# Patient Record
Sex: MALE | Race: WHITE | Employment: OTHER | URBAN - METROPOLITAN AREA
[De-identification: names, ages, dates, MRNs, and addresses within clinical notes are randomized per-mention and may not be internally consistent; named-entity substitution may affect disease eponyms.]

---

## 2017-10-23 ENCOUNTER — APPOINTMENT (OUTPATIENT)
Dept: CT IMAGING | Age: 77
DRG: 200 | End: 2017-10-23
Attending: EMERGENCY MEDICINE
Payer: MEDICARE

## 2017-10-23 ENCOUNTER — APPOINTMENT (OUTPATIENT)
Dept: GENERAL RADIOLOGY | Age: 77
DRG: 200 | End: 2017-10-23
Attending: EMERGENCY MEDICINE
Payer: MEDICARE

## 2017-10-23 ENCOUNTER — HOSPITAL ENCOUNTER (INPATIENT)
Age: 77
LOS: 4 days | Discharge: HOME | DRG: 200 | End: 2017-10-27
Attending: EMERGENCY MEDICINE | Admitting: HOSPITALIST
Payer: MEDICARE

## 2017-10-23 DIAGNOSIS — I10 ESSENTIAL HYPERTENSION: ICD-10-CM

## 2017-10-23 DIAGNOSIS — J90 PLEURAL EFFUSION: ICD-10-CM
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
## 2017-10-23 DIAGNOSIS — S22.41XA CLOSED FRACTURE OF MULTIPLE RIBS OF RIGHT SIDE, INITIAL ENCOUNTER: ICD-10-CM

## 2017-10-23 DIAGNOSIS — S27.321A CONTUSION OF RIGHT LUNG, INITIAL ENCOUNTER: ICD-10-CM

## 2017-10-23 DIAGNOSIS — R06.02 SHORTNESS OF BREATH: ICD-10-CM

## 2017-10-23 DIAGNOSIS — S27.0XXA TRAUMATIC PNEUMOTHORAX WITHOUT OPEN WOUND INTO THORAX, INITIAL ENCOUNTER: ICD-10-CM

## 2017-10-23 DIAGNOSIS — J93.9 PNEUMOTHORAX ON RIGHT: ICD-10-CM

## 2017-10-23 DIAGNOSIS — I48.91 ATRIAL FIBRILLATION, UNSPECIFIED TYPE (HCC): ICD-10-CM

## 2017-10-23 DIAGNOSIS — S22.41XD CLOSED FRACTURE OF MULTIPLE RIBS OF RIGHT SIDE WITH ROUTINE HEALING, SUBSEQUENT ENCOUNTER: ICD-10-CM

## 2017-10-23 PROBLEM — E87.5 HYPERKALEMIA: Status: ACTIVE | Noted: 2017-10-23

## 2017-10-23 PROBLEM — S22.49XA MULTIPLE RIB FRACTURES: Status: ACTIVE | Noted: 2017-10-23

## 2017-10-23 LAB
ANION GAP SERPL CALC-SCNC: 10 MMOL/L (ref 7–16)
APPEARANCE UR: CLEAR
BACTERIA URNS QL MICRO: 0 /HPF
BASOPHILS # BLD: 0 K/UL (ref 0–0.2)
BASOPHILS NFR BLD: 0 % (ref 0–2)
BILIRUB UR QL: NEGATIVE
BNP SERPL-MCNC: 430 PG/ML
BUN SERPL-MCNC: 17 MG/DL (ref 8–23)
CALCIUM SERPL-MCNC: 9.4 MG/DL (ref 8.3–10.4)
CASTS URNS QL MICRO: ABNORMAL /LPF
CHLORIDE SERPL-SCNC: 98 MMOL/L (ref 98–107)
CO2 SERPL-SCNC: 26 MMOL/L (ref 21–32)
COLOR UR: YELLOW
CREAT SERPL-MCNC: 1.34 MG/DL (ref 0.8–1.5)
CRP SERPL-MCNC: 0.9 MG/DL (ref 0–0.9)
DIFFERENTIAL METHOD BLD: ABNORMAL
EOSINOPHIL # BLD: 0 K/UL (ref 0–0.8)
EOSINOPHIL NFR BLD: 0 % (ref 0.5–7.8)
EPI CELLS #/AREA URNS HPF: 0 /HPF
ERYTHROCYTE [DISTWIDTH] IN BLOOD BY AUTOMATED COUNT: 13.1 % (ref 11.9–14.6)
GLUCOSE SERPL-MCNC: 129 MG/DL (ref 65–100)
GLUCOSE UR STRIP.AUTO-MCNC: NEGATIVE MG/DL
HCT VFR BLD AUTO: 41.4 % (ref 41.1–50.3)
HGB BLD-MCNC: 14.3 G/DL (ref 13.6–17.2)
HGB UR QL STRIP: NEGATIVE
IMM GRANULOCYTES # BLD: 0 K/UL (ref 0–0.5)
IMM GRANULOCYTES NFR BLD: 0 % (ref 0–5)
KETONES UR QL STRIP.AUTO: NEGATIVE MG/DL
LACTATE BLD-SCNC: 2 MMOL/L (ref 0.5–1.9)
LEUKOCYTE ESTERASE UR QL STRIP.AUTO: NEGATIVE
LYMPHOCYTES # BLD: 0.4 K/UL (ref 0.5–4.6)
LYMPHOCYTES NFR BLD: 4 % (ref 13–44)
MAGNESIUM SERPL-MCNC: 1.8 MG/DL (ref 1.8–2.4)
MCH RBC QN AUTO: 31.9 PG (ref 26.1–32.9)
MCHC RBC AUTO-ENTMCNC: 34.5 G/DL (ref 31.4–35)
MCV RBC AUTO: 92.4 FL (ref 79.6–97.8)
MONOCYTES # BLD: 0.6 K/UL (ref 0.1–1.3)
MONOCYTES NFR BLD: 6 % (ref 4–12)
NEUTS SEG # BLD: 9.4 K/UL (ref 1.7–8.2)
NEUTS SEG NFR BLD: 90 % (ref 43–78)
NITRITE UR QL STRIP.AUTO: NEGATIVE
PH UR STRIP: 6.5 [PH] (ref 5–9)
PHOSPHATE SERPL-MCNC: 3.6 MG/DL (ref 2.3–3.7)
PLATELET # BLD AUTO: 230 K/UL (ref 150–450)
PMV BLD AUTO: 9.8 FL (ref 10.8–14.1)
POTASSIUM SERPL-SCNC: 5.2 MMOL/L (ref 3.5–5.1)
PROT UR STRIP-MCNC: 30 MG/DL
RBC # BLD AUTO: 4.48 M/UL (ref 4.23–5.67)
RBC #/AREA URNS HPF: ABNORMAL /HPF
SODIUM SERPL-SCNC: 134 MMOL/L (ref 136–145)
SP GR UR REFRACTOMETRY: 1.02 (ref 1–1.02)
TROPONIN I BLD-MCNC: 0.02 NG/ML (ref 0.02–0.05)
TSH SERPL DL<=0.005 MIU/L-ACNC: 2.31 UIU/ML (ref 0.36–3.74)
UROBILINOGEN UR QL STRIP.AUTO: 0.2 EU/DL (ref 0.2–1)
WBC # BLD AUTO: 10.5 K/UL (ref 4.3–11.1)
WBC URNS QL MICRO: 0 /HPF

## 2017-10-23 PROCEDURE — 65610000001 HC ROOM ICU GENERAL

## 2017-10-23 PROCEDURE — 96375 TX/PRO/DX INJ NEW DRUG ADDON: CPT | Performed by: EMERGENCY MEDICINE

## 2017-10-23 PROCEDURE — 96361 HYDRATE IV INFUSION ADD-ON: CPT | Performed by: EMERGENCY MEDICINE

## 2017-10-23 PROCEDURE — 83880 ASSAY OF NATRIURETIC PEPTIDE: CPT | Performed by: HOSPITALIST

## 2017-10-23 PROCEDURE — 81001 URINALYSIS AUTO W/SCOPE: CPT | Performed by: HOSPITALIST

## 2017-10-23 PROCEDURE — 86140 C-REACTIVE PROTEIN: CPT | Performed by: HOSPITALIST

## 2017-10-23 PROCEDURE — 85025 COMPLETE CBC W/AUTO DIFF WBC: CPT | Performed by: EMERGENCY MEDICINE

## 2017-10-23 PROCEDURE — 74011000258 HC RX REV CODE- 258: Performed by: HOSPITALIST

## 2017-10-23 PROCEDURE — 84443 ASSAY THYROID STIM HORMONE: CPT | Performed by: HOSPITALIST

## 2017-10-23 PROCEDURE — 74011250637 HC RX REV CODE- 250/637: Performed by: HOSPITALIST

## 2017-10-23 PROCEDURE — 71010 XR CHEST PORT: CPT

## 2017-10-23 PROCEDURE — 83735 ASSAY OF MAGNESIUM: CPT | Performed by: HOSPITALIST

## 2017-10-23 PROCEDURE — 74011636320 HC RX REV CODE- 636/320: Performed by: EMERGENCY MEDICINE

## 2017-10-23 PROCEDURE — 83605 ASSAY OF LACTIC ACID: CPT

## 2017-10-23 PROCEDURE — 84484 ASSAY OF TROPONIN QUANT: CPT

## 2017-10-23 PROCEDURE — 74011250636 HC RX REV CODE- 250/636: Performed by: HOSPITALIST

## 2017-10-23 PROCEDURE — 74011250636 HC RX REV CODE- 250/636: Performed by: EMERGENCY MEDICINE

## 2017-10-23 PROCEDURE — 96374 THER/PROPH/DIAG INJ IV PUSH: CPT | Performed by: EMERGENCY MEDICINE

## 2017-10-23 PROCEDURE — 74011000258 HC RX REV CODE- 258: Performed by: EMERGENCY MEDICINE

## 2017-10-23 PROCEDURE — 84145 PROCALCITONIN (PCT): CPT | Performed by: HOSPITALIST

## 2017-10-23 PROCEDURE — 80048 BASIC METABOLIC PNL TOTAL CA: CPT | Performed by: EMERGENCY MEDICINE

## 2017-10-23 PROCEDURE — 99285 EMERGENCY DEPT VISIT HI MDM: CPT | Performed by: EMERGENCY MEDICINE

## 2017-10-23 PROCEDURE — 84100 ASSAY OF PHOSPHORUS: CPT | Performed by: HOSPITALIST

## 2017-10-23 PROCEDURE — 96376 TX/PRO/DX INJ SAME DRUG ADON: CPT | Performed by: EMERGENCY MEDICINE

## 2017-10-23 PROCEDURE — 87040 BLOOD CULTURE FOR BACTERIA: CPT | Performed by: HOSPITALIST

## 2017-10-23 PROCEDURE — 93005 ELECTROCARDIOGRAM TRACING: CPT | Performed by: EMERGENCY MEDICINE

## 2017-10-23 PROCEDURE — 71260 CT THORAX DX C+: CPT

## 2017-10-23 RX ORDER — MORPHINE SULFATE 10 MG/ML
5 INJECTION, SOLUTION INTRAMUSCULAR; INTRAVENOUS
Status: COMPLETED | OUTPATIENT
Start: 2017-10-23 | End: 2017-10-23

## 2017-10-23 RX ORDER — NALOXONE HYDROCHLORIDE 0.4 MG/ML
0.4 INJECTION, SOLUTION INTRAMUSCULAR; INTRAVENOUS; SUBCUTANEOUS AS NEEDED
Status: DISCONTINUED | OUTPATIENT
Start: 2017-10-23 | End: 2017-10-27 | Stop reason: HOSPADM

## 2017-10-23 RX ORDER — BISACODYL 5 MG
5 TABLET, DELAYED RELEASE (ENTERIC COATED) ORAL DAILY PRN
Status: DISCONTINUED | OUTPATIENT
Start: 2017-10-23 | End: 2017-10-27 | Stop reason: HOSPADM

## 2017-10-23 RX ORDER — ACETAMINOPHEN 325 MG/1
650 TABLET ORAL
Status: DISCONTINUED | OUTPATIENT
Start: 2017-10-23 | End: 2017-10-27 | Stop reason: HOSPADM

## 2017-10-23 RX ORDER — CARVEDILOL 3.12 MG/1
3.12 TABLET ORAL 2 TIMES DAILY WITH MEALS
Status: DISCONTINUED | OUTPATIENT
Start: 2017-10-23 | End: 2017-10-27 | Stop reason: HOSPADM

## 2017-10-23 RX ORDER — HYDROMORPHONE HYDROCHLORIDE 1 MG/ML
0.5 INJECTION, SOLUTION INTRAMUSCULAR; INTRAVENOUS; SUBCUTANEOUS
Status: COMPLETED | OUTPATIENT
Start: 2017-10-23 | End: 2017-10-23

## 2017-10-23 RX ORDER — SODIUM CHLORIDE 0.9 % (FLUSH) 0.9 %
5-10 SYRINGE (ML) INJECTION AS NEEDED
Status: DISCONTINUED | OUTPATIENT
Start: 2017-10-23 | End: 2017-10-27 | Stop reason: HOSPADM

## 2017-10-23 RX ORDER — AMLODIPINE BESYLATE 10 MG/1
10 TABLET ORAL DAILY
Status: DISCONTINUED | OUTPATIENT
Start: 2017-10-24 | End: 2017-10-27 | Stop reason: HOSPADM

## 2017-10-23 RX ORDER — HYDROMORPHONE HYDROCHLORIDE 1 MG/ML
0.5 INJECTION, SOLUTION INTRAMUSCULAR; INTRAVENOUS; SUBCUTANEOUS
Status: DISCONTINUED | OUTPATIENT
Start: 2017-10-23 | End: 2017-10-27 | Stop reason: HOSPADM

## 2017-10-23 RX ORDER — HYDRALAZINE HYDROCHLORIDE 20 MG/ML
10 INJECTION INTRAMUSCULAR; INTRAVENOUS
Status: DISCONTINUED | OUTPATIENT
Start: 2017-10-23 | End: 2017-10-27 | Stop reason: HOSPADM

## 2017-10-23 RX ORDER — SODIUM CHLORIDE 0.9 % (FLUSH) 0.9 %
5-10 SYRINGE (ML) INJECTION EVERY 8 HOURS
Status: DISCONTINUED | OUTPATIENT
Start: 2017-10-23 | End: 2017-10-27 | Stop reason: HOSPADM

## 2017-10-23 RX ORDER — ONDANSETRON 2 MG/ML
4 INJECTION INTRAMUSCULAR; INTRAVENOUS
Status: COMPLETED | OUTPATIENT
Start: 2017-10-23 | End: 2017-10-23

## 2017-10-23 RX ORDER — HYDROCODONE BITARTRATE AND ACETAMINOPHEN 5; 325 MG/1; MG/1
1 TABLET ORAL
Status: DISCONTINUED | OUTPATIENT
Start: 2017-10-23 | End: 2017-10-27 | Stop reason: HOSPADM

## 2017-10-23 RX ORDER — ALBUTEROL SULFATE 0.83 MG/ML
2.5 SOLUTION RESPIRATORY (INHALATION)
Status: DISCONTINUED | OUTPATIENT
Start: 2017-10-23 | End: 2017-10-27 | Stop reason: HOSPADM

## 2017-10-23 RX ORDER — SODIUM CHLORIDE 0.9 % (FLUSH) 0.9 %
10 SYRINGE (ML) INJECTION
Status: COMPLETED | OUTPATIENT
Start: 2017-10-23 | End: 2017-10-23

## 2017-10-23 RX ORDER — HEPARIN SODIUM 5000 [USP'U]/ML
5000 INJECTION, SOLUTION INTRAVENOUS; SUBCUTANEOUS EVERY 8 HOURS
Status: DISCONTINUED | OUTPATIENT
Start: 2017-10-23 | End: 2017-10-27 | Stop reason: HOSPADM

## 2017-10-23 RX ADMIN — AZITHROMYCIN MONOHYDRATE 500 MG: 500 INJECTION, POWDER, LYOPHILIZED, FOR SOLUTION INTRAVENOUS at 22:35

## 2017-10-23 RX ADMIN — CARVEDILOL 3.12 MG: 3.12 TABLET, FILM COATED ORAL at 22:34

## 2017-10-23 RX ADMIN — CEFTRIAXONE 1 G: 1 INJECTION, POWDER, FOR SOLUTION INTRAMUSCULAR; INTRAVENOUS at 22:35

## 2017-10-23 RX ADMIN — IOPAMIDOL 80 ML: 755 INJECTION, SOLUTION INTRAVENOUS at 17:34

## 2017-10-23 RX ADMIN — SODIUM CHLORIDE 1000 ML: 900 INJECTION, SOLUTION INTRAVENOUS at 16:09

## 2017-10-23 RX ADMIN — ONDANSETRON 4 MG: 2 INJECTION INTRAMUSCULAR; INTRAVENOUS at 16:09

## 2017-10-23 RX ADMIN — Medication 10 ML: at 22:36

## 2017-10-23 RX ADMIN — ONDANSETRON 4 MG: 2 INJECTION INTRAMUSCULAR; INTRAVENOUS at 18:43

## 2017-10-23 RX ADMIN — Medication 10 ML: at 17:35

## 2017-10-23 RX ADMIN — MORPHINE SULFATE 5 MG: 10 INJECTION INTRAMUSCULAR; INTRAVENOUS; SUBCUTANEOUS at 16:10

## 2017-10-23 RX ADMIN — HYDROMORPHONE HYDROCHLORIDE 0.5 MG: 1 INJECTION, SOLUTION INTRAMUSCULAR; INTRAVENOUS; SUBCUTANEOUS at 18:44

## 2017-10-23 RX ADMIN — HYDRALAZINE HYDROCHLORIDE 10 MG: 20 INJECTION INTRAMUSCULAR; INTRAVENOUS at 22:45

## 2017-10-23 RX ADMIN — SODIUM CHLORIDE 100 ML: 900 INJECTION, SOLUTION INTRAVENOUS at 17:35

## 2017-10-23 RX ADMIN — HEPARIN SODIUM 5000 UNITS: 5000 INJECTION, SOLUTION INTRAVENOUS; SUBCUTANEOUS at 22:34

## 2017-10-23 NOTE — IP AVS SNAPSHOT
[address] [place]  
[phone] Patient: Alexandro Hoff MRN: [id number] OYM:[id number] About your hospitalization You were admitted on:  October 23, 2017 You last received care in the:  [facility] You were discharged on:  October 27, 2017 Why you were hospitalized Your primary diagnosis was:  Pneumothorax, Closed, Traumatic Your diagnoses also included:  Shortness Of Breath, Multiple Rib Fractures, Htn (Hypertension), Anemia, Hyperkalemia, Atrial Fibrillation (Hcc), Hld (Hyperlipidemia), Pulmonary Contusion, Bilateral Pulmonary Infiltrates On Chest X-Ray Things You Need To Do (next 8 weeks) Follow up with Kushal Reaves MD in 1 week(s) OFFICE CLOSED CALL ON MONDAY TO SCHEDULE APPT. Phone:  [phone] Where:  [address] [address] Discharge Orders None A check chip indicates which time of day the medication should be taken. My Medications TAKE these medications as instructed Instructions Each Dose to Equal  
 Morning Noon Evening Bedtime  
 amLODIPine 10 mg tablet Commonly known as:  Jas Tomlin Your last dose was: Your next dose is: Take 1 Tab by mouth daily. 10 mg  
    
   
   
   
  
 carvedilol 3.125 mg tablet Commonly known as:  Omer Gonsalez Your last dose was: Your next dose is: Take 1 Tab by mouth two (2) times daily (with meals) for 30 days. 3.125 mg  
    
   
   
   
  
 doxycycline 100 mg tablet Commonly known as:  ADOXA Your last dose was: Your next dose is: Take 1 Tab by mouth two (2) times a day for 10 days. 100 mg HYDROcodone-acetaminophen 5-325 mg per tablet Commonly known as:  Karen Lozano Your last dose was: Your next dose is: Take 1 Tab by mouth every four (4) hours as needed for up to 5 days. Max Daily Amount: 6 Tabs. 1 Tab  
    
   
   
   
  
 lisinopril 10 mg tablet Commonly known as:  Lisa Oneil Start taking on:  10/28/2017 Your last dose was: Your next dose is: Take 1 Tab by mouth daily for 30 days. 10 mg Where to Get Your Medications These medications were sent to [address]  [address] [address] Phone:  [phone]  
  carvedilol 3.125 mg tablet  
 doxycycline 100 mg tablet  
 lisinopril 10 mg tablet Information on where to get these meds will be given to you by the nurse or doctor. ! Ask your nurse or doctor about these medications HYDROcodone-acetaminophen 5-325 mg per tablet Discharge Instructions DISCHARGE SUMMARY from Nurse PATIENT INSTRUCTIONS: 
 
After general anesthesia or intravenous sedation, for 24 hours or while taking prescription Narcotics: · Limit your activities · Do not drive and operate hazardous machinery · Do not make important personal or business decisions · Do  not drink alcoholic beverages · If you have not urinated within 8 hours after discharge, please contact your surgeon on call. Report the following to your surgeon: 
· Excessive pain, swelling, redness or odor of or around the surgical area · Temperature over 100.5 · Nausea and vomiting lasting longer than 4 hours or if unable to take medications · Any signs of decreased circulation or nerve impairment to extremity: change in color, persistent  numbness, tingling, coldness or increase pain · Any questions What to do at Home: 
Recommended activity: Activity as tolerated and no driving while on pain meds If you experience any of the following symptoms please see discharge instructions, please follow up with primary care MD. 
 
*  Please give a list of your current medications to your Primary Care Provider. *  Please update this list whenever your medications are discontinued, doses are 
    changed, or new medications (including over-the-counter products) are added. *  Please carry medication information at all times in case of emergency situations. These are general instructions for a healthy lifestyle: No smoking/ No tobacco products/ Avoid exposure to second hand smoke Surgeon General's Warning:  Quitting smoking now greatly reduces serious risk to your health. Obesity, smoking, and sedentary lifestyle greatly increases your risk for illness A healthy diet, regular physical exercise & weight monitoring are important for maintaining a healthy lifestyle You may be retaining fluid if you have a history of heart failure or if you experience any of the following symptoms:  Weight gain of 3 pounds or more overnight or 5 pounds in a week, increased swelling in our hands or feet or shortness of breath while lying flat in bed. Please call your doctor as soon as you notice any of these symptoms; do not wait until your next office visit. Recognize signs and symptoms of STROKE: 
 
F-face looks uneven A-arms unable to move or move unevenly S-speech slurred or non-existent T-time-call 911 as soon as signs and symptoms begin-DO NOT go Back to bed or wait to see if you get better-TIME IS BRAIN. Warning Signs of HEART ATTACK Call 911 if you have these symptoms: 
? Chest discomfort. Most heart attacks involve discomfort in the center of the chest that lasts more than a few minutes, or that goes away and comes back. It can feel like uncomfortable pressure, squeezing, fullness, or pain. ? Discomfort in other areas of the upper body. Symptoms can include pain or discomfort in one or both arms, the back, neck, jaw, or stomach. ? Shortness of breath with or without chest discomfort. ? Other signs may include breaking out in a cold sweat, nausea, or lightheadedness. Don't wait more than five minutes to call [address]! Fast action can save your life. Calling 911 is almost always the fastest way to get lifesaving treatment. Emergency Medical Services staff can begin treatment when they arrive  up to an hour sooner than if someone gets to the hospital by car. The discharge information has been reviewed with the patient. The patient verbalized understanding. Discharge medications reviewed with the patient and appropriate educational materials and side effects teaching were provided. ___________________________________________________________________________________________________________________________________ [employer] Announcement We are excited to announce that we are making your provider's discharge notes available to you in Ziploop. You will see these notes when they are completed and signed by the physician that discharged you from your recent hospital stay. If you have any questions or concerns about any information you see in [employer], please call the Health Information Department where you were seen or reach out to your Primary Care Provider for more information about your plan of care. Introducing [facility]! Edilberto Thomas introduces Ziploop patient portal. Now you can access parts of your medical record, email your doctor's office, and request medication refills online. 1. In your internet browser, go to [email or web address] 2. Click on the First Time User? Click Here link in the Sign In box. You will see the New Member Sign Up page. 3. Enter your Ziploop Access Code exactly as it appears below. You will not need to use this code after youve completed the sign-up process. If you do not sign up before the expiration date, you must request a new code. · [employer] Access Code: [email or web address] Expires: 1/21/2018  3:40 PM 
 
4. Enter the last four digits of your Social Security Number (xxxx) and Date of Birth (mm/dd/yyyy) as indicated and click Submit. You will be taken to the next sign-up page. 5. Create a [employer] ID. This will be your [employer] login ID and cannot be changed, so think of one that is secure and easy to remember. 6. Create a [employer] password. You can change your password at any time. 7. Enter your Password Reset Question and Answer. This can be used at a later time if you forget your password. 8. Enter your e-mail address. You will receive e-mail notification when new information is available in [address] 9. Click Sign Up. You can now view and download portions of your medical record. 10. Click the Download Summary menu link to download a portable copy of your medical information. If you have questions, please visit the Frequently Asked Questions section of the [employer] website. Remember, [employer] is NOT to be used for urgent needs. For medical emergencies, dial 911. Now available from your iPhone and Android! Unresulted Labs-Please follow up with your PCP about these lab tests Order Current Status CULTURE, BLOOD Preliminary result CULTURE, BLOOD Preliminary result Providers Seen During Your Hospitalization Provider Specialty Primary office phone [address] [facility], [place] Emergency Medicine [phone] Estrella Guillen MD Emergency Medicine [phone] Elder Crawford MD Internal Medicine [phone] Yaneli Calhoun DO Internal Medicine [phone] Your Primary Care Physician (PCP) Primary Care Physician Office Phone Office Fax Татьяна Watts [phone] You are allergic to the following No active allergies Recent Documentation Height Weight BMI Smoking Status 1.727 m 76.8 kg 25.74 kg/m2 Former Smoker Emergency Contacts Name Discharge Info Relation Home Work Mobile Alt Akilah Galaviz  Child [2] Patient Belongings The following personal items are in your possession at time of discharge: 
  Dental Appliances: None  Visual Aid: Glasses, With patient      Home Medications: None   Jewelry: None  Clothing: Undergarments, Footwear, Pajamas, With patient, At bedside, Hat    Other Valuables: John Siegel  Personal Items Sent to Safe: NONE Discharge Instructions Attachments/References RIB FRACTURE (ENGLISH) PNEUMONIA (ENGLISH) Patient Handouts Broken Rib: Care Instructions Your Care Instructions A broken rib is a crack or break in one of the bones of the rib cage. Breathing can be very painful because the muscles used for breathing pull on the rib. In most cases, a broken rib will heal on its own. You can take pain medicine while the rib mends. Pain relief allows you to take deep breaths. In the past, doctors recommended taping or wrapping broken ribs. This is no longer done because taping makes it hard for you to take deep breaths. Taking deep breaths may help prevent pneumonia or a partial collapse of a lung. Your rib will heal in about 6 weeks. You heal best when you take good care of yourself. Eat a variety of healthy foods, and don't smoke. Follow-up care is a key part of your treatment and safety. Be sure to make and go to all appointments, and call your doctor if you are having problems. It's also a good idea to know your test results and keep a list of the medicines you take. How can you care for yourself at home? · Be safe with medicines. Read and follow all instructions on the label. ¨ If the doctor gave you a prescription medicine for pain, take it as prescribed. ¨ If you are not taking a prescription pain medicine, ask your doctor if you can take an over-the-counter medicine.  
· Even if it hurts, try to cough or take the deepest breath you can at least once every hour. This will get air deeply into your lungs. This may reduce your chance of getting pneumonia or a partial collapse of a lung. Hold a pillow against your chest to make this less painful. · Put ice or a cold pack on the area for 10 to 20 minutes at a time. Put a thin cloth between the ice and your skin. When should you call for help? Call 911 anytime you think you may need emergency care. For example, call if: 
? · You have severe trouble breathing. ?Call your doctor now or seek immediate medical care if: 
? · You have some trouble breathing. ? · You have a fever. ? · You have a new or worse cough. ? Watch closely for changes in your health, and be sure to contact your doctor if: 
? · You have pain even after taking your medicine. ? · You do not get better as expected. Where can you learn more? Go to http://shivani-saad.info/. Enter M135 in the search box to learn more about \"Broken Rib: Care Instructions. \" Current as of: March 21, 2017 Content Version: 11.4 © 2891-0481 [employer]. Care instructions adapted under license by [employer] (which disclaims liability or warranty for this information). If you have questions about a medical condition or this instruction, always ask your healthcare professional. [address] any warranty or liability for your use of this information. Pneumonia: Care Instructions Your Care Instructions Pneumonia is an infection of the lungs. Most cases are caused by infections from bacteria or viruses. Pneumonia may be mild or very severe. If it is caused by bacteria, you will be treated with antibiotics. It may take a few weeks to a few months to recover fully from pneumonia, depending on how sick you were and whether your overall health is good. Follow-up care is a key part of your treatment and safety.  Be sure to make and go to all appointments, and call your doctor if you are having problems. It's also a good idea to know your test results and keep a list of the medicines you take. How can you care for yourself at home? · Take your antibiotics exactly as directed. Do not stop taking the medicine just because you are feeling better. You need to take the full course of antibiotics. · Take your medicines exactly as prescribed. Call your doctor if you think you are having a problem with your medicine. · Get plenty of rest and sleep. You may feel weak and tired for a while, but your energy level will improve with time. · To prevent dehydration, drink plenty of fluids, enough so that your urine is light yellow or clear like water. Choose water and other caffeine-free clear liquids until you feel better. If you have kidney, heart, or liver disease and have to limit fluids, talk with your doctor before you increase the amount of fluids you drink. · Take care of your cough so you can rest. A cough that brings up mucus from your lungs is common with pneumonia. It is one way your body gets rid of the infection. But if coughing keeps you from resting or causes severe fatigue and chest-wall pain, talk to your doctor. He or she may suggest that you take a medicine to reduce the cough. · Use a vaporizer or humidifier to add moisture to your bedroom. Follow the directions for cleaning the machine. · Do not smoke or allow others to smoke around you. Smoke will make your cough last longer. If you need help quitting, talk to your doctor about stop-smoking programs and medicines. These can increase your chances of quitting for good. · Take an over-the-counter pain medicine, such as acetaminophen (Tylenol), ibuprofen (Advil, Motrin), or naproxen (Aleve). Read and follow all instructions on the label. · Do not take two or more pain medicines at the same time unless the doctor told you to.  Many pain medicines have acetaminophen, which is Tylenol. Too much acetaminophen (Tylenol) can be harmful. · If you were given a spirometer to measure how well your lungs are working, use it as instructed. This can help your doctor tell how your recovery is going. · To prevent pneumonia in the future, talk to your doctor about getting a flu vaccine (once a year) and a pneumococcal vaccine (one time only for most people). When should you call for help? Call 911 anytime you think you may need emergency care. For example, call if: 
? · You have severe trouble breathing. ?Call your doctor now or seek immediate medical care if: 
? · You cough up dark brown or bloody mucus (sputum). ? · You have new or worse trouble breathing. ? · You are dizzy or lightheaded, or you feel like you may faint. ? Watch closely for changes in your health, and be sure to contact your doctor if: 
? · You have a new or higher fever. ? · You are coughing more deeply or more often. ? · You are not getting better after 2 days (48 hours). ? · You do not get better as expected. Where can you learn more? Go to http://shivani-saad.info/. Enter [id number] in the search box to learn more about \"Pneumonia: Care Instructions. \" Current as of: May 12, 2017 Content Version: 11.4 © 7982-9046 [employer]wise[employer] Incorporated. Care instructions adapted under license by [employer] (which disclaims liability or warranty for this information). If you have questions about a medical condition or this instruction, always ask your healthcare professional. [address] [address] any warranty or liability for your use of this information. Please provide this summary of care documentation to your next provider. Signatures-by signing, you are acknowledging that this After Visit Summary has been reviewed with you and you have received a copy. Patient Signature:  ____________________________________________________________ Date:  ____________________________________________________________  
  
Kinyarwanda Pih Provider Signature:  ____________________________________________________________ Date:  ____________________________________________________________

## 2017-10-23 NOTE — IP AVS SNAPSHOT
[address] 
[phone] Patient: Dahlia Alvarado MRN: [id number] UND:[birth year] My Medications TAKE these medications as instructed Instructions Each Dose to Equal  
 Morning Noon Evening Bedtime  
 amLODIPine 10 mg tablet Commonly known as:  Tahir Driscoll Your last dose was: Your next dose is: Take 1 Tab by mouth daily. 10 mg  
    
   
   
   
  
 carvedilol 3.125 mg tablet Commonly known as:  Will Alexander Your last dose was: Your next dose is: Take 1 Tab by mouth two (2) times daily (with meals) for 30 days. 3.125 mg  
    
   
   
   
  
 doxycycline 100 mg tablet Commonly known as:  ADOXA Your last dose was: Your next dose is: Take 1 Tab by mouth two (2) times a day for 10 days. 100 mg HYDROcodone-acetaminophen 5-325 mg per tablet Commonly known as:  Martha Fothergill Your last dose was: Your next dose is: Take 1 Tab by mouth every four (4) hours as needed for up to 5 days. Max Daily Amount: 6 Tabs. 1 Tab  
    
   
   
   
  
 lisinopril 10 mg tablet Commonly known as:  Chris Alberto Start taking on:  10/28/2017 Your last dose was: Your next dose is: Take 1 Tab by mouth daily for 30 days. 10 mg Where to Get Your Medications These medications were sent to [address]  [address] [place] [address] Way [address] Phone:  [phone]  
  carvedilol 3.125 mg tablet  
 doxycycline 100 mg tablet  
 lisinopril 10 mg tablet Information on where to get these meds will be given to you by the nurse or doctor. ! Ask your nurse or doctor about these medications HYDROcodone-acetaminophen 5-325 mg per tablet

## 2017-10-23 NOTE — ED PROVIDER NOTES
HPI:  76 M, here with Rt chest wall pain. [place] while going to the bathroom yesterday. Pain in the left lateral ribs. Worsen when taking deep breaths. Slept upright last night. This morning woke up pain became worse. Caused him to pass out. Woke up call EMS and was brought here. Complain of pain in the right lateral ribs. No hemoptysis. No LOC. No other injury. Not on blood thinner. Has had multiple prior rib fractures in the past    ROS  Constitutional: No fever, no chills  Skin: no rash  Eye: No vision changes  ENMT: No sore throat, no congestion  Respiratory: +shortness of breath, no cough  Cardiovascular: + chest pain, no palpitations  Gastrointestinal: No vomiting, no nausea, no diarrhea, no constipation, no rectal bleeding, no abdominal pain  : No dysuria, no hematuria  MSK: No back pain, no muscle pain, no joint pain  Neuro: No headache, no change in mental status, no numbness, no tingling, no weakness  Psych: No anxiety, no depression  Endocrine: No hyperglycemia  All other review of systems positive per history of present illness and the above otherwise negative or noncontributory. Visit Vitals    BP (!) 210/96     Past Medical History:   Diagnosis Date    Allergic rhinitis     Fractured pelvis ([address] 2006    [place] off of roof.  History of chicken pox Childhood    Scarlet fever Childhood    Zenker's diverticulum     In the throat. Past Surgical History:   Procedure Laterality Date    HX MALIGNANT SKIN LESION EXCISION      Multiple Basal Cells.  HX VASECTOMY       Prior to Admission Medications   Prescriptions Last Dose Informant Patient Reported? Taking? amLODIPine (NORVASC) 10 mg tablet   No No   Sig: Take 1 Tab by mouth daily.       Facility-Administered Medications: None         Adult Exam   General: alert, no acute distress  Head: normocephalic, atraumatic  ENT: moist mucous membranes  Neck: supple, non-tender; full range of motion  Cardiovascular: regular rate and rhythm, normal peripheral perfusion, no edema. Equal radial pulses  Respiratory: splinting on exam.  Appear to have pain on the right lateral ribs when taking deep breath. No paradoxical chest wall movement. No obvious diminished breath sounds noted on exam  Right lateral chest wall tenderness to palpation. Gastrointestinal: soft, non-tender; no rebound or guarding, no peritoneal signs, no distension  Back: non-tender, full range of motion  Musculoskeletal: normal range of motion, normal strength, no gross deformities  Neurological: alert and oriented x 4, no gross focal deficits; normal speech  Psychiatric: cooperative; appropriate mood and affect    MDM: patient here after a fall. Pain in the right chest wall. Concern for rib fracture, pneumothorax. Vital signs are stable but he is hypertensive. Likely 2/2 pain. KG here with A. Fib rate of 84. No ST elevation is suspicious for acute STEMI. Nonspecific T-wave flattening noted. Prior EKG showing atrial flutter atrial fibrillation. Review of EMR patient used to see Dr. Anthony Gauthier. Heart but does not appear to have any cardiologist.  He has not seen Dr. Alejandrina Isaac for 2-1/2 years due to change in insurance was told that the office no longer takes his insurance. No chest x-ray was obtained. No signs of fracture, pneumothorax or bilateral Infiltrate noted. This also cardiomegaly with pulmonary vascular congestion noted. There is also trace subcutaneous gas noted without signs of pneumothorax. I will follow up with CT chest with IV contrast suffered assessment of fracture, pneumothorax, infiltrate.       Xr Chest Port    Result Date: 10/23/2017  Chest portable CLINICAL INDICATION: Acute right side chest pain, reported rib fracture one month ago, fall injury today worsening pain COMPARISON: None TECHNIQUE: single AP portable view chest at 3:30 PM upright FINDINGS: The lung volumes are shallow leading to crowding and there are multiple external monitoring wires causing artifact. There is trace subcutaneous gas within lateral right chest wall of unclear significance. The bone density is low throughout limiting the sensitivity for subtle osseous lesions. Cardiac silhouette is enlarged. Moderate bibasilar patchy and groundglass infiltrates. Bilateral perihilar vascular indistinctness. No evidence of a large pleural effusion. No evidence of a displaced fracture. Spine not well visualized. IMPRESSION: 1. Cardiomegaly and pulmonary vascular congestion. 2. Bibasilar infiltrates. 3. No pneumothorax. Trace subcutaneous gas noted. Dragon voice recognition software was used to create this note. Although the note has been reviewed and corrected where necessary, additional errors may have been overlooked and remain in the text. 7:25 PM  CT scan of chest shows multiple right-sided rib fractures, right pleural effusion, small trace pneumothorax. Patient still requiring some supplemental oxygen. Otherwise he is hemodynamically stable.  Case has been discussed with hospitalist for admission

## 2017-10-24 ENCOUNTER — APPOINTMENT (OUTPATIENT)
Dept: GENERAL RADIOLOGY | Age: 77
DRG: 200 | End: 2017-10-24
Attending: HOSPITALIST
Payer: MEDICARE

## 2017-10-24 PROBLEM — S27.329A PULMONARY CONTUSION: Status: ACTIVE | Noted: 2017-10-24

## 2017-10-24 LAB
ALBUMIN SERPL-MCNC: 3 G/DL (ref 3.2–4.6)
ALBUMIN/GLOB SERPL: 0.6 {RATIO} (ref 1.2–3.5)
ALP SERPL-CCNC: 160 U/L (ref 50–136)
ALT SERPL-CCNC: 39 U/L (ref 12–65)
ANION GAP SERPL CALC-SCNC: 10 MMOL/L (ref 7–16)
AST SERPL-CCNC: 27 U/L (ref 15–37)
ATRIAL RATE: 468 BPM
BILIRUB SERPL-MCNC: 1 MG/DL (ref 0.2–1.1)
BUN SERPL-MCNC: 19 MG/DL (ref 8–23)
CALCIUM SERPL-MCNC: 9 MG/DL (ref 8.3–10.4)
CALCULATED R AXIS, ECG10: 26 DEGREES
CALCULATED T AXIS, ECG11: 77 DEGREES
CHLORIDE SERPL-SCNC: 97 MMOL/L (ref 98–107)
CHOLEST SERPL-MCNC: 179 MG/DL
CO2 SERPL-SCNC: 27 MMOL/L (ref 21–32)
CREAT SERPL-MCNC: 1.47 MG/DL (ref 0.8–1.5)
DIAGNOSIS, 93000: NORMAL
ERYTHROCYTE [DISTWIDTH] IN BLOOD BY AUTOMATED COUNT: 13.6 % (ref 11.9–14.6)
GLOBULIN SER CALC-MCNC: 5 G/DL (ref 2.3–3.5)
GLUCOSE SERPL-MCNC: 106 MG/DL (ref 65–100)
HCT VFR BLD AUTO: 40.3 % (ref 41.1–50.3)
HDLC SERPL-MCNC: 70 MG/DL (ref 40–60)
HDLC SERPL: 2.6 {RATIO}
HGB BLD-MCNC: 13.6 G/DL (ref 13.6–17.2)
LDLC SERPL CALC-MCNC: 101.8 MG/DL
LIPID PROFILE,FLP: ABNORMAL
MCH RBC QN AUTO: 32 PG (ref 26.1–32.9)
MCHC RBC AUTO-ENTMCNC: 33.7 G/DL (ref 31.4–35)
MCV RBC AUTO: 94.8 FL (ref 79.6–97.8)
PLATELET # BLD AUTO: 247 K/UL (ref 150–450)
PMV BLD AUTO: 10.2 FL (ref 10.8–14.1)
POTASSIUM SERPL-SCNC: 4.7 MMOL/L (ref 3.5–5.1)
PROCALCITONIN SERPL-MCNC: 0.1 NG/ML
PROT SERPL-MCNC: 8 G/DL (ref 6.3–8.2)
Q-T INTERVAL, ECG07: 364 MS
QRS DURATION, ECG06: 104 MS
QTC CALCULATION (BEZET), ECG08: 430 MS
RBC # BLD AUTO: 4.25 M/UL (ref 4.23–5.67)
SODIUM SERPL-SCNC: 134 MMOL/L (ref 136–145)
TRIGL SERPL-MCNC: 36 MG/DL (ref 35–150)
TROPONIN I SERPL-MCNC: 0.04 NG/ML (ref 0.02–0.05)
VENTRICULAR RATE, ECG03: 84 BPM
VLDLC SERPL CALC-MCNC: 7.2 MG/DL (ref 6–23)
WBC # BLD AUTO: 12.4 K/UL (ref 4.3–11.1)

## 2017-10-24 PROCEDURE — 80061 LIPID PANEL: CPT | Performed by: HOSPITALIST

## 2017-10-24 PROCEDURE — 94640 AIRWAY INHALATION TREATMENT: CPT

## 2017-10-24 PROCEDURE — 77030027138 HC INCENT SPIROMETER -A

## 2017-10-24 PROCEDURE — 71010 XR CHEST SNGL V: CPT

## 2017-10-24 PROCEDURE — 94760 N-INVAS EAR/PLS OXIMETRY 1: CPT

## 2017-10-24 PROCEDURE — 94664 DEMO&/EVAL PT USE INHALER: CPT

## 2017-10-24 PROCEDURE — C8929 TTE W OR WO FOL WCON,DOPPLER: HCPCS

## 2017-10-24 PROCEDURE — 99223 1ST HOSP IP/OBS HIGH 75: CPT | Performed by: INTERNAL MEDICINE

## 2017-10-24 PROCEDURE — 84145 PROCALCITONIN (PCT): CPT | Performed by: INTERNAL MEDICINE

## 2017-10-24 PROCEDURE — 77010033678 HC OXYGEN DAILY

## 2017-10-24 PROCEDURE — 74011000250 HC RX REV CODE- 250: Performed by: INTERNAL MEDICINE

## 2017-10-24 PROCEDURE — 85027 COMPLETE CBC AUTOMATED: CPT | Performed by: HOSPITALIST

## 2017-10-24 PROCEDURE — 65610000001 HC ROOM ICU GENERAL

## 2017-10-24 PROCEDURE — 74011250637 HC RX REV CODE- 250/637: Performed by: INTERNAL MEDICINE

## 2017-10-24 PROCEDURE — 80053 COMPREHEN METABOLIC PANEL: CPT | Performed by: HOSPITALIST

## 2017-10-24 PROCEDURE — 36415 COLL VENOUS BLD VENIPUNCTURE: CPT | Performed by: HOSPITALIST

## 2017-10-24 PROCEDURE — 74011000250 HC RX REV CODE- 250: Performed by: HOSPITALIST

## 2017-10-24 PROCEDURE — 74011000258 HC RX REV CODE- 258: Performed by: HOSPITALIST

## 2017-10-24 PROCEDURE — 74011250637 HC RX REV CODE- 250/637: Performed by: HOSPITALIST

## 2017-10-24 PROCEDURE — 84484 ASSAY OF TROPONIN QUANT: CPT | Performed by: HOSPITALIST

## 2017-10-24 PROCEDURE — 74011250636 HC RX REV CODE- 250/636: Performed by: HOSPITALIST

## 2017-10-24 PROCEDURE — 77030032490 HC SLV COMPR SCD KNE COVD -B

## 2017-10-24 RX ORDER — ONDANSETRON 2 MG/ML
4 INJECTION INTRAMUSCULAR; INTRAVENOUS
Status: DISCONTINUED | OUTPATIENT
Start: 2017-10-24 | End: 2017-10-27 | Stop reason: HOSPADM

## 2017-10-24 RX ORDER — LISINOPRIL 5 MG/1
10 TABLET ORAL DAILY
Status: DISCONTINUED | OUTPATIENT
Start: 2017-10-24 | End: 2017-10-27 | Stop reason: HOSPADM

## 2017-10-24 RX ORDER — ALBUTEROL SULFATE 0.83 MG/ML
1.25 SOLUTION RESPIRATORY (INHALATION)
Status: DISCONTINUED | OUTPATIENT
Start: 2017-10-24 | End: 2017-10-25

## 2017-10-24 RX ADMIN — AMLODIPINE BESYLATE 10 MG: 10 TABLET ORAL at 08:18

## 2017-10-24 RX ADMIN — Medication 10 ML: at 05:19

## 2017-10-24 RX ADMIN — ALBUTEROL SULFATE 1.25 MG: 2.5 SOLUTION RESPIRATORY (INHALATION) at 16:25

## 2017-10-24 RX ADMIN — PERFLUTREN 1 ML: 6.52 INJECTION, SUSPENSION INTRAVENOUS at 10:00

## 2017-10-24 RX ADMIN — HYDROMORPHONE HYDROCHLORIDE 0.5 MG: 1 INJECTION, SOLUTION INTRAMUSCULAR; INTRAVENOUS; SUBCUTANEOUS at 13:49

## 2017-10-24 RX ADMIN — LISINOPRIL 10 MG: 5 TABLET ORAL at 17:16

## 2017-10-24 RX ADMIN — CEFTRIAXONE 1 G: 1 INJECTION, POWDER, FOR SOLUTION INTRAMUSCULAR; INTRAVENOUS at 23:13

## 2017-10-24 RX ADMIN — HEPARIN SODIUM 5000 UNITS: 5000 INJECTION, SOLUTION INTRAVENOUS; SUBCUTANEOUS at 23:12

## 2017-10-24 RX ADMIN — HEPARIN SODIUM 5000 UNITS: 5000 INJECTION, SOLUTION INTRAVENOUS; SUBCUTANEOUS at 05:19

## 2017-10-24 RX ADMIN — HEPARIN SODIUM 5000 UNITS: 5000 INJECTION, SOLUTION INTRAVENOUS; SUBCUTANEOUS at 13:49

## 2017-10-24 RX ADMIN — CARVEDILOL 3.12 MG: 3.12 TABLET, FILM COATED ORAL at 08:18

## 2017-10-24 RX ADMIN — CARVEDILOL 3.12 MG: 3.12 TABLET, FILM COATED ORAL at 17:16

## 2017-10-24 RX ADMIN — ALBUTEROL SULFATE 1.25 MG: 2.5 SOLUTION RESPIRATORY (INHALATION) at 08:30

## 2017-10-24 RX ADMIN — HYDROCODONE BITARTRATE AND ACETAMINOPHEN 1 TABLET: 5; 325 TABLET ORAL at 17:16

## 2017-10-24 RX ADMIN — HYDROMORPHONE HYDROCHLORIDE 0.5 MG: 1 INJECTION, SOLUTION INTRAMUSCULAR; INTRAVENOUS; SUBCUTANEOUS at 03:50

## 2017-10-24 RX ADMIN — HYDROCODONE BITARTRATE AND ACETAMINOPHEN 1 TABLET: 5; 325 TABLET ORAL at 08:18

## 2017-10-24 RX ADMIN — Medication 10 ML: at 13:50

## 2017-10-24 RX ADMIN — ALBUTEROL SULFATE 1.25 MG: 2.5 SOLUTION RESPIRATORY (INHALATION) at 12:20

## 2017-10-24 NOTE — PROGRESS NOTES
No change in assessment. Instructed patient on use of Incentive Spirometry. Patient decline to use. Patient states\" hurting to bad right now\". Will continue to monitor.

## 2017-10-24 NOTE — PROGRESS NOTES
TRANSFER - IN REPORT:    Verbal report received from [facility] on Meaghan Ortiz  being received from ED for routine progression of care      Report consisted of patients Situation, Background, Assessment and   Recommendations(SBAR). Information from the following report(s) SBAR, ED Summary, Intake/Output, MAR and Recent Results was reviewed with the receiving nurse. Opportunity for questions and clarification was provided. Assessment completed upon patients arrival to unit and care assumed.

## 2017-10-24 NOTE — PROGRESS NOTES
Care Management Interventions  PCP Verified by CM: Yes  Transition of Care Consult (CM Consult): Other  Current Support Network: Lives Alone  Discharge Location  Discharge Placement: Home      SW received consult due to pt having a cat at home & has no one to care for it. SW spoke with pt who is A&O, lives alone and indep with ADL'S. Pt states has friends but none have a key to his apt. SW suggested pt call apt office ( as they can enter apt) and hopefully they will help out with cat care. Pt appreciative of suggestion. Pt fell at home and has fx ribs & pneumothorax. Unsure at this time if pt will have any d/c needs.

## 2017-10-24 NOTE — ED NOTES
TRANSFER - OUT REPORT:    Verbal report given to Sujit Rockwell  being transferred to ICU room 374 for routine progression of care       Report consisted of patients Situation, Background, Assessment and   Recommendations(SBAR). Information from the following report(s) ED Summary was reviewed with the receiving nurse. Lines:   Peripheral IV 10/23/17 Left Antecubital (Active)   Site Assessment Clean, dry, & intact 10/23/2017  4:32 PM   Phlebitis Assessment 0 10/23/2017  4:32 PM   Infiltration Assessment 0 10/23/2017  4:32 PM   Dressing Status Clean, dry, & intact 10/23/2017  4:32 PM       Peripheral IV 10/23/17 Left Hand (Active)        Opportunity for questions and clarification was provided.       Patient transported with:   Registered Nurse

## 2017-10-24 NOTE — PROGRESS NOTES
Hospitalist Progress Note    10/24/2017  Admit Date: 10/23/2017  2:34 PM   NAME: Beatriz Oneill   :  1940   MRN:  [id number]   Attending: Aditya Sanches MD  PCP:  José Luis Guido MD    SUBJECTIVE:   Patient presented s/p fall in bathroom. Chest xray showed cardiomegaly and pulmonary vascular congestion and bibasilar infiltrates. Denies fever, chills, hemoptysis. CT chest shows multiple right-sided rib fractures with trace right-sided pneumothorax and right pleural effusion. Dense bibasilar airspace consolidation. Started on rocephin and azithromycin. No pneumo on repeat xray. Has been in afib/flutter. Pain currently well controlled.         Review of Systems negative with exception of pertinent positives noted above  PHYSICAL EXAM     Visit Vitals    /87    Pulse 84    Temp 99 °F (37.2 °C)    Resp 26    Ht 5' 8\" (1.727 m)    Wt 72.9 kg (160 lb 12.8 oz)    SpO2 99%    BMI 24.45 kg/m2      Temp (24hrs), Av.3 °F (36.8 °C), Min:98.1 °F (36.7 °C), Max:99 °F (37.2 °C)    Oxygen Therapy  O2 Sat (%): 99 % (10/24/17 0830)  Pulse via Oximetry: 80 beats per minute (10/24/17 0830)  O2 Device: Nasal cannula (10/24/17 08)  O2 Flow Rate (L/min): 3 l/min (weaned to 2 ) (10/24/17 08)    Intake/Output Summary (Last 24 hours) at 10/24/17 1006  Last data filed at 10/24/17 0705   Gross per 24 hour   Intake             1300 ml   Output              375 ml   Net              925 ml      General: No acute distress    Lungs:  Decreased at bases, coarse  Heart:  irregular,  No murmur, rub, or gallop  Abdomen: Soft, Non distended, Non tender, Positive bowel sounds  Extremities: No cyanosis, clubbing or edema  Neurologic:  No focal deficits    ASSESSMENT      Active Hospital Problems    Diagnosis Date Noted    Pulmonary contusion 10/24/2017    Shortness of breath 10/23/2017    Pneumothorax, closed, traumatic 10/23/2017    Multiple rib fractures 10/23/2017    Hyperkalemia 10/23/2017    Atrial fibrillation ([address] 10/23/2017    Anemia 07/24/2013    HLD (hyperlipidemia) 07/24/2013    HTN (hypertension) 07/15/2013     Plan:  · Continue abx. Afebrile  · Monitor on tele. Cardio consulted for afib/flutter  · K improved.   Repeat bmp in am  · PT/OT  · Appreciate specialist inptu    DVT Prophylaxis: heparin    Signed By: Melinda Foster MD     October 24, 2017

## 2017-10-24 NOTE — CONSULTS
CONSULT NOTE    Suzy Goode    10/24/2017    Date of Admission:  10/23/2017    The patient's chart is reviewed and the patient is discussed with the staff. Subjective: The patient is a 76 y.o.  male seen and evaluated at the request of Dr. Bridger Chance for evaluation of a PTX and rib fractures. The pt was in his usual state of health when he fell in the bathroom yesterday morning. He hit his right chest against the bathtub. He managed to crawl out from the BR and apparently lost consciousness for a time. EMS was summoned and he was evaluated in the ER. His chest CT was notable for multiple rib fractures and a small anterior PTX with a densely consolidated RLL and small effusion. His CXR this AM reveals no PTX. Atrial fibrillation was noted in the ER as well. The pt denies any prior issues with dyspnea, cough, sputum, hemoptysis, or fever. Review of Systems    Denies: fevers, chills, sweats, fatigue, malaise, anorexia, weight loss   Denies: blurry vision, loss of vision, eye pain, photophobia  Denies: hearing loss, ringing in the ears, earache, epistaxis  Denies: chest pain, palpitations, syncope, orthopnea, paroxysmal nocturnal dyspnea, claudication  Denies: dysphagia, odynophagia, nausea, vomiting, diarrhea, constipation, abdominal pain, jaundice, melena   Denies: frequency, dysuria, nocturia, urinary incontinence, stones, hematuria  Denies: polydipsia/polyuria, skin changes, temperature intolerance, unexpected weight gain  Denies: back pain, joint pain, joint swelling, muscle pain, muscle weakness  Denies: bleeding problems, blood transfusions, bruising, pallor, swollen lymph nodes  Denies: headache, dysarthria, blurred vision, diplopia,seizure, focal deficits.     Admits to: R chest pain, dyspnea        Patient Active Problem List   Diagnosis Code    HTN (hypertension) I10    HLD (hyperlipidemia) E78.5    Anemia D64.9    Allergic rhinitis J30.9    Shortness of breath R06.02    Pneumothorax, closed, traumatic S27. 0XXA    Multiple rib fractures S22.49XA    Hyperkalemia E87.5    Atrial fibrillation (HCC) I48.91    Pulmonary contusion S27.329A         Prior to Admission Medications   Prescriptions Last Dose Informant Patient Reported? Taking? amLODIPine (NORVASC) 10 mg tablet Not Taking at Unknown time  No No   Sig: Take 1 Tab by mouth daily. Facility-Administered Medications: None       Past Medical History:   Diagnosis Date    Allergic rhinitis     Fractured pelvis ([address] 2006    Kathreen [place] off of roof.  History of chicken pox Childhood    Scarlet fever Childhood    Zenker's diverticulum     In the throat. Past Surgical History:   Procedure Laterality Date    HX MALIGNANT SKIN LESION EXCISION      Multiple Basal Cells.  HX VASECTOMY       Social History     Social History    Marital status:      Spouse name: N/A    Number of children: N/A    Years of education: N/A     Occupational History    Retired salesman.       Social History Main Topics    Smoking status: Former Smoker     Quit date: 1/1/1976    Smokeless tobacco: Not on file    Alcohol use 1.0 oz/week     2 drink(s) per week    Drug use: No    Sexual activity: Not on file     Other Topics Concern    Not on file     Social History Narrative     Family History   Problem Relation Age of Onset    Hypertension Mother     Heart Disease Mother     Heart Disease Father     Diabetes Father     Stroke Father     Cancer Neg Hx      No Known Allergies    Current Facility-Administered Medications   Medication Dose Route Frequency    ondansetron (ZOFRAN) injection 4 mg  4 mg IntraVENous Q6H PRN    amLODIPine (NORVASC) tablet 10 mg  10 mg Oral DAILY    sodium chloride (NS) flush 5-10 mL  5-10 mL IntraVENous Q8H    sodium chloride (NS) flush 5-10 mL  5-10 mL IntraVENous PRN    cefTRIAXone (ROCEPHIN) 1 g in 0.9% sodium chloride (MBP/ADV) 50 mL  1 g IntraVENous Q24H    azithromycin (ZITHROMAX) 500 mg in 0.9% sodium chloride (MBP/ADV) 250 mL  500 mg IntraVENous Q24H    acetaminophen (TYLENOL) tablet 650 mg  650 mg Oral Q4H PRN    HYDROcodone-acetaminophen (NORCO) 5-325 mg per tablet 1 Tab  1 Tab Oral Q4H PRN    HYDROmorphone (PF) (DILAUDID) injection 0.5 mg  0.5 mg IntraVENous Q4H PRN    albuterol (PROVENTIL VENTOLIN) nebulizer solution 2.5 mg  2.5 mg Nebulization Q4H PRN    naloxone (NARCAN) injection 0.4 mg  0.4 mg IntraVENous PRN    heparin (porcine) injection 5,000 Units  5,000 Units SubCUTAneous Q8H    bisacodyl (DULCOLAX) tablet 5 mg  5 mg Oral DAILY PRN    hydrALAZINE (APRESOLINE) 20 mg/mL injection 10 mg  10 mg IntraVENous Q6H PRN    carvedilol (COREG) tablet 3.125 mg  3.125 mg Oral BID WITH MEALS         Objective:     Vitals:    10/24/17 0419 10/24/17 0649 10/24/17 0705 10/24/17 0719   BP: 155/85 181/87 176/85 179/75   Pulse: 72 84 82 70   Resp: 16 (!) 31 29 17   Temp:       SpO2: 98% 98% 98% 98%   Weight:       Height:           PHYSICAL EXAM     Constitutional:  the patient is well developed and in no acute distress  EENMT:  Sclera clear, pupils equal, oral mucosa moist  Respiratory: decreased BS R with marked tenderness and some subQ air  Cardiovascular:  iRR without M,G,R (aflutter with variable block on monitor)  Gastrointestinal: soft and non-tender; with positive bowel sounds. Musculoskeletal: warm without cyanosis. There is no lower leg edema. Skin:  no jaundice or rashes, contusion R chest  Neurologic: no gross neuro deficits     Psychiatric:  alert and oriented x 3    CXR:          Chest CT:        FINDINGS: There is a trace right-sided pneumothorax present. There are fractures  of the right seventh, eighth, ninth, and 10th ribs. There is a small right  pleural effusion. There is significant bibasilar atelectasis present. Calcified  mediastinal lymph nodes are present. There is no pericardial effusion.  There is  no axillary, mediastinal, or hilar lymphadenopathy demonstrated.     Evaluation of the upper abdomen demonstrates multiple calcified granulomas  within the spleen.     There are no aggressive osseous lesions.       IMPRESSION:    1. Multiple right-sided rib fractures with trace right-sided pneumothorax. There  is also a right pleural effusion. 2. Dense bibasilar airspace consolidation, atelectasis. 3. Evidence of remote granulomatous disease.       Recent Labs      10/24/17   0415  10/23/17   1549   WBC  12.4*  10.5   HGB  13.6  14.3   HCT  40.3*  41.4   PLT  247  230     Recent Labs      10/24/17   0415  10/23/17   1958  10/23/17   1549   NA  134*   --   134*   K  4.7   --   5.2*   CL  97*   --   98   GLU  106*   --   129*   CO2  27   --   26   BUN  19   --   17   CREA  1.47   --   1.34   MG   --   1.8   --    PHOS   --   3.6   --    CA  9.0   --   9.4   TROIQ  0.04   --    --    ALB  3.0*   --    --    TBILI  1.0   --    --    ALT  39   --    --    SGOT  27   --    --      No results for input(s): PH, PCO2, PO2, HCO3 in the last 72 hours. No results for input(s): LCAD, LAC in the last 72 hours. Assessment:  (Medical Decision Making)     Hospital Problems  Date Reviewed: 4/22/2015          Codes Class Noted POA    Pulmonary contusion ICD-10-CM: S27.329A  ICD-9-CM: 861.21  10/24/2017 Unknown    No evidence for any pneumonia prior to fall but can check PCT to screen for underlying infectious process    Shortness of breath ICD-10-CM: R06.02  ICD-9-CM: 786.05  10/23/2017 Unknown    Due to rib fx, pain, pulm contusion, PTX    * (Principal)Pneumothorax, closed, traumatic ICD-10-CM: S27. 0XXA  ICD-9-CM: 860.0  10/23/2017 Unknown    Very small on CT and not evident on CXR this AM    Multiple rib fractures ICD-10-CM: S22.49XA  ICD-9-CM: 807.09  10/23/2017 Unknown    Needs pain control    Hyperkalemia ICD-10-CM: E87.5  ICD-9-CM: 276.7  10/23/2017 Unknown    Resolved    Atrial fibrillation ([facility] [address] ICD-10-CM: I48.91  ICD-9-CM: 427.31  10/23/2017 Yes    Now with aflutter. HLD (hyperlipidemia) ICD-10-CM: E78.5  ICD-9-CM: 272.4  7/24/2013 Yes        Anemia ICD-10-CM: D64.9  ICD-9-CM: 285.9  7/24/2013 Yes    Follow    HTN (hypertension) ICD-10-CM: I10  ICD-9-CM: 401.9  7/15/2013 Yes    Poor control due to pain. Plan:  (Medical Decision Making)     Pain control. Defer to primary team.  O2 for nitrogen washout. Keep sat in high 90's today  EZ PAP with albuterol to prevent atx. Check PCT. Follow CXR. ? Cardiology eval. Poor candidate to anticoagulate with likely small hemothorax at present. --    More than 50% of the time documented was spent in face-to-face contact with the patient and in the care of the patient on the floor/unit where the patient is located. Thank you very much for this referral.  We appreciate the opportunity to participate in this patient's care. Will follow along with above stated plan.     Abdulaziz Florez MD

## 2017-10-24 NOTE — PROGRESS NOTES
Late entry due to hands on patient care. Patient received from ED and place on cardiac monitor. Atrial fib with occasional PVC'S. Heart rate 97. Patient complains of mild pain to rib cage. 2/10. Declines pain medicine for now. Head of bed elevated. Respiration even and unlabored. Oxygen saturation 97 % on 3 liters nasal cannula. Dual skin assessment completed with [facility]. Ecchymosis noted to right lateral rib cage. Lower extremities shin area to ankles are discolored and darken. Positive palpable pulses. Sacral/coccyx pink and blanchable. Allevyn place. Instructed patient on use of call light. Demonstrate an understanding. Bed in low/lock position. Bed alarm on.

## 2017-10-24 NOTE — H&P
Hospitalist H&P/Consult Note     Admit Date:  10/23/2017  2:34 PM   Name:  Arian Williamson   Age:  76 y.o.  :  1940   MRN:  [id number]   PCP:  Anca Ott MD  Treatment Team: Attending Provider: Sheila Quigley MD; Primary Nurse: Jak Cuello RN    HPI:   Pleasant 77 y/o male with hx HTN fell in the bathroom this morning around 8 am and right ribcage hit side of bathtub. He had no LOC/head injury. Has been short of breath since the fall and difficulty breathing deeply because of the pain. He has actually recuperated from some anterior right rib fractures sustained from a fall in September. Has had some mild cough prior to fall today. Denies any chest pain or hx CAD/afib. EKG does show atrial fibrillation but he was unaware of this and denies palpitations or syncope/near-syncope. Chest xray today shows cardiomegaly and pulmonary vascular congestion and bibasilar infiltrates. Denies fever, chills, hemoptysis. CT chest shows multiple right-sided rib fractures with trace right-sided pneumothorax and right pleural effusion. Dense bibasilar airspace consolidation. Will admit for further workup. 10 systems reviewed and negative except as noted in HPI. Past Medical History:   Diagnosis Date    Allergic rhinitis     Fractured pelvis ([address]     Severa Murrain off of roof.  History of chicken pox Childhood    Scarlet fever Childhood    Zenker's diverticulum     In the throat. Past Surgical History:   Procedure Laterality Date    HX MALIGNANT SKIN LESION EXCISION      Multiple Basal Cells.  HX VASECTOMY        Prior to Admission Medications   Prescriptions Last Dose Informant Patient Reported? Taking? amLODIPine (NORVASC) 10 mg tablet   No No   Sig: Take 1 Tab by mouth daily.       Facility-Administered Medications: None     No Known Allergies   Social History   Substance Use Topics    Smoking status: Former Smoker     Quit date: 1976    Smokeless tobacco: Not on file    Alcohol use 1.0 oz/week     2 drink(s) per week      Family History   Problem Relation Age of Onset    Hypertension Mother     Heart Disease Mother     Heart Disease Father     Diabetes Father     Stroke Father     Cancer Neg Hx       Immunization History   Administered Date(s) Administered    Influenza Vaccine 01/15/2014    Pneumococcal Polysaccharide (PPSV-23) 07/15/2013       Objective:   Patient Vitals for the past 24 hrs:   Temp Pulse Resp BP SpO2   10/23/17 1901 - 79 18 190/83 96 %   10/23/17 1848 - (!) 101 18 (!) 214/107 97 %   10/23/17 1847 - 94 23 (!) 214/107 96 %   10/23/17 1832 - 93 27 - 94 %   10/23/17 1831 - 94 27 (!) 212/101 -   10/23/17 1816 - (!) 103 (!) 32 (!) 227/109 96 %   10/23/17 1700 - 90 (!) 31 (!) 202/90 96 %   10/23/17 1635 - 92 23 - 98 %   10/23/17 1634 - - - - 98 %   10/23/17 1633 - - - - 97 %   10/23/17 1632 - 83 (!) 31 (!) 214/172 (!) 89 %   10/23/17 1630 - 97 16 (!) 221/107 98 %   10/23/17 1622 - 96 24 - 97 %   10/23/17 1621 - 92 26 - 97 %   10/23/17 1620 - 96 (!) 32 - 93 %   10/23/17 1619 - 91 25 - (!) 86 %   10/23/17 1618 - 88 27 - (!) 88 %   10/23/17 1617 - 91 22 (!) 214/100 -   10/23/17 1614 - 79 18 (!) 202/122 -   10/23/17 1438 98.2 °F (36.8 °C) 94 16 (!) 210/96 98 %     Oxygen Therapy  O2 Sat (%): 96 % (10/23/17 1901)  Pulse via Oximetry: 85 beats per minute (10/23/17 1901)  O2 Device: Nasal cannula (10/23/17 1620)  O2 Flow Rate (L/min): 3 l/min (10/23/17 1620)  No intake or output data in the 24 hours ending 10/23/17 2107    Physical Exam:  General:    Well nourished. Alert. O2 NC     Eyes:   Normal sclera. Extraocular movements intact. JVD present  ENT:  Normocephalic, atraumatic. Moist mucous membranes  CV:                  irreg rhythm, nl rate, Afib on monitor with PVCs  Lungs:  Diminished bases, tender right posterior ribs. splinting  Abdomen: Soft, nontender, nondistended. Bowel sounds normal.   Extremities: Warm and dry.   No cyanosis trace LE edema  Neurologic: CN II-XII grossly intact. Sensation intact. Skin:     No rashes or jaundice. No wounds. Psych:  Normal mood and affect. I reviewed the labs, imaging, EKGs, telemetry, and other studies done this admission. Data Review:   Recent Results (from the past 24 hour(s))   CBC WITH AUTOMATED DIFF    Collection Time: 10/23/17  3:49 PM   Result Value Ref Range    WBC 10.5 4.3 - 11.1 K/uL    RBC 4.48 4.23 - 5.67 M/uL    HGB 14.3 13.6 - 17.2 g/dL    HCT 41.4 41.1 - 50.3 %    MCV 92.4 79.6 - 97.8 FL    MCH 31.9 26.1 - 32.9 PG    MCHC 34.5 31.4 - 35.0 g/dL    RDW 13.1 11.9 - 14.6 %    PLATELET 288 072 - 252 K/uL    MPV 9.8 (L) 10.8 - 14.1 FL    DF AUTOMATED      NEUTROPHILS 90 (H) 43 - 78 %    LYMPHOCYTES 4 (L) 13 - 44 %    MONOCYTES 6 4.0 - 12.0 %    EOSINOPHILS 0 (L) 0.5 - 7.8 %    BASOPHILS 0 0.0 - 2.0 %    IMMATURE GRANULOCYTES 0 0.0 - 5.0 %    ABS. NEUTROPHILS 9.4 (H) 1.7 - 8.2 K/UL    ABS. LYMPHOCYTES 0.4 (L) 0.5 - 4.6 K/UL    ABS. MONOCYTES 0.6 0.1 - 1.3 K/UL    ABS. EOSINOPHILS 0.0 0.0 - 0.8 K/UL    ABS. BASOPHILS 0.0 0.0 - 0.2 K/UL    ABS. IMM.  GRANS. 0.0 0.0 - 0.5 K/UL   METABOLIC PANEL, BASIC    Collection Time: 10/23/17  3:49 PM   Result Value Ref Range    Sodium 134 (L) 136 - 145 mmol/L    Potassium 5.2 (H) 3.5 - 5.1 mmol/L    Chloride 98 98 - 107 mmol/L    CO2 26 21 - 32 mmol/L    Anion gap 10 7 - 16 mmol/L    Glucose 129 (H) 65 - 100 mg/dL    BUN 17 8 - 23 MG/DL    Creatinine 1.34 0.8 - 1.5 MG/DL    GFR est AA >60 >60 ml/min/1.73m2    GFR est non-AA 55 (L) >60 ml/min/1.73m2    Calcium 9.4 8.3 - 10.4 MG/DL   POC TROPONIN-I    Collection Time: 10/23/17  3:51 PM   Result Value Ref Range    Troponin-I (POC) 0.02 0.02 - 0.05 ng/ml   URINALYSIS W/ RFLX MICROSCOPIC    Collection Time: 10/23/17  7:58 PM   Result Value Ref Range    Color YELLOW      Appearance CLEAR      Specific gravity 1.024 (H) 1.001 - 1.023      pH (UA) 6.5 5.0 - 9.0      Protein 30 (A) NEG mg/dL    Glucose NEGATIVE  mg/dL    Ketone NEGATIVE  NEG mg/dL Bilirubin NEGATIVE  NEG      Blood NEGATIVE  NEG      Urobilinogen 0.2 0.2 - 1.0 EU/dL    Nitrites NEGATIVE  NEG      Leukocyte Esterase NEGATIVE  NEG      WBC 0 0 /hpf    RBC 0-3 0 /hpf    Epithelial cells 0 0 /hpf    Bacteria 0 0 /hpf    Casts 0-3 0 /lpf   MAGNESIUM    Collection Time: 10/23/17  7:58 PM   Result Value Ref Range    Magnesium 1.8 1.8 - 2.4 mg/dL   PHOSPHORUS    Collection Time: 10/23/17  7:58 PM   Result Value Ref Range    Phosphorus 3.6 2.3 - 3.7 MG/DL   TSH 3RD GENERATION    Collection Time: 10/23/17  7:58 PM   Result Value Ref Range    TSH 2.314 0.358 - 3.740 uIU/mL   BNP    Collection Time: 10/23/17  7:58 PM   Result Value Ref Range     pg/mL   POC LACTIC ACID    Collection Time: 10/23/17  8:03 PM   Result Value Ref Range    Lactic Acid (POC) 2.0 (H) 0.5 - 1.9 mmol/L       Imaging Studies:  CXR Results  (Last 48 hours)               10/23/17 1538  XR CHEST PORT Final result    Impression:  IMPRESSION:    1. Cardiomegaly and pulmonary vascular congestion. 2. Bibasilar infiltrates. 3. No pneumothorax. Trace subcutaneous gas noted. Narrative:  Chest portable       CLINICAL INDICATION: Acute right side chest pain, reported rib fracture one   month ago, fall injury today worsening pain       COMPARISON: None       TECHNIQUE: single AP portable view chest at 3:30 PM upright        FINDINGS: The lung volumes are shallow leading to crowding and there are   multiple external monitoring wires causing artifact. There is trace subcutaneous   gas within lateral right chest wall of unclear significance. The bone density is   low throughout limiting the sensitivity for subtle osseous lesions. Cardiac   silhouette is enlarged. Moderate bibasilar patchy and groundglass infiltrates. Bilateral perihilar vascular indistinctness. No evidence of a large pleural   effusion. No evidence of a displaced fracture. Spine not well visualized.                     CT Results  (Last 48 hours) 10/23/17 1734  CT CHEST W CONT Final result    Impression:  IMPRESSION:   1. Multiple right-sided rib fractures with trace right-sided pneumothorax. There   is also a right pleural effusion. 2. Dense bibasilar airspace consolidation, atelectasis. 3. Evidence of remote granulomatous disease. [facility]   The critical results contained in this report were communicated to Dr. Mingo Pichardo by   myself on 10/23/2017 at 5:50 PM. Critical results were communicated as outlined   in Section II.C.2.a.i of the ACR Practice Guideline for Communication of   Diagnostic Imaging Findings. Narrative:  History: Fall with right lateral and posterior chest wall pain. Worsening with   inspiration. Fall while going to the bathroom yesterday. Pain also within the   left lateral ribs. EXAM: CT chest with IV contrast       TECHNIQUE: Thin section axial CT images are obtained from the thoracic inlet   through the upper abdomen. 80 cc Isovue-370 is administered intravenously   without incident. Radiation dose reduction techniques were used for this study. Our CT scanners use one or all of the following: Automated exposure control,   adjustment of the mA and/or kV according to patient size, use of iterative   reconstruction. COMPARISON: Portable chest x-ray dated 10/23/2017       FINDINGS: There is a trace right-sided pneumothorax present. There are fractures   of the right seventh, eighth, ninth, and 10th ribs. There is a small right   pleural effusion. There is significant bibasilar atelectasis present. Calcified   mediastinal lymph nodes are present. There is no pericardial effusion. There is   no axillary, mediastinal, or hilar lymphadenopathy demonstrated. Evaluation of the upper abdomen demonstrates multiple calcified granulomas   within the spleen. There are no aggressive osseous lesions.                  Assessment and Plan:     Hospital Problems as of 10/23/2017  Date Reviewed: 4/22/2015 Codes Class Noted - Resolved POA    * (Principal)Pneumothorax, closed, traumatic ICD-10-CM: S27. 0XXA  ICD-9-CM: 860.0  10/23/2017 - Present Unknown        Multiple rib fractures ICD-10-CM: S22.49XA  ICD-9-CM: 807.09  10/23/2017 - Present Unknown        Shortness of breath ICD-10-CM: R06.02  ICD-9-CM: 786.05  10/23/2017 - Present Unknown        Atrial fibrillation (HCC) ICD-10-CM: I48.91  ICD-9-CM: 427.31  10/23/2017 - Present Yes        Hyperkalemia ICD-10-CM: E87.5  ICD-9-CM: 276.7  10/23/2017 - Present Unknown        HTN (hypertension) ICD-10-CM: I10  ICD-9-CM: 401.9  7/15/2013 - Present Yes        HLD (hyperlipidemia) ICD-10-CM: E78.5  ICD-9-CM: 272.4  7/24/2013 - Present Yes        Anemia ICD-10-CM: D64.9  ICD-9-CM: 285.9  7/24/2013 - Present Yes              PLAN:  · Admit inpatient to telemetry  · Continue oxygen as needed. Control pain  · Empirically start antibiotics for bilateral infiltrates. Cannot r/o PNA. May be atelectasis. Add incentive spirometry   · Check lactic acid panel and blood cultures. Check procalcitonin  · Consult pulmonary in am RE: pneumothorax. Hopefully conservative management. Will repeat chest xray in am. If PTX increased may require chest tube  · Atrial fibrillation is new and needs further workup. Rate is controlled. Repeat cardiac enzymes, check BNP, Mg, TSH. Echocardogram in am and cardiology consult. Hold AC tonight in case requires chest tube for PTX. · Check lipid status  · Has evidence of JVD elevation and may have a degree of heart failure. BP is markedly elevated. May partly be due to pain of rib fractures. He is on amlodipine. Start coreg 3.125 mg po BID. Check BNP. Prn IV hydralazine  · Daily BMP with renal insufficiency and hyperkalemia.    · SCDs      Estimated LOS:  Greater 2 midnights    Signed:  Kaycee Mitchell MD

## 2017-10-25 ENCOUNTER — APPOINTMENT (OUTPATIENT)
Dept: GENERAL RADIOLOGY | Age: 77
DRG: 200 | End: 2017-10-25
Attending: INTERNAL MEDICINE
Payer: MEDICARE

## 2017-10-25 PROBLEM — I48.91 ATRIAL FIBRILLATION (HCC): Chronic | Status: RESOLVED | Noted: 2017-10-23 | Resolved: 2017-10-25

## 2017-10-25 PROBLEM — I48.91 ATRIAL FIBRILLATION (HCC): Chronic | Status: ACTIVE | Noted: 2017-10-23

## 2017-10-25 LAB
ANION GAP SERPL CALC-SCNC: 7 MMOL/L (ref 7–16)
BASOPHILS # BLD: 0 K/UL (ref 0–0.2)
BASOPHILS NFR BLD: 0 % (ref 0–2)
BUN SERPL-MCNC: 16 MG/DL (ref 8–23)
CALCIUM SERPL-MCNC: 8.7 MG/DL (ref 8.3–10.4)
CHLORIDE SERPL-SCNC: 92 MMOL/L (ref 98–107)
CO2 SERPL-SCNC: 28 MMOL/L (ref 21–32)
CREAT SERPL-MCNC: 1.15 MG/DL (ref 0.8–1.5)
DIFFERENTIAL METHOD BLD: ABNORMAL
EOSINOPHIL # BLD: 0.1 K/UL (ref 0–0.8)
EOSINOPHIL NFR BLD: 1 % (ref 0.5–7.8)
ERYTHROCYTE [DISTWIDTH] IN BLOOD BY AUTOMATED COUNT: 13 % (ref 11.9–14.6)
GLUCOSE SERPL-MCNC: 100 MG/DL (ref 65–100)
HCT VFR BLD AUTO: 39 % (ref 41.1–50.3)
HGB BLD-MCNC: 13.3 G/DL (ref 13.6–17.2)
IMM GRANULOCYTES # BLD: 0 K/UL (ref 0–0.5)
IMM GRANULOCYTES NFR BLD: 0 % (ref 0–5)
LYMPHOCYTES # BLD: 0.8 K/UL (ref 0.5–4.6)
LYMPHOCYTES NFR BLD: 7 % (ref 13–44)
MCH RBC QN AUTO: 31.9 PG (ref 26.1–32.9)
MCHC RBC AUTO-ENTMCNC: 34.1 G/DL (ref 31.4–35)
MCV RBC AUTO: 93.5 FL (ref 79.6–97.8)
MONOCYTES # BLD: 1 K/UL (ref 0.1–1.3)
MONOCYTES NFR BLD: 9 % (ref 4–12)
NEUTS SEG # BLD: 8.4 K/UL (ref 1.7–8.2)
NEUTS SEG NFR BLD: 83 % (ref 43–78)
PLATELET # BLD AUTO: 231 K/UL (ref 150–450)
PMV BLD AUTO: 9.8 FL (ref 10.8–14.1)
POTASSIUM SERPL-SCNC: 4.6 MMOL/L (ref 3.5–5.1)
PROCALCITONIN SERPL-MCNC: 0.1 NG/ML
PROCALCITONIN SERPL-MCNC: 0.1 NG/ML
RBC # BLD AUTO: 4.17 M/UL (ref 4.23–5.67)
SODIUM SERPL-SCNC: 127 MMOL/L (ref 136–145)
WBC # BLD AUTO: 10.2 K/UL (ref 4.3–11.1)

## 2017-10-25 PROCEDURE — 94760 N-INVAS EAR/PLS OXIMETRY 1: CPT

## 2017-10-25 PROCEDURE — 77010033678 HC OXYGEN DAILY

## 2017-10-25 PROCEDURE — 99232 SBSQ HOSP IP/OBS MODERATE 35: CPT | Performed by: INTERNAL MEDICINE

## 2017-10-25 PROCEDURE — 65270000029 HC RM PRIVATE

## 2017-10-25 PROCEDURE — 74011000250 HC RX REV CODE- 250: Performed by: INTERNAL MEDICINE

## 2017-10-25 PROCEDURE — 85025 COMPLETE CBC W/AUTO DIFF WBC: CPT | Performed by: INTERNAL MEDICINE

## 2017-10-25 PROCEDURE — 71010 XR CHEST SNGL V: CPT

## 2017-10-25 PROCEDURE — 74011250636 HC RX REV CODE- 250/636: Performed by: HOSPITALIST

## 2017-10-25 PROCEDURE — 74011250637 HC RX REV CODE- 250/637: Performed by: HOSPITALIST

## 2017-10-25 PROCEDURE — 84145 PROCALCITONIN (PCT): CPT | Performed by: INTERNAL MEDICINE

## 2017-10-25 PROCEDURE — 74011000250 HC RX REV CODE- 250: Performed by: HOSPITALIST

## 2017-10-25 PROCEDURE — 74011000258 HC RX REV CODE- 258: Performed by: HOSPITALIST

## 2017-10-25 PROCEDURE — 94640 AIRWAY INHALATION TREATMENT: CPT

## 2017-10-25 PROCEDURE — 80048 BASIC METABOLIC PNL TOTAL CA: CPT | Performed by: INTERNAL MEDICINE

## 2017-10-25 PROCEDURE — 74011250637 HC RX REV CODE- 250/637: Performed by: INTERNAL MEDICINE

## 2017-10-25 RX ORDER — ALBUTEROL SULFATE 0.83 MG/ML
2.5 SOLUTION RESPIRATORY (INHALATION)
Status: DISCONTINUED | OUTPATIENT
Start: 2017-10-26 | End: 2017-10-27 | Stop reason: HOSPADM

## 2017-10-25 RX ADMIN — ALBUTEROL SULFATE 2.5 MG: 2.5 SOLUTION RESPIRATORY (INHALATION) at 19:53

## 2017-10-25 RX ADMIN — HYDROCODONE BITARTRATE AND ACETAMINOPHEN 1 TABLET: 5; 325 TABLET ORAL at 20:47

## 2017-10-25 RX ADMIN — ALBUTEROL SULFATE 1.25 MG: 2.5 SOLUTION RESPIRATORY (INHALATION) at 07:45

## 2017-10-25 RX ADMIN — HEPARIN SODIUM 5000 UNITS: 5000 INJECTION, SOLUTION INTRAVENOUS; SUBCUTANEOUS at 14:20

## 2017-10-25 RX ADMIN — CARVEDILOL 3.12 MG: 3.12 TABLET, FILM COATED ORAL at 08:49

## 2017-10-25 RX ADMIN — ALBUTEROL SULFATE 1.25 MG: 2.5 SOLUTION RESPIRATORY (INHALATION) at 13:01

## 2017-10-25 RX ADMIN — CARVEDILOL 3.12 MG: 3.12 TABLET, FILM COATED ORAL at 16:49

## 2017-10-25 RX ADMIN — Medication 10 ML: at 14:21

## 2017-10-25 RX ADMIN — Medication 10 ML: at 00:08

## 2017-10-25 RX ADMIN — Medication 10 ML: at 05:58

## 2017-10-25 RX ADMIN — HYDROCODONE BITARTRATE AND ACETAMINOPHEN 1 TABLET: 5; 325 TABLET ORAL at 12:39

## 2017-10-25 RX ADMIN — AMLODIPINE BESYLATE 10 MG: 10 TABLET ORAL at 08:49

## 2017-10-25 RX ADMIN — AZITHROMYCIN MONOHYDRATE 500 MG: 500 INJECTION, POWDER, LYOPHILIZED, FOR SOLUTION INTRAVENOUS at 23:37

## 2017-10-25 RX ADMIN — ALBUTEROL SULFATE 1.25 MG: 2.5 SOLUTION RESPIRATORY (INHALATION) at 16:00

## 2017-10-25 RX ADMIN — AZITHROMYCIN MONOHYDRATE 500 MG: 500 INJECTION, POWDER, LYOPHILIZED, FOR SOLUTION INTRAVENOUS at 00:08

## 2017-10-25 RX ADMIN — CEFTRIAXONE 1 G: 1 INJECTION, POWDER, FOR SOLUTION INTRAMUSCULAR; INTRAVENOUS at 22:18

## 2017-10-25 RX ADMIN — LISINOPRIL 10 MG: 5 TABLET ORAL at 08:49

## 2017-10-25 RX ADMIN — HEPARIN SODIUM 5000 UNITS: 5000 INJECTION, SOLUTION INTRAVENOUS; SUBCUTANEOUS at 22:25

## 2017-10-25 RX ADMIN — HYDROMORPHONE HYDROCHLORIDE 0.5 MG: 1 INJECTION, SOLUTION INTRAMUSCULAR; INTRAVENOUS; SUBCUTANEOUS at 09:16

## 2017-10-25 RX ADMIN — HEPARIN SODIUM 5000 UNITS: 5000 INJECTION, SOLUTION INTRAVENOUS; SUBCUTANEOUS at 05:57

## 2017-10-25 RX ADMIN — Medication 10 ML: at 22:25

## 2017-10-25 NOTE — PROGRESS NOTES
TRANSFER - IN REPORT:    Verbal report received from Bradley Gold RN on Reyes Formosa  being received from [facility] (unit) for routine progression of care      Report consisted of patients Situation, Background, Assessment and   Recommendations(SBAR). Information from the following report(s) SBAR, Procedure Summary, Intake/Output, MAR and Recent Results was reviewed with the receiving nurse. Opportunity for questions and clarification was provided. Assessment completed upon patients arrival to unit and care assumed.

## 2017-10-25 NOTE — PROGRESS NOTES
Bedside report received from [facility]. Pt sitting up in bed, trying to eat dinner. Repositioned to sit up better, tech in to help pt eat. Pt states pain 2/10 right rib cage but does not want pain meds at this time. Call light within reach.

## 2017-10-25 NOTE — PROGRESS NOTES
Progress Note    Patient: Greyson Sebastian MRN: [id number]  SSN: [id number]    YOB: 1940  Age: 76 y.o. Sex: male      Admit Date: 10/23/2017    LOS: 2 days     Subjective: This 77 yo male that was admitted on 10/23/17 with a trace right posterior pneumothorax and multiple posterior rib fractures from a fall. No LOC or cardiac event reported, simple slip and fall. Had a history of multiple fall and previous anterior rib fractures. Clinically stable with indications of an airspace consolidation, started on antibiotic coverage. No events over night per nursing staff.       Current Facility-Administered Medications   Medication Dose Route Frequency    ondansetron (ZOFRAN) injection 4 mg  4 mg IntraVENous Q6H PRN    albuterol (PROVENTIL VENTOLIN) nebulizer solution 1.25 mg  1.25 mg Inhalation QID RT    lisinopril (PRINIVIL, ZESTRIL) tablet 10 mg  10 mg Oral DAILY    amLODIPine (NORVASC) tablet 10 mg  10 mg Oral DAILY    sodium chloride (NS) flush 5-10 mL  5-10 mL IntraVENous Q8H    sodium chloride (NS) flush 5-10 mL  5-10 mL IntraVENous PRN    cefTRIAXone (ROCEPHIN) 1 g in 0.9% sodium chloride (MBP/ADV) 50 mL  1 g IntraVENous Q24H    azithromycin (ZITHROMAX) 500 mg in 0.9% sodium chloride (MBP/ADV) 250 mL  500 mg IntraVENous Q24H    acetaminophen (TYLENOL) tablet 650 mg  650 mg Oral Q4H PRN    HYDROcodone-acetaminophen (NORCO) 5-325 mg per tablet 1 Tab  1 Tab Oral Q4H PRN    HYDROmorphone (PF) (DILAUDID) injection 0.5 mg  0.5 mg IntraVENous Q4H PRN    albuterol (PROVENTIL VENTOLIN) nebulizer solution 2.5 mg  2.5 mg Nebulization Q4H PRN    naloxone (NARCAN) injection 0.4 mg  0.4 mg IntraVENous PRN    heparin (porcine) injection 5,000 Units  5,000 Units SubCUTAneous Q8H    bisacodyl (DULCOLAX) tablet 5 mg  5 mg Oral DAILY PRN    hydrALAZINE (APRESOLINE) 20 mg/mL injection 10 mg  10 mg IntraVENous Q6H PRN    carvedilol (COREG) tablet 3.125 mg  3.125 mg Oral BID WITH MEALS Objective:     Vitals:    10/25/17 1210 10/25/17 1319 10/25/17 1526 10/25/17 1600   BP: 150/79 161/80 (!) 163/91    Pulse: 74 77 78    Resp: 26 22 21    Temp:  97.9 °F (36.6 °C) 97.5 °F (36.4 °C)    SpO2: 96% 93% 96% 95%   Weight:       Height:            Intake and Output:  Current Shift:    Last three shifts: 10/24 0701 - 10/25 1900  In: 250 [P.O.:250]  Out: 825 [Urine:825]    Physical Exam:   GEN:  This 75 yo white male , in no apparent distress, no family present at time of assessment. ENT: Pupils equal, round , reactive to light. Conjunctiva pink/moist, sclera non-icteric, non-injected. PUL:  Generally course breath sounds throughout the lung fields. No overt rhonchi or wheezes. CARD: Regular rhythm, regular rate, no appreciated NANI. No carotid bruits. ABD: Non-tender, non-distended, positive bowel sounds in all four quadrants. EXT: No upper extremity or lower extremity edema. Pulses equal at all levels bilaterally. SKIN: No skin breakdown, open wounds, ulcers or concerning findings. NEURO: Grossly intact CN II-XII, no overt focal defects on exam    PSYC: Mood appropriate, no anxiety appreciated    Lab/Data Review:    Recent Results (from the past 24 hour(s))   PROCALCITONIN    Collection Time: 10/25/17  8:37 AM   Result Value Ref Range    Procalcitonin 0.1 ng/mL   CBC WITH AUTOMATED DIFF    Collection Time: 10/25/17  8:37 AM   Result Value Ref Range    WBC 10.2 4.3 - 11.1 K/uL    RBC 4.17 (L) 4.23 - 5.67 M/uL    HGB 13.3 (L) 13.6 - 17.2 g/dL    HCT 39.0 (L) 41.1 - 50.3 %    MCV 93.5 79.6 - 97.8 FL    MCH 31.9 26.1 - 32.9 PG    MCHC 34.1 31.4 - 35.0 g/dL    RDW 13.0 11.9 - 14.6 %    PLATELET 960 402 - 243 K/uL    MPV 9.8 (L) 10.8 - 14.1 FL    DF AUTOMATED      NEUTROPHILS 83 (H) 43 - 78 %    LYMPHOCYTES 7 (L) 13 - 44 %    MONOCYTES 9 4.0 - 12.0 %    EOSINOPHILS 1 0.5 - 7.8 %    BASOPHILS 0 0.0 - 2.0 %    IMMATURE GRANULOCYTES 0 0.0 - 5.0 %    ABS.  NEUTROPHILS 8.4 (H) 1.7 - 8.2 K/UL ABS. LYMPHOCYTES 0.8 0.5 - 4.6 K/UL    ABS. MONOCYTES 1.0 0.1 - 1.3 K/UL    ABS. EOSINOPHILS 0.1 0.0 - 0.8 K/UL    ABS. BASOPHILS 0.0 0.0 - 0.2 K/UL    ABS. IMM. GRANS. 0.0 0.0 - 0.5 K/UL   METABOLIC PANEL, BASIC    Collection Time: 10/25/17  8:37 AM   Result Value Ref Range    Sodium 127 (L) 136 - 145 mmol/L    Potassium 4.6 3.5 - 5.1 mmol/L    Chloride 92 (L) 98 - 107 mmol/L    CO2 28 21 - 32 mmol/L    Anion gap 7 7 - 16 mmol/L    Glucose 100 65 - 100 mg/dL    BUN 16 8 - 23 MG/DL    Creatinine 1.15 0.8 - 1.5 MG/DL    GFR est AA >60 >60 ml/min/1.73m2    GFR est non-AA >60 >60 ml/min/1.73m2    Calcium 8.7 8.3 - 10.4 MG/DL       Assessment/ Plan:     Principal Problem:    Pneumothorax, closed, traumatic (10/23/2017)    Active Problems:    Multiple rib fractures (10/23/2017)      Pulmonary contusion (10/24/2017)      HTN (hypertension) (7/15/2013)      HLD (hyperlipidemia) (7/24/2013)      Anemia (7/24/2013)      Shortness of breath (10/23/2017)      Hyperkalemia (10/23/2017)      Patient is clinically stable, still in pain but refusing therapeutics. On 2L NC. Pulmonology following, no intervention required for the trace pneumothorax that appears that resolved. Will move out to the floor for further care and discharge planning. The plan of care will be continued and adjusted as patient condition changes, laboratory and radiologic data is obtained.       Signed By: Lawrence Mayorga DO     October 25, 2017

## 2017-10-25 NOTE — PROGRESS NOTES
TRANSFER - OUT REPORT:    Verbal report given to Cachorro Vega RN (name) on Chris Ni  being transferred to [facility] (unit) for routine progression of care       Report consisted of patients Situation, Background, Assessment and   Recommendations(SBAR). Information from the following report(s) SBAR, Kardex, ED Summary, Procedure Summary, Intake/Output, MAR, Recent Results, Med Rec Status and Cardiac Rhythm A fib/Flutter was reviewed with the receiving nurse. Lines:   Peripheral IV 10/24/17 Right Antecubital (Active)   Site Assessment Clean, dry, & intact 10/25/2017  7:14 AM   Phlebitis Assessment 0 10/25/2017  7:14 AM   Infiltration Assessment 0 10/25/2017  7:14 AM   Dressing Status Clean, dry, & intact 10/25/2017  7:14 AM   Dressing Type Tape;Transparent 10/25/2017  7:14 AM   Hub Color/Line Status Pink;Patent; Flushed 10/25/2017  7:14 AM   Alcohol Cap Used No 10/25/2017  7:14 AM        Opportunity for questions and clarification was provided.       Patient transported with:   O2 @ 2 liters

## 2017-10-25 NOTE — PROGRESS NOTES
Critical Care Daily Progress Note: 10/25/2017    Chris Last   Admission Date: 10/23/2017         The patient's chart is reviewed and the patient is discussed with the staff. 75 yo WM admitted after fall in [place] with rib fx, small PTX, and pulmonary contusion. Subjective:     Comfortable on NC except for chest wall pain. Has not been OOB.  No F/C/S    Current Facility-Administered Medications   Medication Dose Route Frequency    ondansetron (ZOFRAN) injection 4 mg  4 mg IntraVENous Q6H PRN    albuterol (PROVENTIL VENTOLIN) nebulizer solution 1.25 mg  1.25 mg Inhalation QID RT    lisinopril (PRINIVIL, ZESTRIL) tablet 10 mg  10 mg Oral DAILY    amLODIPine (NORVASC) tablet 10 mg  10 mg Oral DAILY    sodium chloride (NS) flush 5-10 mL  5-10 mL IntraVENous Q8H    sodium chloride (NS) flush 5-10 mL  5-10 mL IntraVENous PRN    cefTRIAXone (ROCEPHIN) 1 g in 0.9% sodium chloride (MBP/ADV) 50 mL  1 g IntraVENous Q24H    azithromycin (ZITHROMAX) 500 mg in 0.9% sodium chloride (MBP/ADV) 250 mL  500 mg IntraVENous Q24H    acetaminophen (TYLENOL) tablet 650 mg  650 mg Oral Q4H PRN    HYDROcodone-acetaminophen (NORCO) 5-325 mg per tablet 1 Tab  1 Tab Oral Q4H PRN    HYDROmorphone (PF) (DILAUDID) injection 0.5 mg  0.5 mg IntraVENous Q4H PRN    albuterol (PROVENTIL VENTOLIN) nebulizer solution 2.5 mg  2.5 mg Nebulization Q4H PRN    naloxone (NARCAN) injection 0.4 mg  0.4 mg IntraVENous PRN    heparin (porcine) injection 5,000 Units  5,000 Units SubCUTAneous Q8H    bisacodyl (DULCOLAX) tablet 5 mg  5 mg Oral DAILY PRN    hydrALAZINE (APRESOLINE) 20 mg/mL injection 10 mg  10 mg IntraVENous Q6H PRN    carvedilol (COREG) tablet 3.125 mg  3.125 mg Oral BID WITH MEALS       Review of Systems    Constitutional:  negative for fever, chills, sweats  Cardiovascular:  negative for chest pain, palpitations, syncope, edema  Gastrointestinal:  negative for dysphagia, reflux, vomiting, diarrhea, abdominal pain, or melena  Neurologic:  negative for focal weakness, numbness, headache      Objective:     Vitals:    10/25/17 0510 10/25/17 0610 10/25/17 0714 10/25/17 0745   BP: 149/68 169/82 169/82    Pulse: 72 70 81    Resp: 18 20 29    Temp:   99.1 °F (37.3 °C)    SpO2: 97% 96% (!) 89% 96%   Weight:       Height:           Intake and Output:   10/23 1901 - 10/25 0700  In: 1300 [P.O.:1000; I.V.:300]  Out: 700 [Urine:700]       Physical Exam:          Constitutional:  the patient is well developed and in no acute distress  EENMT:  Sclera clear, pupils equal, oral mucosa moist  Respiratory: decreased BS  Cardiovascular:  RRR without M,G,R  Gastrointestinal: soft and non-tender; with positive bowel sounds. Musculoskeletal: warm without cyanosis. There is no lower leg edema. Skin:  no jaundice or rashes  Neurologic: no gross neuro deficits     Psychiatric:  alert and oriented x 3    LINES:  PIV    DRIPS:   IVF    CXR:           LAB  No results for input(s): GLUCPOC in the last 72 hours. No lab exists for component: [address]      10/24/17   0415  10/23/17   1549   WBC  12.4*  10.5   HGB  13.6  14.3   HCT  40.3*  41.4   PLT  247  230     Recent Labs      10/24/17   0415  10/23/17   1958  10/23/17   1549   NA  134*   --   134*   K  4.7   --   5.2*   CL  97*   --   98   CO2  27   --   26   GLU  106*   --   129*   BUN  19   --   17   CREA  1.47   --   1.34   MG   --   1.8   --    PHOS   --   3.6   --    CA  9.0   --   9.4   TROIQ  0.04   --    --    ALB  3.0*   --    --    TBILI  1.0   --    --    ALT  39   --    --    SGOT  27   --    --      No results for input(s): PH, PCO2, PO2, HCO3 in the last 72 hours. No results for input(s): LCAD, LAC in the last 72 hours.     Assessment:  (Medical Decision Making)     Hospital Problems  Date Reviewed: 4/22/2015          Codes Class Noted POA    Pulmonary contusion ICD-10-CM: S27.329A  ICD-9-CM: 861.21  10/24/2017 Unknown    Needs to be OOB to improve lung inflation and oxygenation. Shortness of breath ICD-10-CM: R06.02  ICD-9-CM: 786.05  10/23/2017 Unknown        * (Principal)Pneumothorax, closed, traumatic ICD-10-CM: S27. 0XXA  ICD-9-CM: 860.0  10/23/2017 Unknown    None on today's CXR    Multiple rib fractures ICD-10-CM: S22.49XA  ICD-9-CM: 807.09  10/23/2017 Unknown    Pain persists. Hyperkalemia ICD-10-CM: E87.5  ICD-9-CM: 276.7  10/23/2017 Unknown        Atrial fibrillation (HCC) ICD-10-CM: I48.91  ICD-9-CM: 427.31  10/23/2017 Yes        HLD (hyperlipidemia) ICD-10-CM: E78.5  ICD-9-CM: 272.4  7/24/2013 Yes        Anemia ICD-10-CM: D64.9  ICD-9-CM: 285.9  7/24/2013 Yes        HTN (hypertension) ICD-10-CM: I10  ICD-9-CM: 401.9  7/15/2013 Yes              Plan:  (Medical Decision Making)       OOB. Ok for floor transfer. Wean oxygen as tolerated. Check PCT. --    More than 50% of the time documented was spent in face-to-face contact with the patient and in the care of the patient on the floor/unit where the patient is located.     Kyle Medina MD

## 2017-10-25 NOTE — PROGRESS NOTES
Bedside and verbal shift report received from [facility]. Patient resting quietly in bed. Reports that he has pain with coughing/movement but does not want pain medicine at this time. VSS, will continue to monitor.

## 2017-10-25 NOTE — PROGRESS NOTES
Pts daughter Yury Red, called to be informed of patient's status. States \"her dad his hardheaded and wouldn't not tell her what happened or why he was in ICU. \" I got permission from pt to give daughter update. [facility], that is pts primary contact in our computer system is . Darshana added to emergency contact [phone]. Daughter updated on pt status.

## 2017-10-25 NOTE — PROGRESS NOTES
10/25/2017 11:42 AM    Admit Date: 10/23/2017        Subjective:     Greyson Sebastian denies palpitations, Has alot of rt rib pain H/o a fib in past he was told by Dr Booker Herrera. Objective:      Visit Vitals    /76    Pulse 76    Temp 99.1 °F (37.3 °C)    Resp 22    Ht 5' 8\" (1.727 m)    Wt 76.8 kg (169 lb 4.8 oz)    SpO2 96%    BMI 25.74 kg/m2       Physical Exam:  Heart: irregularly irregular rhythm  Lungs: diminished breath sounds R base  Extremities: no edema    Data Review:   Labs:    Recent Results (from the past 24 hour(s))   CBC WITH AUTOMATED DIFF    Collection Time: 10/25/17  8:37 AM   Result Value Ref Range    WBC 10.2 4.3 - 11.1 K/uL    RBC 4.17 (L) 4.23 - 5.67 M/uL    HGB 13.3 (L) 13.6 - 17.2 g/dL    HCT 39.0 (L) 41.1 - 50.3 %    MCV 93.5 79.6 - 97.8 FL    MCH 31.9 26.1 - 32.9 PG    MCHC 34.1 31.4 - 35.0 g/dL    RDW 13.0 11.9 - 14.6 %    PLATELET 257 552 - 086 K/uL    MPV 9.8 (L) 10.8 - 14.1 FL    DF AUTOMATED      NEUTROPHILS 83 (H) 43 - 78 %    LYMPHOCYTES 7 (L) 13 - 44 %    MONOCYTES 9 4.0 - 12.0 %    EOSINOPHILS 1 0.5 - 7.8 %    BASOPHILS 0 0.0 - 2.0 %    IMMATURE GRANULOCYTES 0 0.0 - 5.0 %    ABS. NEUTROPHILS 8.4 (H) 1.7 - 8.2 K/UL    ABS. LYMPHOCYTES 0.8 0.5 - 4.6 K/UL    ABS. MONOCYTES 1.0 0.1 - 1.3 K/UL    ABS. EOSINOPHILS 0.1 0.0 - 0.8 K/UL    ABS. BASOPHILS 0.0 0.0 - 0.2 K/UL    ABS. IMM.  GRANS. 0.0 0.0 - 0.5 K/UL   METABOLIC PANEL, BASIC    Collection Time: 10/25/17  8:37 AM   Result Value Ref Range    Sodium 127 (L) 136 - 145 mmol/L    Potassium 4.6 3.5 - 5.1 mmol/L    Chloride 92 (L) 98 - 107 mmol/L    CO2 28 21 - 32 mmol/L    Anion gap 7 7 - 16 mmol/L    Glucose 100 65 - 100 mg/dL    BUN 16 8 - 23 MG/DL    Creatinine 1.15 0.8 - 1.5 MG/DL    GFR est AA >60 >60 ml/min/1.73m2    GFR est non-AA >60 >60 ml/min/1.73m2    Calcium 8.7 8.3 - 10.4 MG/DL       Telemetry: AFIB rate controlled    ECHO: Nl EF > 50%      Assessment:     Patient Active Problem List    Diagnosis Date Noted    Pulmonary contusion 10/24/2017    Shortness of breath 10/23/2017    Pneumothorax, closed, traumatic 10/23/2017    Multiple rib fractures from fall No syncope 10/23/2017    Hyperkalemia 10/23/2017    Atrial fibrillation (HCC) chronic rate control Later he may need blood thinner once his ribs heal 10/23/2017    HLD (hyperlipidemia) 07/24/2013    Anemia 07/24/2013    Allergic rhinitis 07/24/2013    HTN (hypertension) 07/15/2013       Plan:   Call us if needed thanks

## 2017-10-26 LAB
ANION GAP SERPL CALC-SCNC: 5 MMOL/L (ref 7–16)
BUN SERPL-MCNC: 21 MG/DL (ref 8–23)
CALCIUM SERPL-MCNC: 8.9 MG/DL (ref 8.3–10.4)
CHLORIDE SERPL-SCNC: 95 MMOL/L (ref 98–107)
CO2 SERPL-SCNC: 29 MMOL/L (ref 21–32)
CREAT SERPL-MCNC: 1.11 MG/DL (ref 0.8–1.5)
ERYTHROCYTE [DISTWIDTH] IN BLOOD BY AUTOMATED COUNT: 13.1 % (ref 11.9–14.6)
GLUCOSE SERPL-MCNC: 97 MG/DL (ref 65–100)
HCT VFR BLD AUTO: 37.1 % (ref 41.1–50.3)
HGB BLD-MCNC: 12.9 G/DL (ref 13.6–17.2)
MCH RBC QN AUTO: 32.4 PG (ref 26.1–32.9)
MCHC RBC AUTO-ENTMCNC: 34.8 G/DL (ref 31.4–35)
MCV RBC AUTO: 93.2 FL (ref 79.6–97.8)
PLATELET # BLD AUTO: 236 K/UL (ref 150–450)
PMV BLD AUTO: 10.3 FL (ref 10.8–14.1)
POTASSIUM SERPL-SCNC: 5.3 MMOL/L (ref 3.5–5.1)
RBC # BLD AUTO: 3.98 M/UL (ref 4.23–5.67)
SODIUM SERPL-SCNC: 129 MMOL/L (ref 136–145)
WBC # BLD AUTO: 10.7 K/UL (ref 4.3–11.1)

## 2017-10-26 PROCEDURE — 74011250637 HC RX REV CODE- 250/637: Performed by: INTERNAL MEDICINE

## 2017-10-26 PROCEDURE — 74011000250 HC RX REV CODE- 250: Performed by: HOSPITALIST

## 2017-10-26 PROCEDURE — 77010033678 HC OXYGEN DAILY

## 2017-10-26 PROCEDURE — 74011250637 HC RX REV CODE- 250/637: Performed by: HOSPITALIST

## 2017-10-26 PROCEDURE — 65270000029 HC RM PRIVATE

## 2017-10-26 PROCEDURE — 94760 N-INVAS EAR/PLS OXIMETRY 1: CPT

## 2017-10-26 PROCEDURE — 97161 PT EVAL LOW COMPLEX 20 MIN: CPT

## 2017-10-26 PROCEDURE — 74011250636 HC RX REV CODE- 250/636: Performed by: HOSPITALIST

## 2017-10-26 PROCEDURE — 85027 COMPLETE CBC AUTOMATED: CPT | Performed by: INTERNAL MEDICINE

## 2017-10-26 PROCEDURE — 99232 SBSQ HOSP IP/OBS MODERATE 35: CPT | Performed by: INTERNAL MEDICINE

## 2017-10-26 PROCEDURE — 36415 COLL VENOUS BLD VENIPUNCTURE: CPT | Performed by: INTERNAL MEDICINE

## 2017-10-26 PROCEDURE — 80048 BASIC METABOLIC PNL TOTAL CA: CPT | Performed by: INTERNAL MEDICINE

## 2017-10-26 PROCEDURE — 97165 OT EVAL LOW COMPLEX 30 MIN: CPT

## 2017-10-26 PROCEDURE — 94640 AIRWAY INHALATION TREATMENT: CPT

## 2017-10-26 PROCEDURE — 74011000258 HC RX REV CODE- 258: Performed by: HOSPITALIST

## 2017-10-26 RX ADMIN — ALBUTEROL SULFATE 2.5 MG: 2.5 SOLUTION RESPIRATORY (INHALATION) at 11:27

## 2017-10-26 RX ADMIN — HYDROCODONE BITARTRATE AND ACETAMINOPHEN 1 TABLET: 5; 325 TABLET ORAL at 08:23

## 2017-10-26 RX ADMIN — ALBUTEROL SULFATE 2.5 MG: 2.5 SOLUTION RESPIRATORY (INHALATION) at 08:00

## 2017-10-26 RX ADMIN — Medication 10 ML: at 21:26

## 2017-10-26 RX ADMIN — HEPARIN SODIUM 5000 UNITS: 5000 INJECTION, SOLUTION INTRAVENOUS; SUBCUTANEOUS at 15:16

## 2017-10-26 RX ADMIN — AMLODIPINE BESYLATE 10 MG: 10 TABLET ORAL at 08:23

## 2017-10-26 RX ADMIN — Medication 10 ML: at 05:50

## 2017-10-26 RX ADMIN — HYDROCODONE BITARTRATE AND ACETAMINOPHEN 1 TABLET: 5; 325 TABLET ORAL at 12:36

## 2017-10-26 RX ADMIN — LISINOPRIL 10 MG: 5 TABLET ORAL at 08:23

## 2017-10-26 RX ADMIN — ALBUTEROL SULFATE 2.5 MG: 2.5 SOLUTION RESPIRATORY (INHALATION) at 20:11

## 2017-10-26 RX ADMIN — CEFTRIAXONE 1 G: 1 INJECTION, POWDER, FOR SOLUTION INTRAMUSCULAR; INTRAVENOUS at 21:26

## 2017-10-26 RX ADMIN — CARVEDILOL 3.12 MG: 3.12 TABLET, FILM COATED ORAL at 17:15

## 2017-10-26 RX ADMIN — HEPARIN SODIUM 5000 UNITS: 5000 INJECTION, SOLUTION INTRAVENOUS; SUBCUTANEOUS at 05:48

## 2017-10-26 RX ADMIN — CARVEDILOL 3.12 MG: 3.12 TABLET, FILM COATED ORAL at 08:23

## 2017-10-26 RX ADMIN — HYDROCODONE BITARTRATE AND ACETAMINOPHEN 1 TABLET: 5; 325 TABLET ORAL at 17:15

## 2017-10-26 RX ADMIN — HEPARIN SODIUM 5000 UNITS: 5000 INJECTION, SOLUTION INTRAVENOUS; SUBCUTANEOUS at 21:13

## 2017-10-26 RX ADMIN — AZITHROMYCIN MONOHYDRATE 500 MG: 500 INJECTION, POWDER, LYOPHILIZED, FOR SOLUTION INTRAVENOUS at 22:51

## 2017-10-26 RX ADMIN — HYDROCODONE BITARTRATE AND ACETAMINOPHEN 1 TABLET: 5; 325 TABLET ORAL at 21:38

## 2017-10-26 NOTE — PROGRESS NOTES
Problem: Mobility Impaired (Adult and Pediatric)  Goal: *Acute Goals and Plan of Care (Insert Text)  DISCHARGE GOALS:    (1.)Mr. Riggs will transfer from bed to chair and chair to bed with MODIFIED INDEPENDENCE using the least restrictive device within 3 day(s). (2.)Mr. Riggs will ambulate with MODIFIED INDEPENDENCE for 300 feet with the least restrictive device within 3 day(s). (3.)Mr. Riggs will ambulate up and down a flight of stairs with one handrail and SUPERVISION within 3 days. (4.)Mr. Riggs will demonstrate good dynamic standing balance with UE support at walker within 3 days. ________________________________________________________________________________________________      PHYSICAL THERAPY: Initial Assessment 10/26/2017  INPATIENT: Hospital Day: 4  Payor: [address] [place] [address] / Plan: [address] / Product Type: [email or web address] Medicare /      NAME/AGE/GENDER: Brandie Araiza is a 76 y.o. male   PRIMARY DIAGNOSIS: Pneumothorax, closed, traumatic  Multiple rib fractures  Shortness of breath Pneumothorax, closed, traumatic Pneumothorax, closed, traumatic        ICD-10: Treatment Diagnosis:   · Other lack of cordination (R27.8)  · Difficulty in walking, Not elsewhere classified (R26.2)  · History of falling (Z91.81)   Precaution/Allergies:  Review of patient's allergies indicates no known allergies. ASSESSMENT:     Mr. Nicho Lazaro presents with above diagnosis. Patient fell in his bathroom at home after toileting and undressing without the lights turned on. He lives alone and reports he is independent with his mobility and ADLs without an assistive device at baseline. Patient does, however, have a history of falls and rib fractures. Patient needed lots of encouragement to work with therapy. Patient very irritable and often difficult to reason with.   Patient insistent that he is going home today and explained he needed to work with therapy to ensure his safety if he does return home. O2 sats 89% prior to getting up. Patient asked to use a walker when getting out of bed. He ambulated in the hallways to the stairwell with an accelerated gait pattern and often deviating from a straight path. Needed cues to slow his pace. Able to ambulate up/down stairs with L handrail and alternating between a step to and reciprocal pattern but required CGA for safety. On the way back to room, made patient stop and focus on his balance with walking. O2 sats initially 86% upon returning to room but quickly reached 90-91%. Patient impulsive and stubborn which made him unsafe today. He is agreeable to using a walker at home and is agreeable to working with [address] he returns home. He feels his daughter can stay with him the first night if necessary but he doesn't think it will be. Talked with patient about needing a night light in the bathroom and about being more cautious. He doesn't feel he needs a seat in the shower and reports he will sponge bathe until he feels safe. Unsure if patient will follow through with recommendations. Patient would benefit from continued therapy while he is in the hospital to improve mobility and balance and safety. This section established at most recent assessment   PROBLEM LIST (Impairments causing functional limitations):  1. Decreased Strength  2. Decreased ADL/Functional Activities  3. Decreased Transfer Abilities  4. Decreased Ambulation Ability/Technique  5. Decreased Balance  6. Increased Pain  7. Decreased Activity Tolerance  8. Increased Shortness of Breath  9. Decreased Knowledge of Precautions   INTERVENTIONS PLANNED: (Benefits and precautions of physical therapy have been discussed with the patient.)  1. Gait Training  2. Home Exercise Program (HEP)  3. Therapeutic Activites  4.  Therapeutic Exercise/Strengthening     TREATMENT PLAN: Frequency/Duration: daily for duration of hospital stay  Rehabilitation Potential For Stated Goals: Good     RECOMMENDED REHABILITATION/EQUIPMENT: (at time of discharge pending progress): Due to the probability of continued deficits (see above) this patient will likely need continued skilled physical therapy after discharge. Equipment:    Walkers, Type: Rolling Walker - if unable to get from family member              HISTORY:   History of Present Injury/Illness (Reason for Referral):  Lydia 77 y/o male with hx HTN fell in the bathroom this morning around 8 am and right ribcage hit side of bathtub. He had no LOC/head injury. Has been short of breath since the fall and difficulty breathing deeply because of the pain. He has actually recuperated from some anterior right rib fractures sustained from a fall in September. Has had some mild cough prior to fall today. Denies any chest pain or hx CAD/afib. EKG does show atrial fibrillation but he was unaware of this and denies palpitations or syncope/near-syncope. Chest xray today shows cardiomegaly and pulmonary vascular congestion and bibasilar infiltrates. Denies fever, chills, hemoptysis. CT chest shows multiple right-sided rib fractures with trace right-sided pneumothorax and right pleural effusion. Dense bibasilar airspace consolidation. Will admit for further workup. Past Medical History/Comorbidities:   Mr. Shaq Roblero  has a past medical history of Allergic rhinitis; Fractured pelvis ([address] (2006); History of chicken pox (Childhood); Scarlet fever (Childhood); and Zenker's diverticulum. Mr. Shaq Roblero  has a past surgical history that includes vasectomy and malignant skin lesion excision.   Social History/Living Environment:   Home Environment: Apartment  # Steps to Enter: 15  Rails to Enter: Yes  [employer] : Bilateral  One/Two Story Residence: One story  Living Alone: Yes  Support Systems: Friends \ neighbors  Patient Expects to be Discharged to[de-identified] Private residence  Current DME Used/Available at Home: Walker, rolling  Tub or Shower Type: Tub/Shower combination  Prior Level of Function/Work/Activity:  Independent with mobility and ADLs but history of falls and rib fractures. Personal Factors:          Sex:  male        Age:  76 y.o. Number of Personal Factors/Comorbidities that affect the Plan of Care: 1-2: MODERATE COMPLEXITY   EXAMINATION:   Most Recent Physical Functioning:   Gross Assessment:  AROM: Generally decreased, functional  Strength: Generally decreased, functional  Coordination: Generally decreased, functional  Tone: Normal  Sensation: Intact               Posture:     Balance:  Sitting: Intact  Standing: Impaired  Standing - Static: Good (with walker)  Standing - Dynamic : Fair (with walker) Bed Mobility:  Supine to Sit: Supervision  Sit to Supine: Supervision  Wheelchair Mobility:     Transfers:  Sit to Stand: Stand-by asssistance  Stand to Sit: Stand-by asssistance  Gait:     Base of Support: Widened  Speed/Yeni: Accelerated  Gait Abnormalities: Path deviations  Distance (ft): 150 Feet (ft)  Assistive Device: Walker, rolling  Ambulation - Level of Assistance: Contact guard assistance  Number of Stairs Trained: 14  Stairs - Level of Assistance: Contact guard assistance  Rail Use: Left   Interventions: Safety awareness training;Verbal cues      Body Structures Involved:  1. Muscles Body Functions Affected:  1. Movement Related Activities and Participation Affected:  1. Mobility  2. Self Care  3. Domestic Life   Number of elements that affect the Plan of Care: 4+: HIGH COMPLEXITY   CLINICAL PRESENTATION:   Presentation: Stable and uncomplicated: LOW COMPLEXITY   CLINICAL DECISION MAKIN [facility] [address] [id number] AM-PAC 6 Clicks   Basic Mobility Inpatient Short Form  How much difficulty does the patient currently have. .. Unable A Lot A Little None   1. Turning over in bed (including adjusting bedclothes, sheets and blankets)? [] 1   [] 2   [x] 3   [] 4   2.   Sitting down on and standing up from a chair with arms ( e.g., wheelchair, bedside commode, etc.)   [] 1   [] 2   [x] 3   [] 4   3. Moving from lying on back to sitting on the side of the bed? [] 1   [] 2   [x] 3   [] 4   How much help from another person does the patient currently need. .. Total A Lot A Little None   4. Moving to and from a bed to a chair (including a wheelchair)? [] 1   [] 2   [x] 3   [] 4   5. Need to walk in hospital room? [] 1   [] 2   [x] 3   [] 4   6. Climbing 3-5 steps with a railing? [] 1   [] 2   [x] 3   [] 4   © 2007, Trustees of [address] [place] [address] [address] [address], under license to [employer]. All rights reserved      Score:  Initial: 18 Most Recent: X (Date: -- )    Interpretation of Tool:  Represents activities that are increasingly more difficult (i.e. Bed mobility, Transfers, Gait). Score 24 23 22-20 19-15 14-10 9-7 6     Modifier CH CI CJ CK CL CM CN      ? Mobility - Walking and Moving Around:     - CURRENT STATUS: CK - 40%-59% impaired, limited or restricted    - GOAL STATUS: CJ - 20%-39% impaired, limited or restricted    - D/C STATUS:  ---------------To be determined---------------  Payor: [employer]PLETE / Plan: [address] / Product Type: Managed Care Medicare /      Medical Necessity:     · Patient is expected to demonstrate progress in strength, balance and coordination to improve safety during home and community activities. Reason for Services/Other Comments:  · Patient continues to require skilled intervention due to decreased strength, balance, mobility, and safety when out of bed. Use of outcome tool(s) and clinical judgement create a POC that gives a: Clear prediction of patient's progress: LOW COMPLEXITY            TREATMENT:   (In addition to Assessment/Re-Assessment sessions the following treatments were rendered)   Pre-treatment Symptoms/Complaints:  Patient agreeable to therapy after some discussion.   Pain: Initial:   Pain Intensity 1: 2  Pain Location 1: Rib cage  Pain Orientation 1: Right  Post Session:  1/10     Assessment/Reassessment only, no treatment provided today    Braces/Orthotics/Lines/Etc:   · O2 Device: Room air  Treatment/Session Assessment:    · Response to Treatment:  Patient tolerated fairly well. Patient impulsive and unsafe today but also frustrated about lack of knowledge of d/c plans/time/day. Patient easily irritated. · Interdisciplinary Collaboration:   o Physical Therapist  o Occupational Therapist  o Registered Nurse  · After treatment position/precautions:   o Supine in bed  o Bed alarm/tab alert on  o Bed/Chair-wheels locked  o Bed in low position  o Call light within reach  o RN notified  o Side rails x 3   · Compliance with Program/Exercises: compliant most of the time. · Recommendations/Intent for next treatment session: \"Next visit will focus on advancements to more challenging activities and reduction in assistance provided\".   Total Treatment Duration:  PT Patient Time In/Time Out  Time In: 1320  Time Out: [facility]

## 2017-10-26 NOTE — PROGRESS NOTES
Notified MD that patient was not comfortable going home to be by himself at this time. Daughter also called concerned about patients safety if he were to be discharge today. Said she would rather him leave tomorrow MD stated it would be ok for patient to stay until tomorrow and daughter is able to be here for the discharge. Pt. Refused walking O2 sat because he does not want to leave today. Stated he will do it tomorrow.

## 2017-10-26 NOTE — PROGRESS NOTES
AM Assessment. Respirations even and unlabored. Lungs clear, slightly diminished in the lower lobes. Abd. Soft and non tender. BS active X4 quads. Pt. Has no complaints at this time. Call light within reach.

## 2017-10-26 NOTE — PROGRESS NOTES
Critical Care Daily Progress Note: 10/26/2017    Christopher Rodgers   Admission Date: 10/23/2017         The patient's chart is reviewed and the patient is discussed with the staff. 75 yo WM admitted after fall in [place] with rib fx, small PTX, and pulmonary contusion. Subjective:      On RA  Complains of some pain when taking deep breath     Current Facility-Administered Medications   Medication Dose Route Frequency    albuterol (PROVENTIL VENTOLIN) nebulizer solution 2.5 mg  2.5 mg Inhalation QID RT    ondansetron (ZOFRAN) injection 4 mg  4 mg IntraVENous Q6H PRN    lisinopril (PRINIVIL, ZESTRIL) tablet 10 mg  10 mg Oral DAILY    amLODIPine (NORVASC) tablet 10 mg  10 mg Oral DAILY    sodium chloride (NS) flush 5-10 mL  5-10 mL IntraVENous Q8H    sodium chloride (NS) flush 5-10 mL  5-10 mL IntraVENous PRN    cefTRIAXone (ROCEPHIN) 1 g in 0.9% sodium chloride (MBP/ADV) 50 mL  1 g IntraVENous Q24H    azithromycin (ZITHROMAX) 500 mg in 0.9% sodium chloride (MBP/ADV) 250 mL  500 mg IntraVENous Q24H    acetaminophen (TYLENOL) tablet 650 mg  650 mg Oral Q4H PRN    HYDROcodone-acetaminophen (NORCO) 5-325 mg per tablet 1 Tab  1 Tab Oral Q4H PRN    HYDROmorphone (PF) (DILAUDID) injection 0.5 mg  0.5 mg IntraVENous Q4H PRN    albuterol (PROVENTIL VENTOLIN) nebulizer solution 2.5 mg  2.5 mg Nebulization Q4H PRN    naloxone (NARCAN) injection 0.4 mg  0.4 mg IntraVENous PRN    heparin (porcine) injection 5,000 Units  5,000 Units SubCUTAneous Q8H    bisacodyl (DULCOLAX) tablet 5 mg  5 mg Oral DAILY PRN    hydrALAZINE (APRESOLINE) 20 mg/mL injection 10 mg  10 mg IntraVENous Q6H PRN    carvedilol (COREG) tablet 3.125 mg  3.125 mg Oral BID WITH MEALS       Review of Systems    Constitutional:  negative for fever, chills, sweats  Cardiovascular:  negative for chest pain, palpitations, syncope, edema  Gastrointestinal:  negative for dysphagia, reflux, vomiting, diarrhea, abdominal pain, or melena  Neurologic:  negative for focal weakness, numbness, headache      Objective:     Vitals:    10/26/17 0719 10/26/17 0800 10/26/17 0850 10/26/17 1022   BP: 154/83   119/77   Pulse: 75   76   Resp: 20   20   Temp: 98.1 °F (36.7 °C)   97.7 °F (36.5 °C)   SpO2: 95% 96% 92% 91%   Weight:       Height:           Intake and Output:   10/24 1901 - 10/26 0700  In: 250 [P.O.:250]  Out: 1695 [Urine:1695]       Physical Exam:          Constitutional:  the patient is well developed and in no acute distress  EENMT:  Sclera clear, pupils equal, oral mucosa moist  Respiratory: decreased BS  Cardiovascular:  RRR without M,G,R  Gastrointestinal: soft and non-tender; with positive bowel sounds. Musculoskeletal: warm without cyanosis. There is no lower leg edema. Skin:  no jaundice or rashes  Neurologic: no gross neuro deficits     Psychiatric:  alert and oriented x 3    LINES:  PIV    DRIPS:   IVF    CXR:           LAB  No results for input(s): GLUCPOC in the last 72 hours.     No lab exists for component: [place]   Recent Labs      10/26/17   0525  10/25/17   0837  10/24/17   0415   WBC  10.7  10.2  12.4*   HGB  12.9*  13.3*  13.6   HCT  37.1*  39.0*  40.3*   PLT  236  231  247     Recent Labs      10/26/17   0525  10/25/17   0837  10/24/17   0415  10/23/17   1958   NA  129*  127*  134*   --    K  5.3*  4.6  4.7   --    CL  95*  92*  97*   --    CO2  29  28  27   --    GLU  97  100  106*   --    BUN  21  16  19   --    CREA  1.11  1.15  1.47   --    MG   --    --    --   1.8   PHOS   --    --    --   3.6   CA  8.9  8.7  9.0   --    TROIQ   --    --   0.04   --    ALB   --    --   3.0*   --    TBILI   --    --   1.0   --    ALT   --    --   39   --    SGOT   --    --   27   --          Assessment:  (Medical Decision Making)     Hospital Problems  Date Reviewed: 4/22/2015          Codes Class Noted POA    Pulmonary contusion ICD-10-CM: S27.329A  ICD-9-CM: 861.21  10/24/2017 Unknown    Needs to be OOB to improve lung inflation and oxygenation. Shortness of breath ICD-10-CM: R06.02  ICD-9-CM: 786.05  10/23/2017 Unknown        * (Principal)Pneumothorax, closed, traumatic ICD-10-CM: S27. 0XXA  ICD-9-CM: 860.0  10/23/2017 Unknown    None on today's CXR    Multiple rib fractures ICD-10-CM: S22.49XA  ICD-9-CM: 807.09  10/23/2017 Unknown    Pain persists. Hyperkalemia ICD-10-CM: E87.5  ICD-9-CM: 276.7  10/23/2017 Unknown        Atrial fibrillation (HCC) ICD-10-CM: I48.91  ICD-9-CM: 427.31  10/23/2017 Yes        HLD (hyperlipidemia) ICD-10-CM: E78.5  ICD-9-CM: 272.4  7/24/2013 Yes        Anemia ICD-10-CM: D64.9  ICD-9-CM: 285.9  7/24/2013 Yes        HTN (hypertension) ICD-10-CM: I10  ICD-9-CM: 401.9  7/15/2013 Yes              Plan:  (Medical Decision Making)   -doing well, Ok to be discharged, discussed with   --    More than 50% of the time documented was spent in face-to-face contact with the patient and in the care of the patient on the floor/unit where the patient is located.     Vee Saini MD

## 2017-10-26 NOTE — PROGRESS NOTES
Pt. Stated pain 4/10 while sitting down but states it increases significantly when moving. Requested pain medication before getting out of bed to go to the restroom. Norco given. See MAR.
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
## 2017-10-26 NOTE — PROGRESS NOTES
Problem: Self Care Deficits Care Plan (Adult)  Goal: *Acute Goals and Plan of Care (Insert Text)  1. Patient will perform grooming with mod indep. 2. Patient will perform Upper body dressing with mod indep  3. Patient will perform lower body dressing with mod indep  4. Patient will perform upper and lower body bathing with mod indep  5. Patient will perform toilet transfers with CGA. 6. Patient will perform shower transfer with CGA. 7. Patient will participate in 30 + minutes of ADL/ therapeutic exercise/therapeutic activity with min rest breaks to increase activity tolerance for self care. 8. Patient will perform ADL functional mobility in room with mod indep    Goals to be achieved in 7 days.;    OCCUPATIONAL THERAPY: Initial Assessment and PM 10/26/2017  INPATIENT: Hospital Day: 4  Payor: [address] [place] [address] / Plan: [address] / Product Type: Managed Care Medicare /      NAME/AGE/GENDER: Daxa Wadsworth is a 76 y.o. male   PRIMARY DIAGNOSIS:  Pneumothorax, closed, traumatic  Multiple rib fractures  Shortness of breath Pneumothorax, closed, traumatic Pneumothorax, closed, traumatic        ICD-10: Treatment Diagnosis:    · Generalized Muscle Weakness (M62.81)  · Other lack of cordination (R27.8)   Precautions/Allergies:     Review of patient's allergies indicates no known allergies. ASSESSMENT:     Mr. Tao Harrell presents with above diagnosis. Pt has a h/o multiple falls with PTX and rib fractures. Pt lives alone in an apartment on the second floor. Pt presents with decreased indep with self care and functional mobility. Will benefit from skilled OT to address deficits. This section established at most recent assessment   PROBLEM LIST (Impairments causing functional limitations):  1. Decreased ADL/Functional Activities  2. Decreased Transfer Abilities  3. Decreased Ambulation Ability/Technique  4. Decreased Balance  5. Increased Shortness of Breath  6.  Decreased [place] with Home Exercise Program   INTERVENTIONS PLANNED: (Benefits and precautions of occupational therapy have been discussed with the patient.)  1. Activities of daily living training  2. Adaptive equipment training  3. Balance training  4. Clothing management  5. Hygiene training  6. Therapeutic activity  7. Therapeutic exercise     TREATMENT PLAN: Frequency/Duration: Follow patient 12 times to address above goals. Rehabilitation Potential For Stated Goals: Good     RECOMMENDED REHABILITATION/EQUIPMENT: (at time of discharge pending progress): Due to the probability of continued deficits (see above) this patient will likely need continued skilled occupational therapy after discharge. Equipment:    None at this time              OCCUPATIONAL PROFILE AND HISTORY:   History of Present Injury/Illness (Reason for Referral):  Pt with falls resulting in rib fractures and PTX. Past Medical History/Comorbidities:   Mr. Amanda Orr  has a past medical history of Allergic rhinitis; Fractured pelvis ([address] (2006); History of chicken pox (Childhood); Scarlet fever (Childhood); and Zenker's diverticulum. Mr. Amanda Orr  has a past surgical history that includes vasectomy and malignant skin lesion excision.   Social History/Living Environment:   Home Environment: Apartment  # Steps to Enter: 15  Rails to Enter: Yes  [employer] : Bilateral  One/Two Story Residence: One story  Living Alone: Yes  Support Systems: Friends \ neighbors  Patient Expects to be Discharged to[de-identified] Private residence  Current DME Used/Available at Home: Walker, rolling  Tub or Shower Type: Tub/Shower combination  Prior Level of Function/Work/Activity:  indep with BADLs and IADLs     Number of Personal Factors/Comorbidities that affect the Plan of Care: Brief history (0):  LOW COMPLEXITY   ASSESSMENT OF OCCUPATIONAL PERFORMANCE[de-identified]   Activities of Daily Living:         Indep  Basic ADLs (From Assessment) Complex ADLs (From Assessment)   Basic ADL  Feeding: Supervision  Oral Facial Hygiene/Grooming: Supervision  Bathing: Minimum assistance  Upper Body Dressing: Supervision  Lower Body Dressing: Minimum assistance  Toileting: Minimum assistance     Grooming/Bathing/Dressing Activities of Daily Living     Cognitive Retraining  Safety/Judgement: Decreased awareness of need for assistance;Decreased awareness of need for safety;Decreased insight into deficits                 Functional Transfers  Toilet Transfer : Contact guard assistance  Shower Transfer: Contact guard assistance     Bed/Mat Mobility  Supine to Sit: Supervision  Sit to Supine: Supervision  Sit to Stand: Contact guard assistance       Most Recent Physical Functioning:   Gross Assessment:                  Posture:     Balance:  Sitting: Intact  Standing: Impaired  Standing - Static: Good (with walker)  Standing - Dynamic : Fair (with walker) Bed Mobility:  Supine to Sit: Supervision  Sit to Supine: Supervision  Wheelchair Mobility:     Transfers:  Sit to Stand: Contact guard assistance  Stand to Sit: Contact guard assistance     ROM:          BUE WFL           Patient Vitals for the past 6 hrs:   BP BP Patient Position SpO2 Pulse   10/26/17 1022 119/77 At rest;Sitting 91 % 76       Mental Status  Neurologic State: Alert  Orientation Level: Oriented X4  Cognition: Impaired decision making, Impulsive, Poor safety awareness  Perception: Appears intact  Perseveration: No perseveration noted  Safety/Judgement: Decreased awareness of need for assistance, Decreased awareness of need for safety, Decreased insight into deficits                          Physical Skills Involved:  1. Balance  2. Strength  3. Activity Tolerance Cognitive Skills Affected (resulting in the inability to perform in a timely and safe manner):  1.  None Psychosocial Skills Affected:  1. none   Number of elements that affect the Plan of Care: 1-3:  LOW COMPLEXITY   CLINICAL DECISION MAKING:   [employer] AM-PAC 6 Clicks   Daily Activity Inpatient Short Form  How much help from another person does the patient currently need. .. Total A Lot A Little None   1. Putting on and taking off regular lower body clothing? [] 1   [] 2   [x] 3   [] 4   2. Bathing (including washing, rinsing, drying)? [] 1   [] 2   [x] 3   [] 4   3. Toileting, which includes using toilet, bedpan or urinal?   [] 1   [] 2   [x] 3   [] 4   4. Putting on and taking off regular upper body clothing? [] 1   [] 2   [x] 3   [] 4   5. Taking care of personal grooming such as brushing teeth? [] 1   [] 2   [x] 3   [] 4   6. Eating meals? [] 1   [] 2   [x] 3   [] 4   © 2007, Trustees of [facility], under license to [employer]. All rights reserved      Score:  Initial: 18 Most Recent: X (Date: -- )    Interpretation of Tool:  Represents activities that are increasingly more difficult (i.e. Bed mobility, Transfers, Gait). Score 24 23 22-20 19-15 14-10 9-7 6     Modifier CH CI CJ CK CL CM CN      ? Self Care:     - CURRENT STATUS: CK - 40%-59% impaired, limited or restricted    - GOAL STATUS: CJ - 20%-39% impaired, limited or restricted    - D/C STATUS:  ---------------To be determined---------------  Payor: [address] [place] [address] / Plan: [facility] MEDICARE COMPLETE / Product Type: Managed Care Medicare /      Medical Necessity:     · Patient is expected to demonstrate progress in strength, balance, coordination and functional technique to decrease assistance required with self care and functional mobility. Reason for Services/Other Comments:  · Patient continues to require skilled intervention due to decreased self care and functional mobility.    Use of outcome tool(s) and clinical judgement create a POC that gives a: LOW COMPLEXITY         TREATMENT:   (In addition to Assessment/Re-Assessment sessions the following treatments were rendered)     Pre-treatment Symptoms/Complaints:  none  Pain: Initial:   Pain Intensity 1: 2  Pain Location 1: Rib cage  Pain Intervention(s) 1: Ambulation/Increased Activity  Post Session:  0     Assessment/Reassessment only, no treatment provided today    Braces/Orthotics/Lines/Etc:   · O2 Device: Room air  Treatment/Session Assessment:    · Response to Treatment:  Tolerated well  · Interdisciplinary Collaboration:   o Physical Therapist  o Occupational Therapist  o Registered Nurse  · After treatment position/precautions:   o Supine in bed  o Bed alarm/tab alert on  o Bed/Chair-wheels locked  o Bed in low position  o Call light within reach  o RN notified  o Side rails x 3   · Compliance with Program/Exercises: compliant most of the time. · Recommendations/Intent for next treatment session: \"Next visit will focus on advancements to more challenging activities\".   Total Treatment Duration:  OT Patient Time In/Time Out  Time In: 1400  Time Out: [address], OT

## 2017-10-26 NOTE — PROGRESS NOTES
Pt resting in bed and is alert and oriented x 4. he denies pain and is on 2 L NC. RR even and unlabored. Call light in reach and pt instructed to call for assistance if needed. Will monitor. Daughter at bedside.
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
## 2017-10-27 VITALS
OXYGEN SATURATION: 94 % | WEIGHT: 169.3 LBS | TEMPERATURE: 98.5 F | RESPIRATION RATE: 18 BRPM | DIASTOLIC BLOOD PRESSURE: 92 MMHG | HEART RATE: 78 BPM | SYSTOLIC BLOOD PRESSURE: 165 MMHG | HEIGHT: 68 IN | BODY MASS INDEX: 25.66 KG/M2

## 2017-10-27 PROBLEM — R91.8 BILATERAL PULMONARY INFILTRATES ON CHEST X-RAY: Status: ACTIVE | Noted: 2017-10-27

## 2017-10-27 LAB
ANION GAP SERPL CALC-SCNC: 9 MMOL/L (ref 7–16)
BUN SERPL-MCNC: 20 MG/DL (ref 8–23)
CALCIUM SERPL-MCNC: 8.9 MG/DL (ref 8.3–10.4)
CHLORIDE SERPL-SCNC: 94 MMOL/L (ref 98–107)
CO2 SERPL-SCNC: 26 MMOL/L (ref 21–32)
CREAT SERPL-MCNC: 1.07 MG/DL (ref 0.8–1.5)
ERYTHROCYTE [DISTWIDTH] IN BLOOD BY AUTOMATED COUNT: 13.1 % (ref 11.9–14.6)
GLUCOSE SERPL-MCNC: 98 MG/DL (ref 65–100)
HCT VFR BLD AUTO: 39.5 % (ref 41.1–50.3)
HGB BLD-MCNC: 13.5 G/DL (ref 13.6–17.2)
MAGNESIUM SERPL-MCNC: 1.9 MG/DL (ref 1.8–2.4)
MCH RBC QN AUTO: 31.6 PG (ref 26.1–32.9)
MCHC RBC AUTO-ENTMCNC: 34.2 G/DL (ref 31.4–35)
MCV RBC AUTO: 92.5 FL (ref 79.6–97.8)
PLATELET # BLD AUTO: 239 K/UL (ref 150–450)
PMV BLD AUTO: 10.1 FL (ref 10.8–14.1)
POTASSIUM SERPL-SCNC: 4.4 MMOL/L (ref 3.5–5.1)
RBC # BLD AUTO: 4.27 M/UL (ref 4.23–5.67)
SODIUM SERPL-SCNC: 129 MMOL/L (ref 136–145)
WBC # BLD AUTO: 9.6 K/UL (ref 4.3–11.1)

## 2017-10-27 PROCEDURE — 94760 N-INVAS EAR/PLS OXIMETRY 1: CPT

## 2017-10-27 PROCEDURE — 85027 COMPLETE CBC AUTOMATED: CPT | Performed by: INTERNAL MEDICINE

## 2017-10-27 PROCEDURE — 83735 ASSAY OF MAGNESIUM: CPT | Performed by: INTERNAL MEDICINE

## 2017-10-27 PROCEDURE — 94640 AIRWAY INHALATION TREATMENT: CPT

## 2017-10-27 PROCEDURE — 74011250637 HC RX REV CODE- 250/637: Performed by: HOSPITALIST

## 2017-10-27 PROCEDURE — 74011250636 HC RX REV CODE- 250/636: Performed by: HOSPITALIST

## 2017-10-27 PROCEDURE — 36415 COLL VENOUS BLD VENIPUNCTURE: CPT | Performed by: INTERNAL MEDICINE

## 2017-10-27 PROCEDURE — 80048 BASIC METABOLIC PNL TOTAL CA: CPT | Performed by: INTERNAL MEDICINE

## 2017-10-27 PROCEDURE — 77010033678 HC OXYGEN DAILY

## 2017-10-27 PROCEDURE — 74011250637 HC RX REV CODE- 250/637: Performed by: INTERNAL MEDICINE

## 2017-10-27 PROCEDURE — 74011000250 HC RX REV CODE- 250: Performed by: HOSPITALIST

## 2017-10-27 RX ORDER — CARVEDILOL 3.12 MG/1
3.12 TABLET ORAL 2 TIMES DAILY WITH MEALS
Qty: 60 TAB | Refills: 0 | Status: SHIPPED | OUTPATIENT
Start: 2017-10-27 | End: 2017-11-03 | Stop reason: SDUPTHER

## 2017-10-27 RX ORDER — DOXYCYCLINE 100 MG/1
100 TABLET ORAL 2 TIMES DAILY
Qty: 20 TAB | Refills: 0 | Status: SHIPPED | OUTPATIENT
Start: 2017-10-27 | End: 2017-11-06

## 2017-10-27 RX ORDER — HYDROCODONE BITARTRATE AND ACETAMINOPHEN 5; 325 MG/1; MG/1
1 TABLET ORAL
Qty: 30 TAB | Refills: 0 | Status: SHIPPED | OUTPATIENT
Start: 2017-10-27 | End: 2017-11-01

## 2017-10-27 RX ORDER — LISINOPRIL 10 MG/1
10 TABLET ORAL DAILY
Qty: 30 TAB | Refills: 0 | Status: SHIPPED | OUTPATIENT
Start: 2017-10-28 | End: 2017-11-03 | Stop reason: SDUPTHER

## 2017-10-27 RX ADMIN — HEPARIN SODIUM 5000 UNITS: 5000 INJECTION, SOLUTION INTRAVENOUS; SUBCUTANEOUS at 05:44

## 2017-10-27 RX ADMIN — LISINOPRIL 10 MG: 5 TABLET ORAL at 08:57

## 2017-10-27 RX ADMIN — AMLODIPINE BESYLATE 10 MG: 10 TABLET ORAL at 08:57

## 2017-10-27 RX ADMIN — HYDROCODONE BITARTRATE AND ACETAMINOPHEN 1 TABLET: 5; 325 TABLET ORAL at 10:19

## 2017-10-27 RX ADMIN — CARVEDILOL 3.12 MG: 3.12 TABLET, FILM COATED ORAL at 08:57

## 2017-10-27 RX ADMIN — HYDROCODONE BITARTRATE AND ACETAMINOPHEN 1 TABLET: 5; 325 TABLET ORAL at 05:41

## 2017-10-27 RX ADMIN — Medication 10 ML: at 05:46

## 2017-10-27 RX ADMIN — ALBUTEROL SULFATE 2.5 MG: 2.5 SOLUTION RESPIRATORY (INHALATION) at 08:00

## 2017-10-27 RX ADMIN — HYDROCODONE BITARTRATE AND ACETAMINOPHEN 1 TABLET: 5; 325 TABLET ORAL at 01:34

## 2017-10-27 NOTE — PROGRESS NOTES
Shift assessment complete. Pt alert and oriented x4, respirations diminished, even and unlabored, HOB elevated, pt denies any SOB at this time, S1&S2 auscultated, HR irregular, abd soft, non- tender, Active BS in all 4 quadrants, No pressure ulcers or edema noted, pt is up with assistance to the bathroom, voiding, SCDs in place and functioning, pt instructed to call for assistance, pt verbalizes understanding, bed low and locked, side rails x3, call light within reach.

## 2017-10-27 NOTE — PROGRESS NOTES
Discharge instructions given to and reviewed with pt and pts daughterCorbin. Pt given specific instructions to take antibiotics everyday as prescribed until completed. Pt also given IS to use at home, pt demonstrated understanding of teaching. Pt to go home today with daughter and they will call and make follow up appointment with primary care MD. Pt assisted to [facility] prior to discharge and to call when ready to get dressed.

## 2017-10-27 NOTE — PROGRESS NOTES
Hourly rounds completed every hour. End of shift report given to oncoming nurse, Rosalie Mcnally, [facility].

## 2017-10-27 NOTE — PROGRESS NOTES
[facility] - INPATIENT  Face to Face Encounter    Patients Name: Crystal Ruvalcaba    YOB: 1940    Ordering Physician: Linda Dick    Primary Diagnosis: Pneumothorax, closed, traumatic  Multiple rib fractures  Shortness of breath    Date of Face to Face:   10/27/2017                                  Face to Face Encounter findings are related to primary reason for home care:   yes. 1. I certify that the patient needs intermittent care as follows: physical therapy: stretching/ROM    2. I certify that this patient is homebound, that is: 1) patient requires the use of a walker device, special transportation, or assistance of another to leave the home; or 2) patient's condition makes leaving the home medically contraindicated; and 3) patient has a normal inability to leave the home and leaving the home requires considerable and taxing effort. Patient may leave the home for infrequent and short duration for medical reasons, and occasional absences for non-medical reasons. Homebound status is due to the following functional limitations: Patient with strength deficits limiting the performance of all ADL's without caregiver assistance or the use of an assistive device. 3. I certify that this patient is under my care and that I, or a nurse practitioner or  [id number], or clinical nurse specialist, or certified nurse midwife, working with me, had a Face-to-Face Encounter that meets the physician Face-to-Face Encounter requirements. The following are the clinical findings from the [address] [place] [address] encounter that support the need for skilled services and is a summary of the encounter: Progress  Notes    See attached progess note      NUVIA Louis  10/27/2017      THE FOLLOWING TO BE COMPLETED BY THE COMMUNITY PHYSICIAN:    I concur with the findings described above from the [facility] encounter that this patient is homebound and in need of a skilled service.     Certifying Physician: _____________________________________      Printed Certifying Physician Name: _____________________________________    Date: _________________
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
## 2017-10-27 NOTE — DISCHARGE INSTRUCTIONS
DISCHARGE SUMMARY from Nurse    PATIENT INSTRUCTIONS:    After general anesthesia or intravenous sedation, for 24 hours or while taking prescription Narcotics:  · Limit your activities  · Do not drive and operate hazardous machinery  · Do not make important personal or business decisions  · Do  not drink alcoholic beverages  · If you have not urinated within 8 hours after discharge, please contact your surgeon on call. Report the following to your surgeon:  · Excessive pain, swelling, redness or odor of or around the surgical area  · Temperature over 100.5  · Nausea and vomiting lasting longer than 4 hours or if unable to take medications  · Any signs of decreased circulation or nerve impairment to extremity: change in color, persistent  numbness, tingling, coldness or increase pain  · Any questions    What to do at Home:  Recommended activity: Activity as tolerated and no driving while on pain meds     If you experience any of the following symptoms please see discharge instructions, please follow up with primary care MD.    *  Please give a list of your current medications to your Primary Care Provider. *  Please update this list whenever your medications are discontinued, doses are      changed, or new medications (including over-the-counter products) are added. *  Please carry medication information at all times in case of emergency situations. These are general instructions for a healthy lifestyle:    No smoking/ No tobacco products/ Avoid exposure to second hand smoke  Surgeon General's Warning:  Quitting smoking now greatly reduces serious risk to your health.     Obesity, smoking, and sedentary lifestyle greatly increases your risk for illness    A healthy diet, regular physical exercise & weight monitoring are important for maintaining a healthy lifestyle    You may be retaining fluid if you have a history of heart failure or if you experience any of the following symptoms:  Weight gain of 3 pounds or more overnight or 5 pounds in a week, increased swelling in our hands or feet or shortness of breath while lying flat in bed. Please call your doctor as soon as you notice any of these symptoms; do not wait until your next office visit. Recognize signs and symptoms of STROKE:    F-face looks uneven    A-arms unable to move or move unevenly    S-speech slurred or non-existent    T-time-call 911 as soon as signs and symptoms begin-DO NOT go       Back to bed or wait to see if you get better-TIME IS BRAIN. Warning Signs of HEART ATTACK     Call 911 if you have these symptoms:   Chest discomfort. Most heart attacks involve discomfort in the center of the chest that lasts more than a few minutes, or that goes away and comes back. It can feel like uncomfortable pressure, squeezing, fullness, or pain.  Discomfort in other areas of the upper body. Symptoms can include pain or discomfort in one or both arms, the back, neck, jaw, or stomach.  Shortness of breath with or without chest discomfort.  Other signs may include breaking out in a cold sweat, nausea, or lightheadedness. Don't wait more than five minutes to call 911 - MINUTES MATTER! Fast action can save your life. Calling 911 is almost always the fastest way to get lifesaving treatment. Emergency Medical Services staff can begin treatment when they arrive -- up to an hour sooner than if someone gets to the hospital by car. The discharge information has been reviewed with the patient. The patient verbalized understanding. Discharge medications reviewed with the patient and appropriate educational materials and side effects teaching were provided.   ___________________________________________________________________________________________________________________________________

## 2017-10-29 LAB
BACTERIA SPEC CULT: NORMAL
BACTERIA SPEC CULT: NORMAL
SERVICE CMNT-IMP: NORMAL
SERVICE CMNT-IMP: NORMAL

## 2017-12-01 PROBLEM — S22.49XA MULTIPLE RIB FRACTURES: Status: RESOLVED | Noted: 2017-10-23 | Resolved: 2017-12-01

## 2017-12-01 PROBLEM — S27.0XXA PNEUMOTHORAX, CLOSED, TRAUMATIC: Status: RESOLVED | Noted: 2017-10-23 | Resolved: 2017-12-01

## 2017-12-01 PROBLEM — S27.329A PULMONARY CONTUSION: Status: RESOLVED | Noted: 2017-10-24 | Resolved: 2017-12-01

## 2017-12-01 PROBLEM — E87.1 CHRONIC HYPONATREMIA: Status: ACTIVE | Noted: 2017-12-01

## 2017-12-01 PROBLEM — I48.0 PAROXYSMAL ATRIAL FIBRILLATION (HCC): Status: ACTIVE | Noted: 2017-12-01

## 2017-12-01 PROBLEM — R06.02 SHORTNESS OF BREATH: Status: RESOLVED | Noted: 2017-10-23 | Resolved: 2017-12-01

## 2017-12-01 PROBLEM — R91.8 BILATERAL PULMONARY INFILTRATES ON CHEST X-RAY: Status: RESOLVED | Noted: 2017-10-27 | Resolved: 2017-12-01

## 2017-12-01 PROBLEM — E87.5 HYPERKALEMIA: Status: RESOLVED | Noted: 2017-10-23 | Resolved: 2017-12-01

## 2018-04-20 ENCOUNTER — APPOINTMENT (RX ONLY)
Dept: URBAN - METROPOLITAN AREA CLINIC 349 | Facility: CLINIC | Age: 78
Setting detail: DERMATOLOGY
End: 2018-04-20

## 2018-04-20 DIAGNOSIS — D485 NEOPLASM OF UNCERTAIN BEHAVIOR OF SKIN: ICD-10-CM

## 2018-04-20 DIAGNOSIS — Z85.828 PERSONAL HISTORY OF OTHER MALIGNANT NEOPLASM OF SKIN: ICD-10-CM

## 2018-04-20 DIAGNOSIS — D22 MELANOCYTIC NEVI: ICD-10-CM | Status: STABLE

## 2018-04-20 PROCEDURE — ? OBSERVATION

## 2018-04-20 PROCEDURE — ? COUNSELING

## 2018-04-20 ASSESSMENT — LOCATION DETAILED DESCRIPTION DERM
LOCATION DETAILED: LEFT DISTAL DORSAL FOREARM
LOCATION DETAILED: RIGHT MEDIAL UPPER BACK
LOCATION DETAILED: LEFT INFERIOR UPPER BACK
LOCATION DETAILED: RIGHT FOREHEAD
LOCATION DETAILED: RIGHT PROXIMAL DORSAL FOREARM
LOCATION DETAILED: RIGHT DISTAL POSTERIOR THIGH
LOCATION DETAILED: LEFT DISTAL POSTERIOR THIGH

## 2018-04-20 ASSESSMENT — LOCATION SIMPLE DESCRIPTION DERM
LOCATION SIMPLE: LEFT POSTERIOR THIGH
LOCATION SIMPLE: RIGHT FOREARM
LOCATION SIMPLE: LEFT UPPER BACK
LOCATION SIMPLE: RIGHT FOREHEAD
LOCATION SIMPLE: RIGHT UPPER BACK
LOCATION SIMPLE: RIGHT POSTERIOR THIGH
LOCATION SIMPLE: LEFT FOREARM

## 2018-04-20 ASSESSMENT — LOCATION ZONE DERM
LOCATION ZONE: TRUNK
LOCATION ZONE: LEG
LOCATION ZONE: ARM
LOCATION ZONE: FACE

## 2018-07-22 ENCOUNTER — HOSPITAL ENCOUNTER (EMERGENCY)
Age: 78
Discharge: HOME | End: 2018-07-22
Attending: EMERGENCY MEDICINE
Payer: MEDICARE

## 2018-07-22 ENCOUNTER — APPOINTMENT (OUTPATIENT)
Dept: GENERAL RADIOLOGY | Age: 78
End: 2018-07-22
Attending: NURSE PRACTITIONER
Payer: MEDICARE

## 2018-07-22 VITALS
HEIGHT: 67 IN | OXYGEN SATURATION: 98 % | SYSTOLIC BLOOD PRESSURE: 193 MMHG | RESPIRATION RATE: 18 BRPM | TEMPERATURE: 97.8 F | HEART RATE: 68 BPM | BODY MASS INDEX: 23.54 KG/M2 | WEIGHT: 150 LBS | DIASTOLIC BLOOD PRESSURE: 106 MMHG

## 2018-07-22 DIAGNOSIS — M79.601 PAIN OF RIGHT UPPER EXTREMITY: Primary | ICD-10-CM

## 2018-07-22 PROCEDURE — 73060 X-RAY EXAM OF HUMERUS: CPT

## 2018-07-22 PROCEDURE — 99282 EMERGENCY DEPT VISIT SF MDM: CPT | Performed by: NURSE PRACTITIONER

## 2018-07-22 NOTE — ED NOTES
I have reviewed discharge instructions with the patient. The patient verbalized understanding. Patient left ED via Discharge Method: ambulatory to Home with self. Opportunity for questions and clarification provided. Patient given 0 scripts. To continue your aftercare when you leave the hospital, you may receive an automated call from our care team to check in on how you are doing. This is a free service and part of our promise to provide the best care and service to meet your aftercare needs.  If you have questions, or wish to unsubscribe from this service please call [phone]. Thank you for Choosing our [facility].
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
## 2018-09-04 NOTE — CONSULTS
[facility] Cardiology Consult                Date of  Admission: 10/23/2017  2:34 PM     Primary Care Physician:  Dr. Yisel Schulte (no longer seeing Dr. Gómez Benavidez)  Primary Cardiologist:  None  Referring Physician:  Dr. Mayra Matute Physician:  Dr. Katheryn Benz    CC/Reason for consult: a fib/flutter      Ayush Marti is a 76 y.o. male with PMH of HTN, atrial fibrillation, HLP, and anemia, who presented to the [facility] ED after a fall at home. The patient reports getting up to go to the restroom to urinate. He had \"some leakage\" on the way to the restroom. After rinsing underpants he was going to hang them over the door and lost his balance falling backwards over the toilet and striking the bath tub. He managed to crawl out of the bathroom into a chair in his room when he thinks he passed out for a while. The patient developed pain the right lateral ribs with increased difficulty breathing and summoned EMS. In the ED, CXR showed cardiomegaly, pulmonary congestion and bibasilar infiltrates. A CT of chest was ordered and revealed multiple right sided rib fractures with trace right sided pneumothorax and pleural effusion. Pulmonary following for PTX. Other labs showed , K 5.2, , TSH 2.3 and negative troponin. EKG in the ED showed questionable a fib (tracing was poor). Tele shows rate controlled. atrial flutter. Patient states he was diagnosed with a fib on a routine exam by Dr. Gómez Benavidez. Review of Dr. Gómez Benavidez office visits states that the patient refused [address] despite being counseling on potential risks such as CVA. His BP has been notably elevated as well with review of past notes showing elevated BP on visits to PCP. He was prescribed Norvasc at one point but stated he took it and was only given one refill so he assumed he didn't need to take it any more after the meds ran out. His insurance changed and Dr. Gómez Benavidez was no longer in network and doesn't look like he has followed up in about 1 1/2 years.   He denies history of CAD or CHF. He takes no medications at home. No chest pain, SOB prior to accident, palpitations or syncope. Patient Active Problem List   Diagnosis Code    HTN (hypertension) I10    HLD (hyperlipidemia) E78.5    Anemia D64.9    Allergic rhinitis J30.9    Shortness of breath R06.02    Pneumothorax, closed, traumatic S27. 0XXA    Multiple rib fractures S22.49XA    Hyperkalemia E87.5    Atrial fibrillation (HCC) I48.91    Pulmonary contusion S27.329A       Past Medical History:   Diagnosis Date    Allergic rhinitis     Fractured pelvis ([address] 2006    Fell off of roof.  History of chicken pox Childhood    Scarlet fever Childhood    Zenker's diverticulum     In the throat. Past Surgical History:   Procedure Laterality Date    HX MALIGNANT SKIN LESION EXCISION      Multiple Basal Cells.     HX VASECTOMY       No Known Allergies   Family History   Problem Relation Age of Onset    Hypertension Mother     Heart Disease Mother     Heart Disease Father     Diabetes Father     Stroke Father     Cancer Neg Hx         Current Facility-Administered Medications   Medication Dose Route Frequency    ondansetron (ZOFRAN) injection 4 mg  4 mg IntraVENous Q6H PRN    albuterol (PROVENTIL VENTOLIN) nebulizer solution 1.25 mg  1.25 mg Inhalation QID RT    amLODIPine (NORVASC) tablet 10 mg  10 mg Oral DAILY    sodium chloride (NS) flush 5-10 mL  5-10 mL IntraVENous Q8H    sodium chloride (NS) flush 5-10 mL  5-10 mL IntraVENous PRN    cefTRIAXone (ROCEPHIN) 1 g in 0.9% sodium chloride (MBP/ADV) 50 mL  1 g IntraVENous Q24H    azithromycin (ZITHROMAX) 500 mg in 0.9% sodium chloride (MBP/ADV) 250 mL  500 mg IntraVENous Q24H    acetaminophen (TYLENOL) tablet 650 mg  650 mg Oral Q4H PRN    HYDROcodone-acetaminophen (NORCO) 5-325 mg per tablet 1 Tab  1 Tab Oral Q4H PRN    HYDROmorphone (PF) (DILAUDID) injection 0.5 mg  0.5 mg IntraVENous Q4H PRN    albuterol (PROVENTIL VENTOLIN) nebulizer solution 2.5 mg  2.5 mg Nebulization Q4H PRN    naloxone (NARCAN) injection 0.4 mg  0.4 mg IntraVENous PRN    heparin (porcine) injection 5,000 Units  5,000 Units SubCUTAneous Q8H    bisacodyl (DULCOLAX) tablet 5 mg  5 mg Oral DAILY PRN    hydrALAZINE (APRESOLINE) 20 mg/mL injection 10 mg  10 mg IntraVENous Q6H PRN    carvedilol (COREG) tablet 3.125 mg  3.125 mg Oral BID WITH MEALS       Review of Systems   Respiratory: Positive for shortness of breath. Musculoskeletal: Positive for back pain and falls. All other systems reviewed and are negative. Physical Exam  Vitals:    10/24/17 0818 10/24/17 0819 10/24/17 0830 10/24/17 1012   BP: 196/87 196/87  153/89   Pulse: 84 84  73   Resp:  26  29   Temp:       SpO2:   99% 96%   Weight:       Height:           Physical Exam:  Physical Exam   Constitutional: He is oriented to person, place, and time and well-developed, well-nourished, and in no distress. Eyes: Pupils are equal, round, and reactive to light. Neck: Normal range of motion. Cardiovascular: Normal rate. A regularly irregular rhythm present. Pulmonary/Chest: Tachypnea noted. He has decreased breath sounds. Abdominal: Soft. Bowel sounds are normal.   Musculoskeletal: Normal range of motion. Neurological: He is alert and oriented to person, place, and time. Skin: Skin is warm and dry.    Psychiatric: Mood, memory, affect and judgment normal.       Cardiographics    Telemetry: atrial flutter  ECG: a fib / a flutter (poor tracing)  Echocardiogram: pending  Labs:   Recent Labs      10/24/17   0415  10/23/17   1958  10/23/17   1549   NA  134*   --   134*   K  4.7   --   5.2*   MG   --   1.8   --    BUN  19   --   17   CREA  1.47   --   1.34   GLU  106*   --   129*   WBC  12.4*   --   10.5   HGB  13.6   --   14.3   HCT  40.3*   --   41.4   PLT  247   --   230   TRIGL  36   --    --    HDL  70*   --    --         Assessment/Plan:     Assessment:          Pneumothorax, closed, traumatic -- likely hemothorax. Continue oxygen via NC. Pulmonary following      Atrial fibrillation/flutter-- rate controlled. Continue BB. He will need anticoagulation for stroke prevention when bleeding risk from trauma subsides. Echo results pending. Will need follow up with cardiology after discharge. HTN (hypertension) -- continue norvasc, coreg, and has hydralazine PRN. Add Lisinopril. HLD (hyperlipidemia) -- controlled with diet, total cholesterol 179. Anemia-- hgb stable      Shortness of breath - see above      Multiple rib fractures -- pain management per primary      Hyperkalemia - -resolved. Pulmonary contusion -- see above, pulmonary following      Thank you very much for this referral. We appreciate the opportunity to participate in this patient's care. We will follow along with above stated plan. Ishmael Black NP  Consulting MD: Teresa Aguila    Patient seen and examined. Agree with above note. He has atrial  flutter of unknown duration with controlled rate on Coreg. He needs anticoagulation when there is a low risk of bleeding from rib fractures. Echo pending. Exam unremarkable. Creatinine 1.5. Recommend addition of Lisinopril 10 mg daily. Will follow.      Cristina Villeda MD

## 2019-12-09 PROBLEM — N18.30 STAGE 3 CHRONIC KIDNEY DISEASE (HCC): Status: ACTIVE | Noted: 2019-12-09

## 2021-04-08 PROBLEM — N18.31 STAGE 3A CHRONIC KIDNEY DISEASE (HCC): Status: ACTIVE | Noted: 2019-12-09

## 2021-05-28 ENCOUNTER — HOSPITAL ENCOUNTER (OUTPATIENT)
Dept: CT IMAGING | Age: 81
Discharge: HOME | End: 2021-05-28
Attending: INTERNAL MEDICINE
Payer: MEDICARE

## 2021-05-28 DIAGNOSIS — R93.89 ABNORMAL CXR: ICD-10-CM

## 2021-05-28 PROCEDURE — 71250 CT THORAX DX C-: CPT

## 2021-06-01 NOTE — PROGRESS NOTES
CT showed what looks like an old blood collection and is probably not a concern. They recommended rechecking a CT chest w/o contrast in 3 months to make sure it doesn't change.

## 2021-06-01 NOTE — PROGRESS NOTES
Spoke with patient advised per  CT showed what looks like an old blood collection and is probably not a concern. They recommended rechecking a CT chest w/o contrast in 3 months to make sure it doesn't change. Pt expressed understanding, informed him he would get an call to schedule an appt.

## 2021-08-25 ENCOUNTER — APPOINTMENT (OUTPATIENT)
Dept: GENERAL RADIOLOGY | Age: 81
DRG: 481 | End: 2021-08-25
Attending: ORTHOPAEDIC SURGERY
Payer: MEDICARE

## 2021-08-25 ENCOUNTER — ANESTHESIA (OUTPATIENT)
Dept: SURGERY | Age: 81
DRG: 481 | End: 2021-08-25
Payer: MEDICARE

## 2021-08-25 ENCOUNTER — APPOINTMENT (OUTPATIENT)
Dept: GENERAL RADIOLOGY | Age: 81
DRG: 481 | End: 2021-08-25
Attending: STUDENT IN AN ORGANIZED HEALTH CARE EDUCATION/TRAINING PROGRAM
Payer: MEDICARE

## 2021-08-25 ENCOUNTER — ANESTHESIA EVENT (OUTPATIENT)
Dept: SURGERY | Age: 81
DRG: 481 | End: 2021-08-25
Payer: MEDICARE

## 2021-08-25 ENCOUNTER — HOSPITAL ENCOUNTER (INPATIENT)
Age: 81
LOS: 2 days | Discharge: NURSING, REHAB OR LONG-TERM CARE | DRG: 481 | End: 2021-08-27
Attending: STUDENT IN AN ORGANIZED HEALTH CARE EDUCATION/TRAINING PROGRAM | Admitting: HOSPITALIST
Payer: MEDICARE
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
## 2021-08-25 DIAGNOSIS — S72.002A CLOSED FRACTURE OF LEFT HIP, INITIAL ENCOUNTER (HCC): Primary | ICD-10-CM

## 2021-08-25 DIAGNOSIS — S72.142A CLOSED 2-PART INTERTROCHANTERIC FRACTURE OF LEFT FEMUR, INITIAL ENCOUNTER (HCC): ICD-10-CM

## 2021-08-25 LAB
ABO + RH BLD: NORMAL
ALBUMIN SERPL-MCNC: 3.2 G/DL (ref 3.2–4.6)
ALBUMIN/GLOB SERPL: 0.6 {RATIO} (ref 1.2–3.5)
ALP SERPL-CCNC: 171 U/L (ref 50–136)
ALT SERPL-CCNC: 30 U/L (ref 12–65)
ANION GAP SERPL CALC-SCNC: 4 MMOL/L (ref 7–16)
AST SERPL-CCNC: 26 U/L (ref 15–37)
ATRIAL RATE: 178 BPM
BASOPHILS # BLD: 0 K/UL (ref 0–0.2)
BASOPHILS NFR BLD: 0 % (ref 0–2)
BILIRUB SERPL-MCNC: 1.5 MG/DL (ref 0.2–1.1)
BLOOD GROUP ANTIBODIES SERPL: NORMAL
BUN SERPL-MCNC: 25 MG/DL (ref 8–23)
CALCIUM SERPL-MCNC: 9.1 MG/DL (ref 8.3–10.4)
CALCULATED R AXIS, ECG10: 68 DEGREES
CALCULATED T AXIS, ECG11: -97 DEGREES
CHLORIDE SERPL-SCNC: 101 MMOL/L (ref 98–107)
CK SERPL-CCNC: 95 U/L (ref 21–215)
CO2 SERPL-SCNC: 28 MMOL/L (ref 21–32)
COVID-19 RAPID TEST, COVR: NOT DETECTED
CREAT SERPL-MCNC: 1.47 MG/DL (ref 0.8–1.5)
DIAGNOSIS, 93000: NORMAL
DIFFERENTIAL METHOD BLD: ABNORMAL
EOSINOPHIL # BLD: 0 K/UL (ref 0–0.8)
EOSINOPHIL NFR BLD: 0 % (ref 0.5–7.8)
ERYTHROCYTE [DISTWIDTH] IN BLOOD BY AUTOMATED COUNT: 13 % (ref 11.9–14.6)
GLOBULIN SER CALC-MCNC: 5 G/DL (ref 2.3–3.5)
GLUCOSE SERPL-MCNC: 121 MG/DL (ref 65–100)
HCT VFR BLD AUTO: 41.3 % (ref 41.1–50.3)
HGB BLD-MCNC: 13.8 G/DL (ref 13.6–17.2)
IMM GRANULOCYTES # BLD AUTO: 0 K/UL (ref 0–0.5)
IMM GRANULOCYTES NFR BLD AUTO: 0 % (ref 0–5)
INR PPP: 1
LACTATE SERPL-SCNC: 2.6 MMOL/L (ref 0.4–2)
LYMPHOCYTES # BLD: 0.4 K/UL (ref 0.5–4.6)
LYMPHOCYTES NFR BLD: 4 % (ref 13–44)
MCH RBC QN AUTO: 32.5 PG (ref 26.1–32.9)
MCHC RBC AUTO-ENTMCNC: 33.4 G/DL (ref 31.4–35)
MCV RBC AUTO: 97.4 FL (ref 79.6–97.8)
MONOCYTES # BLD: 0.7 K/UL (ref 0.1–1.3)
MONOCYTES NFR BLD: 6 % (ref 4–12)
NEUTS SEG # BLD: 10.5 K/UL (ref 1.7–8.2)
NEUTS SEG NFR BLD: 90 % (ref 43–78)
NRBC # BLD: 0 K/UL (ref 0–0.2)
PLATELET # BLD AUTO: 235 K/UL (ref 150–450)
PMV BLD AUTO: 9.8 FL (ref 9.4–12.3)
POTASSIUM SERPL-SCNC: 4.5 MMOL/L (ref 3.5–5.1)
PROT SERPL-MCNC: 8.2 G/DL (ref 6.3–8.2)
PROTHROMBIN TIME: 14 SEC (ref 12.6–14.5)
Q-T INTERVAL, ECG07: 320 MS
QRS DURATION, ECG06: 108 MS
QTC CALCULATION (BEZET), ECG08: 406 MS
RBC # BLD AUTO: 4.24 M/UL (ref 4.23–5.6)
SODIUM SERPL-SCNC: 133 MMOL/L (ref 138–145)
SOURCE, COVRS: NORMAL
SPECIMEN EXP DATE BLD: NORMAL
VENTRICULAR RATE, ECG03: 97 BPM
WBC # BLD AUTO: 11.7 K/UL (ref 4.3–11.1)

## 2021-08-25 PROCEDURE — 00HU33Z INSERTION OF INFUSION DEVICE INTO SPINAL CANAL, PERCUTANEOUS APPROACH: ICD-10-PCS | Performed by: ANESTHESIOLOGY

## 2021-08-25 PROCEDURE — 77030007880 HC KT SPN EPDRL BBMI -B: Performed by: ANESTHESIOLOGY

## 2021-08-25 PROCEDURE — 85025 COMPLETE CBC W/AUTO DIFF WBC: CPT

## 2021-08-25 PROCEDURE — 74011250637 HC RX REV CODE- 250/637: Performed by: ORTHOPAEDIC SURGERY

## 2021-08-25 PROCEDURE — 76010000160 HC OR TIME 0.5 TO 1 HR INTENSV-TIER 1: Performed by: ORTHOPAEDIC SURGERY

## 2021-08-25 PROCEDURE — 99284 EMERGENCY DEPT VISIT MOD MDM: CPT

## 2021-08-25 PROCEDURE — 73502 X-RAY EXAM HIP UNI 2-3 VIEWS: CPT

## 2021-08-25 PROCEDURE — 85610 PROTHROMBIN TIME: CPT

## 2021-08-25 PROCEDURE — 65270000029 HC RM PRIVATE

## 2021-08-25 PROCEDURE — 77030021107 HC SHFT RMR MOD DISP STRY -D: Performed by: ORTHOPAEDIC SURGERY

## 2021-08-25 PROCEDURE — 74011250636 HC RX REV CODE- 250/636: Performed by: ORTHOPAEDIC SURGERY

## 2021-08-25 PROCEDURE — 74011250636 HC RX REV CODE- 250/636: Performed by: STUDENT IN AN ORGANIZED HEALTH CARE EDUCATION/TRAINING PROGRAM

## 2021-08-25 PROCEDURE — 74011000250 HC RX REV CODE- 250: Performed by: ANESTHESIOLOGY

## 2021-08-25 PROCEDURE — C1713 ANCHOR/SCREW BN/BN,TIS/BN: HCPCS | Performed by: ORTHOPAEDIC SURGERY

## 2021-08-25 PROCEDURE — 86901 BLOOD TYPING SEROLOGIC RH(D): CPT

## 2021-08-25 PROCEDURE — 77030003665 HC NDL SPN BBMI -A: Performed by: ANESTHESIOLOGY

## 2021-08-25 PROCEDURE — 83605 ASSAY OF LACTIC ACID: CPT

## 2021-08-25 PROCEDURE — 77030008846 HC WRE K STRY -C: Performed by: ORTHOPAEDIC SURGERY

## 2021-08-25 PROCEDURE — 74011000258 HC RX REV CODE- 258: Performed by: ORTHOPAEDIC SURGERY

## 2021-08-25 PROCEDURE — 96374 THER/PROPH/DIAG INJ IV PUSH: CPT

## 2021-08-25 PROCEDURE — 0QH706Z INSERTION OF INTRAMEDULLARY INTERNAL FIXATION DEVICE INTO LEFT UPPER FEMUR, OPEN APPROACH: ICD-10-PCS | Performed by: ORTHOPAEDIC SURGERY

## 2021-08-25 PROCEDURE — 71045 X-RAY EXAM CHEST 1 VIEW: CPT

## 2021-08-25 PROCEDURE — 82550 ASSAY OF CK (CPK): CPT

## 2021-08-25 PROCEDURE — 99232 SBSQ HOSP IP/OBS MODERATE 35: CPT | Performed by: ORTHOPAEDIC SURGERY

## 2021-08-25 PROCEDURE — 93005 ELECTROCARDIOGRAM TRACING: CPT | Performed by: STUDENT IN AN ORGANIZED HEALTH CARE EDUCATION/TRAINING PROGRAM

## 2021-08-25 PROCEDURE — C1769 GUIDE WIRE: HCPCS | Performed by: ORTHOPAEDIC SURGERY

## 2021-08-25 PROCEDURE — 87635 SARS-COV-2 COVID-19 AMP PRB: CPT

## 2021-08-25 PROCEDURE — 74011250636 HC RX REV CODE- 250/636: Performed by: NURSE ANESTHETIST, CERTIFIED REGISTERED

## 2021-08-25 PROCEDURE — 76210000006 HC OR PH I REC 0.5 TO 1 HR: Performed by: ORTHOPAEDIC SURGERY

## 2021-08-25 PROCEDURE — 73552 X-RAY EXAM OF FEMUR 2/>: CPT

## 2021-08-25 PROCEDURE — 77030017016 HC DSG ANTIMIC BARR2 S&N -B: Performed by: ORTHOPAEDIC SURGERY

## 2021-08-25 PROCEDURE — 27245 TREAT THIGH FRACTURE: CPT | Performed by: ORTHOPAEDIC SURGERY

## 2021-08-25 PROCEDURE — 74011250637 HC RX REV CODE- 250/637: Performed by: ANESTHESIOLOGY

## 2021-08-25 PROCEDURE — 77030016449 HC BIT DRL AO1 STRY -C: Performed by: ORTHOPAEDIC SURGERY

## 2021-08-25 PROCEDURE — 74011250636 HC RX REV CODE- 250/636: Performed by: ANESTHESIOLOGY

## 2021-08-25 PROCEDURE — 77030002933 HC SUT MCRYL J&J -A: Performed by: ORTHOPAEDIC SURGERY

## 2021-08-25 PROCEDURE — 74011000250 HC RX REV CODE- 250: Performed by: NURSE ANESTHETIST, CERTIFIED REGISTERED

## 2021-08-25 PROCEDURE — 80053 COMPREHEN METABOLIC PANEL: CPT

## 2021-08-25 PROCEDURE — 96375 TX/PRO/DX INJ NEW DRUG ADDON: CPT

## 2021-08-25 PROCEDURE — 2709999900 HC NON-CHARGEABLE SUPPLY: Performed by: ORTHOPAEDIC SURGERY

## 2021-08-25 PROCEDURE — 2709999900 HC NON-CHARGEABLE SUPPLY

## 2021-08-25 PROCEDURE — 76060000033 HC ANESTHESIA 1 TO 1.5 HR: Performed by: ORTHOPAEDIC SURGERY

## 2021-08-25 PROCEDURE — 77030018673: Performed by: ORTHOPAEDIC SURGERY

## 2021-08-25 DEVICE — LOCKING SCREW, FULLY THREADED: Type: IMPLANTABLE DEVICE | Site: HIP | Status: FUNCTIONAL

## 2021-08-25 DEVICE — LAG SCREW, TI
Type: IMPLANTABLE DEVICE | Site: HIP | Status: FUNCTIONAL
Brand: GAMMA

## 2021-08-25 DEVICE — LONG NAIL KIT R1.5, TI, LEFT
Type: IMPLANTABLE DEVICE | Site: HIP | Status: FUNCTIONAL
Brand: GAMMA

## 2021-08-25 RX ORDER — LIDOCAINE HYDROCHLORIDE 20 MG/ML
INJECTION, SOLUTION EPIDURAL; INFILTRATION; INTRACAUDAL; PERINEURAL AS NEEDED
Status: DISCONTINUED | OUTPATIENT
Start: 2021-08-25 | End: 2021-08-25 | Stop reason: HOSPADM

## 2021-08-25 RX ORDER — ONDANSETRON 2 MG/ML
4 INJECTION INTRAMUSCULAR; INTRAVENOUS
Status: DISCONTINUED | OUTPATIENT
Start: 2021-08-25 | End: 2021-08-27 | Stop reason: HOSPADM

## 2021-08-25 RX ORDER — HYDRALAZINE HYDROCHLORIDE 20 MG/ML
10 INJECTION INTRAMUSCULAR; INTRAVENOUS
Status: DISCONTINUED | OUTPATIENT
Start: 2021-08-25 | End: 2021-08-27 | Stop reason: HOSPADM

## 2021-08-25 RX ORDER — POLYETHYLENE GLYCOL 3350 17 G/17G
17 POWDER, FOR SOLUTION ORAL DAILY PRN
Status: DISCONTINUED | OUTPATIENT
Start: 2021-08-25 | End: 2021-08-27 | Stop reason: HOSPADM

## 2021-08-25 RX ORDER — ACETAMINOPHEN 325 MG/1
650 TABLET ORAL ONCE
Status: COMPLETED | OUTPATIENT
Start: 2021-08-25 | End: 2021-08-25

## 2021-08-25 RX ORDER — FUROSEMIDE 40 MG/1
40 TABLET ORAL DAILY
Status: DISCONTINUED | OUTPATIENT
Start: 2021-08-26 | End: 2021-08-27 | Stop reason: HOSPADM

## 2021-08-25 RX ORDER — ENOXAPARIN SODIUM 100 MG/ML
40 INJECTION SUBCUTANEOUS EVERY 24 HOURS
Status: DISCONTINUED | OUTPATIENT
Start: 2021-08-26 | End: 2021-08-26

## 2021-08-25 RX ORDER — HYDROMORPHONE HYDROCHLORIDE 1 MG/ML
0.5 INJECTION, SOLUTION INTRAMUSCULAR; INTRAVENOUS; SUBCUTANEOUS
Status: DISCONTINUED | OUTPATIENT
Start: 2021-08-25 | End: 2021-08-25 | Stop reason: HOSPADM

## 2021-08-25 RX ORDER — ACETAMINOPHEN 650 MG/1
650 SUPPOSITORY RECTAL
Status: DISCONTINUED | OUTPATIENT
Start: 2021-08-25 | End: 2021-08-27 | Stop reason: HOSPADM

## 2021-08-25 RX ORDER — VALSARTAN 80 MG/1
80 TABLET ORAL DAILY
Status: DISCONTINUED | OUTPATIENT
Start: 2021-08-26 | End: 2021-08-27 | Stop reason: HOSPADM

## 2021-08-25 RX ORDER — FENTANYL CITRATE 50 UG/ML
50 INJECTION, SOLUTION INTRAMUSCULAR; INTRAVENOUS
Status: COMPLETED | OUTPATIENT
Start: 2021-08-25 | End: 2021-08-25

## 2021-08-25 RX ORDER — SODIUM CHLORIDE 0.9 % (FLUSH) 0.9 %
5-40 SYRINGE (ML) INJECTION AS NEEDED
Status: DISCONTINUED | OUTPATIENT
Start: 2021-08-25 | End: 2021-08-25 | Stop reason: HOSPADM

## 2021-08-25 RX ORDER — OXYCODONE AND ACETAMINOPHEN 10; 325 MG/1; MG/1
1 TABLET ORAL AS NEEDED
Status: DISCONTINUED | OUTPATIENT
Start: 2021-08-25 | End: 2021-08-25 | Stop reason: HOSPADM

## 2021-08-25 RX ORDER — SODIUM CHLORIDE 0.9 % (FLUSH) 0.9 %
5-40 SYRINGE (ML) INJECTION AS NEEDED
Status: DISCONTINUED | OUTPATIENT
Start: 2021-08-25 | End: 2021-08-27 | Stop reason: HOSPADM

## 2021-08-25 RX ORDER — FERROUS SULFATE, DRIED 160(50) MG
1 TABLET, EXTENDED RELEASE ORAL
Status: DISCONTINUED | OUTPATIENT
Start: 2021-08-25 | End: 2021-08-27 | Stop reason: HOSPADM

## 2021-08-25 RX ORDER — ONDANSETRON 4 MG/1
4 TABLET, ORALLY DISINTEGRATING ORAL
Status: DISCONTINUED | OUTPATIENT
Start: 2021-08-25 | End: 2021-08-27 | Stop reason: HOSPADM

## 2021-08-25 RX ORDER — BUPIVACAINE HYDROCHLORIDE 7.5 MG/ML
INJECTION, SOLUTION INTRASPINAL
Status: COMPLETED | OUTPATIENT
Start: 2021-08-25 | End: 2021-08-25

## 2021-08-25 RX ORDER — ONDANSETRON 2 MG/ML
4 INJECTION INTRAMUSCULAR; INTRAVENOUS
Status: COMPLETED | OUTPATIENT
Start: 2021-08-25 | End: 2021-08-25

## 2021-08-25 RX ORDER — SODIUM CHLORIDE, SODIUM LACTATE, POTASSIUM CHLORIDE, CALCIUM CHLORIDE 600; 310; 30; 20 MG/100ML; MG/100ML; MG/100ML; MG/100ML
75 INJECTION, SOLUTION INTRAVENOUS CONTINUOUS
Status: DISCONTINUED | OUTPATIENT
Start: 2021-08-25 | End: 2021-08-26

## 2021-08-25 RX ORDER — MAGNESIUM SULFATE HEPTAHYDRATE 40 MG/ML
2 INJECTION, SOLUTION INTRAVENOUS AS NEEDED
Status: DISCONTINUED | OUTPATIENT
Start: 2021-08-25 | End: 2021-08-27 | Stop reason: HOSPADM

## 2021-08-25 RX ORDER — CEFAZOLIN SODIUM/WATER 2 G/20 ML
2 SYRINGE (ML) INTRAVENOUS
Status: DISPENSED | OUTPATIENT
Start: 2021-08-25 | End: 2021-08-26

## 2021-08-25 RX ORDER — ASPIRIN 325 MG
325 TABLET, DELAYED RELEASE (ENTERIC COATED) ORAL DAILY
Status: DISCONTINUED | OUTPATIENT
Start: 2021-08-26 | End: 2021-08-27 | Stop reason: HOSPADM

## 2021-08-25 RX ORDER — SODIUM CHLORIDE 0.9 % (FLUSH) 0.9 %
5-40 SYRINGE (ML) INJECTION AS NEEDED
Status: DISCONTINUED | OUTPATIENT
Start: 2021-08-25 | End: 2021-08-27

## 2021-08-25 RX ORDER — OXYCODONE HYDROCHLORIDE 5 MG/1
10 TABLET ORAL
Status: DISCONTINUED | OUTPATIENT
Start: 2021-08-25 | End: 2021-08-27 | Stop reason: HOSPADM

## 2021-08-25 RX ORDER — SODIUM CHLORIDE, SODIUM LACTATE, POTASSIUM CHLORIDE, CALCIUM CHLORIDE 600; 310; 30; 20 MG/100ML; MG/100ML; MG/100ML; MG/100ML
INJECTION, SOLUTION INTRAVENOUS
Status: DISCONTINUED | OUTPATIENT
Start: 2021-08-25 | End: 2021-08-25 | Stop reason: HOSPADM

## 2021-08-25 RX ORDER — MAG HYDROX/ALUMINUM HYD/SIMETH 200-200-20
30 SUSPENSION, ORAL (FINAL DOSE FORM) ORAL
Status: DISCONTINUED | OUTPATIENT
Start: 2021-08-25 | End: 2021-08-27 | Stop reason: HOSPADM

## 2021-08-25 RX ORDER — MORPHINE SULFATE 4 MG/ML
4 INJECTION INTRAVENOUS
Status: DISCONTINUED | OUTPATIENT
Start: 2021-08-25 | End: 2021-08-27 | Stop reason: HOSPADM

## 2021-08-25 RX ORDER — CYCLOBENZAPRINE HCL 10 MG
10 TABLET ORAL ONCE
Status: COMPLETED | OUTPATIENT
Start: 2021-08-25 | End: 2021-08-25

## 2021-08-25 RX ORDER — OXYCODONE HYDROCHLORIDE 5 MG/1
5 TABLET ORAL
Status: DISCONTINUED | OUTPATIENT
Start: 2021-08-25 | End: 2021-08-25 | Stop reason: HOSPADM

## 2021-08-25 RX ORDER — ACETAMINOPHEN 325 MG/1
650 TABLET ORAL
Status: DISCONTINUED | OUTPATIENT
Start: 2021-08-25 | End: 2021-08-27 | Stop reason: HOSPADM

## 2021-08-25 RX ORDER — SODIUM CHLORIDE 0.9 % (FLUSH) 0.9 %
5-40 SYRINGE (ML) INJECTION EVERY 8 HOURS
Status: DISCONTINUED | OUTPATIENT
Start: 2021-08-25 | End: 2021-08-25

## 2021-08-25 RX ORDER — TAMSULOSIN HYDROCHLORIDE 0.4 MG/1
0.4 CAPSULE ORAL DAILY
Status: DISCONTINUED | OUTPATIENT
Start: 2021-08-26 | End: 2021-08-27 | Stop reason: HOSPADM

## 2021-08-25 RX ORDER — METOPROLOL TARTRATE 5 MG/5ML
2.5 INJECTION INTRAVENOUS
Status: DISCONTINUED | OUTPATIENT
Start: 2021-08-25 | End: 2021-08-27 | Stop reason: HOSPADM

## 2021-08-25 RX ORDER — EPHEDRINE SULFATE/0.9% NACL/PF 50 MG/5 ML
SYRINGE (ML) INTRAVENOUS AS NEEDED
Status: DISCONTINUED | OUTPATIENT
Start: 2021-08-25 | End: 2021-08-25 | Stop reason: HOSPADM

## 2021-08-25 RX ORDER — OXYCODONE HYDROCHLORIDE 5 MG/1
5 TABLET ORAL
Status: DISCONTINUED | OUTPATIENT
Start: 2021-08-25 | End: 2021-08-25

## 2021-08-25 RX ORDER — POTASSIUM CHLORIDE 7.45 MG/ML
10 INJECTION INTRAVENOUS AS NEEDED
Status: DISCONTINUED | OUTPATIENT
Start: 2021-08-25 | End: 2021-08-27 | Stop reason: HOSPADM

## 2021-08-25 RX ORDER — MORPHINE SULFATE 2 MG/ML
2 INJECTION, SOLUTION INTRAMUSCULAR; INTRAVENOUS
Status: DISCONTINUED | OUTPATIENT
Start: 2021-08-25 | End: 2021-08-25

## 2021-08-25 RX ORDER — SODIUM CHLORIDE 0.9 % (FLUSH) 0.9 %
5-40 SYRINGE (ML) INJECTION EVERY 8 HOURS
Status: DISCONTINUED | OUTPATIENT
Start: 2021-08-25 | End: 2021-08-27 | Stop reason: HOSPADM

## 2021-08-25 RX ORDER — SODIUM CHLORIDE 0.9 % (FLUSH) 0.9 %
5-40 SYRINGE (ML) INJECTION EVERY 8 HOURS
Status: DISCONTINUED | OUTPATIENT
Start: 2021-08-25 | End: 2021-08-25 | Stop reason: HOSPADM

## 2021-08-25 RX ORDER — PANTOPRAZOLE SODIUM 40 MG/1
40 TABLET, DELAYED RELEASE ORAL
Status: DISCONTINUED | OUTPATIENT
Start: 2021-08-26 | End: 2021-08-27 | Stop reason: HOSPADM

## 2021-08-25 RX ORDER — PROPOFOL 10 MG/ML
INJECTION, EMULSION INTRAVENOUS
Status: DISCONTINUED | OUTPATIENT
Start: 2021-08-25 | End: 2021-08-25 | Stop reason: HOSPADM

## 2021-08-25 RX ADMIN — PHENYLEPHRINE HYDROCHLORIDE 120 MCG: 10 INJECTION INTRAVENOUS at 14:35

## 2021-08-25 RX ADMIN — PHENYLEPHRINE HYDROCHLORIDE 120 MCG: 10 INJECTION INTRAVENOUS at 14:33

## 2021-08-25 RX ADMIN — FENTANYL CITRATE 50 MCG: 50 INJECTION, SOLUTION INTRAMUSCULAR; INTRAVENOUS at 10:51

## 2021-08-25 RX ADMIN — ACETAMINOPHEN 650 MG: 325 TABLET ORAL at 13:50

## 2021-08-25 RX ADMIN — PHENYLEPHRINE HYDROCHLORIDE 120 MCG: 10 INJECTION INTRAVENOUS at 14:36

## 2021-08-25 RX ADMIN — PHENYLEPHRINE HYDROCHLORIDE 120 MCG: 10 INJECTION INTRAVENOUS at 14:18

## 2021-08-25 RX ADMIN — SODIUM CHLORIDE, SODIUM LACTATE, POTASSIUM CHLORIDE, AND CALCIUM CHLORIDE 75 ML/HR: 600; 310; 30; 20 INJECTION, SOLUTION INTRAVENOUS at 13:51

## 2021-08-25 RX ADMIN — SODIUM CHLORIDE, SODIUM LACTATE, POTASSIUM CHLORIDE, AND CALCIUM CHLORIDE 75 ML/HR: 600; 310; 30; 20 INJECTION, SOLUTION INTRAVENOUS at 18:04

## 2021-08-25 RX ADMIN — PHENYLEPHRINE HYDROCHLORIDE 120 MCG: 10 INJECTION INTRAVENOUS at 14:21

## 2021-08-25 RX ADMIN — CYCLOBENZAPRINE 10 MG: 10 TABLET, FILM COATED ORAL at 21:57

## 2021-08-25 RX ADMIN — Medication 1 TABLET: at 18:05

## 2021-08-25 RX ADMIN — PHENYLEPHRINE HYDROCHLORIDE 120 MCG: 10 INJECTION INTRAVENOUS at 14:29

## 2021-08-25 RX ADMIN — OXYCODONE 10 MG: 5 TABLET ORAL at 20:05

## 2021-08-25 RX ADMIN — SODIUM CHLORIDE, SODIUM LACTATE, POTASSIUM CHLORIDE, AND CALCIUM CHLORIDE: 600; 310; 30; 20 INJECTION, SOLUTION INTRAVENOUS at 13:54

## 2021-08-25 RX ADMIN — PHENYLEPHRINE HYDROCHLORIDE 120 MCG: 10 INJECTION INTRAVENOUS at 14:43

## 2021-08-25 RX ADMIN — ONDANSETRON 4 MG: 2 INJECTION INTRAMUSCULAR; INTRAVENOUS at 10:54

## 2021-08-25 RX ADMIN — PHENYLEPHRINE HYDROCHLORIDE 120 MCG: 10 INJECTION INTRAVENOUS at 14:27

## 2021-08-25 RX ADMIN — BUPIVACAINE HYDROCHLORIDE IN DEXTROSE 9 MG: 7.5 INJECTION, SOLUTION SUBARACHNOID at 14:06

## 2021-08-25 RX ADMIN — PHENYLEPHRINE HYDROCHLORIDE 120 MCG: 10 INJECTION INTRAVENOUS at 14:31

## 2021-08-25 RX ADMIN — LIDOCAINE HYDROCHLORIDE 30 MG: 20 INJECTION, SOLUTION EPIDURAL; INFILTRATION; INTRACAUDAL; PERINEURAL at 14:02

## 2021-08-25 RX ADMIN — SODIUM CHLORIDE, SODIUM LACTATE, POTASSIUM CHLORIDE, AND CALCIUM CHLORIDE 75 ML/HR: 600; 310; 30; 20 INJECTION, SOLUTION INTRAVENOUS at 22:01

## 2021-08-25 RX ADMIN — PROPOFOL 40 MCG/KG/MIN: 10 INJECTION, EMULSION INTRAVENOUS at 14:02

## 2021-08-25 RX ADMIN — CEFAZOLIN SODIUM 1 G: 1 INJECTION, POWDER, FOR SOLUTION INTRAMUSCULAR; INTRAVENOUS at 18:10

## 2021-08-25 RX ADMIN — ACETAMINOPHEN 650 MG: 325 TABLET ORAL at 21:57

## 2021-08-25 RX ADMIN — Medication 5 MG: at 14:36

## 2021-08-25 RX ADMIN — Medication 10 ML: at 21:44

## 2021-08-25 RX ADMIN — Medication 5 MG: at 14:33

## 2021-08-25 RX ADMIN — HYDROMORPHONE HYDROCHLORIDE 0.5 MG: 1 INJECTION, SOLUTION INTRAMUSCULAR; INTRAVENOUS; SUBCUTANEOUS at 12:44

## 2021-08-25 NOTE — ANESTHESIA PROCEDURE NOTES
Spinal Block    Start time: 8/25/2021 2:03 PM  End time: 8/25/2021 2:06 PM  Performed by: Daniela Mendoza MD  Authorized by: Daniela Mendoza MD     Pre-procedure: Indications: primary anesthetic  Preanesthetic Checklist: patient identified, risks and benefits discussed, anesthesia consent, site marked, patient being monitored and timeout performed    Timeout Time: 14:03 EDT          Spinal Block:   Patient Position:  Right lateral decubitus  Prep Region:  Lumbar  Prep: chlorhexidine and patient draped      Location:  L3-4          Needle:   Needle Type:   Bereket  Needle Gauge:  25 G  Attempts:  1      Events: CSF confirmed, no blood with aspiration and no paresthesia        Assessment:  Insertion:  Uncomplicated  Patient tolerance:  Patient tolerated the procedure well with no immediate complications

## 2021-08-25 NOTE — OP NOTES
Operative Report    Patient: Daniele Mae MRN: [id number]  SSN: [id number]    YOB: 1940  Age: 78 y.o. Sex: male       Date of Surgery: 8/25/2021     History:  Daniele Mae is a 78 y.o. male who fell onto his left hip. He was seen in the emergency room and found to have a left peritrochanteric proximal femur fracture. I did talk to him about the need for operative fixation and try to explain exactly what this would involve. After talking him about the nature of the procedure he seemed to feel comfortable consenting. I talked to the patient and/or their representative and explained the exact nature the procedure. I also went through a detailed list of the material risks associated with  the procedure which included risk of bleeding, infection, injury to nearby structures, worsening the situation, as well as the risks associate with anesthesia and finally death. Also talked with him regarding the benefits and alternatives to the procedure. Preoperative Diagnosis: Left Hip Fracture     Postoperative Diagnosis: Left closed displaced osteoporotic intertrochanteric proximal femur fracture     Surgeon(s) and Role:     * Herb Stephens MD - Primary    Anesthesia: General     Procedure: Procedure(s):  Open treatment of left proximal femur fracture with intramedullary nail fixation    Procedure in Detail: After successful  induction of spinal anesthetic the left lower extremity was placed in gentle boot traction on a fracture table and we made sure we could adequately visualize the osteoporotic proximal femur and that an adequate closed reduction could be obtained.   I then prepped and draped the left hip and thigh area and made a small incision just proximal to the area the greater trochanter and placed the cannulated awl into the tip the greater trochanter on both the AP and lateral projection and once it was in appropriate position placed a guidewire down the shaft of the femur and then reamed sequentially up to 13 mm for the shaft of the femur and to 15.5 mm proximally. I then measured for a 420 mm nail and placed the actual nail. Once the nail was in appropriate position I placed a guidewire in the center of the femoral head on both the AP and lateral projection. Once this was in appropriate position I drilled for and placed a 100 mm lag screw proximally. Once this was in position I then placed a set screw in the proximal portion of the nail and tightened this all the way down and backed off a half of a turn to allow for dynamic compression. I then placed a single interlock bolt distally using freehand technique. I then removed the insertion handle and checked the final reduction as well as the placement of the hardware. I was very pleased with this I then closed incisions with 2.0 Monocryl for the subcutaneous tissue and staples for the skin. Dressings were applied. The patient was awakened and taken recovery in stable condition there were no apparent complications      Estimated Blood Loss: 120 cc    Tourniquet Time: * No tourniquets in log *      Implants:   Implant Name Type Inv. Item Serial No.  Lot No. LRB No. Used Action   NAIL IM L420MM [id number] 125DEG LNG L FEM HIP TI GAMMA3 R1.5 - [id number]  NAIL IM L420MM [id number] 125DEG LNG L FEM HIP TI GAMMA3 R1.5  [email or web address] [id number] Left 1 Implanted   SCR BNE LAG GAMMA 3 [id number] -- TI STRL - [id number]  SCR BNE LAG GAMMA 3 [id number] -- TI STRL  [email or web address] [id number] Left 1 Implanted   SCR BNE LCK T2 FT 5X60MM TI -- STRL - [id number]  SCR BNE LCK T2 FT 5X60MM TI -- STRL  [email or web address] [id number] Left 1 Implanted               Specimens: * No specimens in log *        Drains: None                Complications: None    Counts: Sponge and needle counts were correct times two.     Signed By:  Vanessa Prescott Becka Sharpe MD     August 25, 2021

## 2021-08-25 NOTE — PERIOP NOTES
TRANSFER - IN REPORT:    Verbal report received from Sherri RN(name) on Andrew Wei  being received from ER(unit) for routine progression of care      Report consisted of patients Situation, Background, Assessment and   Recommendations(SBAR). Information from the following report(s) Kardex, MAR and Recent Results was reviewed with the receiving nurse. Opportunity for questions and clarification was provided. Assessment completed upon patients arrival to unit and care assumed.

## 2021-08-25 NOTE — ED NOTES
TRANSFER - OUT REPORT:    Verbal report given to EVA Wilson on Unknown [place]  being transferred to 7th floor for routine progression of care       Report consisted of patients Situation, Background, Assessment and   Recommendations(SBAR). Information from the following report(s) ED Summary was reviewed with the receiving nurse. Lines:   Peripheral IV 08/25/21 Right Antecubital (Active)        Opportunity for questions and clarification was provided.       Patient transported with:   Ines Veliz

## 2021-08-25 NOTE — PERIOP NOTES
TRANSFER - OUT REPORT:    Verbal report given to EVA Wilson(name) on Ángel Russ  being transferred to 703(unit) for routine post - op       Report consisted of patients Situation, Background, Assessment and   Recommendations(SBAR). Information from the following report(s) SBAR, Kardex, Procedure Summary, Intake/Output and MAR was reviewed with the receiving nurse. Lines:   Peripheral IV 08/25/21 Right Antecubital (Active)   Site Assessment Clean, dry, & intact 08/25/21 1458   Phlebitis Assessment 0 08/25/21 1458   Infiltration Assessment 0 08/25/21 1458   Dressing Status Clean, dry, & intact 08/25/21 1458   Dressing Type Tape;Transparent 08/25/21 1458   Hub Color/Line Status Pink;Patent 08/25/21 1458        Opportunity for questions and clarification was provided.

## 2021-08-25 NOTE — ED TRIAGE NOTES
Patient arrived via EMS from home. EMS was called due to fall. Patient fell around 0300 this morning and remained on the floor until EMS arrived. EMS reports pt had a trip and fall on carpet. Denies hitting head, denies LOC, denies blood thinners. Noted shortening and rotation to left leg and c/o left hip pain. /96, HR 83, O2 sat 96% on RA.

## 2021-08-25 NOTE — PROGRESS NOTES
08/25/21 1650   Dual Skin Pressure Injury Assessment   Dual Skin Pressure Injury Assessment WDL   Second Care Provider (Based on [address] [place] [address]) Shannon Heard RN   Skin Integumentary   Skin Integumentary (WDL) X    Pressure  Injury Documentation No Pressure Injury Noted-Pressure Ulcer Prevention Initiated   Skin Integrity Incision (comment)

## 2021-08-25 NOTE — ED NOTES
TRANSFER - OUT REPORT:    Verbal report given to Josi Henderson RN on [place]  being transferred to Pre-OP for routine progression of care       Report consisted of patients Situation, Background, Assessment and   Recommendations(SBAR). Information from the following report(s) ED Summary was reviewed with the receiving nurse. Lines:   Peripheral IV 08/25/21 Right Antecubital (Active)        Opportunity for questions and clarification was provided.       Patient transported with:

## 2021-08-25 NOTE — PROGRESS NOTES
Consult Received:     Closed left hip intertrochanteric hip fracture. I have notified Dr. Kiran Stevenson. Patient is NPO since 2AM. Hospitalist to admit and clear for surgery. Hopefully we can proceed with surgery this afternoon depending on OR availability. ER to covid swab. Hold blood thinners.  Keep NPO

## 2021-08-25 NOTE — ED PROVIDER NOTES
39-year-old male presents emerge department after a fall at home. States he fell approximately 3 AM, after tripping over carpet. Had to wait until approximately 9 to 9:30 AM this morning with help was able to arrive. Denies hitting his head, denies loss of conscious. Reports pain to the left hip. Denies any other injuries. Normal sensation to the left foot. Is not on blood thinners. Past Medical History:   Diagnosis Date    Allergic rhinitis     Fractured pelvis ([address]     Eli Broaden off of roof.  History of chicken pox Childhood    Scarlet fever Childhood    Zenker's diverticulum     In the throat. 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  HX VASECTOMY           Family History:   Problem Relation Age of Onset    Hypertension Mother     Heart Disease Mother     Heart Disease Father     Diabetes Father     Stroke Father     Cancer Neg Hx        Social History     Socioeconomic History    Marital status:      Spouse name: Not on file    Number of children: Not on file    Years of education: Not on file    Highest education level: Not on file   Occupational History    Occupation: Retired salesman. Tobacco Use    Smoking status: Former Smoker     Quit date: 1976     Years since quittin.6    Smokeless tobacco: Never Used   Substance and Sexual Activity    Alcohol use:  Yes     Alcohol/week: 1.7 standard drinks     Types: 2 Standard drinks or equivalent per week     Comment: occ    Drug use: No    Sexual activity: Not on file   Other Topics Concern    Not on file   Social History Narrative    Not on file     Social Determinants of Health     Financial Resource Strain:     Difficulty of Paying Living Expenses:    Food Insecurity:     Worried About Running Out of Food in the Last Year:     [address] in the Last Year:    Transportation Needs:     Lack of Transportation (Medical):  Lack of Transportation (Non-Medical):    Physical Activity:     Days of Exercise per Week:     Minutes of Exercise per Session:    Stress:     Feeling of Stress :    Social Connections:     Frequency of Communication with Friends and Family:     Frequency of Social Gatherings with Friends and Family:     Attends [employer] Services:     Active Member of Clubs or Organizations:     Attends Club or Organization Meetings:     Marital Status:    Intimate Partner Violence:     Fear of Current or Ex-Partner:     Emotionally Abused:     Physically Abused:     Sexually Abused: ALLERGIES: Patient has no known allergies. Review of Systems   Constitutional: Negative for chills and fever. HENT: Negative for congestion and sore throat. Eyes: Negative for visual disturbance. Respiratory: Negative for cough and shortness of breath. Cardiovascular: Negative for chest pain. Gastrointestinal: Negative for abdominal pain, diarrhea, nausea and vomiting. Endocrine: Negative for polyuria. Genitourinary: Negative for difficulty urinating and dysuria. Musculoskeletal: Positive for arthralgias. Negative for neck pain and neck stiffness. Skin: Negative for rash. Neurological: Negative for weakness and headaches. All other systems reviewed and are negative. Vitals:    08/25/21 1015   BP: (!) 189/89   Pulse: 85   Resp: 18   Temp: 98.3 °F (36.8 °C)   SpO2: 97%   Weight: 69.9 kg (154 lb)   Height: 5' 4\" (1.626 m)            Physical Exam  Vitals and nursing note reviewed. Constitutional:       Appearance: Normal appearance. HENT:      Head: Normocephalic and atraumatic. Nose: Nose normal.      Mouth/Throat:      Mouth: Mucous membranes are moist.   Eyes:      Extraocular Movements: Extraocular movements intact. Cardiovascular:      Rate and Rhythm: Normal rate and regular rhythm. Heart sounds: Normal heart sounds.    Pulmonary:      Effort: Pulmonary effort is normal.      Breath sounds: Normal breath sounds. No wheezing, rhonchi or rales. Abdominal:      General: Abdomen is flat. Palpations: Abdomen is soft. Tenderness: There is no abdominal tenderness. There is no guarding. Musculoskeletal:         General: Tenderness and signs of injury present. Cervical back: Normal range of motion. Comments: Left lower extremity: Externally rotated and shortened, normal sensation, DP and PT pulses are present. Pain to the lateral aspect of the hip with slight deformity. Skin:     General: Skin is warm and dry. Neurological:      General: No focal deficit present. Mental Status: He is alert and oriented to person, place, and time. Psychiatric:         Mood and Affect: Mood normal.          MDM  Number of Diagnoses or Management Options  Closed fracture of left hip, initial encounter [facility])  Fall, initial encounter  Diagnosis management comments: 49-year-old male presents the ER with fall. Pain with the left hip. Will obtain imaging as well as lab work given the patient laid on the ground for approximately 6 hours. We will treat with fentanyl and Zofran for pain and nausea. X-ray of left hip shows intertrochanteric hip fracture. Lab work including CBC CMP showed no emergent findings. I spoke with orthopedics who requested Covid swab and hospitalist admission. I then spoke with hospice agreed to meet this patient for continued evaluation and treatment. Patient voiced understanding and agreement with this plan.        Amount and/or Complexity of Data Reviewed  Clinical lab tests: ordered and reviewed  Tests in the radiology section of CPT®: reviewed  Discussion of test results with the performing providers: yes  Discuss the patient with other providers: yes    Risk of Complications, Morbidity, and/or Mortality  Presenting problems: moderate  Diagnostic procedures: moderate  Management options: moderate Procedures

## 2021-08-25 NOTE — H&P
[facility] History and Physical       Name:  Darrel Johnson  Age:81 y.o. Sex:male   :  1940    MRN:  [id number]   PCP:  Chago Schofield MD      Admit Date:  2021 10:09 AM   Chief Complaint: Status post mechanical fall with left hip pain    Reason for Admission:   Closed 2-part intertrochanteric fracture of left femur ([address] [S72.142A]    Assessment & Plan:     #Left femoral intracardiac fracture:  Patient has been evaluated by Ortho. Patient does not have any acute contraindication for surgical intervention. Patient is at low risk for moderate risk surgery. Plan for ORIF today  Ordered for 1 dose of Flexeril 10 mg for muscle spasm  Order for morphine 2 mg IV every 3 hours along with Roxicodone 5 mg every 4 hours as needed. Patient is n.p.o. for now. #Hypertension:  BP is elevated due to underlying pain. We will restart Diovan 80 mg p.o. daily  Order for IV Lopressor 2.5 mg IV every 6 hours for heart rate of greater than 110  As needed hydralazine 10 mg every 6 hours for systolic of more than 035 or DBP greater than 100    #CKD stage III:  Creatinine 1.47, is at baseline currently. No intervention for now    #BPH:  Order for Flomax 0.4 mg daily  Indwelling Fabian in place    #Paroxysmal A. fib:  Patient is currently with controlled heart rate  Patient is not on any oral anticoagulation  Continue as needed IV Lopressor as needed for heart rate of greater than 110  We will get 2D echo tomorrow    PT and OT eval after surgery      Disposition/Expected LOS: 3 to 4 days pending postoperative clearance  Diet: DIET NPO  VTE ppx: Lovenox  GI ppx: Protonix  Code status: Full Code  Surrogate decision-maker: Patient's daughter      History of Presenting Illness:     Patient is an 24-year-old  male with history of hypertension, CKD stage IIIa, mixed dyslipidemia, questionable paroxysmal A. fib (not on [address]) presented to ER following a fall at home at 3 AM last night.   Patient experienced sudden acute pain in left hip following the fall and was not able to stand up or put any weight on left hip. ER work-up showed left intertrochanteric femur fracture. Patient is at low risk of moderate risk surgery. Patient denies having any chest pain, shortness of breath, cough, urinary symptoms, abdominal pain, headache, focal neurological weakness, lightheadedness or dizziness. Blood work was remarkable for creatinine 1.47 (at baseline), T bili 1.5, , lactic acid 2.6. Review of Systems:  A 14 point review of systems was taken and pertinent positive as per HPI. Past Medical History:   Diagnosis Date    Allergic rhinitis     Fractured pelvis ([address]     Jame Nai off of roof.  History of chicken pox Childhood    Scarlet fever Childhood    Zenker's diverticulum     In the throat. 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  HX VASECTOMY         Family History : reviewed  Family History   Problem Relation Age of Onset    Hypertension Mother     Heart Disease Mother     Heart Disease Father     Diabetes Father     Stroke Father     Cancer Neg Hx         Social History     Tobacco Use    Smoking status: Former Smoker     Quit date: 1976     Years since quittin.6    Smokeless tobacco: Never Used   Substance Use Topics    Alcohol use:  Yes     Alcohol/week: 1.7 standard drinks     Types: 2 Standard drinks or equivalent per week     Comment: occ       No Known Allergies    Immunization History   Administered Date(s) Administered    COVID-19, PFIZER, MRNA, LNP-S, PF, 30MCG/0.3ML DOSE 2021, 2021    Influenza High Dose Vaccine PF 2017, 2018    Influenza Vaccine 01/15/2014    Influenza Vaccine (Tri) Adjuvanted (>65 Yrs FLUAD TRI 82278) 2019    Influenza, High-dose, Quadrivalent (>65 Yrs Fluzone High Dose Quad 77636) 2020    Pneumococcal Conjugate (PCV-13) 12/01/2017    Pneumococcal Polysaccharide (PPSV-23) 07/15/2013    Tdap 01/01/2019         PTA Medications:  Current Outpatient Medications   Medication Instructions    furosemide (LASIX) 40 mg, Oral, DAILY    tamsulosin (FLOMAX) 0.4 mg capsule TAKE 1 CAPSULE BY MOUTH DAILY    valsartan (DIOVAN) 80 mg tablet TAKE 1 TABLET BY MOUTH DAILY       Objective:     Patient Vitals for the past 24 hrs:   Temp Pulse Resp BP SpO2   08/25/21 1047  87  (!) 189/89 98 %   08/25/21 1015 98.3 °F (36.8 °C) 85 18 (!) 189/89 97 %       Oxygen Therapy  O2 Sat (%): 98 % (08/25/21 1047)  Pulse via Oximetry: 89 beats per minute (08/25/21 1047)  O2 Device: None (Room air) (08/25/21 1015)    Body mass index is 26.43 kg/m². Physical Exam:    General:  Elderly, moderate distress due to pain, on room air, alert and awake  HEENT:  Head NCAT , pupils equal and reactive to light and accommodation, oropharynx is clear   Neck:   Supple, no lymphadenopathy, no JVD   Lungs:  Clear to auscultation bilaterally   CV:   Regular rate and rhythm with normal S1 and S2   Abdomen:  Soft, nontender, nondistended, normoactive bowel sounds   Extremities:  No cyanosis clubbing or edema   Neuro:  GCS 15, following commands, cranial nerves II 12 intact  Psych:  Normal mood and affect       Data Reviewed: I have reviewed all labs, meds, and studies. Recent Results (from the past 24 hour(s))   EKG, 12 LEAD, INITIAL    Collection Time: 08/25/21 10:18 AM   Result Value Ref Range    Ventricular Rate 97 BPM    Atrial Rate 178 BPM    QRS Duration 108 ms    Q-T Interval 320 ms    QTC Calculation (Bezet) 406 ms    Calculated R Axis 68 degrees    Calculated T Axis -97 degrees    Diagnosis       Appears to be atrial fibrillation but artifact. PVCs present.    Possible Inferior infarct , age undetermined  Abnormal ECG  No previous ECGs available  Confirmed by Joseph Christian ([id number]) on 8/25/2021 1:39:14 PM     CBC WITH AUTOMATED DIFF Collection Time: 08/25/21 10:42 AM   Result Value Ref Range    WBC 11.7 (H) 4.3 - 11.1 K/uL    RBC 4.24 4.23 - 5.6 M/uL    HGB 13.8 13.6 - 17.2 g/dL    HCT 41.3 41.1 - 50.3 %    MCV 97.4 79.6 - 97.8 FL    MCH 32.5 26.1 - 32.9 PG    MCHC 33.4 31.4 - 35.0 g/dL    RDW 13.0 11.9 - 14.6 %    PLATELET 358 299 - 425 K/uL    MPV 9.8 9.4 - 12.3 FL    ABSOLUTE NRBC 0.00 0.0 - 0.2 K/uL    DF AUTOMATED      NEUTROPHILS 90 (H) 43 - 78 %    LYMPHOCYTES 4 (L) 13 - 44 %    MONOCYTES 6 4.0 - 12.0 %    EOSINOPHILS 0 (L) 0.5 - 7.8 %    BASOPHILS 0 0.0 - 2.0 %    IMMATURE GRANULOCYTES 0 0.0 - 5.0 %    ABS. NEUTROPHILS 10.5 (H) 1.7 - 8.2 K/UL    ABS. LYMPHOCYTES 0.4 (L) 0.5 - 4.6 K/UL    ABS. MONOCYTES 0.7 0.1 - 1.3 K/UL    ABS. EOSINOPHILS 0.0 0.0 - 0.8 K/UL    ABS. BASOPHILS 0.0 0.0 - 0.2 K/UL    ABS. IMM. GRANS. 0.0 0.0 - 0.5 K/UL   METABOLIC PANEL, COMPREHENSIVE    Collection Time: 08/25/21 10:42 AM   Result Value Ref Range    Sodium 133 (L) 138 - 145 mmol/L    Potassium 4.5 3.5 - 5.1 mmol/L    Chloride 101 98 - 107 mmol/L    CO2 28 21 - 32 mmol/L    Anion gap 4 (L) 7 - 16 mmol/L    Glucose 121 (H) 65 - 100 mg/dL    BUN 25 (H) 8 - 23 MG/DL    Creatinine 1.47 0.8 - 1.5 MG/DL    GFR est AA 59 (L) >60 ml/min/1.73m2    GFR est non-AA 49 (L) >60 ml/min/1.73m2    Calcium 9.1 8.3 - 10.4 MG/DL    Bilirubin, total 1.5 (H) 0.2 - 1.1 MG/DL    ALT (SGPT) 30 12 - 65 U/L    AST (SGOT) 26 15 - 37 U/L    Alk.  phosphatase 171 (H) 50 - 136 U/L    Protein, total 8.2 6.3 - 8.2 g/dL    Albumin 3.2 3.2 - 4.6 g/dL    Globulin 5.0 (H) 2.3 - 3.5 g/dL    A-G Ratio 0.6 (L) 1.2 - 3.5     TYPE & SCREEN    Collection Time: 08/25/21 10:42 AM   Result Value Ref Range    Crossmatch Expiration 08/28/2021,2359     ABO/Rh(D) Kate Cruise POSITIVE     Antibody screen NEG    CK    Collection Time: 08/25/21 10:42 AM   Result Value Ref Range    CK 95 21 - 215 U/L   LACTIC ACID    Collection Time: 08/25/21 10:44 AM   Result Value Ref Range    Lactic acid 2.6 (H) 0.4 - 2.0 MMOL/L PROTHROMBIN TIME + INR    Collection Time: 08/25/21 10:44 AM   Result Value Ref Range    Prothrombin time 14.0 12.6 - 14.5 sec    INR 1.0     COVID-19 RAPID TEST    Collection Time: 08/25/21 11:40 AM   Result Value Ref Range    Specimen source NASAL      COVID-19 rapid test Not detected NOTD         EKG Results     Procedure 720 Value Units Date/Time    EKG 12 LEAD INITIAL [[id number]] Collected: 08/25/21 1018    Order Status: Completed Updated: 08/25/21 1339     Ventricular Rate 97 BPM      Atrial Rate 178 BPM      QRS Duration 108 ms      Q-T Interval 320 ms      QTC Calculation (Bezet) 406 ms      Calculated R Axis 68 degrees      Calculated T Axis -97 degrees      Diagnosis --     Appears to be atrial fibrillation but artifact. PVCs present. Possible Inferior infarct , age undetermined  Abnormal ECG  No previous ECGs available  Confirmed by Jeanine Ronquillo ([id number]) on 8/25/2021 1:39:14 PM            All Micro Results     Procedure Component Value Units Date/Time    COVID-19 RAPID TEST [[id number]] Collected: 08/25/21 1140    Order Status: Completed Specimen: Nasopharyngeal Updated: 08/25/21 1207     Specimen source NASAL        Comment: RAPID ONLY        COVID-19 rapid test Not detected        Comment:      The specimen is NEGATIVE for SARS-CoV-2, the novel coronavirus associated with COVID-19. A negative result does not rule out COVID-19. This test has been authorized by the FDA under an Emergency Use Authorization (EUA) for use by authorized laboratories.         Fact sheet for Healthcare Providers: ConventionUpdate.co.nz  Fact sheet for Patients: ConventionUpdate.co.nz       Methodology: Isothermal Nucleic Acid Amplification               Other Studies:  XR CHEST SNGL V    Result Date: 8/25/2021  EXAMINATION: XR CHEST SNGL V, XR FEMUR LT 2 V, XR HIP LT W OR WO PELV 2-3 VWS 8/25/2021 11:14 AM ACCESSION NUMBER: [id number], [id number], [id number] INDICATION: fall, Fall at 3am this morning-Hx many broken ribs, per pt COMPARISON: Chest CT 5/20/2021, chest x-ray 4/8/2021 TECHNIQUE AND FINDINGS: SINGLE AP VIEW OF THE CHEST: Unchanged enlargement of the cardiac silhouette. Multiple chronic right rib deformities are unchanged from prior exam. Adjacent pleural thickening and right-sided pulmonary parenchymal opacifications are unchanged from priors. There is unchanged blunting of the right costophrenic sulcus. The left lung is clear, without evidence of a pleural effusion. There is no pneumothorax bilaterally. Calcifications overlying the right hilum corresponding to calcified mediastinal and hilar lymph nodes. Unremarkable included upper abdomen. AP VIEW OF THE PELVIS AND AP AND FROG-LEG LATERAL VIEWS OF THE LEFT HIP: Comminuted intertrochanteric fracture of the proximal left femur with foreshortening of the distal fracture fragments. The femoral head and acetabulum remain normally aligned. Mild right hip joint degenerative change. No acute fracture in the pelvis. Lower lumbar spine spondylosis. 2 VIEWS OF THE LEFT FEMUR: Intertrochanteric fracture as above. No evidence of a fracture of the more distal portions of the left femur. 1. Right-sided pleural thickening and parenchymal opacification adjacent to chronic right rib fractures are unchanged from recent priors, including the 5/28/2021 chest CT. While this may be a chronic organized hematoma, favored to the rib fractures, as discussed on the prior chest CT, a follow-up chest CT would be beneficial to demonstrate stability. 2. Intertrochanteric fracture of the proximal left femur, as above. 3. No evidence of acute fracture in the more distal portions of the left femur.  VOICE DICTATED BY: Dr. John Mo 2-3 VWS    Result Date: 8/25/2021  EXAMINATION: XR CHEST SNGL V, XR FEMUR LT 2 V, XR HIP LT W OR WO PELV 2-3 VWS 8/25/2021 11:14 AM ACCESSION NUMBER: [id number], [id number], [id number] INDICATION: fall, Fall at 3am this morning-Hx many broken ribs, per pt COMPARISON: Chest CT 5/20/2021, chest x-ray 4/8/2021 TECHNIQUE AND FINDINGS: SINGLE AP VIEW OF THE CHEST: Unchanged enlargement of the cardiac silhouette. Multiple chronic right rib deformities are unchanged from prior exam. Adjacent pleural thickening and right-sided pulmonary parenchymal opacifications are unchanged from priors. There is unchanged blunting of the right costophrenic sulcus. The left lung is clear, without evidence of a pleural effusion. There is no pneumothorax bilaterally. Calcifications overlying the right hilum corresponding to calcified mediastinal and hilar lymph nodes. Unremarkable included upper abdomen. AP VIEW OF THE PELVIS AND AP AND FROG-LEG LATERAL VIEWS OF THE LEFT HIP: Comminuted intertrochanteric fracture of the proximal left femur with foreshortening of the distal fracture fragments. The femoral head and acetabulum remain normally aligned. Mild right hip joint degenerative change. No acute fracture in the pelvis. Lower lumbar spine spondylosis. 2 VIEWS OF THE LEFT FEMUR: Intertrochanteric fracture as above. No evidence of a fracture of the more distal portions of the left femur. 1. Right-sided pleural thickening and parenchymal opacification adjacent to chronic right rib fractures are unchanged from recent priors, including the 5/28/2021 chest CT. While this may be a chronic organized hematoma, favored to the rib fractures, as discussed on the prior chest CT, a follow-up chest CT would be beneficial to demonstrate stability. 2. Intertrochanteric fracture of the proximal left femur, as above. 3. No evidence of acute fracture in the more distal portions of the left femur.  VOICE DICTATED BY: Dr. Yuly Goyal      XR FEMUR LT 2 V    Result Date: 8/25/2021  EXAMINATION: XR CHEST SNGL V, XR FEMUR LT 2 V, XR HIP LT W OR WO PELV 2-3 VWS 8/25/2021 11:14 AM ACCESSION NUMBER: [id number], [id number], [id number] INDICATION: fall, Fall at 3am this morning-Hx many broken ribs, per pt COMPARISON: Chest CT 5/20/2021, chest x-ray 4/8/2021 TECHNIQUE AND FINDINGS: SINGLE AP VIEW OF THE CHEST: Unchanged enlargement of the cardiac silhouette. Multiple chronic right rib deformities are unchanged from prior exam. Adjacent pleural thickening and right-sided pulmonary parenchymal opacifications are unchanged from priors. There is unchanged blunting of the right costophrenic sulcus. The left lung is clear, without evidence of a pleural effusion. There is no pneumothorax bilaterally. Calcifications overlying the right hilum corresponding to calcified mediastinal and hilar lymph nodes. Unremarkable included upper abdomen. AP VIEW OF THE PELVIS AND AP AND FROG-LEG LATERAL VIEWS OF THE LEFT HIP: Comminuted intertrochanteric fracture of the proximal left femur with foreshortening of the distal fracture fragments. The femoral head and acetabulum remain normally aligned. Mild right hip joint degenerative change. No acute fracture in the pelvis. Lower lumbar spine spondylosis. 2 VIEWS OF THE LEFT FEMUR: Intertrochanteric fracture as above. No evidence of a fracture of the more distal portions of the left femur. 1. Right-sided pleural thickening and parenchymal opacification adjacent to chronic right rib fractures are unchanged from recent priors, including the 5/28/2021 chest CT. While this may be a chronic organized hematoma, favored to the rib fractures, as discussed on the prior chest CT, a follow-up chest CT would be beneficial to demonstrate stability. 2. Intertrochanteric fracture of the proximal left femur, as above. 3. No evidence of acute fracture in the more distal portions of the left femur.  VOICE DICTATED BY: Dr. Luis Love          Medications:  Medications Administered       acetaminophen (TYLENOL) tablet 650 mg       Admin Date  03/13/2021 Action  Given Dose  650 mg Route  Oral Administered By  Isma Street RN              cefTRIAXone (ROCEPHIN) 1 g in 0.9% sodium chloride (MBP/ADV) 50 mL MBP       Admin Date  03/13/2021 Action  New Bag Dose  1 g Rate  100 mL/hr Route  IntraVENous Administered By  Arti Medley RN              cefTRIAXone (ROCEPHIN) 2 g in 0.9% sodium chloride (MBP/ADV) 50 mL MBP       Admin Date  03/13/2021 Action  New Bag Dose  2 g Rate  100 mL/hr Route  IntraVENous Administered By  Wesley Ahuja RN               Admin Date  03/14/2021 Action  New Bag Dose  2 g Rate  100 mL/hr Route  IntraVENous Administered By  Yocasta Morley RN               Admin Date  03/15/2021 Action  New Bag Dose  2 g Rate  100 mL/hr Route  IntraVENous Administered By  Yocasta Morley RN               Admin Date  03/16/2021 Action  New Bag Dose  2 g Rate  100 mL/hr Route  IntraVENous Administered By  Toshia Lama              diphenhydrAMINE (BENADRYL) injection 25 mg       Admin Date  03/14/2021 Action  Given Dose  25 mg Route  IntraVENous Administered By  Luisa Reese RN              doxycycline (VIBRAMYCIN) 100 mg in 0.9% sodium chloride (MBP/ADV) 100 mL MBP       Admin Date  03/13/2021 Action  New Bag Dose  100 mg Rate  100 mL/hr Route  IntraVENous Administered By  Wesley Ahuja RN               Admin Date  03/13/2021 Action  New Bag Dose  100 mg Rate  100 mL/hr Route  IntraVENous Administered By  Luisa Reese RN               Admin Date  03/14/2021 Action  New Bag Dose  100 mg Rate  100 mL/hr Route  IntraVENous Administered By  Yocasta Morley RN               Admin Date  03/14/2021 Action  New Bag Dose  100 mg Rate  100 mL/hr Route  IntraVENous Administered By  Luisa Reese RN               Admin Date  03/15/2021 Action  New Bag Dose  100 mg Rate  100 mL/hr Route  IntraVENous Administered By  Yocasta Morley RN              enoxaparin (LOVENOX) injection 40 mg       Admin Date  03/13/2021 Action  Given Dose  40 mg Route  SubCUTAneous Administered By  Wesley Ahuja RN               Admin Date  03/13/2021 Action  Given Dose  40 mg Route  SubCUTAneous Administered By  William Oakley, RN               Admin Date  03/14/2021 Action  Given Dose  40 mg Route  SubCUTAneous Administered By  Katy Martínez, RN               Admin Date  03/14/2021 Action  Given Dose  40 mg Route  SubCUTAneous Administered By  William Oakley, RN               Admin Date  03/15/2021 Action  Given Dose  40 mg Route  SubCUTAneous Administered By  Katy Martínez, RN               Admin Date  03/15/2021 Action  Given Dose  40 mg Route  SubCUTAneous Administered By  Carmencita Banerjee, RN               Admin Date  03/16/2021 Action  Given Dose  40 mg Route  SubCUTAneous Administered By  Triny Khan              fentaNYL citrate (PF) injection 50 mcg       Admin Date  03/13/2021 Action  Given Dose  50 mcg Route  IntraVENous Administered By  Brenda Chaudhry, RN               Admin Date  03/14/2021 Action  Given Dose  50 mcg Route  IntraVENous Administered By  William Oakley RN              ferrous sulfate tablet 325 mg       Admin Date  03/14/2021 Action  Given Dose  325 mg Route  Oral Administered By  Katy Martínez, EVA               Admin Date  03/14/2021 Action  Given Dose  325 mg Route  Oral Administered By  Katy Martínez, RN               Admin Date  03/15/2021 Action  Given Dose  325 mg Route  Oral Administered By  Katy Martínez RN              folic acid (FOLVITE) tablet 1 mg       Admin Date  03/14/2021 Action  Given Dose  1 mg Route  Oral Administered By  Katy Martínez RN               Admin Date  03/15/2021 Action  Given Dose  1 mg Route  Oral Administered By  Katy Martínez RN               Admin Date  03/16/2021 Action  Given Dose  1 mg Route  Oral Administered By  Triny Khan              furosemide (LASIX) injection 20 mg       Admin Date  03/15/2021 Action  Given Dose  20 mg Route  IntraVENous Administered By  Katy Martínez RN              furosemide (LASIX) injection 40 mg       Admin Date  03/13/2021 Action  Given Dose  40 mg Route  IntraVENous Administered By  Onel Brito RN               Admin Date  03/13/2021 Action  Given Dose  40 mg Route  IntraVENous Administered By  Danae Wilburn RN               Admin Date  03/14/2021 Action  Given Dose  40 mg Route  IntraVENous Administered By  Nahed Butcher RN               Admin Date  03/14/2021 Action  Given Dose  40 mg Route  IntraVENous Administered By  Danae Wilburn RN               Admin Date  03/15/2021 Action  Given Dose  40 mg Route  IntraVENous Administered By  Gladys Conteh RN               Admin Date  03/16/2021 Action  Given Dose  40 mg Route  IntraVENous Administered By  Alejandra Whalen              HYDROcodone-acetaminophen [facility]) 5-325 mg per tablet 1 Tab       Admin Date  03/14/2021 Action  Given Dose  1 Tab Route  Oral Administered By  Nahed Butcher RN               Admin Date  03/15/2021 Action  Given Dose  1 Tab Route  Oral Administered By  Danae Wilburn RN              insulin lispro (HUMALOG) injection       Admin Date  03/13/2021 Action  Given Dose  2 Units Route  SubCUTAneous Administered By  Danae Wilburn RN               Admin Date  03/14/2021 Action  Given Dose  2 Units Route  SubCUTAneous Administered By  Nahed Butcher RN               Admin Date  03/14/2021 Action  Given Dose  2 Units Route  SubCUTAneous Administered By  Nahed Butcher RN               Admin Date  03/14/2021 Action  Given Dose  4 Units Route  SubCUTAneous Administered By  Danae Wilburn RN               Admin Date  03/15/2021 Action  Given Dose  2 Units Route  SubCUTAneous Administered By  Nahed Butcher RN               Admin Date  03/15/2021 Action  Given Dose  4 Units Route  SubCUTAneous Administered By  Nahed Butcher RN               Admin Date  03/15/2021 Action  Given Dose  2 Units Route  SubCUTAneous Administered By  Gladys Conteh RN               Admin Date  03/16/2021 Action  Given Dose  4 Units Route  SubCUTAneous Administered By  Alejandra Whalen              levothyroxine (SYNTHROID) tablet 137 mcg       Admin Date  03/14/2021 Action  Given Dose  137 mcg Route  Oral Administered By  Quan Hawley RN               Admin Date  03/15/2021 Action  Given Dose  137 mcg Route  Oral Administered By  Quan Hawley RN               Admin Date  03/16/2021 Action  Given Dose  137 mcg Route  Oral Administered By  Horacio Grissom RN              midodrine (PROAMATINE) tablet 10 mg       Admin Date  03/14/2021 Action  Given Dose  10 mg Route  Oral Administered By  Quan Hawley RN               Admin Date  03/14/2021 Action  Given Dose  10 mg Route  Oral Administered By  Quan Hawley RN               Admin Date  03/15/2021 Action  Given Dose  10 mg Route  Oral Administered By  Quan Hawley RN               Admin Date  03/15/2021 Action  Given Dose  10 mg Route  Oral Administered By  Quan Hawley RN               Admin Date  03/15/2021 Action  Given Dose  10 mg Route  Oral Administered By  Quan Hawley RN               Admin Date  03/16/2021 Action  Given Dose  10 mg Route  Oral Administered By  Ben Mims Date  03/16/2021 Action  Given Dose  10 mg Route  Oral Administered By  Ben Mims Date  03/16/2021 Action  Given Dose  10 mg Route  Oral Administered By  Aaron Gomes              [facility]) injection 2 mg       Admin Date  03/13/2021 Action  Given Dose  2 mg Route  IntraVENous Administered By  Chidi Siddiqui RN              NOREPINephrine (LEVOPHED) 4 mg in 5% dextrose 250 mL infusion       Admin Date  03/14/2021 Action  New Bag Dose  4 mcg/min Rate  15 mL/hr Route  IntraVENous Administered By  Quan Hawley RN               Admin Date  03/14/2021 Action  Rate Change Dose  6 mcg/min Rate  22.5 mL/hr Route  IntraVENous Administered By  Quan Hawley RN               Admin Date  03/14/2021 Action  Rate Change Dose  4 mcg/min Rate  15 mL/hr Route  IntraVENous Administered By  Quan Hawley RN Admin Date  03/14/2021 Action  Rate Change Dose  2 mcg/min Rate  7.5 mL/hr Route  IntraVENous Administered By  Vilma Contreras RN               Admin Date  03/14/2021 Action  Rate Change Dose  1 mcg/min Rate  3.8 mL/hr Route  IntraVENous Administered By  Vilma Contreras RN               Admin Date  03/14/2021 Action  Restarted Dose  2 mcg/min Rate  7.5 mL/hr Route  IntraVENous Administered By  Vilma Contreras RN               Admin Date  03/14/2021 Action  Rate Change Dose  4 mcg/min Rate  15 mL/hr Route  IntraVENous Administered By  Vilma Contreras RN               Admin Date  03/15/2021 Action  Rate Change Dose  2 mcg/min Rate  7.5 mL/hr Route  IntraVENous Administered By  Alanna Fierro RN              ondansetron [facility]) injection 4 mg       Admin Date  03/13/2021 Action  Given Dose  4 mg Route  IntraVENous Administered By  Grecia Charles RN              pantoprazole (PROTONIX) tablet 40 mg       Admin Date  03/14/2021 Action  Given Dose  40 mg Route  Oral Administered By  Vilma Contreras RN               Admin Date  03/15/2021 Action  Given Dose  40 mg Route  Oral Administered By  Vilma Contreras RN               Admin Date  03/16/2021 Action  Given Dose  40 mg Route  Oral Administered By  Saumya Francis              perflutren lipid microspheres (DEFINITY) in NS bolus IV       Admin Date  03/13/2021 Action  Given Dose  1 mL Route  IntraVENous Administered By  YOU Kiran              potassium chloride (K-DUR, KLOR-CON) SR tablet 40 mEq       Admin Date  03/15/2021 Action  Given Dose  40 mEq Route  Oral Administered By  Vilma Contreras RN               Admin Date  03/16/2021 Action  Given Dose  40 mEq Route  Oral Administered By  Saumya Francis              potassium chloride 40 mEq IVPB       Admin Date  03/15/2021 Action  New Bag Dose  40 mEq Rate  25 mL/hr Route  IntraVENous Administered By  Alanna Fierro RN              pregabalin (LYRICA) capsule 300 mg       Admin Date  03/14/2021 Action  Given Dose  300 mg Route  Oral Administered By  Gabbie Stratton RN               Admin Date  03/14/2021 Action  Given Dose  300 mg Route  Oral Administered By  Gabbie Stratton RN               Admin Date  03/15/2021 Action  Given Dose  300 mg Route  Oral Administered By  Gabbie Stratton RN               Admin Date  03/15/2021 Action  Given Dose  300 mg Route  Oral Administered By  Maxine Savage RN               Admin Date  03/16/2021 Action  Given Dose  300 mg Route  Oral Administered By  Jenni Perez              sertraline (ZOLOFT) tablet 300 mg       Admin Date  03/14/2021 Action  Given Dose  300 mg Route  Oral Administered By  Gabbie Stratton RN               Admin Date  03/15/2021 Action  Given Dose  300 mg Route  Oral Administered By  Gabbie Stratton RN               Admin Date  03/16/2021 Action  Given Dose  300 mg Route  Oral Administered By  Jenni Perez              sodium chloride (NS) flush 5-40 mL       Admin Date  03/13/2021 Action  Given Dose  10 mL Route  IntraVENous Administered By  Shawn Barcenas RN               Admin Date  03/13/2021 Action  Given Dose  30 mL Route  IntraVENous Administered By  Shawn Barcenas RN               Admin Date  03/13/2021 Action  Given Dose  40 mL Route  IntraVENous Administered By  Elizabeth Martinez RN               Admin Date  03/14/2021 Action  Given Dose  40 mL Route  IntraVENous Administered By  Elizabeth Martinez RN               Admin Date  03/14/2021 Action  Given Dose  10 mL Route  IntraVENous Administered By  Gabbie Stratton RN               Admin Date  03/14/2021 Action  Given Dose  40 mL Route  IntraVENous Administered By  Elizabeth Martinez RN               Admin Date  03/15/2021 Action  Given Dose  40 mL Route  IntraVENous Administered By  Elizabeth Martinez RN               Admin Date  03/15/2021 Action  Given Dose  10 mL Route  IntraVENous Administered By  Gabbie Stratton RN               Admin Date  03/15/2021 Action  Given Dose  10 mL Route  IntraVENous Administered By  Nadja Ruff RN               Admin Date  03/16/2021 Action  Given Dose  10 mL Route  IntraVENous Administered By  Toshia Lama              sodium chloride 0.9 % bolus infusion 250 mL       Admin Date  03/14/2021 Action  New Bag Dose  250 mL Rate  250 mL/hr Route  IntraVENous Administered By  Yocasta Morley RN              tiZANidine (ZANAFLEX) tablet 4 mg       Admin Date  03/14/2021 Action  Given Dose  4 mg Route  Oral Administered By  Luisa Reese RN               Admin Date  03/14/2021 Action  Given Dose  4 mg Route  Oral Administered By  Yocasta Morley RN               Admin Date  03/14/2021 Action  Given Dose  4 mg Route  Oral Administered By  Yocasta Morley RN               Admin Date  03/14/2021 Action  Given Dose  4 mg Route  Oral Administered By  Luisa Reese RN               Admin Date  03/15/2021 Action  Given Dose  4 mg Route  Oral Administered By  Yocasta Morley RN               Admin Date  03/15/2021 Action  Given Dose  4 mg Route  Oral Administered By  Yocasta Morley RN               Admin Date  03/15/2021 Action  Given Dose  4 mg Route  Oral Administered By  Nadja Ruff RN               Admin Date  03/16/2021 Action  Given Dose  4 mg Route  Oral Administered By  Aries Davidson Date  03/16/2021 Action  Given Dose  4 mg Route  Oral Administered By  Toshia Lama              traZODone (DESYREL) tablet 150 mg       Admin Date  03/14/2021 Action  Given Dose  150 mg Route  Oral Administered By  Luisa Reese RN               Admin Date  03/15/2021 Action  Given Dose  150 mg Route  Oral Administered By  Nadja Ruff RN              tuberculin injection 5 Units       Admin Date  03/15/2021 Action  Give PPD Dose  5 Units Route  IntraDERMal Administered By  Yocasta Morley RN              vancomycin (VANCOCIN) 2500 mg in  mL infusion       Admin Date  03/13/2021 Action  Given Dose  2,500 mg Rate  200 mL/hr Route  IntraVENous Administered By  Se Garrett RN              zonisamide [facility]) capsule 300 mg       Admin Date  03/14/2021 Action  Given Dose  300 mg Route  Oral Administered By  Raghu Tim RN               Admin Date  03/15/2021 Action  Given Dose  300 mg Route  Oral Administered By  Lashawn Llamas RN                        Problem List:     Hospital Problems as of 8/25/2021 Date Reviewed: 8/25/2021        Codes Class Noted - Resolved POA    * (Principal) Closed 2-part intertrochanteric fracture of left femur ([facility] ICD-10-CM: C50.133S  ICD-9-CM: 820.21  8/25/2021 - Present Unknown        Stage 3a chronic kidney disease ([facility] ICD-10-CM: N18.31  ICD-9-CM: 585.3  12/9/2019 - Present Yes        Paroxysmal atrial fibrillation ([facility] ICD-10-CM: I48.0  ICD-9-CM: 427.31  12/1/2017 - Present Yes        Mixed hyperlipidemia ICD-10-CM: E78.2  ICD-9-CM: 272.2  7/24/2013 - Present Yes        Essential hypertension ICD-10-CM: I10  ICD-9-CM: 401.9  7/15/2013 - Present Yes                 Signed By: Dominique Dey MD   [employer] [facility]    August 25, 2021  4:22 PM

## 2021-08-25 NOTE — PROGRESS NOTES
Patient is an 75-year-old  male with history of hypertension, CKD stage IIIa, mixed dyslipidemia, questionable paroxysmal A. fib (not on [address]) presented to ER following a fall at home at 3 AM last night. Patient experienced sudden acute pain in left hip following the fall and was not able to stand up or put any weight on left hip. ER work-up showed left intertrochanteric femur fracture. Patient is at low risk of moderate risk surgery. Does not have any acute contraindication for surgical intervention. EKG, chest x-ray and blood work reviewed. O/E:  Alert/awake, mild distress due to pain, on RA  Left LE with LROM due to pain, pulses intact  CTAB/L  Normal S1S2 IRIR, no M/R/G  Patient can proceed with surgery based on OR availability today itself.

## 2021-08-25 NOTE — CONSULTS
Consult    Patient: Leann Garcia MRN: [id number]  SSN: [id number]    YOB: 1940  Age: 78 y.o. Sex: male      Subjective:      Leann Garcia is a 78 y.o. male who fell and injured his left hip. He was seen emergency room and found to have a left peritrochanteric proximal femur fracture. He denies any other problems besides his left hip. He normally is in pretty good health and gets around very well. He has no problem with his right lower extremity or bilateral upper extremities no other complaints besides his left hip. He had no history of any problems with his left hip prior to his fall    Past Medical History:   Diagnosis Date    Allergic rhinitis     Fractured pelvis ([address]     [facility] off of roof.  History of chicken pox Childhood    Scarlet fever Childhood    Zenker's diverticulum     In the throat. 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  HX VASECTOMY        FAMHX -No history of inflammatory arthritis   Social History     Tobacco Use    Smoking status: Former Smoker     Quit date: 1976     Years since quittin.6    Smokeless tobacco: Never Used   Substance Use Topics    Alcohol use:  Yes     Alcohol/week: 1.7 standard drinks     Types: 2 Standard drinks or equivalent per week     Comment: occ      Current Facility-Administered Medications   Medication Dose Route Frequency Provider Last Rate Last Admin    [START ON 2021] furosemide (LASIX) tablet 40 mg  40 mg Oral DAILY Bina Felton MD        [START ON 2021] tamsulosin (FLOMAX) capsule 0.4 mg  0.4 mg Oral DAILY Bina Felton MD       [facility] ON 2021] valsartan (DIOVAN) tablet 80 mg  80 mg Oral DAILY Bina Felton MD        sodium chloride (NS) flush 5-40 mL  5-40 mL IntraVENous Q8H Bina Felton MD        sodium chloride (NS) flush 5-40 mL  5-40 mL IntraVENous PRN Bhavya Menjivar MD        acetaminophen (TYLENOL) tablet 650 mg  650 mg Oral Q6H PRN Bhavya Menjivar MD        Or    acetaminophen (TYLENOL) suppository 650 mg  650 mg Rectal Q6H PRN Bhavya Menjivar MD        polyethylene glycol (MIRALAX) packet 17 g  17 g Oral DAILY PRN Bhavya Menjivar MD        ondansetron (ZOFRAN ODT) tablet 4 mg  4 mg Oral Q8H PRN Bhavya Menjivar MD        Or    ondansetron [facility]) injection 4 mg  4 mg IntraVENous Q4H PRN Bhavya Menjivar MD        [START ON 8/26/2021] enoxaparin (LOVENOX) injection 40 mg  40 mg SubCUTAneous Q24H Bhavya Menjivar MD        potassium chloride 10 mEq in 100 ml IVPB  10 mEq IntraVENous PRN Bhavya Menjivar MD        magnesium sulfate 2 g/50 ml IVPB (premix or compounded)  2 g IntraVENous PRN Bhavya Menjivar MD        lactated Ringers infusion  75 mL/hr IntraVENous CONTINUOUS Leena Mike MD        HYDROmorphone (DILAUDID) injection 0.5 mg  0.5 mg IntraVENous Multiple Leena Mike MD   0.5 mg at 08/25/21 1244    acetaminophen (TYLENOL) tablet 650 mg  650 mg Oral ONCE Leena Mike MD        cyclobenzaprine (FLEXERIL) tablet 10 mg  10 mg Oral Clara Blancas MD        morphine injection 2 mg  2 mg IntraVENous Q3H PRN Bhavya Menjivar MD        oxyCODONE IR (ROXICODONE) tablet 5 mg  5 mg Oral Q4H PRN Bhavya Menjivar MD        metoprolol (LOPRESSOR) injection 2.5 mg  2.5 mg IntraVENous Q6H PRN Bhavya Menjivar MD        hydrALAZINE (APRESOLINE) 20 mg/mL injection 10 mg  10 mg IntraVENous Q6H PRN Bhavya Menjivar MD            No Known Allergies    Review of Systems:  A comprehensive review of systems was negative except for that written in the History of Present Illness.     Objective:     Vitals:    08/25/21 1015 08/25/21 1047   BP: (!) 189/89 (!) 189/89   Pulse: 85 87   Resp: 18    Temp: 98.3 °F (36.8 °C)    SpO2: 97% 98%   Weight: 69.9 kg (154 lb)    Height: 5' 4\" (1.626 m)         Physical Exam:  Physical Exam:  General:  Alert, cooperative, no distress, appears stated age. Orientation alert and oriented person place time and situation   Eyes:  Conjunctivae/corneas clear. PERRL, EOMs intact. Fundi benign   Ears:  Normal TMs and external ear canals both ears. Nose: Nares normal. Septum midline. Mucosa normal. No drainage or sinus tenderness. Mouth/Throat: Lips, mucosa, and tongue normal. Teeth and gums normal.   Neck: Supple, symmetrical, trachea midline, no adenopathy, thyroid: no enlargment/tenderness/nodules, no carotid bruit and no JVD. Back:   Symmetric, no curvature. ROM normal. No CVA tenderness. Lungs:   Clear to auscultation bilaterally. Heart:  Regular rate and rhythm, S1, S2 normal, no murmur, click, rub or gallop. Abdomen:   Soft, non-tender. Bowel sounds normal. No masses,  No organomegaly. No lymphadenopathy in all 4 extremities  Alignment- pt has some obvious deformity of the left hip  Range of motion- with any range of motion leftt hip  Vascular-distal pulses palpable in left lower extremity  Sensory/motor-deep tendon reflexes normal left lower extremity. Motor and sensory function intact.   Stability- is difficult to assess stability because of the presence of the fracture  Tenderness to palpation over the left greater trochanter area  Skin- no rashes ulcerations or open wounds left lower extremity  Gait- cannot put any weight on the left lower extremity      Assessment:     Hospital Problems  Date Reviewed: 8/25/2021        Codes Class Noted POA    * (Principal) Closed 2-part intertrochanteric fracture of left femur ([facility] ICD-10-CM: T27.270S  ICD-9-CM: 820.21  8/25/2021 Unknown        Stage 3a chronic kidney disease ([facility] ICD-10-CM: N18.31  ICD-9-CM: 585.3  12/9/2019 Yes        Paroxysmal atrial fibrillation (HCC) ICD-10-CM: I48.0  ICD-9-CM: 427.31  12/1/2017 Yes        Mixed hyperlipidemia ICD-10-CM: U67.6  ICD-9-CM: 272.2  7/24/2013 Yes        Essential hypertension ICD-10-CM: I10  ICD-9-CM: 401.9  7/15/2013 Yes                Xrays and or studies:    AP and lateral views of the left hip shows a peritrochanteric/intertrochanteric left proximal femur fracture  Plan:     I have spoke with the patient regarding the need for operative fixation of this. I explained to him that I do not think nonoperative treatment really plays much of a role since he is a healthy individual that should undergo surgical intervention rather easily. I explained exactly what this would involve the use of illustrations to help him understand exactly what the nail looks like in try to make sure he understands where his incisions would be and what the procedure would involve. So after talking him about the nature of the procedure up also talked to him about the material risk associate with the procedure. After hearing this he seems to feel comfortable consenting.   The plan will be to proceed with open treatment of left proximal femur fracture with intramedullary nail fixation today in the operating room    Signed By: Pedro Gomez MD

## 2021-08-25 NOTE — ANESTHESIA PREPROCEDURE EVALUATION
Relevant Problems   CARDIOVASCULAR   (+) Essential hypertension   (+) Paroxysmal atrial fibrillation (HCC)      RENAL FAILURE   (+) Stage 3a chronic kidney disease (HCC)       Anesthetic History   No history of anesthetic complications            Review of Systems / Medical History  Patient summary reviewed and pertinent labs reviewed    Pulmonary  Within defined limits                 Neuro/Psych   Within defined limits           Cardiovascular    Hypertension        Dysrhythmias : atrial fibrillation      Exercise tolerance: >4 METS  Comments: Does not take any anticoagulants   GI/Hepatic/Renal  Within defined limits              Endo/Other  Within defined limits           Other Findings            Physical Exam    Airway  Mallampati: II  TM Distance: > 6 cm  Neck ROM: decreased range of motion   Mouth opening: Normal     Cardiovascular    Rhythm: regular           Dental  No notable dental hx       Pulmonary                 Abdominal  GI exam deferred       Other Findings            Anesthetic Plan    ASA: 2  Anesthesia type: spinal          Induction: Intravenous  Anesthetic plan and risks discussed with: Patient

## 2021-08-26 PROBLEM — D62 ACUTE BLOOD LOSS AS CAUSE OF POSTOPERATIVE ANEMIA: Status: ACTIVE | Noted: 2021-08-26

## 2021-08-26 PROBLEM — I48.0 PAROXYSMAL ATRIAL FIBRILLATION (HCC): Chronic | Status: ACTIVE | Noted: 2017-12-01

## 2021-08-26 PROBLEM — E87.1 CHRONIC HYPONATREMIA: Chronic | Status: ACTIVE | Noted: 2017-12-01

## 2021-08-26 PROBLEM — N18.31 STAGE 3A CHRONIC KIDNEY DISEASE (HCC): Chronic | Status: ACTIVE | Noted: 2019-12-09

## 2021-08-26 LAB
ALBUMIN SERPL-MCNC: 2.3 G/DL (ref 3.2–4.6)
ALBUMIN/GLOB SERPL: 0.6 {RATIO} (ref 1.2–3.5)
ALP SERPL-CCNC: 111 U/L (ref 50–136)
ALT SERPL-CCNC: 22 U/L (ref 12–65)
ANION GAP SERPL CALC-SCNC: 5 MMOL/L (ref 7–16)
AST SERPL-CCNC: 20 U/L (ref 15–37)
BASOPHILS # BLD: 0 K/UL (ref 0–0.2)
BASOPHILS NFR BLD: 0 % (ref 0–2)
BILIRUB SERPL-MCNC: 0.9 MG/DL (ref 0.2–1.1)
BUN SERPL-MCNC: 29 MG/DL (ref 8–23)
CALCIUM SERPL-MCNC: 8.1 MG/DL (ref 8.3–10.4)
CHLORIDE SERPL-SCNC: 101 MMOL/L (ref 98–107)
CO2 SERPL-SCNC: 28 MMOL/L (ref 21–32)
CREAT SERPL-MCNC: 1.48 MG/DL (ref 0.8–1.5)
DIFFERENTIAL METHOD BLD: ABNORMAL
EOSINOPHIL # BLD: 0 K/UL (ref 0–0.8)
EOSINOPHIL NFR BLD: 0 % (ref 0.5–7.8)
ERYTHROCYTE [DISTWIDTH] IN BLOOD BY AUTOMATED COUNT: 13 % (ref 11.9–14.6)
GLOBULIN SER CALC-MCNC: 3.8 G/DL (ref 2.3–3.5)
GLUCOSE SERPL-MCNC: 116 MG/DL (ref 65–100)
HCT VFR BLD AUTO: 31.3 % (ref 41.1–50.3)
HGB BLD-MCNC: 10.6 G/DL (ref 13.6–17.2)
IMM GRANULOCYTES # BLD AUTO: 0 K/UL (ref 0–0.5)
IMM GRANULOCYTES NFR BLD AUTO: 0 % (ref 0–5)
LYMPHOCYTES # BLD: 0.6 K/UL (ref 0.5–4.6)
LYMPHOCYTES NFR BLD: 6 % (ref 13–44)
MAGNESIUM SERPL-MCNC: 2 MG/DL (ref 1.8–2.4)
MCH RBC QN AUTO: 32.2 PG (ref 26.1–32.9)
MCHC RBC AUTO-ENTMCNC: 33.9 G/DL (ref 31.4–35)
MCV RBC AUTO: 95.1 FL (ref 79.6–97.8)
MONOCYTES # BLD: 0.9 K/UL (ref 0.1–1.3)
MONOCYTES NFR BLD: 10 % (ref 4–12)
NEUTS SEG # BLD: 7.7 K/UL (ref 1.7–8.2)
NEUTS SEG NFR BLD: 83 % (ref 43–78)
NRBC # BLD: 0 K/UL (ref 0–0.2)
PLATELET # BLD AUTO: 175 K/UL (ref 150–450)
PMV BLD AUTO: 10.1 FL (ref 9.4–12.3)
POTASSIUM SERPL-SCNC: 4.6 MMOL/L (ref 3.5–5.1)
PROT SERPL-MCNC: 6.1 G/DL (ref 6.3–8.2)
RBC # BLD AUTO: 3.29 M/UL (ref 4.23–5.6)
SARS-COV-2, COV2: NORMAL
SODIUM SERPL-SCNC: 134 MMOL/L (ref 136–145)
WBC # BLD AUTO: 9.3 K/UL (ref 4.3–11.1)

## 2021-08-26 PROCEDURE — 80053 COMPREHEN METABOLIC PANEL: CPT

## 2021-08-26 PROCEDURE — 77030040361 HC SLV COMPR DVT MDII -B

## 2021-08-26 PROCEDURE — 97161 PT EVAL LOW COMPLEX 20 MIN: CPT

## 2021-08-26 PROCEDURE — 74011000250 HC RX REV CODE- 250: Performed by: INTERNAL MEDICINE

## 2021-08-26 PROCEDURE — 74011250637 HC RX REV CODE- 250/637: Performed by: ORTHOPAEDIC SURGERY

## 2021-08-26 PROCEDURE — 74011250636 HC RX REV CODE- 250/636: Performed by: ORTHOPAEDIC SURGERY

## 2021-08-26 PROCEDURE — 83735 ASSAY OF MAGNESIUM: CPT

## 2021-08-26 PROCEDURE — 74011250636 HC RX REV CODE- 250/636: Performed by: HOSPITALIST

## 2021-08-26 PROCEDURE — 86580 TB INTRADERMAL TEST: CPT | Performed by: INTERNAL MEDICINE

## 2021-08-26 PROCEDURE — 85025 COMPLETE CBC W/AUTO DIFF WBC: CPT

## 2021-08-26 PROCEDURE — 74011250637 HC RX REV CODE- 250/637: Performed by: HOSPITALIST

## 2021-08-26 PROCEDURE — 97530 THERAPEUTIC ACTIVITIES: CPT

## 2021-08-26 PROCEDURE — 65270000029 HC RM PRIVATE

## 2021-08-26 PROCEDURE — 97165 OT EVAL LOW COMPLEX 30 MIN: CPT

## 2021-08-26 PROCEDURE — 74011000258 HC RX REV CODE- 258: Performed by: ORTHOPAEDIC SURGERY

## 2021-08-26 PROCEDURE — U0005 INFEC AGEN DETEC AMPLI PROBE: HCPCS

## 2021-08-26 PROCEDURE — 36415 COLL VENOUS BLD VENIPUNCTURE: CPT

## 2021-08-26 PROCEDURE — 77030027138 HC INCENT SPIROMETER -A

## 2021-08-26 PROCEDURE — 97535 SELF CARE MNGMENT TRAINING: CPT

## 2021-08-26 PROCEDURE — 2709999900 HC NON-CHARGEABLE SUPPLY

## 2021-08-26 RX ADMIN — Medication 10 ML: at 14:08

## 2021-08-26 RX ADMIN — PANTOPRAZOLE SODIUM 40 MG: 40 TABLET, DELAYED RELEASE ORAL at 06:25

## 2021-08-26 RX ADMIN — OXYCODONE 10 MG: 5 TABLET ORAL at 06:25

## 2021-08-26 RX ADMIN — TAMSULOSIN HYDROCHLORIDE 0.4 MG: 0.4 CAPSULE ORAL at 08:29

## 2021-08-26 RX ADMIN — TUBERCULIN PURIFIED PROTEIN DERIVATIVE 5 UNITS: 5 INJECTION, SOLUTION INTRADERMAL at 08:28

## 2021-08-26 RX ADMIN — Medication 1 TABLET: at 08:29

## 2021-08-26 RX ADMIN — Medication 1 TABLET: at 17:38

## 2021-08-26 RX ADMIN — MORPHINE SULFATE 4 MG: 4 INJECTION INTRAVENOUS at 01:20

## 2021-08-26 RX ADMIN — ASPIRIN 325 MG: 325 TABLET, COATED ORAL at 08:29

## 2021-08-26 RX ADMIN — FUROSEMIDE 40 MG: 40 TABLET ORAL at 08:29

## 2021-08-26 RX ADMIN — Medication 5 ML: at 01:21

## 2021-08-26 RX ADMIN — Medication 1 TABLET: at 12:18

## 2021-08-26 RX ADMIN — Medication 10 ML: at 21:34

## 2021-08-26 RX ADMIN — VALSARTAN 80 MG: 80 TABLET ORAL at 08:29

## 2021-08-26 RX ADMIN — CEFAZOLIN SODIUM 1 G: 1 INJECTION, POWDER, FOR SOLUTION INTRAMUSCULAR; INTRAVENOUS at 01:24

## 2021-08-26 RX ADMIN — ENOXAPARIN SODIUM 40 MG: 40 INJECTION SUBCUTANEOUS at 08:29

## 2021-08-26 NOTE — PROGRESS NOTES
ACUTE OT GOALS:  (Developed with and agreed upon by patient and/or caregiver.)  1. Patient will complete grooming at sink with modified independence. 2. Patient will complete functional transfers with CGA while maintaining WBAT status in LLE and with adaptive equipment as needed. 3. Patient will complete lower body bathing and dressing with minimal assistance and adaptive equipment as needed. 4. Patient will complete toileting and toilet transfer with CGA. 5. Patient will tolerate 20 minutes of OT treatment with as needed rest breaks to increase activity tolerance for ADLs. 6. Patient will participate in at least 20 minutes of BUE therapeutic exercises to strengthen BUE for functional transfers.      Timeframe: 7 visits       OCCUPATIONAL THERAPY ASSESSMENT: Initial Assessment and Daily Note OT Treatment Day # 1    Arielle White is a 78 y.o. male   PRIMARY DIAGNOSIS: Closed 2-part intertrochanteric fracture of left femur (HCC)  Closed 2-part intertrochanteric fracture of left femur (HCC) [S72.142A]  Procedure(s) (LRB):  Left Gamma Nail (Left)  1 Day Post-Op  Reason for Referral:  Fall; s/p above procedure   ICD-10: Treatment Diagnosis: Pain in left hip (M25.552)  Stiffness of Left Hip, Not elsewhere classified (M25.652)  History of falling (Z91.81)  INPATIENT: Payor: [employer]ARP MEDICARE COMPLETE / Plan: Henery Plush / Product Type: Managed Care Medicare /   ASSESSMENT:     REHAB RECOMMENDATIONS:   Recommendation to date pending progress:  Setting:   Short-term Rehab  Equipment:    To Be Determined     PRIOR LEVEL OF FUNCTION:  (Prior to Hospitalization)  INITIAL/CURRENT LEVEL OF FUNCTION:  (Based on today's evaluation)   Bathing:   Independent  Dressing:   Independent  Feeding/Grooming:   Independent  Toileting:   Independent  Functional Mobility:   Independent Bathing:   Maximal Assistance  Dressing:   Maximal Assistance  Feeding/Grooming:   Set Up  Toileting:   Maximal Assistance  Functional Mobility:   Minimal Assistance x 2     ASSESSMENT:  Mr. Mathew Echeverria is an 78year old male who was admitted after fall, now s/p above procedure and WBAT in LLE. At baseline patient lives alone and is typically independent with ADLs. Today, reports no pain. Able to stand with minimal assistance x2 and RW. Then sat at sink to complete grooming ADLs with SBA. He is functioning below his baseline and would benefit from continued OT to increase independence and safety. Will follow. SUBJECTIVE:   Mr. Mathew Echeverria states, \"I need to shave. \"    SOCIAL HISTORY/LIVING ENVIRONMENT: Patient lives alone, is independent with ADLs, & driving.    Home Environment: Apartment  # Steps to Enter: 12  One/Two Story Residence: One story  Living Alone: Yes    OBJECTIVE:     PAIN: VITAL SIGNS: LINES/DRAINS:   Pre Treatment: Pain Screen  Pain Scale 1: Numeric (0 - 10)  Pain Intensity 1: 0  Post Treatment: none    IV  O2 Device: None (Room air)     GROSS EVALUATION:  BUE Within Functional Limits Abnormal/ Functional Abnormal/ Non-Functional (see comments) Not Tested Comments:   AROM [x] [] [] []    PROM [] [] [] [x]    Strength [x] [] [] []    Balance [] [x] [] []    Posture [] [x] [] []    Sensation [] [] [] [x]    Coordination [] [] [] [x]    Tone [] [] [] [x]    Edema [] [] [] [x]    Activity Tolerance [] [x] [] []     [] [] [] []      COGNITION/  PERCEPTION: Intact Impaired   (see comments) Comments:   Orientation [x] []    Vision [] [x]    Hearing [] []    Judgment/ Insight [x] []    Attention [x] []    Memory [x] []    Command Following [x] []    Emotional Regulation [x] []     [] []      ACTIVITIES OF DAILY LIVING: I Mod I S SBA CGA Min Mod Max Total NT Comments   BASIC ADLs:              Bathing/ Showering [] [] [] [] [] [] [] [] [] []    Toileting [] [] [] [] [] [] [] [] [] []    Dressing [] [] [] [] [] [] [] [] [] []    Feeding [] [] [x] [] [] [] [] [] [] []    Grooming [] [] [x] [] [] [] [] [] [] [] Washing face, brushing teeth, shaving, combing hair from seated position @ sink    Personal Device Care [] [] [] [] [] [] [] [] [] []    Functional Mobility [] [] [] [] [] [x] [] [] [] []    I=Independent, Mod I=Modified Independent, S=Supervision, SBA=Standby Assistance, CGA=Contact Guard Assistance,   Min=Minimal Assistance, Mod=Moderate Assistance, Max=Maximal Assistance, Total=Total Assistance, NT=Not Tested    MOBILITY: I Mod I S SBA CGA Min Mod Max Total  NT x2 Comments:   Supine to sit [] [] [] [] [] [] [] [] [] [x] []    Sit to supine [] [] [] [] [] [] [] [] [] [x] []    Sit to stand [] [] [] [] [] [x] [] [] [] [] [x]    Bed to chair [] [] [] [] [] [] [] [] [] [x] []    I=Independent, Mod I=Modified Independent, S=Supervision, SBA=Standby Assistance, CGA=Contact Guard Assistance,   Min=Minimal Assistance, Mod=Moderate Assistance, Max=Maximal Assistance, Total=Total Assistance, NT=Not [facility] 6 Clicks   Daily Activity Inpatient Short Form        How much help from another person does the patient currently need. .. Total A Lot A Little None   1. Putting on and taking off regular lower body clothing? [] 1   [x] 2   [] 3   [] 4   2. Bathing (including washing, rinsing, drying)? [] 1   [x] 2   [] 3   [] 4   3. Toileting, which includes using toilet, bedpan or urinal?   [] 1   [x] 2   [] 3   [] 4   4. Putting on and taking off regular upper body clothing? [] 1   [] 2   [x] 3   [] 4   5. Taking care of personal grooming such as brushing teeth? [] 1   [] 2   [x] 3   [] 4   6. Eating meals? [] 1   [] 2   [x] 3   [] 4   © 2007, Trustees of [address] [place] [address] [address] [id number], under license to [employer]. All rights reserved     Score:  Initial: 15 Most Recent: X (Date: -- )   Interpretation of Tool:  Represents activities that are increasingly more difficult (i.e. Bed mobility, Transfers, Gait).     PLAN:   FREQUENCY/DURATION: OT Plan of Care: 4 times/week for duration of hospital stay or until stated goals are met, whichever comes first.    PROBLEM LIST:   (Skilled intervention is medically necessary to address:)  1. Decreased ADL/Functional Activities  2. Decreased Activity Tolerance  3. Decreased AROM/PROM  4. Decreased Balance  5. Decreased Gait Ability  6. Decreased Transfer Abilities  7. Increased Pain   INTERVENTIONS PLANNED:   (Benefits and precautions of occupational therapy have been discussed with the patient.)  1. Self Care Training  2. Therapeutic Activity  3. Therapeutic Exercise/HEP  4. Neuromuscular Re-education  5. Education     TREATMENT:     EVALUATION: Low Complexity : (Untimed Charge)    TREATMENT:   ($$ Self Care/Home Management: 23-37 mins    )  Co-Treatment PT/OT necessary due to patient's decreased overall endurance/tolerance levels, as well as need for high level skilled assistance to complete functional transfers/mobility and functional tasks  Self Care (23 Minutes): Self care including Self Feeding, Personal Device Care and Grooming to increase independence and decrease level of assistance required.     TREATMENT GRID:  N/A    AFTER TREATMENT POSITION/PRECAUTIONS:  Alarm Activated, Chair, Needs within reach and RN notified    INTERDISCIPLINARY COLLABORATION:  RN/PCT, PT/PTA and OT/AVILA    TOTAL TREATMENT DURATION:  OT Patient Time In/Time Out  Time In: 1311  Time Out: 0721 [facility], OTR/L

## 2021-08-26 NOTE — PROGRESS NOTES
Hospitalist Progress Note   Admit Date:  2021 10:09 AM   Name:  Meghann Garcia   Age:  78 y.o. Sex:  male  :  1940   MRN:  [id number]     Presenting Complaint: Fall and Hip Pain    Reason(s) for Admission: Closed 2-part intertrochanteric fracture of left femur [facility]) [place] [facility] Course & Interval History:   Patient is an 78-year-old  male with history of hypertension, CKD stage IIIa, mixed dyslipidemia, questionable paroxysmal A. fib (not on [address]) presented to ER following a fall at home at 3 AM last night. Patient experienced sudden acute pain in left hip following the fall and was not able to stand up or put any weight on left hip. ER work-up showed left intertrochanteric femur fracture. Patient is at low risk of moderate risk surgery. Patient denies having any chest pain, shortness of breath, cough, urinary symptoms, abdominal pain, headache, focal neurological weakness, lightheadedness or dizziness. Blood work was remarkable for creatinine 1.47 (at baseline), T bili 1.5, , lactic acid 2.6. Admitted for hip fracture. Went to OR . Blood loss postop but no other complications. Subjective (21):  Pt reports pain 2/10 dull, constant, worse with movement. No CP, SOB. Assessment & Plan:     #Left femoral intracardiac fracture:  - s/p ORIF . -IV morphine and PO oxy PRN    Anemia   - new issue, likely operative blood loss. recheck Hb in AM.    #Hypertension:  Better controlled today.   Cont current meds     #CKD stage III:  Creatinine is at baseline     #BPH:  Cont Flomax 0.4 mg daily     #Paroxysmal A. fib:  Patient is not on any oral anticoagulation at home    We will get 2D echo tomorrow     Diet:  ADULT DIET Regular  DVT PPx: per ortho  Code status: Full Code    Active Hospital Problems    Diagnosis Date Noted    Acute blood loss as cause of postoperative anemia 2021    Closed 2-part intertrochanteric fracture of left femur ([facility] 08/25/2021    Stage 3a chronic kidney disease ([facility] 12/09/2019    Paroxysmal atrial fibrillation ([facility] 12/01/2017    Chronic hyponatremia 12/01/2017    Mixed hyperlipidemia 07/24/2013    Essential hypertension 07/15/2013       Objective:     Patient Vitals for the past 24 hrs:   Temp Pulse Resp BP SpO2   08/26/21 0816 98.2 °F (36.8 °C) 86 18 134/69 94 %   08/26/21 0332 97.6 °F (36.4 °C) 86 18 (!) 155/88 96 %   08/25/21 2315 98.2 °F (36.8 °C) 80 18 (!) 141/72 95 %   08/25/21 1910 97.8 °F (36.6 °C) 80 18 (!) 133/58 91 %   08/25/21 1649 97.4 °F (36.3 °C) 84 16 (!) 154/97 96 %   08/25/21 1545  80 16 (!) 100/57 (!) 79 %   08/25/21 1540  81  (!) 93/53 99 %   08/25/21 1535 98 °F (36.7 °C) 85 14 (!) 118/55 98 %   08/25/21 1531  91 12 129/63 96 %   08/25/21 1526  77 14 (!) 107/58 99 %   08/25/21 1521  82 16 110/64 100 %   08/25/21 1516  77 14 102/64 97 %   08/25/21 1510  73 13 (!) 125/58 100 %   08/25/21 1505  76 14 (!) 113/54 100 %   08/25/21 1500  75 16 (!) 114/56 100 %   08/25/21 1458 97.3 °F (36.3 °C) 75 14 (!) 116/56 100 %   08/25/21 1456  74  (!) 95/51 99 %     Oxygen Therapy  O2 Sat (%): 94 % (08/26/21 0816)  Pulse via Oximetry: 87 beats per minute (08/25/21 1545)  O2 Device: None (Room air) (08/26/21 0900)  O2 Flow Rate (L/min): 3 l/min (08/25/21 1458)    Estimated body mass index is 26.43 kg/m² as calculated from the following:    Height as of this encounter: 5' 4\" (1.626 m). Weight as of this encounter: 69.9 kg (154 lb). Intake/Output Summary (Last 24 hours) at 8/26/2021 1149  Last data filed at 8/26/2021 0816  Gross per 24 hour   Intake 708 ml   Output 25 ml   Net 683 ml         Physical Exam:   General:    Well nourished. No overt distress  Head:  Normocephalic, atraumatic  Eyes:  Sclerae appear normal.  Pupils equally round. ENT:  Nares appear normal, no drainage. Moist oral mucosa  Neck:  No restricted ROM. Trachea midline   CV:   RRR. No jugular venous distension.   Lungs: Respirations even, unlabored  Abdomen:   Soft, nontender, nondistended. Extremities: No cyanosis or clubbing. No edema  Skin:     No rashes and normal coloration. Warm and dry. Neuro:  Cranial nerves II-XII grossly intact. Sensation intact  Psych:  Normal mood and affect. Alert and oriented x3    I have reviewed ordered lab tests and independently visualized imaging below:    Last 24hr Labs:  Recent Results (from the past 24 hour(s))   CBC WITH AUTOMATED DIFF    Collection Time: 08/26/21  4:15 AM   Result Value Ref Range    WBC 9.3 4.3 - 11.1 K/uL    RBC 3.29 (L) 4.23 - 5.6 M/uL    HGB 10.6 (L) 13.6 - 17.2 g/dL    HCT 31.3 (L) 41.1 - 50.3 %    MCV 95.1 79.6 - 97.8 FL    MCH 32.2 26.1 - 32.9 PG    MCHC 33.9 31.4 - 35.0 g/dL    RDW 13.0 11.9 - 14.6 %    PLATELET 940 703 - 257 K/uL    MPV 10.1 9.4 - 12.3 FL    ABSOLUTE NRBC 0.00 0.0 - 0.2 K/uL    DF AUTOMATED      NEUTROPHILS 83 (H) 43 - 78 %    LYMPHOCYTES 6 (L) 13 - 44 %    MONOCYTES 10 4.0 - 12.0 %    EOSINOPHILS 0 (L) 0.5 - 7.8 %    BASOPHILS 0 0.0 - 2.0 %    IMMATURE GRANULOCYTES 0 0.0 - 5.0 %    ABS. NEUTROPHILS 7.7 1.7 - 8.2 K/UL    ABS. LYMPHOCYTES 0.6 0.5 - 4.6 K/UL    ABS. MONOCYTES 0.9 0.1 - 1.3 K/UL    ABS. EOSINOPHILS 0.0 0.0 - 0.8 K/UL    ABS. BASOPHILS 0.0 0.0 - 0.2 K/UL    ABS. IMM.  GRANS. 0.0 0.0 - 0.5 K/UL   MAGNESIUM    Collection Time: 08/26/21  4:15 AM   Result Value Ref Range    Magnesium 2.0 1.8 - 2.4 mg/dL   METABOLIC PANEL, COMPREHENSIVE    Collection Time: 08/26/21  4:15 AM   Result Value Ref Range    Sodium 134 (L) 136 - 145 mmol/L    Potassium 4.6 3.5 - 5.1 mmol/L    Chloride 101 98 - 107 mmol/L    CO2 28 21 - 32 mmol/L    Anion gap 5 (L) 7 - 16 mmol/L    Glucose 116 (H) 65 - 100 mg/dL    BUN 29 (H) 8 - 23 MG/DL    Creatinine 1.48 0.8 - 1.5 MG/DL    GFR est AA 58 (L) >60 ml/min/1.73m2    GFR est non-AA 48 (L) >60 ml/min/1.73m2    Calcium 8.1 (L) 8.3 - 10.4 MG/DL    Bilirubin, total 0.9 0.2 - 1.1 MG/DL    ALT (SGPT) 22 12 - 65 U/L AST (SGOT) 20 15 - 37 U/L    Alk. phosphatase 111 50 - 136 U/L    Protein, total 6.1 (L) 6.3 - 8.2 g/dL    Albumin 2.3 (L) 3.2 - 4.6 g/dL    Globulin 3.8 (H) 2.3 - 3.5 g/dL    A-G Ratio 0.6 (L) 1.2 - 3.5         All Micro Results     Procedure Component Value Units Date/Time    COVID-19 RAPID TEST [[id number]] Collected: 08/25/21 1140    Order Status: Completed Specimen: Nasopharyngeal Updated: 08/25/21 1207     Specimen source NASAL        Comment: RAPID ONLY        COVID-19 rapid test Not detected        Comment:      The specimen is NEGATIVE for SARS-CoV-2, the novel coronavirus associated with COVID-19. A negative result does not rule out COVID-19. This test has been authorized by the FDA under an Emergency Use Authorization (EUA) for use by authorized laboratories. Fact sheet for Healthcare Providers: [employer]co.nz  Fact sheet for Patients: [employer]       Methodology: Isothermal Nucleic Acid Amplification               Other Studies:  XR HIP LT W OR WO PELV 2-3 VWS    Result Date: 8/25/2021  EXAMINATION: XR HIP LT W OR WO PELV 2-3 VWS 8/25/2021 4:27 PM COMPARISON: Prior left hip and femur x-rays  INDICATION: Postop TECHNIQUE: A frontal view of the pelvis with  2 views of the left hip were obtained FINDINGS: Internal fixation of a prior intertrochanteric fracture. Fracture fragments are in improved alignment. Unremarkable immediate postoperative appearance of the hardware. Expected postoperative subcutaneous emphysema, soft tissue swelling, and surgical skin staples. There is mild irregularity the superior cortex of the left superior pubic ramus. 1. Near-anatomic alignment of the intertrochanteric fracture after internal fixation. Expected postoperative findings as above. 2. There is subtle irregularity of the superior cortex of the left superior pubic ramus.  This appear to be smooth on the prior pelvis x-rays and is favored to be chronic. Directed attention on follow-up exams is recommended to completely exclude nondisplaced fracture. XR FEMUR LT 2 V    Result Date: 8/25/2021  EXAMINATION: XR FEMUR LT 2 V 8/25/2021 2:30 PM COMPARISON: Same-day left hip x-rays  INDICATION: Left gamma nail insertion TECHNIQUE: 4 intraoperative fluoroscopic views of the left femur were obtained FINDINGS: Fluoroscopy demonstrating internal fixation of the prior femoral fracture. Intraoperative fluoroscopy for proximal femoral fracture fixation.  Fluoroscopic time: 57 seconds number fluoroscopic images: 4      Current Meds:  Current Facility-Administered Medications   Medication Dose Route Frequency    tuberculin injection 5 Units  5 Units IntraDERMal ONCE    furosemide (LASIX) tablet 40 mg  40 mg Oral DAILY    tamsulosin (FLOMAX) capsule 0.4 mg  0.4 mg Oral DAILY    valsartan (DIOVAN) tablet 80 mg  80 mg Oral DAILY    sodium chloride (NS) flush 5-40 mL  5-40 mL IntraVENous Q8H    sodium chloride (NS) flush 5-40 mL  5-40 mL IntraVENous PRN    acetaminophen (TYLENOL) tablet 650 mg  650 mg Oral Q6H PRN    Or    acetaminophen (TYLENOL) suppository 650 mg  650 mg Rectal Q6H PRN    polyethylene glycol (MIRALAX) packet 17 g  17 g Oral DAILY PRN    ondansetron (ZOFRAN ODT) tablet 4 mg  4 mg Oral Q8H PRN    Or    ondansetron (ZOFRAN) injection 4 mg  4 mg IntraVENous Q4H PRN    potassium chloride 10 mEq in 100 ml IVPB  10 mEq IntraVENous PRN    magnesium sulfate 2 g/50 ml IVPB (premix or compounded)  2 g IntraVENous PRN    lactated Ringers infusion  75 mL/hr IntraVENous CONTINUOUS    metoprolol (LOPRESSOR) injection 2.5 mg  2.5 mg IntraVENous Q6H PRN    hydrALAZINE (APRESOLINE) 20 mg/mL injection 10 mg  10 mg IntraVENous Q6H PRN    ceFAZolin (ANCEF) 2 g/20 mL in sterile water IV syringe  2 g IntraVENous ON CALL TO OR    oxyCODONE IR (ROXICODONE) tablet 10 mg  10 mg Oral Q4H PRN    morphine injection 4 mg  4 mg IntraVENous Q1H PRN    sodium chloride (NS) flush 5-40 mL  5-40 mL IntraVENous PRN    alum-mag hydroxide-simeth (MYLANTA) oral suspension 30 mL  30 mL Oral Q4H PRN    calcium-vitamin D (OS-BRITTNI +D3) 500 mg-200 unit per tablet 1 Tablet  1 Tablet Oral TID WITH MEALS    aspirin delayed-release tablet 325 mg  325 mg Oral DAILY    pantoprazole (PROTONIX) tablet 40 mg  40 mg Oral ACB       Signed:  Andrea Pierre MD    Part of this note may have been written by using a voice dictation software. The note has been proof read but may still contain some grammatical/other typographical errors.

## 2021-08-26 NOTE — PROGRESS NOTES
2021         Post Op day: 1 Day Post-Op Procedure(s) (LRB):  Left Gamma Nail (Left)      Admit Date: 2021  Admit Diagnosis: Closed 2-part intertrochanteric fracture of left femur ([address] [S72.142A]       Principle Problem: Closed 2-part intertrochanteric fracture of left femur ([address] Subjective: Doing well, No complaints, No SOB, No Chest Pain, No Nausea or Vomiting     Objective:   Vital Signs are Stable, No Acute Distress, Alert,  Dressing is Dry,  Neurovascular exam is normal.     Assessment / Plan :  Patient Active Problem List   Diagnosis Code    Essential hypertension I10    Mixed hyperlipidemia E78.2    Paroxysmal atrial fibrillation (HCC) I48.0    Chronic hyponatremia E87.1    Stage 3a chronic kidney disease ([facility] N18.31    Closed 2-part intertrochanteric fracture of left femur ([facility] S72.142A      Patient Vitals for the past 8 hrs:   BP Temp Pulse Resp SpO2   21 0816 134/69 98.2 °F (36.8 °C) 86 18 94 %   21 0332 (!) 155/88 97.6 °F (36.4 °C) 86 18 96 %    Temp (24hrs), Av.9 °F (36.6 °C), Min:97.3 °F (36.3 °C), Max:98.3 °F (36.8 °C)    Body mass index is 26.43 kg/m².     Lab Results   Component Value Date/Time    HGB 10.6 (L) 2021 04:15 AM          S/P Procedure(s) (LRB):  Left Gamma Nail (Left)       Medical Mgmt per hospitalist  Anticoagulation plan: ASA 325mg daily  Continue PT  Fall Precautions  DC disp: per SW  F/U: 2 weeks postop for wound check and staple removal        Signed By: JERRY Ashley  2021,  9:28 AM

## 2021-08-26 NOTE — PROGRESS NOTES
ACUTE PHYSICAL THERAPY GOALS:  (Developed with and agreed upon by patient and/or caregiver. )  LTG:  (1.)Mr. Riggs will move from supine to sit and sit to supine , scoot up and down and roll side to side in bed with MODIFIED INDEPENDENCE within 7 treatment day(s). (2.)Mr. Riggs will transfer from bed to chair and chair to bed with CONTACT GUARD ASSIST using the least restrictive device within 7 treatment day(s). (3.)Mr. Riggs will ambulate with MINIMAL ASSIST for 40+ feet with the least restrictive device within 7 treatment day(s). (4.)Mr. Riggs will perform exercises per HEP independently to improve strength and mobility within 7 days. PHYSICAL THERAPY: Daily Note and PM Treatment Day # 1    Dani Martin is a 78 y.o. male   PRIMARY DIAGNOSIS: Closed 2-part intertrochanteric fracture of left femur (HCC)  Closed 2-part intertrochanteric fracture of left femur (HCC) [S72.142A]  Procedure(s) (LRB):  Left Gamma Nail (Left)  1 Day Post-Op    ASSESSMENT:     REHAB RECOMMENDATIONS: CURRENT LEVEL OF FUNCTION:  (Most Recently Demonstrated)   Recommendation to date pending progress:  Setting:   Short-term Rehab  Equipment:    3 in 1 Bedside Commode   Rolling Walker Bed Mobility:   Not tested  Sit to Stand:   Minimal Assistance  Transfers:   Minimal Assistance x 2  Gait/Mobility:   Minimal Assistance x 2     ASSESSMENT:  Mr. Cecille Bruno is admitted from home with left femur fracture after fall; s/p IM nailing and is WBAT per ortho orders. Presents today expected post op pain/soreness. He is agreeable to therapy assessment, mobility. Needs additional time and cueing for all mobility. Mod assist for supine to sit transfer with fair balance and initial posterior lean. Sit-stand transfer with mod assist using RW, performed x 2 trials. Worked on standing balance, posture, and walker management during pre-gait activities.  On second stand, pt able to take a few small shuffled steps with very slow pace and antalgic pattern to chair. Cues for use of UEs, step clearance, and gait safety. Positioned comfortably in chair, reviewed IS use. Chair alarm on, needs in reach. Will need rehab at dc. PM-practiced sit-stand transfers, standing activities, and ambulation x 5 ft with RW, very slow pace, and cues for posture, walker management, and sequencing. Performed static and dynamic activities in sitting and/or standing to address balance, tolerance, and safety. Pt  Left sitting at sink with OT.      SUBJECTIVE:   Mr. Cedirck Edwards states, \"I think I need to sit. \"    SOCIAL HISTORY/ LIVING ENVIRONMENT: Lives alone. Independent without DME use. Drives.   Home Environment: Apartment  # Steps to Enter: 12  One/Two Story Residence: One story  Living Alone: Yes  OBJECTIVE:     PAIN: VITAL SIGNS: LINES/DRAINS:   Pre Treatment: Pain Screen  Pain Scale 1: Numeric (0 - 10)  Pain Intensity 1: 0  Pain Onset 1: post op  Pain Location 1: Hip  Pain Orientation 1: Left  Pain Intervention(s) 1: Repositioned  Post Treatment: 0/10 Vital Signs  O2 Device: None (Room air) Purewick  O2 Device: None (Room air)     MOBILITY: I Mod I S SBA CGA Min Mod Max Total  NT x2 Comments:   Bed Mobility    Rolling [] [] [] [] [] [] [] [] [] [] []    Supine to Sit [] [] [] [] [] [] [] [] [] [] []    Scooting [] [] [] [] [] [] [] [] [] [] []    Sit to Supine [] [] [] [] [] [] [] [] [] [] []    Transfers    Sit to Stand [] [] [] [] [] [x] [] [] [] [] [x]    Bed to Chair [] [] [] [] [] [x] [] [] [] [] [x]    Stand to Sit [] [] [] [] [] [x] [] [] [] [] [x]    I=Independent, Mod I=Modified Independent, S=Supervision, SBA=Standby Assistance, CGA=Contact Guard Assistance,   Min=Minimal Assistance, Mod=Moderate Assistance, Max=Maximal Assistance, Total=Total Assistance, NT=Not Tested    BALANCE: Good Fair+ Fair Fair- Poor NT Comments   Sitting Static [] [] [x] [] [] []    Sitting Dynamic [] [] [x] [] [] []              Standing Static [] [] [] [x] [] [] Standing Dynamic [] [] [] [x] [] []      GAIT: I Mod I S SBA CGA Min Mod Max Total  NT x2 Comments:   Level of Assistance [] [] [] [] [] [x] [] [] [] [] [x]    Distance 5 ft    DME Rolling Walker    Gait Quality Slow, antalgic, decreased step clearance    Weightbearing  Status WBAT L LE    I=Independent, Mod I=Modified Independent, S=Supervision, SBA=Standby Assistance, CGA=Contact Guard Assistance,   Min=Minimal Assistance, Mod=Moderate Assistance, Max=Maximal Assistance, Total=Total Assistance, NT=Not Tested    PLAN:   FREQUENCY/DURATION: PT Plan of Care: BID for duration of hospital stay or until stated goals are met, whichever comes first.  TREATMENT:     TREATMENT:   ($$ Therapeutic Activity: 23-37 mins    )  Co-Treatment PT/OT necessary due to patient's decreased overall endurance/tolerance levels, as well as need for high level skilled assistance to complete functional transfers/mobility and functional tasks  Therapeutic Activity (30 Minutes): Therapeutic activity included Scooting, Transfer Training, Ambulation on level ground, Sitting balance  and Standing balance to improve functional Mobility, Strength, Activity tolerance and balance, transfer technique, walker management/use.     TREATMENT GRID:  N/A    AFTER TREATMENT POSITION/PRECAUTIONS:  Chair, Needs within reach, RN notified and OT in room    INTERDISCIPLINARY COLLABORATION:  RN/PCT, PT/PTA and OT/AVILA    TOTAL TREATMENT DURATION:  PT Patient Time In/Time Out  Time In: 1311  Time Out: [place], DPT

## 2021-08-26 NOTE — PROGRESS NOTES
Problem: Falls - Risk of  Goal: *Absence of Falls  Description: Document Drea Louisccasin Fall Risk and appropriate interventions in the flowsheet. Outcome: Progressing Towards Goal  Note: Fall Risk Interventions:  Mobility Interventions: Bed/chair exit alarm, Patient to call before getting OOB         Medication Interventions: Bed/chair exit alarm, Patient to call before getting OOB    Elimination Interventions: Call light in reach, Bed/chair exit alarm    History of Falls Interventions: Bed/chair exit alarm, Door open when patient unattended         Problem: Patient Education: Go to Patient Education Activity  Goal: Patient/Family Education  Outcome: Progressing Towards Goal     Problem: Pressure Injury - Risk of  Goal: *Prevention of pressure injury  Description: Document Duke Scale and appropriate interventions in the flowsheet.   Outcome: Progressing Towards Goal  Note: Pressure Injury Interventions:       Moisture Interventions: Absorbent underpads, Apply protective barrier, creams and emollients    Activity Interventions: Increase time out of bed    Mobility Interventions: HOB 30 degrees or less, Float heels    Nutrition Interventions: Document food/fluid/supplement intake                     Problem: Patient Education: Go to Patient Education Activity  Goal: Patient/Family Education  Outcome: Progressing Towards Goal     Problem: Patient Education: Go to Patient Education Activity  Goal: Patient/Family Education  Outcome: Progressing Towards Goal     Problem: Hip Fracture: Post-Op Day 1  Goal: Off Pathway (Use only if patient is Off Pathway)  Outcome: Progressing Towards Goal  Goal: Activity/Safety  Outcome: Progressing Towards Goal  Goal: Diagnostic Test/Procedures  Outcome: Progressing Towards Goal  Goal: Nutrition/Diet  Outcome: Progressing Towards Goal  Goal: Medications  Outcome: Progressing Towards Goal  Goal: Respiratory  Outcome: Progressing Towards Goal  Goal: Treatments/Interventions/Procedures  Outcome: Progressing Towards Goal  Goal: Psychosocial  Outcome: Progressing Towards Goal  Goal: Discharge Planning  Outcome: Progressing Towards Goal  Goal: *Demonstrates progressive activity  Outcome: Progressing Towards Goal  Goal: *Optimal pain control at patient's stated goal  Outcome: Progressing Towards Goal  Goal: *Hemodynamically stable  Outcome: Progressing Towards Goal  Goal: *Discharge plan identified  Outcome: Progressing Towards Goal  Goal: *Absence of skin breakdown  Outcome: Progressing Towards Goal     Problem: Hip Fracture: Post-Op Day 2  Goal: Off Pathway (Use only if patient is Off Pathway)  Outcome: Progressing Towards Goal  Goal: Activity/Safety  Outcome: Progressing Towards Goal  Goal: Diagnostic Test/Procedures  Outcome: Progressing Towards Goal  Goal: Nutrition/Diet  Outcome: Progressing Towards Goal  Goal: Medications  Outcome: Progressing Towards Goal  Goal: Respiratory  Outcome: Progressing Towards Goal  Goal: Treatments/Interventions/Procedures  Outcome: Progressing Towards Goal  Goal: Psychosocial  Outcome: Progressing Towards Goal  Goal: Discharge Planning  Outcome: Progressing Towards Goal  Goal: *Optimal pain control at patient's stated goal  Outcome: Progressing Towards Goal  Goal: *Hemodynamically stable  Outcome: Progressing Towards Goal  Goal: *Adequate oxygenation  Outcome: Progressing Towards Goal  Goal: *Tolerates increased activity  Outcome: Progressing Towards Goal  Goal: *Tolerates nutrition therapy  Outcome: Progressing Towards Goal  Goal: *Absence of skin breakdown  Outcome: Progressing Towards Goal     Problem: Hip Fracture: Post-Op Day 3  Goal: Off Pathway (Use only if patient is Off Pathway)  Outcome: Progressing Towards Goal  Goal: Activity/Safety  Outcome: Progressing Towards Goal  Goal: Diagnostic Test/Procedures  Outcome: Progressing Towards Goal  Goal: Nutrition/Diet  Outcome: Progressing Towards Goal  Goal: Medications  Outcome: Progressing Towards Goal  Goal: Respiratory  Outcome: Progressing Towards Goal  Goal: Treatments/Interventions/Procedures  Outcome: Progressing Towards Goal  Goal: Psychosocial  Outcome: Progressing Towards Goal  Goal: Discharge Planning  Outcome: Progressing Towards Goal  Goal: *Met physical therapy criteria for discharge to next level of care  Outcome: Progressing Towards Goal  Goal: *Optimal pain control at patient's stated goal  Outcome: Progressing Towards Goal  Goal: *Hemodynamically stable  Outcome: Progressing Towards Goal  Goal: *Tolerating diet  Outcome: Progressing Towards Goal  Goal: *Active bowel function  Outcome: Progressing Towards Goal  Goal: *Adequate urinary output  Outcome: Progressing Towards Goal  Goal: *Absence of skin breakdown  Outcome: Progressing Towards Goal  Goal: *Patient verbalizes understanding of discharge instructions  Outcome: Progressing Towards Goal     Problem: Hip Fracture: Post-Op Day 4  Goal: Off Pathway (Use only if patient is Off Pathway)  Outcome: Progressing Towards Goal  Goal: Activity/Safety  Outcome: Progressing Towards Goal  Goal: Diagnostic Test/Procedures  Outcome: Progressing Towards Goal  Goal: Nutrition/Diet  Outcome: Progressing Towards Goal  Goal: Medications  Outcome: Progressing Towards Goal  Goal: Respiratory  Outcome: Progressing Towards Goal  Goal: Treatments/Interventions/Procedures  Outcome: Progressing Towards Goal  Goal: Psychosocial  Outcome: Progressing Towards Goal  Goal: Discharge Planning  Outcome: Progressing Towards Goal  Goal: *Met physical therapy criteria for discharge to next level of care  Outcome: Progressing Towards Goal  Goal: *Optimal pain control at patient's stated goal  Outcome: Progressing Towards Goal  Goal: *Hemodynamically stable  Outcome: Progressing Towards Goal  Goal: *Tolerating diet  Outcome: Progressing Towards Goal  Goal: *Active bowel function  Outcome: Progressing Towards Goal  Goal: *Adequate urinary output  Outcome: Progressing Towards Goal  Goal: *Absence of skin breakdown  Outcome: Progressing Towards Goal  Goal: *Patient verbalizes understanding of discharge instructions  Outcome: Progressing Towards Goal

## 2021-08-26 NOTE — PROGRESS NOTES
ACUTE PHYSICAL THERAPY GOALS:  (Developed with and agreed upon by patient and/or caregiver. )  LTG:  (1.)Mr. Riggs will move from supine to sit and sit to supine , scoot up and down and roll side to side in bed with MODIFIED INDEPENDENCE within 7 treatment day(s). (2.)Mr. Riggs will transfer from bed to chair and chair to bed with CONTACT GUARD ASSIST using the least restrictive device within 7 treatment day(s). (3.)Mr. Riggs will ambulate with MINIMAL ASSIST for 40+ feet with the least restrictive device within 7 treatment day(s). (4.)Mr. Riggs will perform exercises per HEP independently to improve strength and mobility within 7 days.   ________________________________________________________________________________________________      PHYSICAL THERAPY ASSESSMENT: Initial Assessment and AM PT Treatment Day # 1      Leann Garcia is a 78 y.o. male   PRIMARY DIAGNOSIS: Closed 2-part intertrochanteric fracture of left femur (HCC)  Closed 2-part intertrochanteric fracture of left femur (HCC) [S72.142A]  Procedure(s) (LRB):  Left Gamma Nail (Left)  1 Day Post-Op  Reason for Referral:  Femur fracture, IM nailing  ICD-10: Treatment Diagnosis: Generalized Muscle Weakness (M62.81)  Difficulty in walking, Not elsewhere classified (R26.2)  Other abnormalities of gait and mobility (R26.89)  INPATIENT: Payor: [facility] MEDICARE COMPLETE / Plan: BSI [facility] MEDICARE COMPLETE / Product Type: Managed Care Medicare /     ASSESSMENT:     REHAB RECOMMENDATIONS:   Recommendation to date pending progress:  Setting:   Short-term Rehab  Equipment:    3 in 1 Bedside Commode   Rolling Walker     PRIOR LEVEL OF FUNCTION:  (Prior to Hospitalization) INITIAL/CURRENT LEVEL OF FUNCTION:  (Most Recently Demonstrated)   Bed Mobility:   Independent  Sit to Stand:   Independent  Transfers:   Independent  Gait/Mobility:   Independent Bed Mobility:   Moderate Assistance  Sit to Stand:   Moderate Assistance  Transfers:   Moderate Assistance  Gait/Mobility:   Moderate Assistance     ASSESSMENT:  Mr. Amadeo Carlos is admitted from home with left femur fracture after fall; s/p IM nailing and is WBAT per ortho orders. Presents today expected post op pain/soreness. He is agreeable to therapy assessment, mobility. Needs additional time and cueing for all mobility. Mod assist for supine to sit transfer with fair balance and initial posterior lean. Sit-stand transfer with mod assist using RW, performed x 2 trials. Worked on standing balance, posture, and walker management during pre-gait activities. On second stand, pt able to take a few small shuffled steps with very slow pace and antalgic pattern to chair. Cues for use of UEs, step clearance, and gait safety. Positioned comfortably in chair, reviewed IS use. Chair alarm on, needs in reach. Will need rehab at [place]. SUBJECTIVE:   Mr. Amadeo Carlos states, \"Don't touch me. \"    SOCIAL HISTORY/LIVING ENVIRONMENT: Lives alone. Independent without DME use. Drives.    Home Environment: Apartment  # Steps to Enter: 12  One/Two Story Residence: One story  Living Alone: Yes  OBJECTIVE:     PAIN: VITAL SIGNS: LINES/DRAINS:   Pre Treatment: Pain Screen  Pain Scale 1: Numeric (0 - 10)  Pain Intensity 1: 3  Pain Onset 1: post op  Pain Location 1: Hip  Pain Orientation 1: Left  Pain Intervention(s) 1: Repositioned  Post Treatment: 3/10 Vital Signs  O2 Device: None (Room air) IV and Purewick  O2 Device: None (Room air)     GROSS EVALUATION:   Within Functional Limits Abnormal/ Functional Abnormal/ Non-Functional (see comments) Not Tested Comments:   AROM [] [x] [] []    PROM [] [] [] []    Strength [] [x] [] []    Balance [] [x] [] []    Posture [] [] [] []    Sensation [] [] [] []    Coordination [] [] [] []    Tone [] [] [] []    Edema [] [] [] []    Activity Tolerance [] [x] [] []     [] [] [] []      COGNITION/  PERCEPTION: Intact Impaired   (see comments) Comments:   Orientation [x] []    Vision [x] []    Hearing [x] []    Command Following [] [x]    Safety Awareness [] [x]     [] []      MOBILITY: I Mod I S SBA CGA Min Mod Max Total  NT x2 Comments:   Bed Mobility    Rolling [] [] [] [] [] [] [x] [] [] [] []    Supine to Sit [] [] [] [] [] [] [x] [] [] [] []    Scooting [] [] [] [] [] [] [x] [] [] [] []    Sit to Supine [] [] [] [] [] [] [] [] [] [] []    Transfers    Sit to Stand [] [] [] [] [] [] [x] [] [] [] []    Bed to Chair [] [] [] [] [] [] [x] [] [] [] []    Stand to Sit [] [] [] [] [] [] [x] [] [] [] []    I=Independent, Mod I=Modified Independent, S=Supervision, SBA=Standby Assistance, CGA=Contact Guard Assistance,   Min=Minimal Assistance, Mod=Moderate Assistance, Max=Maximal Assistance, Total=Total Assistance, NT=Not Tested  GAIT: I Mod I S SBA CGA Min Mod Max Total  NT x2 Comments:   Level of Assistance [] [] [] [] [] [] [x] [] [] [] []    Distance 3 ft    DME Rolling Walker    Gait Quality Antalgic, slow, decreased step clearance    Weightbearing Status WBAT     I=Independent, Mod I=Modified Independent, S=Supervision, SBA=Standby Assistance, CGA=Contact Guard Assistance,   Min=Minimal Assistance, Mod=Moderate Assistance, Max=Maximal Assistance, Total=Total Assistance, NT=Not Tested    [address] [facility] [address] [address] AM-PAC 6 Clicks   Basic Mobility Inpatient Short Form       How much difficulty does the patient currently have. .. Unable A Lot A Little None   1. Turning over in bed (including adjusting bedclothes, sheets and blankets)? [] 1   [x] 2   [] 3   [] 4   2. Sitting down on and standing up from a chair with arms ( e.g., wheelchair, bedside commode, etc.)   [] 1   [x] 2   [] 3   [] 4   3. Moving from lying on back to sitting on the side of the bed? [] 1   [x] 2   [] 3   [] 4   How much help from another person does the patient currently need. .. Total A Lot A Little None   4. Moving to and from a bed to a chair (including a wheelchair)? [] 1   [x] 2   [] 3   [] 4   5. Need to walk in hospital room? [] 1   [x] 2   [] 3   [] 4   6. Climbing 3-5 steps with a railing? [x] 1   [] 2   [] 3   [] 4   © 2007, Trustees of [address] [place] [address] [address] [address], under license to [employer]. All rights reserved     Score:  Initial: 11 Most Recent: X (Date: -- )    Interpretation of Tool:  Represents activities that are increasingly more difficult (i.e. Bed mobility, Transfers, Gait). PLAN:   FREQUENCY/DURATION: PT Plan of Care: BID for duration of hospital stay or until stated goals are met, whichever comes first.    PROBLEM LIST:   (Skilled intervention is medically necessary to address:)  1. Decreased Activity Tolerance  2. Decreased AROM/PROM  3. Decreased Balance  4. Decreased Gait Ability  5. Decreased Strength  6. Decreased Transfer Abilities  7. Increased Pain   INTERVENTIONS PLANNED:   (Benefits and precautions of physical therapy have been discussed with the patient.)  1. Therapeutic Activity  2. Therapeutic Exercise/HEP  3. Neuromuscular Re-education  4. Gait Training  5. Manual Therapy  6. Education     TREATMENT:     EVALUATION: Low Complexity : (Untimed Charge)    TREATMENT:   ($$ Therapeutic Activity: 23-37 mins    )  Therapeutic Activity (25 Minutes): Therapeutic activity included Supine to Sit, Scooting, Transfer Training, Ambulation on level ground, Sitting balance  and Standing balance to improve functional Mobility, Strength, ROM, Activity tolerance and balance, transfers.     TREATMENT GRID:  N/A    AFTER TREATMENT POSITION/PRECAUTIONS:  Alarm Activated, Chair, Needs within reach and RN notified    INTERDISCIPLINARY COLLABORATION:  RN/PCT and PT/PTA    TOTAL TREATMENT DURATION:  PT Patient Time In/Time Out  Time In: 0900  Time Out: Liv Steele, FAISAL

## 2021-08-26 NOTE — PROGRESS NOTES
Pt is an 79 yo male admitted for surgical repair of a hip fracture he sustained in a fall. SW met with patient to discuss dc planning. Demographics, insurance and PCP confirmed. Pt confirmed that he lives alone. He has a dtr that lives about an hour away and friends locally. Pt is agreeable to STR at [place] and expressed no preference of facility. From a list of facilities, he requested a referral to [facility]. Referral submitted and pt accepted for admission for tomorrow if he is medically cleared for dc. Pt notified of bed offer and plan and he is in agreement. PPD and COVID test ordered today. SW following to facilitate pt's transfer to rehab at [place]. Care Management Interventions  Mode of Transport at Discharge: BLS  Transition of Care Consult (CM Consult): SNF  Partner SNF: Yes  Discharge Durable Medical Equipment: No  Physical Therapy Consult: Yes  Occupational Therapy Consult: Yes  Speech Therapy Consult: No  Current Support Network: Own Home, Lives Alone (has friends that live nearby; dtr lives about an hour away)  Confirm Follow Up Transport: Self  The Plan for Transition of Care is Related to the Following Treatment Goals : STR to improve pt's stength and functional abilities for a safe transition to home.   The Patient and/or Patient Representative was Provided with a Choice of Provider and Agrees with the Discharge Plan?: Yes  Freedom of Choice List was Provided with Basic Dialogue that Supports the Patient's Individualized Plan of Care/Goals, Treatment Preferences and Shares the Quality Data Associated with the Providers?: Yes  Discharge Location  Discharge Placement: Rehab Unit Subacute ([facility])

## 2021-08-27 ENCOUNTER — APPOINTMENT (OUTPATIENT)
Dept: GENERAL RADIOLOGY | Age: 81
DRG: 481 | End: 2021-08-27
Attending: INTERNAL MEDICINE
Payer: MEDICARE

## 2021-08-27 VITALS
SYSTOLIC BLOOD PRESSURE: 138 MMHG | RESPIRATION RATE: 18 BRPM | BODY MASS INDEX: 26.29 KG/M2 | DIASTOLIC BLOOD PRESSURE: 65 MMHG | HEIGHT: 64 IN | HEART RATE: 72 BPM | OXYGEN SATURATION: 97 % | TEMPERATURE: 97.9 F | WEIGHT: 154 LBS

## 2021-08-27 LAB
HGB BLD-MCNC: 9.6 G/DL (ref 13.6–17.2)
MM INDURATION POC: 0 MM (ref 0–5)
PPD POC: NEGATIVE NEGATIVE
SARS-COV-2, COV2: NOT DETECTED
SPECIMEN SOURCE, FCOV2M: NORMAL

## 2021-08-27 PROCEDURE — 74011250637 HC RX REV CODE- 250/637: Performed by: ORTHOPAEDIC SURGERY

## 2021-08-27 PROCEDURE — 97530 THERAPEUTIC ACTIVITIES: CPT

## 2021-08-27 PROCEDURE — 71045 X-RAY EXAM CHEST 1 VIEW: CPT

## 2021-08-27 PROCEDURE — 85018 HEMOGLOBIN: CPT

## 2021-08-27 PROCEDURE — 97535 SELF CARE MNGMENT TRAINING: CPT

## 2021-08-27 PROCEDURE — 74011250637 HC RX REV CODE- 250/637: Performed by: HOSPITALIST

## 2021-08-27 PROCEDURE — 36415 COLL VENOUS BLD VENIPUNCTURE: CPT

## 2021-08-27 RX ORDER — FERROUS SULFATE, DRIED 160(50) MG
1 TABLET, EXTENDED RELEASE ORAL
Qty: 90 TABLET | Refills: 0 | Status: SHIPPED | OUTPATIENT
Start: 2021-08-27 | End: 2021-09-26

## 2021-08-27 RX ORDER — ASPIRIN 325 MG
325 TABLET, DELAYED RELEASE (ENTERIC COATED) ORAL DAILY
Qty: 32 TABLET | Refills: 0 | Status: SHIPPED | OUTPATIENT
Start: 2021-08-28 | End: 2021-09-29

## 2021-08-27 RX ORDER — OXYCODONE HYDROCHLORIDE 5 MG/1
5 TABLET ORAL
Qty: 18 TABLET | Refills: 0 | Status: SHIPPED | OUTPATIENT
Start: 2021-08-27 | End: 2021-08-30

## 2021-08-27 RX ADMIN — PANTOPRAZOLE SODIUM 40 MG: 40 TABLET, DELAYED RELEASE ORAL at 06:32

## 2021-08-27 RX ADMIN — FUROSEMIDE 40 MG: 40 TABLET ORAL at 08:16

## 2021-08-27 RX ADMIN — Medication 1 TABLET: at 08:16

## 2021-08-27 RX ADMIN — Medication 5 ML: at 06:32

## 2021-08-27 RX ADMIN — ASPIRIN 325 MG: 325 TABLET, COATED ORAL at 08:16

## 2021-08-27 NOTE — ROUTINE PROCESS
TRANSFER - OUT REPORT:    Verbal report given to Augusto(name) on Thressa List being transferred to [facility](unit) for routine progression of care       Report consisted of patient's Situation, Background, Assessment and   Recommendations(SBAR). Information from the following report(s) SBAR was reviewed with the receiving nurse. Opportunity for questions and clarification was provided.       Patient transported with:   [facility]

## 2021-08-27 NOTE — PROGRESS NOTES
2021         Post Op day: 2 Days Post-Op Procedure(s) (LRB):  Left Gamma Nail (Left)      Admit Date: 2021  Admit Diagnosis: Closed 2-part intertrochanteric fracture of left femur ([facility] [address] [S72.142A]       Principle Problem: Closed 2-part intertrochanteric fracture of left femur ([facility] [address] Subjective: Doing well, No complaints, No SOB, No Chest Pain, No Nausea or Vomiting     Objective:   Vital Signs are Stable, No Acute Distress, Alert,  Dressing is Dry,  Neurovascular exam is normal.     Assessment / Plan :  Patient Active Problem List   Diagnosis Code    Essential hypertension I10    Mixed hyperlipidemia E78.2    Paroxysmal atrial fibrillation (HCC) I48.0    Chronic hyponatremia E87.1    Stage 3a chronic kidney disease ([facility] [address] N18.31    Closed 2-part intertrochanteric fracture of left femur (HCC) S72.142A    Acute blood loss as cause of postoperative anemia D62      Patient Vitals for the past 8 hrs:   BP Temp Pulse Resp SpO2   21 0810 (!) 143/76 98.6 °F (37 °C) 99 20 95 %   21 0259 (!) 166/69 98.4 °F (36.9 °C) 80 20 98 %    Temp (24hrs), Av.4 °F (36.9 °C), Min:97.8 °F (36.6 °C), Max:98.7 °F (37.1 °C)    Body mass index is 26.43 kg/m².     Lab Results   Component Value Date/Time    HGB 9.6 (L) 2021 04:47 AM          S/P Procedure(s) (LRB):  Left Gamma Nail (Left)         Medical Mgmt per hospitalist  Anticoagulation plan: ASA 325mg daily  Continue PT  Fall Precautions  DC disp: per SW  F/U: 2 weeks postop for wound check and staple removal        Signed By: JERRY Yee  2021,  8:22 AM

## 2021-08-27 NOTE — PROGRESS NOTES
Problem: Falls - Risk of  Goal: *Absence of Falls  Description: Document Carmen Conley Fall Risk and appropriate interventions in the flowsheet. Outcome: Progressing Towards Goal  Note: Fall Risk Interventions:  Mobility Interventions: Bed/chair exit alarm, Patient to call before getting OOB    Mentation Interventions: Adequate sleep, hydration, pain control, Bed/chair exit alarm, Door open when patient unattended    Medication Interventions: Bed/chair exit alarm    Elimination Interventions: Bed/chair exit alarm, Call light in reach    History of Falls Interventions: Bed/chair exit alarm         Problem: Patient Education: Go to Patient Education Activity  Goal: Patient/Family Education  Outcome: Progressing Towards Goal     Problem: Pressure Injury - Risk of  Goal: *Prevention of pressure injury  Description: Document Duke Scale and appropriate interventions in the flowsheet.   Outcome: Progressing Towards Goal  Note: Pressure Injury Interventions:       Moisture Interventions: Absorbent underpads, Apply protective barrier, creams and emollients    Activity Interventions: Increase time out of bed    Mobility Interventions: HOB 30 degrees or less    Nutrition Interventions: Document food/fluid/supplement intake                     Problem: Patient Education: Go to Patient Education Activity  Goal: Patient/Family Education  Outcome: Progressing Towards Goal     Problem: Patient Education: Go to Patient Education Activity  Goal: Patient/Family Education  Outcome: Progressing Towards Goal     Problem: Hip Fracture: Post-Op Day 1  Goal: Medications  Outcome: Progressing Towards Goal     Problem: Hip Fracture: Post-Op Day 2  Goal: Off Pathway (Use only if patient is Off Pathway)  Outcome: Progressing Towards Goal  Goal: Activity/Safety  Outcome: Progressing Towards Goal  Goal: Diagnostic Test/Procedures  Outcome: Progressing Towards Goal  Goal: Nutrition/Diet  Outcome: Progressing Towards Goal  Goal: Medications  Outcome: Progressing Towards Goal  Goal: Respiratory  Outcome: Progressing Towards Goal  Goal: Treatments/Interventions/Procedures  Outcome: Progressing Towards Goal  Goal: Psychosocial  Outcome: Progressing Towards Goal  Goal: Discharge Planning  Outcome: Progressing Towards Goal  Goal: *Optimal pain control at patient's stated goal  Outcome: Progressing Towards Goal  Goal: *Hemodynamically stable  Outcome: Progressing Towards Goal  Goal: *Adequate oxygenation  Outcome: Progressing Towards Goal  Goal: *Tolerates increased activity  Outcome: Progressing Towards Goal  Goal: *Tolerates nutrition therapy  Outcome: Progressing Towards Goal  Goal: *Absence of skin breakdown  Outcome: Progressing Towards Goal     Problem: Hip Fracture: Post-Op Day 3  Goal: Off Pathway (Use only if patient is Off Pathway)  Outcome: Progressing Towards Goal  Goal: Activity/Safety  Outcome: Progressing Towards Goal  Goal: Diagnostic Test/Procedures  Outcome: Progressing Towards Goal  Goal: Nutrition/Diet  Outcome: Progressing Towards Goal  Goal: Medications  Outcome: Progressing Towards Goal  Goal: Respiratory  Outcome: Progressing Towards Goal  Goal: Treatments/Interventions/Procedures  Outcome: Progressing Towards Goal  Goal: Psychosocial  Outcome: Progressing Towards Goal  Goal: Discharge Planning  Outcome: Progressing Towards Goal  Goal: *Met physical therapy criteria for discharge to next level of care  Outcome: Progressing Towards Goal  Goal: *Optimal pain control at patient's stated goal  Outcome: Progressing Towards Goal  Goal: *Hemodynamically stable  Outcome: Progressing Towards Goal  Goal: *Tolerating diet  Outcome: Progressing Towards Goal  Goal: *Active bowel function  Outcome: Progressing Towards Goal  Goal: *Adequate urinary output  Outcome: Progressing Towards Goal  Goal: *Absence of skin breakdown  Outcome: Progressing Towards Goal  Goal: *Patient verbalizes understanding of discharge instructions  Outcome: Progressing Towards Goal Problem: Hip Fracture: Post-Op Day 4  Goal: Off Pathway (Use only if patient is Off Pathway)  Outcome: Progressing Towards Goal  Goal: Activity/Safety  Outcome: Progressing Towards Goal  Goal: Diagnostic Test/Procedures  Outcome: Progressing Towards Goal  Goal: Nutrition/Diet  Outcome: Progressing Towards Goal  Goal: Medications  Outcome: Progressing Towards Goal  Goal: Respiratory  Outcome: Progressing Towards Goal  Goal: Treatments/Interventions/Procedures  Outcome: Progressing Towards Goal  Goal: Psychosocial  Outcome: Progressing Towards Goal  Goal: Discharge Planning  Outcome: Progressing Towards Goal  Goal: *Met physical therapy criteria for discharge to next level of care  Outcome: Progressing Towards Goal  Goal: *Optimal pain control at patient's stated goal  Outcome: Progressing Towards Goal  Goal: *Hemodynamically stable  Outcome: Progressing Towards Goal  Goal: *Tolerating diet  Outcome: Progressing Towards Goal  Goal: *Active bowel function  Outcome: Progressing Towards Goal  Goal: *Adequate urinary output  Outcome: Progressing Towards Goal  Goal: *Absence of skin breakdown  Outcome: Progressing Towards Goal  Goal: *Patient verbalizes understanding of discharge instructions  Outcome: Progressing Towards Goal     Problem: Patient Education: Go to Patient Education Activity  Goal: Patient/Family Education  Outcome: Progressing Towards Goal     Problem: Pain  Goal: *Control of Pain  Outcome: Progressing Towards Goal     Problem: Patient Education: Go to Patient Education Activity  Goal: Patient/Family Education  Outcome: Progressing Towards Goal     Problem: Patient Education: Go to Patient Education Activity  Goal: Patient/Family Education  Outcome: Progressing Towards Goal

## 2021-08-27 NOTE — PROGRESS NOTES
ACUTE OT GOALS:  (Developed with and agreed upon by patient and/or caregiver.)  1. Patient will complete grooming at sink with modified independence. 2. Patient will complete functional transfers with CGA while maintaining WBAT status in LLE and with adaptive equipment as needed. 3. Patient will complete lower body bathing and dressing with minimal assistance and adaptive equipment as needed. 4. Patient will complete toileting and toilet transfer with CGA. 5. Patient will tolerate 20 minutes of OT treatment with as needed rest breaks to increase activity tolerance for ADLs. 6. Patient will participate in at least 20 minutes of BUE therapeutic exercises to strengthen BUE for functional transfers.      Timeframe: 7 visits     OCCUPATIONAL THERAPY: Daily Note OT Treatment Day # 2    Georgina Smart is a 78 y.o. male   PRIMARY DIAGNOSIS: Closed 2-part intertrochanteric fracture of left femur (HCC)  Closed 2-part intertrochanteric fracture of left femur (HCC) [S72.142A]  Procedure(s) (LRB):  Left Gamma Nail (Left)  2 Days Post-Op  Payor: [facility] MEDICARE COMPLETE / Plan: Cephus Khalif / Product Type: Managed Care Medicare /   ASSESSMENT:     REHAB RECOMMENDATIONS: CURRENT LEVEL OF FUNCTION:  (Most Recently Demonstrated)   Recommendation to date pending progress:  Setting:   Short-term Rehab  Equipment:    To Be Determined Bathing:   Not tested  Dressing:   Maximal Assistance  Feeding/Grooming:   Standby Assistance (sitting at sink)  Toileting:   Not tested  Functional Mobility:   Moderate Assistance (sit>stand)     ASSESSMENT:  Mr. Evgeny Cramer is slowly progressing towards goals with OT services. Today, pt was received sitting in the chair. Pt wanted to perform grooming tasks at sink but unwilling to attempt to do them in standing despite max encouragement from therapist. Completed sitting at sink SBA.  Following grooming tasks at sink, pt agreeable to attempt to stand with continued max encouragement. Sit>stand modA to RW. Difficulty transitioning hands from chair to RW--extended time and assistance required. Mr. Quinton Ty continues to demonstrate overall deficits in strength, balance, activity tolerance, and ADL performance. Continue OT efforts and PO in order to address functional deficits and OT goals stated above. SUBJECTIVE:   Mr. Quinton Ty states, \"I am not standing. \"    SOCIAL HISTORY/LIVING ENVIRONMENT:   Home Environment: Apartment  # Steps to Enter: 12  One/Two Story Residence: One story  Living Alone: Yes    OBJECTIVE:     PAIN: VITAL SIGNS: LINES/DRAINS:   Pre Treatment: Pain Screen  Pain Scale 1: Numeric (0 - 10)  Pain Intensity 1: 0  Post Treatment: no change    none  O2 Device: None (Room air)     ACTIVITIES OF DAILY LIVING: I Mod I S SBA CGA Min Mod Max Total NT Comments   BASIC ADLs:              Bathing/ Showering [] [] [] [] [] [] [] [] [] [x]    Toileting [] [] [] [] [] [] [] [] [] [x]    Dressing [] [] [] [] [] [] [] [x] [] [] Donned socks   Feeding [] [] [] [] [] [] [] [] [] [x]    Grooming [] [] [] [] [x] [] [] [] [] [] Brushed teeth, flossed teeth, washed face, and combed hair sitting at sink   Personal Device Care [] [] [] [] [] [] [] [] [] [x]    Functional Mobility [] [] [] [] [] [] [x] [] [] [] Sit>stand    I=Independent, Mod I=Modified Independent, S=Supervision, SBA=Standby Assistance, CGA=Contact Guard Assistance,   Min=Minimal Assistance, Mod=Moderate Assistance, Max=Maximal Assistance, Total=Total Assistance, NT=Not Tested    MOBILITY: I Mod I S SBA CGA Min Mod Max Total  NT x2 Comments:   Supine to sit [] [] [] [] [] [] [] [] [] [x] [] Received in chair    Sit to supine [] [] [] [] [] [] [] [] [] [x] [] Left sitting in chair    Sit to stand [] [] [] [] [] [] [x] [] [] [] []    Bed to chair [] [] [] [] [] [] [] [] [] [x] []    I=Independent, Mod I=Modified Independent, S=Supervision, SBA=Standby Assistance, CGA=Contact Guard Assistance,   Min=Minimal Assistance, Mod=Moderate Assistance, Max=Maximal Assistance, Total=Total Assistance, NT=Not Tested    BALANCE: Good Fair+ Fair Fair- Poor NT Comments   Sitting Static [x] [] [] [] [] []    Sitting Dynamic [] [x] [] [] [] []              Standing Static [] [] [x] [] [] []    Standing Dynamic [] [] [x] [] [] []      PLAN:   FREQUENCY/DURATION: OT Plan of Care: 4 times/week for duration of hospital stay or until stated goals are met, whichever comes first.    TREATMENT:   TREATMENT:   ($$ Self Care/Home Management: 8-22 mins$$ Therapeutic Activity: 8-22 mins   )  Therapeutic Activity (15 Minutes): Therapeutic activity included Transfer Training, Sitting balance  and Standing balance to improve functional Mobility, Strength and Activity tolerance. Self Care (15 Minutes): Self care including Lower Body Dressing and Grooming to increase independence and decrease level of assistance required.     TREATMENT GRID:  N/A    AFTER TREATMENT POSITION/PRECAUTIONS:  Chair, Needs within reach and RN notified    INTERDISCIPLINARY COLLABORATION:  RN/PCT and OT/AVILA    TOTAL TREATMENT DURATION:  OT Patient Time In/Time Out  Time In: 1011  Time Out: [address], OT

## 2021-08-27 NOTE — DISCHARGE SUMMARY
Hospitalist Discharge Summary     Admit Date:  2021 10:09 AM   DC note date: 2021  Name:  Daniele Mae   Age:  78 y.o.  :  1940   MRN:  [id number]   PCP:  Moe Parker MD    Presenting Complaint: Fall and Hip Pain    Initial Admission Diagnosis: Closed 2-part intertrochanteric fracture of left femur ([facility] [S72.142A]     Problem List for this Hospitalization:  Hospital Problems as of 2021 Date Reviewed: 2021        Codes Class Noted - Resolved POA    Acute blood loss as cause of postoperative anemia ICD-10-CM: D62  ICD-9-CM: 285.1  2021 - Present Yes        * (Principal) Closed 2-part intertrochanteric fracture of left femur ([facility] ICD-10-CM: F03.817T  ICD-9-CM: 820.21  2021 - Present Yes        Stage 3a chronic kidney disease ([facility] (Chronic) ICD-10-CM: N18.31  ICD-9-CM: 585.3  2019 - Present Yes        Paroxysmal atrial fibrillation (HCC) (Chronic) ICD-10-CM: I48.0  ICD-9-CM: 427.31  2017 - Present Yes        Chronic hyponatremia (Chronic) ICD-10-CM: E87.1  ICD-9-CM: 276.1  2017 - Present Yes        Mixed hyperlipidemia (Chronic) ICD-10-CM: E78.2  ICD-9-CM: 272.2  2013 - Present Yes        Essential hypertension (Chronic) ICD-10-CM: I10  ICD-9-CM: 401.9  7/15/2013 - Present Yes            Did Patient have Sepsis (YES OR NO): no    Admission HPI from 2021:    \" Patient is an 64-year-old  male with history of hypertension, CKD stage IIIa, mixed dyslipidemia, questionable paroxysmal A. fib (not on [address]) presented to ER following a fall at home at 3 AM last night. Patient experienced sudden acute pain in left hip following the fall and was not able to stand up or put any weight on left hip. ER work-up showed left intertrochanteric femur fracture. Patient is at low risk of moderate risk surgery.   Patient denies having any chest pain, shortness of breath, cough, urinary symptoms, abdominal pain, headache, focal neurological weakness, lightheadedness or dizziness. Blood work was remarkable for creatinine 1.47 (at baseline), T bili 1.5, , lactic acid 2.6. \"    Hospital Course:  Admitted for hip fracture. Went to OR 8/25. Blood loss postop but no other complications. is stable enough for rehab placement today. Disposition: Skilled Nursing Facility  Diet: ADULT DIET Regular  Code Status: Full Code    Follow Up Orders: Follow-up Appointments   Procedures    FOLLOW UP VISIT Appointment in: Two Weeks Staple removal in office. Call  [phone] for appointment     Staple removal in office. Call  [phone] for appointment     Standing Status:   Standing     Number of Occurrences:   1     Order Specific Question:   Appointment in     Answer: Two Weeks       Follow-up Information     Follow up With Specialties Details Why Contact Info    [address] [place] [address] [address] [place] [address] today rehab [address] [place] [address]  [phone]          Time spent in patient discharge and coordination 34 minutes. Plan was discussed with pt. All questions answered. Patient was stable at time of discharge. Given instructions to call a physician or return if any concerns. Discharge Info:   Current Discharge Medication List      START taking these medications    Details   aspirin delayed-release 325 mg tablet Take 1 Tablet by mouth daily for 32 days. Qty: 32 Tablet, Refills: 0  Start date: 8/28/2021, End date: 9/29/2021      calcium-vitamin D (OS-BRITTNI +D3) 500 mg-200 unit per tablet Take 1 Tablet by mouth three (3) times daily (with meals) for 30 days. Qty: 90 Tablet, Refills: 0  Start date: 8/27/2021, End date: 9/26/2021      oxyCODONE IR (ROXICODONE) 5 mg immediate release tablet Take 1 Tablet by mouth every four (4) hours as needed for Pain for up to 3 days.  Max Daily Amount: 30 mg.  Qty: 18 Tablet, Refills: 0  Start date: 8/27/2021, End date: 8/30/2021 Associated Diagnoses: Closed 2-part intertrochanteric fracture of left femur, initial encounter ([facility]         CONTINUE these medications which have NOT CHANGED    Details   furosemide (LASIX) 20 mg tablet Take 2 Tablets by mouth daily. Qty: 180 Tablet, Refills: 0    Associated Diagnoses: Acute on chronic congestive heart failure, unspecified heart failure type (HCC)      tamsulosin (FLOMAX) 0.4 mg capsule TAKE 1 CAPSULE BY MOUTH DAILY  Qty: 90 Cap, Refills: 1    Associated Diagnoses: Benign prostatic hyperplasia with urinary hesitancy      valsartan (DIOVAN) 80 mg tablet TAKE 1 TABLET BY MOUTH DAILY  Qty: 90 Tab, Refills: 1    Comments: **Patient requests 90 days supply**  Associated Diagnoses: Essential hypertension             Procedures done this admission:  Procedure(s):  Left Gamma Nail    Consults this admission:  None    Echocardiogram/EKG results:  No results found for this visit on 08/25/21. EKG Results     Procedure 720 Value Units Date/Time    EKG 12 LEAD INITIAL [[id number]] Collected: 08/25/21 1018    Order Status: Completed Updated: 08/25/21 1339     Ventricular Rate 97 BPM      Atrial Rate 178 BPM      QRS Duration 108 ms      Q-T Interval 320 ms      QTC Calculation (Bezet) 406 ms      Calculated R Axis 68 degrees      Calculated T Axis -97 degrees      Diagnosis --     Appears to be atrial fibrillation but artifact. PVCs present.    Possible Inferior infarct , age undetermined  Abnormal ECG  No previous ECGs available  Confirmed by Johana Vasquez ([id number]) on 8/25/2021 1:39:14 PM            Diagnostic Imaging/Tests:   XR CHEST SNGL V    Result Date: 8/25/2021  EXAMINATION: XR CHEST SNGL V, XR FEMUR LT 2 V, XR HIP LT W OR WO PELV 2-3 VWS 8/25/2021 11:14 AM ACCESSION NUMBER: [id number], [id number], [id number] INDICATION: fall, Fall at 3am this morning-Hx many broken ribs, per pt COMPARISON: Chest CT 5/20/2021, chest x-ray 4/8/2021 TECHNIQUE AND FINDINGS: SINGLE AP VIEW OF THE CHEST: Unchanged enlargement of the cardiac silhouette. Multiple chronic right rib deformities are unchanged from prior exam. Adjacent pleural thickening and right-sided pulmonary parenchymal opacifications are unchanged from priors. There is unchanged blunting of the right costophrenic sulcus. The left lung is clear, without evidence of a pleural effusion. There is no pneumothorax bilaterally. Calcifications overlying the right hilum corresponding to calcified mediastinal and hilar lymph nodes. Unremarkable included upper abdomen. AP VIEW OF THE PELVIS AND AP AND FROG-LEG LATERAL VIEWS OF THE LEFT HIP: Comminuted intertrochanteric fracture of the proximal left femur with foreshortening of the distal fracture fragments. The femoral head and acetabulum remain normally aligned. Mild right hip joint degenerative change. No acute fracture in the pelvis. Lower lumbar spine spondylosis. 2 VIEWS OF THE LEFT FEMUR: Intertrochanteric fracture as above. No evidence of a fracture of the more distal portions of the left femur. 1. Right-sided pleural thickening and parenchymal opacification adjacent to chronic right rib fractures are unchanged from recent priors, including the 5/28/2021 chest CT. While this may be a chronic organized hematoma, favored to the rib fractures, as discussed on the prior chest CT, a follow-up chest CT would be beneficial to demonstrate stability. 2. Intertrochanteric fracture of the proximal left femur, as above. 3. No evidence of acute fracture in the more distal portions of the left femur. VOICE DICTATED BY: Dr. Anival Mares 2-3 VWS    Result Date: 8/25/2021  EXAMINATION: XR HIP LT W OR WO PELV 2-3 VWS 8/25/2021 4:27 PM COMPARISON: Prior left hip and femur x-rays  INDICATION: Postop TECHNIQUE: A frontal view of the pelvis with  2 views of the left hip were obtained FINDINGS: Internal fixation of a prior intertrochanteric fracture. Fracture fragments are in improved alignment. Unremarkable immediate postoperative appearance of the hardware. Expected postoperative subcutaneous emphysema, soft tissue swelling, and surgical skin staples. There is mild irregularity the superior cortex of the left superior pubic ramus. 1. Near-anatomic alignment of the intertrochanteric fracture after internal fixation. Expected postoperative findings as above. 2. There is subtle irregularity of the superior cortex of the left superior pubic ramus. This appear to be smooth on the prior pelvis x-rays and is favored to be chronic. Directed attention on follow-up exams is recommended to completely exclude nondisplaced fracture. XR HIP LT W OR WO PELV 2-3 VWS    Result Date: 8/25/2021  EXAMINATION: XR CHEST SNGL V, XR FEMUR LT 2 V, XR HIP LT W OR WO PELV 2-3 VWS 8/25/2021 11:14 AM ACCESSION NUMBER: [id number], [id number], [id number] INDICATION: fall, Fall at 3am this morning-Hx many broken ribs, per pt COMPARISON: Chest CT 5/20/2021, chest x-ray 4/8/2021 TECHNIQUE AND FINDINGS: SINGLE AP VIEW OF THE CHEST: Unchanged enlargement of the cardiac silhouette. Multiple chronic right rib deformities are unchanged from prior exam. Adjacent pleural thickening and right-sided pulmonary parenchymal opacifications are unchanged from priors. There is unchanged blunting of the right costophrenic sulcus. The left lung is clear, without evidence of a pleural effusion. There is no pneumothorax bilaterally. Calcifications overlying the right hilum corresponding to calcified mediastinal and hilar lymph nodes. Unremarkable included upper abdomen. AP VIEW OF THE PELVIS AND AP AND FROG-LEG LATERAL VIEWS OF THE LEFT HIP: Comminuted intertrochanteric fracture of the proximal left femur with foreshortening of the distal fracture fragments. The femoral head and acetabulum remain normally aligned. Mild right hip joint degenerative change. No acute fracture in the pelvis. Lower lumbar spine spondylosis.  2 VIEWS OF THE LEFT FEMUR: Intertrochanteric fracture as above. No evidence of a fracture of the more distal portions of the left femur. 1. Right-sided pleural thickening and parenchymal opacification adjacent to chronic right rib fractures are unchanged from recent priors, including the 5/28/2021 chest CT. While this may be a chronic organized hematoma, favored to the rib fractures, as discussed on the prior chest CT, a follow-up chest CT would be beneficial to demonstrate stability. 2. Intertrochanteric fracture of the proximal left femur, as above. 3. No evidence of acute fracture in the more distal portions of the left femur. VOICE DICTATED BY: Dr. Renea Machuca      XR FEMUR LT 2 V    Result Date: 8/25/2021  EXAMINATION: XR FEMUR LT 2 V 8/25/2021 2:30 PM COMPARISON: Same-day left hip x-rays  INDICATION: Left gamma nail insertion TECHNIQUE: 4 intraoperative fluoroscopic views of the left femur were obtained FINDINGS: Fluoroscopy demonstrating internal fixation of the prior femoral fracture. Intraoperative fluoroscopy for proximal femoral fracture fixation. Fluoroscopic time: 57 seconds number fluoroscopic images: 4    XR FEMUR LT 2 V    Result Date: 8/25/2021  EXAMINATION: XR CHEST SNGL V, XR FEMUR LT 2 V, XR HIP LT W OR WO PELV 2-3 VWS 8/25/2021 11:14 AM ACCESSION NUMBER: [id number], [id number], [id number] INDICATION: fall, Fall at 3am this morning-Hx many broken ribs, per pt COMPARISON: Chest CT 5/20/2021, chest x-ray 4/8/2021 TECHNIQUE AND FINDINGS: SINGLE AP VIEW OF THE CHEST: Unchanged enlargement of the cardiac silhouette. Multiple chronic right rib deformities are unchanged from prior exam. Adjacent pleural thickening and right-sided pulmonary parenchymal opacifications are unchanged from priors. There is unchanged blunting of the right costophrenic sulcus. The left lung is clear, without evidence of a pleural effusion. There is no pneumothorax bilaterally.  Calcifications overlying the right hilum corresponding to calcified mediastinal and hilar lymph nodes. Unremarkable included upper abdomen. AP VIEW OF THE PELVIS AND AP AND FROG-LEG LATERAL VIEWS OF THE LEFT HIP: Comminuted intertrochanteric fracture of the proximal left femur with foreshortening of the distal fracture fragments. The femoral head and acetabulum remain normally aligned. Mild right hip joint degenerative change. No acute fracture in the pelvis. Lower lumbar spine spondylosis. 2 VIEWS OF THE LEFT FEMUR: Intertrochanteric fracture as above. No evidence of a fracture of the more distal portions of the left femur. 1. Right-sided pleural thickening and parenchymal opacification adjacent to chronic right rib fractures are unchanged from recent priors, including the 5/28/2021 chest CT. While this may be a chronic organized hematoma, favored to the rib fractures, as discussed on the prior chest CT, a follow-up chest CT would be beneficial to demonstrate stability. 2. Intertrochanteric fracture of the proximal left femur, as above. 3. No evidence of acute fracture in the more distal portions of the left femur. VOICE DICTATED BY: Dr. Gongora Sergio Results     Procedure Component Value Units Date/Time    SARS-COV-2, PCR [[id number]] Collected: 08/26/21 1523    Order Status: Completed Specimen: Nasopharyngeal Updated: 08/27/21 0933     Specimen source Nasopharyngeal        SARS-CoV-2 Not detected        Comment:      The specimen is NEGATIVE for SARS-CoV-2, the novel coronavirus associated with COVID-19. This test has been authorized by the FDA under an Emergency Use Authorization (EUA) for use by authorized laboratories.         Fact sheet for Healthcare Providers: ConventionUpdate[employer]co.nz       Fact sheet for Patients: ConventionUpdate[employer]co.nz       Methodology: RT-PCR         COVID-19 RAPID TEST [[id number]] Collected: 08/25/21 1140    Order Status: Completed Specimen: Nasopharyngeal Updated: 08/25/21 1207 Specimen source NASAL        Comment: RAPID ONLY        COVID-19 rapid test Not detected        Comment:      The specimen is NEGATIVE for SARS-CoV-2, the novel coronavirus associated with COVID-19. A negative result does not rule out COVID-19. This test has been authorized by the FDA under an Emergency Use Authorization (EUA) for use by authorized laboratories.         Fact sheet for Healthcare Providers: [employer]cher[employer] Technologydate.co.nz  Fact sheet for Patients: [employer] Technologydate.co.nz       Methodology: Isothermal Nucleic Acid Amplification               Labs: Results:       BMP, Mg, Phos Recent Labs     08/26/21 0415 08/25/21  1042   * 133*   K 4.6 4.5    101   CO2 28 28   AGAP 5* 4*   BUN 29* 25*   CREA 1.48 1.47   CA 8.1* 9.1   * 121*   MG 2.0  --       CBC Recent Labs     08/27/21  0447 08/26/21 0415 08/25/21  1042   WBC  --  9.3 11.7*   RBC  --  3.29* 4.24   HGB 9.6* 10.6* 13.8   HCT  --  31.3* 41.3   PLT  --  175 235   GRANS  --  83* 90*   LYMPH  --  6* 4*   EOS  --  0* 0*   MONOS  --  10 6   BASOS  --  0 0   IG  --  0 0   ANEU  --  7.7 10.5*   ABL  --  0.6 0.4*   DANIEL  --  0.0 0.0   ABM  --  0.9 0.7   ABB  --  0.0 0.0   AIG  --  0.0 0.0      LFT Recent Labs     08/26/21 0415 08/25/21  1042   ALT 22 30    171*   TP 6.1* 8.2   ALB 2.3* 3.2   GLOB 3.8* 5.0*   AGRAT 0.6* 0.6*      Cardiac Testing Lab Results   Component Value Date/Time     10/23/2017 07:58 PM    CK 95 08/25/2021 10:42 AM    Troponin-I, Qt. 0.04 10/24/2017 04:15 AM      Coagulation Tests Lab Results   Component Value Date/Time    Prothrombin time 14.0 08/25/2021 10:44 AM    INR 1.0 08/25/2021 10:44 AM      A1c No results found for: HBA1C, XPE1GSIE   Lipid Panel Lab Results   Component Value Date/Time    Cholesterol, total 146 04/08/2021 01:13 PM    HDL Cholesterol 57 04/08/2021 01:13 PM    LDL, calculated 75 04/08/2021 01:13 PM    LDL, calculated 85 12/09/2019 10:15 AM    VLDL, calculated 14 04/08/2021 01:13 PM    VLDL, calculated 13 12/09/2019 10:15 AM    Triglyceride 69 04/08/2021 01:13 PM    CHOL/HDL Ratio 2.6 10/24/2017 04:15 AM      Thyroid Panel Lab Results   Component Value Date/Time    TSH 1.650 08/12/2021 10:14 AM    TSH 3.030 04/08/2021 01:13 PM        Most Recent UA Lab Results   Component Value Date/Time    Color Yellow 07/02/2019 02:45 PM    Appearance Clear 07/02/2019 02:45 PM    Specific gravity 1.024 (H) 10/23/2017 07:58 PM    pH (UA) 7.0 07/02/2019 02:45 PM    Protein Negative 07/02/2019 02:45 PM    Glucose Negative 07/02/2019 02:45 PM    Ketone Negative 07/02/2019 02:45 PM    Bilirubin Negative 07/02/2019 02:45 PM    Blood Negative 07/02/2019 02:45 PM    Urobilinogen 0.2 E.U./dL 07/02/2019 02:45 PM    Nitrites Negative 07/02/2019 02:45 PM    Leukocyte Esterase Negative 07/02/2019 02:45 PM    WBC 0 10/23/2017 07:58 PM    RBC 0-3 10/23/2017 07:58 PM    Epithelial cells 0 10/23/2017 07:58 PM    Bacteria 0 10/23/2017 07:58 PM    Casts 0-3 10/23/2017 07:58 PM          All Labs from Last 24 Hrs:  Recent Results (from the past 24 hour(s))   SARS-COV-2    Collection Time: 08/26/21  3:23 PM   Result Value Ref Range    SARS-CoV-2 Please find results under separate order     SARS-COV-2, PCR    Collection Time: 08/26/21  3:23 PM    Specimen: Nasopharyngeal   Result Value Ref Range    Specimen source Nasopharyngeal      SARS-CoV-2 Not detected NOTD     HEMOGLOBIN    Collection Time: 08/27/21  4:47 AM   Result Value Ref Range    HGB 9.6 (L) 13.6 - 17.2 g/dL   PLEASE READ & DOCUMENT PPD TEST IN 24 HRS    Collection Time: 08/27/21  8:28 AM   Result Value Ref Range    PPD Negative Negative    mm Induration 0 0 - 5 mm       Recent Vital Data:  Patient Vitals for the past 24 hrs:   Temp Pulse Resp BP SpO2   08/27/21 0810 98.6 °F (37 °C) 99 20 (!) 143/76 95 %   08/27/21 0259 98.4 °F (36.9 °C) 80 20 (!) 166/69 98 %   08/26/21 2242 98.7 °F (37.1 °C) 84 20 (!) 144/88 98 %   08/26/21 2001 98.5 °F (36.9 °C) 90 20 117/83 98 %   08/26/21 1546 97.8 °F (36.6 °C) 85 18 135/65 97 %   08/26/21 1240 98.2 °F (36.8 °C) 82 18 (!) 117/56 96 %     Oxygen Therapy  O2 Sat (%): 95 % (08/27/21 0810)  Pulse via Oximetry: 87 beats per minute (08/25/21 1545)  O2 Device: None (Room air) (08/26/21 1341)  O2 Flow Rate (L/min): 3 l/min (08/25/21 1458)    Estimated body mass index is 26.43 kg/m² as calculated from the following:    Height as of this encounter: 5' 4\" (1.626 m). Weight as of this encounter: 69.9 kg (154 lb). Intake/Output Summary (Last 24 hours) at 8/27/2021 1051  Last data filed at 8/27/2021 7765  Gross per 24 hour   Intake 536 ml   Output 900 ml   Net -364 ml         Physical Exam:  General:    Well nourished. No overt distress  Head:  Normocephalic, atraumatic  Eyes:  Sclerae appear normal.  Pupils equally round. HENT:  Nares appear normal, no drainage. Moist mucous membranes  Neck:  No restricted ROM. Trachea midline  CV:   RRR. No m/r/g. Lungs:   Even, unlabored  Abdomen:   nondistended  Extremities: Warm and dry. No cyanosis or clubbing. No edema. Skin:     No rashes. Normal coloration  Neuro:  Cranial nerves II-XII grossly intact. Psych:  Normal mood and affect.     Current Med List in Hospital:   Current Facility-Administered Medications   Medication Dose Route Frequency    furosemide (LASIX) tablet 40 mg  40 mg Oral DAILY    tamsulosin (FLOMAX) capsule 0.4 mg  0.4 mg Oral DAILY    valsartan (DIOVAN) tablet 80 mg  80 mg Oral DAILY    sodium chloride (NS) flush 5-40 mL  5-40 mL IntraVENous Q8H    acetaminophen (TYLENOL) tablet 650 mg  650 mg Oral Q6H PRN    Or    acetaminophen (TYLENOL) suppository 650 mg  650 mg Rectal Q6H PRN    polyethylene glycol (MIRALAX) packet 17 g  17 g Oral DAILY PRN    ondansetron (ZOFRAN ODT) tablet 4 mg  4 mg Oral Q8H PRN    Or    ondansetron (ZOFRAN) injection 4 mg  4 mg IntraVENous Q4H PRN    potassium chloride 10 mEq in 100 ml IVPB  10 mEq IntraVENous PRN    magnesium sulfate 2 g/50 ml IVPB (premix or compounded)  2 g IntraVENous PRN    metoprolol (LOPRESSOR) injection 2.5 mg  2.5 mg IntraVENous Q6H PRN    hydrALAZINE (APRESOLINE) 20 mg/mL injection 10 mg  10 mg IntraVENous Q6H PRN    oxyCODONE IR (ROXICODONE) tablet 10 mg  10 mg Oral Q4H PRN    morphine injection 4 mg  4 mg IntraVENous Q1H PRN    sodium chloride (NS) flush 5-40 mL  5-40 mL IntraVENous PRN    alum-mag hydroxide-simeth (MYLANTA) oral suspension 30 mL  30 mL Oral Q4H PRN    calcium-vitamin D (OS-BRITTNI +D3) 500 mg-200 unit per tablet 1 Tablet  1 Tablet Oral TID WITH MEALS    aspirin delayed-release tablet 325 mg  325 mg Oral DAILY    pantoprazole (PROTONIX) tablet 40 mg  40 mg Oral ACB       No Known Allergies  Immunization History   Administered Date(s) Administered    COVID-19, PFIZER, MRNA, LNP-S, PF, 30MCG/0.3ML DOSE 01/23/2021, 02/13/2021    Influenza High Dose Vaccine PF 12/01/2017, 12/05/2018    Influenza Vaccine 01/15/2014    Influenza Vaccine (Tri) Adjuvanted (>65 Yrs FLUAD TRI 25705) 12/09/2019    Influenza, High-dose, Quadrivalent (>65 Yrs Fluzone High Dose Quad 75826) 12/08/2020    Pneumococcal Conjugate (PCV-13) 12/01/2017    Pneumococcal Polysaccharide (PPSV-23) 07/15/2013    TB Skin Test (PPD) Intradermal 08/26/2021    Tdap 01/01/2019       Signed:  Priscila Martínez MD    Part of this note may have been written by using a voice dictation software. The note has been proof read but may still contain some grammatical/other typographical errors.

## 2021-08-27 NOTE — DISCHARGE INSTRUCTIONS
[facility]    IF YOU HAVE ANY PROBLEMS ONCE YOU ARE AT HOME CALL THE FOLLOWING NUMBERS:   Main office number: [phone] ask for Nabil Addison (medical assistant with Dr. Theodora Millan)  Office Address: [facility] [address] [address] [address] [address] , [address] [facility]      Patient Discharge Instructions    Fili Elliott / [id number] : 1940    Admitted 2021 Discharged: 2021         To be given to [address] on Admission         Weight bearing status: As tolerated with walker and assistance    Activity  · Continue Physical Therapy and Occupational Therapy   · Fall precautions     Wound Care   Dry dressing changes using sterile technique every other day or more frequently if needed    Staples are to be left in and removed in our office 2 weeks postop    Diet  · Resume regular or diabetic diet      Medications    · Patient medications are listed on the medication reconciliation sheet. Follow up    Follow up in our office in 2 weeks postop    All patients are to be transported via stretcher unless they are able to independently get out of a chair and stand without assistance. Information obtained by :  I understand that if any problems occur once I am at home I am to contact my physician. I understand and acknowledge receipt of the instructions indicated above.                                                                                                                                            Physician's or R.N.'s Signature                                                                  Date/Time                                                                                                                                              Patient or Representative Signature                                                          Date/Time

## 2021-08-27 NOTE — PROGRESS NOTES
ACUTE PHYSICAL THERAPY GOALS:  (Developed with and agreed upon by patient and/or caregiver. )  LTG:  (1.)Mr. Riggs will move from supine to sit and sit to supine , scoot up and down and roll side to side in bed with MODIFIED INDEPENDENCE within 7 treatment day(s). (2.)Mr. Riggs will transfer from bed to chair and chair to bed with CONTACT GUARD ASSIST using the least restrictive device within 7 treatment day(s). (3.)Mr. Riggs will ambulate with MINIMAL ASSIST for 40+ feet with the least restrictive device within 7 treatment day(s). (4.)Mr. Riggs will perform exercises per HEP independently to improve strength and mobility within 7 days. PHYSICAL THERAPY: Daily Note and AM Treatment Day # 2    Ivett Reynoso is a 78 y.o. male   PRIMARY DIAGNOSIS: Closed 2-part intertrochanteric fracture of left femur (HCC)  Closed 2-part intertrochanteric fracture of left femur (HCC) [S72.142A]  Procedure(s) (LRB):  Left Gamma Nail (Left)  2 Days Post-Op    ASSESSMENT:     REHAB RECOMMENDATIONS: CURRENT LEVEL OF FUNCTION:  (Most Recently Demonstrated)   Recommendation to date pending progress:  Setting:   Short-term Rehab  Equipment:    3 in 1 Bedside Commode   Rolling Walker Bed Mobility:   Moderate Assistance  Sit to Stand:   Moderate Assistance x 2  Transfers:   Moderate Assistance x 2  Gait/Mobility:   Not tested     ASSESSMENT:  Mr. Brenda Maher is admitted from home with left femur fracture after fall; s/p IM nailing and is WBAT per ortho orders. Patient is making slow progress towards PT goals. Patient needed mod assist to perform supine to sit. Patient attempted several times to perform sit to stand on his own power but was unable to perform eventually needing mod assist x 2 to achieve standing. Once standing patient unable to advance LE's during gait despite MAX cues for technique, walker utilization, sequencing, and posture.  Patient performs SPT to recliner chair with mod assist x 2 and manual assistance to advance LLE towards chair. Will continue efforts. SUBJECTIVE:   Mr. Leonard Monsalve states, \"I could do this yesterday\"    SOCIAL HISTORY/ LIVING ENVIRONMENT: Lives alone. Independent without DME use. Drives.   Home Environment: Apartment  # Steps to Enter: 12  One/Two Story Residence: One story  Living Alone: Yes  OBJECTIVE:     PAIN: VITAL SIGNS: LINES/DRAINS:   Pre Treatment: Pain Screen  Pain Scale 1: FLACC  Pain Intensity 1: 0  Post Treatment: 0/10   Purewick  O2 Device: None (Room air)     MOBILITY: I Mod I S SBA CGA Min Mod Max Total  NT x2 Comments:   Bed Mobility    Rolling [] [] [] [] [] [] [] [] [] [] []    Supine to Sit [] [] [] [] [] [] [x] [] [] [] []    Scooting [] [] [] [] [] [] [] [] [] [] []    Sit to Supine [] [] [] [] [] [] [] [] [] [] []    Transfers    Sit to Stand [] [] [] [] [] [] [x] [] [] [] [x]    Bed to Chair [] [] [] [] [] [] [x] [] [] [] [x]    Stand to Sit [] [] [] [] [] [x] [x] [] [] [] [x]    I=Independent, Mod I=Modified Independent, S=Supervision, SBA=Standby Assistance, CGA=Contact Guard Assistance,   Min=Minimal Assistance, Mod=Moderate Assistance, Max=Maximal Assistance, Total=Total Assistance, NT=Not Tested    BALANCE: Good Fair+ Fair Fair- Poor NT Comments   Sitting Static [] [x] [] [] [] []    Sitting Dynamic [] [] [x] [] [] []              Standing Static [] [] [x] [] [] []    Standing Dynamic [] [] [] [x] [x] []      GAIT: I Mod I S SBA CGA Min Mod Max Total  NT x2 Comments:   Level of Assistance [] [] [] [] [] [] [] [] [] [] []    Distance Unable    DME Rolling Walker    Gait Quality     Weightbearing  Status WBAT L LE    I=Independent, Mod I=Modified Independent, S=Supervision, SBA=Standby Assistance, CGA=Contact Guard Assistance,   Min=Minimal Assistance, Mod=Moderate Assistance, Max=Maximal Assistance, Total=Total Assistance, NT=Not Tested    PLAN:   FREQUENCY/DURATION: PT Plan of Care: BID for duration of hospital stay or until stated goals are met, whichever comes first.  TREATMENT:     TREATMENT:   ($$ Therapeutic Activity: 23-37 mins    )  Therapeutic Activity (25 Minutes): Therapeutic activity included Supine to Sit, Scooting, Transfer Training, Ambulation on level ground, Sitting balance , Standing balance and instruction in walker utilization/sequencing to improve functional Mobility, Strength, Activity tolerance and balance, transfer technique, walker management/use.     TREATMENT GRID:  N/A    AFTER TREATMENT POSITION/PRECAUTIONS:  Alarm Activated, Chair, Needs within reach and RN notified    INTERDISCIPLINARY COLLABORATION:  RN/PCT, PT/PTA and Rehab Attendant     TOTAL TREATMENT DURATION:  PT Patient Time In/Time Out  Time In: 0920  Time Out: [address]

## 2021-08-27 NOTE — PROGRESS NOTES
Pt was medically cleared for dc today and transferred to [facility] for STR services. [facility]. SW spoke with pt's dtr via phone prior to his dc to update her and answer questions. Care Management Interventions  Mode of Transport at Discharge:  ([facility])  Hospital Transport Time of Discharge: [address] (CM Consult): SNF  Partner SNF: Yes  Discharge Durable Medical Equipment: No  Physical Therapy Consult: Yes  Occupational Therapy Consult: Yes  Speech Therapy Consult: No  Current Support Network: Own Home, Lives Alone (has friends that live nearby; dtr lives about an hour away)  Confirm Follow Up Transport: Self  The Plan for Transition of Care is Related to the Following Treatment Goals : STR to improve pt's stength and functional abilities for a safe transition to home.   The Patient and/or Patient Representative was Provided with a Choice of Provider and Agrees with the Discharge Plan?: Yes  Freedom of Choice List was Provided with Basic Dialogue that Supports the Patient's Individualized Plan of Care/Goals, Treatment Preferences and Shares the Quality Data Associated with the Providers?: Yes  Discharge Location  Discharge Placement: Rehab Unit Subacute ([facility])

## 2021-08-27 NOTE — ROUTINE PROCESS
Pt is DC to [facility] this afternoon, his L leg dressings were changed. All three incisions were dy and intact, scant amount of dry drainage noted around incisions, no sign of infection or dehiscence.

## 2021-08-30 ENCOUNTER — PATIENT OUTREACH (OUTPATIENT)
Dept: CASE MANAGEMENT | Age: 81
End: 2021-08-30

## 2021-08-30 NOTE — PROGRESS NOTES
Transition of care outreach postponed for 14 days due to patient's discharge to SNF, [facility]. Staff message to Buster Lynch RN, CCM.

## 2021-08-31 ENCOUNTER — APPOINTMENT (OUTPATIENT)
Dept: PHYSICAL THERAPY | Age: 81
End: 2021-08-31
Attending: INTERNAL MEDICINE

## 2021-09-07 NOTE — ANESTHESIA POSTPROCEDURE EVALUATION
Procedure(s):  Left Gamma Nail.    spinal    Anesthesia Post Evaluation      Multimodal analgesia: multimodal analgesia used between 6 hours prior to anesthesia start to PACU discharge  Patient location during evaluation: PACU  Patient participation: complete - patient participated  Level of consciousness: awake  Pain management: adequate  Airway patency: patent  Anesthetic complications: no  Cardiovascular status: acceptable  Respiratory status: acceptable  Hydration status: acceptable  Post anesthesia nausea and vomiting:  none  Final Post Anesthesia Temperature Assessment:  Normothermia (36.0-37.5 degrees C)      INITIAL Post-op Vital signs:   Vitals Value Taken Time   /57 08/25/21 1545   Temp 36.7 °C (98 °F) 08/25/21 1535   Pulse 80 08/25/21 1545   Resp 16 08/25/21 1545   SpO2 79 % 08/25/21 1545

## 2021-09-17 ENCOUNTER — HOSPITAL ENCOUNTER (EMERGENCY)
Age: 81
Discharge: HOME | End: 2021-09-17
Attending: EMERGENCY MEDICINE
Payer: MEDICARE

## 2021-09-17 VITALS
SYSTOLIC BLOOD PRESSURE: 136 MMHG | BODY MASS INDEX: 26.29 KG/M2 | WEIGHT: 154 LBS | DIASTOLIC BLOOD PRESSURE: 86 MMHG | HEART RATE: 84 BPM | HEIGHT: 64 IN | OXYGEN SATURATION: 100 % | TEMPERATURE: 97.7 F | RESPIRATION RATE: 16 BRPM

## 2021-09-17 DIAGNOSIS — R53.1 GENERALIZED WEAKNESS: Primary | ICD-10-CM

## 2021-09-17 LAB
COVID-19 RAPID TEST, COVR: NOT DETECTED
SOURCE, COVRS: NORMAL

## 2021-09-17 PROCEDURE — 87635 SARS-COV-2 COVID-19 AMP PRB: CPT

## 2021-09-17 PROCEDURE — 99284 EMERGENCY DEPT VISIT MOD MDM: CPT

## 2021-09-17 NOTE — DISCHARGE INSTRUCTIONS
Continue taking your normally prescribed medications. Follow-up with your primary care provider. Return to the emergency department for any new, worsening, or concerning symptoms.

## 2021-09-17 NOTE — ED TRIAGE NOTES
Patient arrives via [facility] from home with mask in place. Patient reports left hip surgery 8/24. Went to rehab. Signed himself out of rehab yesterday AMA. Patient reports he had a bowel movement on self last night and went to change his brief. Patient reports he did not have his walker and chose to lower himself to the ground and scoot to bathroom. Daughter and caregiver went to house this am and patient was still on the ground. Denies falling, reports choosing to lower self to ground. Up in chair by the time EMS arrived. Wants to go back to rehab.

## 2021-09-17 NOTE — ED PROVIDER NOTES
53-year-old male who presents emergency department today requesting to go back to [facility] for rehab. Patient had a left hip fracture with surgical repair in August.  He states that he was sent to rehab after to gain strength. He reports that he was given multiple different stories on when he would be discharged from there. He states that he went home yesterday and they were supposed to be getting him a walker but the walker was never delivered. Patient states that he had a bowel movement and was trying to get to the restroom to clean himself up so he lowered himself to the floor. He states that he was unable to get back up because of the set up in his bathroom. He denies falling. He denies any injury. He states he is able to recall the entirety of the event. He states he is only here today because he would like to go back to rehab to gain enough strength because he lives alone. The history is provided by the patient. Fall  Pertinent negatives include no fever, no abdominal pain, no nausea and no vomiting. Past Medical History:   Diagnosis Date    Allergic rhinitis     Fractured pelvis ([address] 2006    Winferd Hatchet off of roof.  History of chicken pox Childhood    Scarlet fever Childhood    Zenker's diverticulum     In the throat. 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  HX VASECTOMY           Family History:   Problem Relation Age of Onset    Hypertension Mother     Heart Disease Mother     Heart Disease Father     Diabetes Father     Stroke Father     Cancer Neg Hx        Social History     Socioeconomic History    Marital status:      Spouse name: Not on file    Number of children: Not on file    Years of education: Not on file    Highest education level: Not on file   Occupational History    Occupation: Retired salesman.    Tobacco Use    Smoking status: Former Smoker Quit date: 1976     Years since quittin.7    Smokeless tobacco: Never Used   Substance and Sexual Activity    Alcohol use: Yes     Alcohol/week: 1.7 standard drinks     Types: 2 Standard drinks or equivalent per week     Comment: occ    Drug use: No    Sexual activity: Not on file   Other Topics Concern    Not on file   Social History Narrative    Not on file     Social Determinants of Health     Financial Resource Strain:     Difficulty of Paying Living Expenses:    Food Insecurity:     Worried About Running Out of Food in the Last Year:     [address] in the Last Year:    Transportation Needs:     Lack of Transportation (Medical):  Lack of Transportation (Non-Medical):    Physical Activity:     Days of Exercise per Week:     Minutes of Exercise per Session:    Stress:     Feeling of Stress :    Social Connections:     Frequency of Communication with Friends and Family:     Frequency of Social Gatherings with Friends and Family:     Attends Buddhist Services:     Active Member of Clubs or Organizations:     Attends Club or Organization Meetings:     Marital Status:    Intimate Partner Violence:     Fear of Current or Ex-Partner:     Emotionally Abused:     Physically Abused:     Sexually Abused: ALLERGIES: Cephalosporins    Review of Systems   Constitutional: Negative for chills and fever. Respiratory: Negative for cough, chest tightness and shortness of breath. Cardiovascular: Negative for chest pain. Gastrointestinal: Negative for abdominal pain, diarrhea, nausea and vomiting. Musculoskeletal: Positive for gait problem. Negative for myalgias. Neurological: Negative for dizziness and syncope. All other systems reviewed and are negative. Vitals:    21 1030   BP: 136/86   Pulse: 84   Resp: 16   Temp: 97.7 °F (36.5 °C)   SpO2: 100%   Weight: 69.9 kg (154 lb)   Height: 5' 4\" (1.626 m)            Physical Exam  Vitals and nursing note reviewed. Constitutional:       General: He is not in acute distress. Appearance: Normal appearance. He is normal weight. He is not ill-appearing, toxic-appearing or diaphoretic. HENT:      Head: Normocephalic and atraumatic. Right Ear: External ear normal.      Left Ear: External ear normal.      Mouth/Throat:      Mouth: Mucous membranes are moist.      Pharynx: Oropharynx is clear. Eyes:      General: No scleral icterus. Extraocular Movements: Extraocular movements intact. Conjunctiva/sclera: Conjunctivae normal.   Cardiovascular:      Rate and Rhythm: Normal rate. Pulses: Normal pulses. Heart sounds: Normal heart sounds. Pulmonary:      Effort: Pulmonary effort is normal. No respiratory distress. Breath sounds: Normal breath sounds. Abdominal:      General: Abdomen is flat. Bowel sounds are normal. There is no distension. Palpations: Abdomen is soft. Tenderness: There is no abdominal tenderness. Musculoskeletal:         General: Normal range of motion. Cervical back: Normal range of motion and neck supple. No rigidity. Right lower leg: No edema. Left lower leg: No edema. Comments: Patient moves all extremities without difficulty. He is able to stand with assistance. Skin:     General: Skin is warm and dry. Capillary Refill: Capillary refill takes less than 2 seconds. Comments: Left hip incision with Steri-Strips intact. Appears clean and dry. No evidence of infection. No erythema or drainage. Neurological:      General: No focal deficit present. Mental Status: He is alert and oriented to person, place, and time. GCS: GCS eye subscore is 4. GCS verbal subscore is 5. GCS motor subscore is 6. Cranial Nerves: Cranial nerves are intact. Sensory: Sensation is intact. Motor: Motor function is intact. Coordination: Coordination is intact. Gait: Gait is intact.       Comments: Patient appears alert and oriented. He answers all questions appropriately. He is able to stand with assistance. Psychiatric:         Mood and Affect: Mood normal.         Behavior: Behavior normal.         Thought Content: Thought content normal.         Judgment: Judgment normal.          MDM  Number of Diagnoses or Management Options  Generalized weakness  Diagnosis management comments: 67-year-old male who presents to the emergency department today due to generalized weakness after having left hip replacement last month. Patient was admitted to rehab but was discharged yesterday. Concern that patient lives alone and is unable to get around by himself at home. He states that last night he had a bowel movement and was going to try to get himself cleaned up but was unable to walk to the bathroom so he lowered himself to the floor. He reports he was unable to get himself up out of the floor after getting into the bathroom. Patient denies any other concerns today. Patient states that otherwise he feels well. I am concerned that patient is unable to go home by himself and will need to go back to [facility] for further rehab to gain strength so that he is able to live independently. I have discussed the results of all labs, procedures, radiographs, and/or treatments with the patient and available family members. Elainemarcel Cortes is agreed upon by the patient and the patient is ready for discharge.  Questions about treatment in the ED and differential diagnosis of presenting condition were answered. Parveen Azul was given verbal discharge instructions including, but not limited to, importance of returning to the emergency department for any concern of worsening or continued symptoms.  Instructions were given to follow up with a primary care provider or specialist within 1-2 days. [id number] Huy Hirsch NP; 9/17/2021 @12:09 PM Voice dictation software was used during the making of  this note.  This software is not perfect and grammatical and other typographical errors  may be present. This note has not been proofread for errors. 2:32 PM  Social work states that patient has been accepted to go back to [facility]. Plan to discharge patient back to [facility]. [facility] to continue all previous orders. Amount and/or Complexity of Data Reviewed  Clinical lab tests: reviewed    Risk of Complications, Morbidity, and/or Mortality  Presenting problems: low  Diagnostic procedures: low  Management options: low    Patient Progress  Patient progress: improved    ED Course as of Sep 17 1157   Fri Sep 17, 2021   1147 Case discussed with Social work. State she will speak with the patient.      [BC]      ED Course User Index  [BC] Jermain Salas NP       Procedures

## 2021-09-17 NOTE — PROGRESS NOTES
Care Management Interventions  PCP Verified by CM: Yes  Mode of Transport at Discharge: Self  Transition of Care Consult (CM Consult): Discharge Planning, SNF  Discharge Durable Medical Equipment: No  Physical Therapy Consult: No  Occupational Therapy Consult: No  Support Systems: [place]  Confirm Follow Up Transport: Family  The Plan for Transition of Care is Related to the Following Treatment Goals : SNF  The Patient and/or Patient Representative was Provided with a Choice of Provider and Agrees with the Discharge Plan?: Yes  Name of the Patient Representative Who was Provided with a Choice of Provider and Agrees with the Discharge Plan: Patient   Freedom of Choice List was Provided with Basic Dialogue that Supports the Patient's Individualized Plan of Care/Goals, Treatment Preferences and Shares the Quality Data Associated with the Providers?: Yes  Discharge Location  Discharge Placement: Skilled nursing facility      Patient left AMA from [facility]. Would like to return to continue rehab- therapy. Patient is unable to care for himself at home. CM spoke with Estefania at [facility], will send ED notes to insurance to authorize return. Patient will need COVID test to return. CM to continue to follow. Update: Patient has been approved to return to [facility] per Jasen Marx. Transport via [employer]. Patient's COVID test is negative. Patient and daughter are in agreement with DCP. NP notified.

## 2021-09-17 NOTE — ED NOTES
Pt waiting for Case Management consult for admission to [facility].

## 2021-10-13 PROBLEM — S72.142A CLOSED 2-PART INTERTROCHANTERIC FRACTURE OF LEFT FEMUR (HCC): Status: RESOLVED | Noted: 2021-08-25 | Resolved: 2021-10-13

## 2021-10-13 PROBLEM — I50.32 CHRONIC DIASTOLIC CONGESTIVE HEART FAILURE (HCC): Status: ACTIVE | Noted: 2021-10-13

## 2021-11-22 ENCOUNTER — TELEPHONE (OUTPATIENT)
Dept: PHARMACY | Age: 81
End: 2021-11-22

## 2021-11-22 NOTE — TELEPHONE ENCOUNTER
CLINICAL PHARMACY: ADHERENCE REVIEW  Identified care gap per [place]; fills at [facility]: ACE/ARB adherence    Last Visit: 10/13/21    ASSESSMENT  ACE/ARB ADHERENCE    Per Insurance Records through 11/6/21 (2020 [facility] = n/a; YTD PDC = 79%; Potential Fail Date: 11/27/21):   Valsartan 80 mg tab last filled on 8/19/21 for 90 day supply. Next refill due: 11/17/21    Per Reconciled Dispense Report: 5/6/21 to 11/1/21  VALSARTAN 80MG TABLETS 08/20/2021 90 90 Each [address] .. Per updated [employer] report claim noted 11/18/21 x 90 ds    Per [facility]:   Valsartan last picked up on 8/22/21 for 90 day supply. Rx ready for  - copay $6 x 90 ds    BP Readings from Last 3 Encounters:   10/13/21 (!) 152/78   09/17/21 136/86   08/27/21 138/65     Estimated Creatinine Clearance: 28.8 mL/min (A) (by C-G formula based on SCr of 1.63 mg/dL (H)). PLAN  The following are interventions that have been identified:  - Patient overdue refilling valsartan and active on home medication list - awaiting  since 11/18/21    Unable to leave message     Fliqq message sent.     Future Appointments   Date Time Provider Dali Collins   4/13/2022 10:40 AM Sophy Farah MD [facility]       Fatoumata Saenz, PharmD, [facility]  Department, toll free: [phone]

## 2021-11-23 NOTE — TELEPHONE ENCOUNTER
Per pt Katya response:  I still have an ample supply and will contact Vivien Garrison' s when I need a refill. Thank you for caring!   [id number] [facility] Only     CPA in place: No   Recommendation Provided To: Patient/Caregiver: 1 via Eli[address] 20 Intervention Detail: Adherence Monitorin   Gap Closed?: No   Intervention Accepted By: Patient/Caregiver: 0   Time Spent (min): 10

## 2022-03-19 PROBLEM — E87.1 CHRONIC HYPONATREMIA: Status: ACTIVE | Noted: 2017-12-01

## 2022-03-19 PROBLEM — N18.31 STAGE 3A CHRONIC KIDNEY DISEASE (HCC): Status: ACTIVE | Noted: 2019-12-09

## 2022-03-19 PROBLEM — I50.32 CHRONIC DIASTOLIC CONGESTIVE HEART FAILURE (HCC): Status: ACTIVE | Noted: 2021-10-13

## 2022-03-19 PROBLEM — I48.0 PAROXYSMAL ATRIAL FIBRILLATION (HCC): Status: ACTIVE | Noted: 2017-12-01

## 2022-03-19 PROBLEM — D62 ACUTE BLOOD LOSS AS CAUSE OF POSTOPERATIVE ANEMIA: Status: ACTIVE | Noted: 2021-08-26

## 2022-07-09 ENCOUNTER — APPOINTMENT (OUTPATIENT)
Dept: GENERAL RADIOLOGY | Age: 82
DRG: 557 | End: 2022-07-09
Payer: MEDICARE

## 2022-07-09 ENCOUNTER — HOSPITAL ENCOUNTER (INPATIENT)
Age: 82
LOS: 9 days | Discharge: NURSING, REHAB OR LONG-TERM CARE | DRG: 557 | End: 2022-07-19
Attending: STUDENT IN AN ORGANIZED HEALTH CARE EDUCATION/TRAINING PROGRAM | Admitting: STUDENT IN AN ORGANIZED HEALTH CARE EDUCATION/TRAINING PROGRAM
Payer: MEDICARE

## 2022-07-09 ENCOUNTER — APPOINTMENT (OUTPATIENT)
Dept: CT IMAGING | Age: 82
DRG: 557 | End: 2022-07-09
Payer: MEDICARE

## 2022-07-09 DIAGNOSIS — R55 SYNCOPE, UNSPECIFIED SYNCOPE TYPE: ICD-10-CM

## 2022-07-09 DIAGNOSIS — M62.82 NON-TRAUMATIC RHABDOMYOLYSIS: ICD-10-CM
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
## 2022-07-09 PROCEDURE — 93005 ELECTROCARDIOGRAM TRACING: CPT | Performed by: STUDENT IN AN ORGANIZED HEALTH CARE EDUCATION/TRAINING PROGRAM

## 2022-07-09 PROCEDURE — 73030 X-RAY EXAM OF SHOULDER: CPT

## 2022-07-09 PROCEDURE — 82550 ASSAY OF CK (CPK): CPT

## 2022-07-09 PROCEDURE — 99285 EMERGENCY DEPT VISIT HI MDM: CPT

## 2022-07-09 PROCEDURE — 70450 CT HEAD/BRAIN W/O DYE: CPT

## 2022-07-09 PROCEDURE — 72125 CT NECK SPINE W/O DYE: CPT

## 2022-07-09 PROCEDURE — 85025 COMPLETE CBC W/AUTO DIFF WBC: CPT

## 2022-07-09 PROCEDURE — 80053 COMPREHEN METABOLIC PANEL: CPT

## 2022-07-09 ASSESSMENT — ENCOUNTER SYMPTOMS
BACK PAIN: 0
ABDOMINAL DISTENTION: 0
VOMITING: 0
SHORTNESS OF BREATH: 0
CHEST TIGHTNESS: 0
WHEEZING: 0
CONSTIPATION: 0
ABDOMINAL PAIN: 0
COLOR CHANGE: 0
COUGH: 0
NAUSEA: 0

## 2022-07-10 ENCOUNTER — APPOINTMENT (OUTPATIENT)
Dept: NON INVASIVE DIAGNOSTICS | Age: 82
DRG: 557 | End: 2022-07-10
Payer: MEDICARE

## 2022-07-10 ENCOUNTER — APPOINTMENT (OUTPATIENT)
Dept: ULTRASOUND IMAGING | Age: 82
DRG: 557 | End: 2022-07-10
Payer: MEDICARE

## 2022-07-10 PROBLEM — N40.0 BENIGN PROSTATIC HYPERPLASIA: Status: ACTIVE | Noted: 2021-09-29

## 2022-07-10 PROBLEM — M62.82 RHABDOMYOLYSIS: Status: ACTIVE | Noted: 2022-07-10

## 2022-07-10 PROBLEM — I50.32 CHRONIC DIASTOLIC CONGESTIVE HEART FAILURE (HCC): Chronic | Status: ACTIVE | Noted: 2021-10-13

## 2022-07-10 PROBLEM — I48.0 PAROXYSMAL ATRIAL FIBRILLATION (HCC): Chronic | Status: ACTIVE | Noted: 2017-12-01

## 2022-07-10 PROBLEM — D62 ACUTE BLOOD LOSS AS CAUSE OF POSTOPERATIVE ANEMIA: Status: RESOLVED | Noted: 2021-08-26 | Resolved: 2022-07-10

## 2022-07-10 PROBLEM — I48.0 PAROXYSMAL ATRIAL FIBRILLATION (HCC): Status: ACTIVE | Noted: 2017-12-01

## 2022-07-10 PROBLEM — E87.1 CHRONIC HYPONATREMIA: Chronic | Status: RESOLVED | Noted: 2017-12-01 | Resolved: 2022-07-10

## 2022-07-10 PROBLEM — E87.1 CHRONIC HYPONATREMIA: Status: ACTIVE | Noted: 2017-12-01

## 2022-07-10 PROBLEM — E83.51 HYPOCALCEMIA: Status: ACTIVE | Noted: 2021-09-29

## 2022-07-10 PROBLEM — E87.5 HYPERKALEMIA: Status: ACTIVE | Noted: 2022-07-10

## 2022-07-10 PROBLEM — N18.31 STAGE 3A CHRONIC KIDNEY DISEASE (HCC): Chronic | Status: ACTIVE | Noted: 2019-12-09

## 2022-07-10 PROBLEM — I50.32 CHRONIC DIASTOLIC CONGESTIVE HEART FAILURE (HCC): Status: ACTIVE | Noted: 2021-10-13

## 2022-07-10 PROBLEM — E87.1 CHRONIC HYPONATREMIA: Chronic | Status: ACTIVE | Noted: 2017-12-01

## 2022-07-10 PROBLEM — N18.31 STAGE 3A CHRONIC KIDNEY DISEASE (HCC): Status: ACTIVE | Noted: 2019-12-09

## 2022-07-10 LAB
ALBUMIN SERPL-MCNC: 2.7 G/DL (ref 3.2–4.6)
ALBUMIN/GLOB SERPL: 0.4 {RATIO} (ref 1.2–3.5)
ALP SERPL-CCNC: 177 U/L (ref 50–136)
ALT SERPL-CCNC: 37 U/L (ref 12–65)
ANION GAP SERPL CALC-SCNC: 12 MMOL/L (ref 7–16)
APPEARANCE UR: ABNORMAL
AST SERPL-CCNC: 103 U/L (ref 15–37)
BACTERIA URNS QL MICRO: NEGATIVE /HPF
BASOPHILS # BLD: 0 K/UL (ref 0–0.2)
BASOPHILS NFR BLD: 0 % (ref 0–2)
BILIRUB SERPL-MCNC: 2.5 MG/DL (ref 0.2–1.1)
BILIRUB UR QL: NEGATIVE
BUN SERPL-MCNC: 25 MG/DL (ref 8–23)
CALCIUM SERPL-MCNC: 9.2 MG/DL (ref 8.3–10.4)
CASTS URNS QL MICRO: ABNORMAL /LPF
CHLORIDE SERPL-SCNC: 102 MMOL/L (ref 98–107)
CHOLEST SERPL-MCNC: 125 MG/DL
CK SERPL-CCNC: 746 U/L (ref 21–215)
CO2 SERPL-SCNC: 21 MMOL/L (ref 21–32)
COLOR UR: ABNORMAL
CREAT SERPL-MCNC: 1.4 MG/DL (ref 0.8–1.5)
DIFFERENTIAL METHOD BLD: ABNORMAL
ECHO AO ASC DIAM: 3.3 CM
ECHO AO ASCENDING AORTA INDEX: 1.98 CM/M2
ECHO AO ROOT DIAM: 3.3 CM
ECHO AO ROOT INDEX: 1.98 CM/M2
ECHO AV AREA PEAK VELOCITY: 2.7 CM2
ECHO AV AREA VTI: 2.9 CM2
ECHO AV AREA/BSA PEAK VELOCITY: 1.6 CM2/M2
ECHO AV AREA/BSA VTI: 1.7 CM2/M2
ECHO AV MEAN GRADIENT: 3 MMHG
ECHO AV MEAN VELOCITY: 0.8 M/S
ECHO AV PEAK GRADIENT: 7 MMHG
ECHO AV PEAK VELOCITY: 1.3 M/S
ECHO AV VELOCITY RATIO: 0.69
ECHO AV VTI: 21 CM
ECHO BSA: 1.68 M2
ECHO EST RA PRESSURE: 8 MMHG
ECHO IVC PROX: 2.1 CM
ECHO LA AREA 2C: 29.5 CM2
ECHO LA AREA 4C: 31 CM2
ECHO LA DIAMETER INDEX: 3.05 CM/M2
ECHO LA DIAMETER: 5.1 CM
ECHO LA MAJOR AXIS: 7.5 CM
ECHO LA MINOR AXIS: 7.5 CM
ECHO LA TO AORTIC ROOT RATIO: 1.55
ECHO LA VOL BP: 95 ML (ref 18–58)
ECHO LA VOL/BSA BIPLANE: 57 ML/M2 (ref 16–34)
ECHO LV E' LATERAL VELOCITY: 13 CM/S
ECHO LV E' SEPTAL VELOCITY: 10 CM/S
ECHO LV EDV 3D: 175 ML
ECHO LV EDV INDEX 3D: 105 ML/M2
ECHO LV EJECTION FRACTION 3D: 47 %
ECHO LV ESV 3D: 93 ML
ECHO LV ESV INDEX 3D: 56 ML/M2
ECHO LV FRACTIONAL SHORTENING: 22 % (ref 28–44)
ECHO LV INTERNAL DIMENSION DIASTOLE INDEX: 3.23 CM/M2
ECHO LV INTERNAL DIMENSION DIASTOLIC: 5.4 CM (ref 4.2–5.9)
ECHO LV INTERNAL DIMENSION SYSTOLIC INDEX: 2.51 CM/M2
ECHO LV INTERNAL DIMENSION SYSTOLIC: 4.2 CM
ECHO LV IVSD: 1.1 CM (ref 0.6–1)
ECHO LV MASS 2D: 206.7 G (ref 88–224)
ECHO LV MASS 3D INDEX: 89.8 G/M2
ECHO LV MASS 3D: 150 G
ECHO LV MASS INDEX 2D: 123.8 G/M2 (ref 49–115)
ECHO LV POSTERIOR WALL DIASTOLIC: 0.9 CM (ref 0.6–1)
ECHO LV RELATIVE WALL THICKNESS RATIO: 0.33
ECHO LVOT AREA: 3.8 CM2
ECHO LVOT AV VTI INDEX: 0.76
ECHO LVOT DIAM: 2.2 CM
ECHO LVOT MEAN GRADIENT: 2 MMHG
ECHO LVOT PEAK GRADIENT: 3 MMHG
ECHO LVOT PEAK VELOCITY: 0.9 M/S
ECHO LVOT STROKE VOLUME INDEX: 36.4 ML/M2
ECHO LVOT SV: 60.8 ML
ECHO LVOT VTI: 16 CM
ECHO MV E DECELERATION TIME (DT): 164 MS
ECHO MV E VELOCITY: 0.96 M/S
ECHO MV E/E' LATERAL: 7.38
ECHO MV E/E' RATIO (AVERAGED): 8.49
ECHO MV E/E' SEPTAL: 9.6
ECHO MV EROA PISA: 15.2 CM2
ECHO MV REGURGITANT ALIASING (NYQUIST) VELOCITY: 35 CM/S
ECHO MV REGURGITANT PEAK GRADIENT: 108 MMHG
ECHO MV REGURGITANT PEAK VELOCITY: 5.2 M/S
ECHO MV REGURGITANT RADIUS PISA: 0.6 CM
ECHO MV REGURGITANT VOLUME PISA: 2358.62 ML
ECHO MV REGURGITANT VTIA: 155 CM
ECHO RIGHT VENTRICULAR SYSTOLIC PRESSURE (RVSP): 43 MMHG
ECHO RV BASAL DIMENSION: 3.6 CM
ECHO RV TAPSE: 1.5 CM (ref 1.7–?)
ECHO TV REGURGITANT MAX VELOCITY: 2.95 M/S
ECHO TV REGURGITANT PEAK GRADIENT: 35 MMHG
EKG ATRIAL RATE: 156 BPM
EKG DIAGNOSIS: NORMAL
EKG Q-T INTERVAL: 414 MS
EKG QRS DURATION: 116 MS
EKG QTC CALCULATION (BAZETT): 475 MS
EKG R AXIS: 65 DEGREES
EKG T AXIS: 245 DEGREES
EKG VENTRICULAR RATE: 79 BPM
EOSINOPHIL # BLD: 0 K/UL (ref 0–0.8)
EOSINOPHIL NFR BLD: 0 % (ref 0.5–7.8)
EPI CELLS #/AREA URNS HPF: ABNORMAL /HPF
ERYTHROCYTE [DISTWIDTH] IN BLOOD BY AUTOMATED COUNT: 13.9 % (ref 11.9–14.6)
GLOBULIN SER CALC-MCNC: 6.3 G/DL (ref 2.3–3.5)
GLUCOSE SERPL-MCNC: 81 MG/DL (ref 65–100)
GLUCOSE UR STRIP.AUTO-MCNC: NEGATIVE MG/DL
HCT VFR BLD AUTO: 48.5 % (ref 41.1–50.3)
HDLC SERPL-MCNC: 44 MG/DL (ref 40–60)
HDLC SERPL: 2.8 {RATIO}
HGB BLD-MCNC: 16.1 G/DL (ref 13.6–17.2)
HGB UR QL STRIP: ABNORMAL
IMM GRANULOCYTES # BLD AUTO: 0 K/UL (ref 0–0.5)
IMM GRANULOCYTES NFR BLD AUTO: 0 % (ref 0–5)
KETONES UR QL STRIP.AUTO: 15 MG/DL
LDLC SERPL CALC-MCNC: 66.4 MG/DL
LEUKOCYTE ESTERASE UR QL STRIP.AUTO: NEGATIVE
LYMPHOCYTES # BLD: 0.7 K/UL (ref 0.5–4.6)
LYMPHOCYTES NFR BLD: 6 % (ref 13–44)
MCH RBC QN AUTO: 32 PG (ref 26.1–32.9)
MCHC RBC AUTO-ENTMCNC: 33.2 G/DL (ref 31.4–35)
MCV RBC AUTO: 96.4 FL (ref 79.6–97.8)
MONOCYTES # BLD: 1.1 K/UL (ref 0.1–1.3)
MONOCYTES NFR BLD: 9 % (ref 4–12)
NEUTS SEG # BLD: 10.3 K/UL (ref 1.7–8.2)
NEUTS SEG NFR BLD: 85 % (ref 43–78)
NITRITE UR QL STRIP.AUTO: NEGATIVE
NRBC # BLD: 0 K/UL (ref 0–0.2)
PH UR STRIP: 7 [PH] (ref 5–9)
PLATELET # BLD AUTO: 154 K/UL (ref 150–450)
PLATELET COMMENT: ABNORMAL
PMV BLD AUTO: 10.8 FL (ref 9.4–12.3)
POTASSIUM SERPL-SCNC: 4.1 MMOL/L (ref 3.5–5.1)
POTASSIUM SERPL-SCNC: 6.8 MMOL/L (ref 3.5–5.1)
PROT SERPL-MCNC: 9 G/DL (ref 6.3–8.2)
PROT UR STRIP-MCNC: 100 MG/DL
RBC # BLD AUTO: 5.03 M/UL (ref 4.23–5.6)
RBC #/AREA URNS HPF: ABNORMAL /HPF
RBC MORPH BLD: ABNORMAL
SODIUM SERPL-SCNC: 135 MMOL/L (ref 136–145)
SP GR UR REFRACTOMETRY: 1.02 (ref 1–1.02)
TRIGL SERPL-MCNC: 73 MG/DL (ref 35–150)
UROBILINOGEN UR QL STRIP.AUTO: 1 EU/DL (ref 0.2–1)
VLDLC SERPL CALC-MCNC: 14.6 MG/DL (ref 6–23)
WBC # BLD AUTO: 12.1 K/UL (ref 4.3–11.1)
WBC MORPH BLD: ABNORMAL
WBC URNS QL MICRO: ABNORMAL /HPF

## 2022-07-10 PROCEDURE — 2500000003 HC RX 250 WO HCPCS: Performed by: FAMILY MEDICINE

## 2022-07-10 PROCEDURE — 81001 URINALYSIS AUTO W/SCOPE: CPT

## 2022-07-10 PROCEDURE — 93306 TTE W/DOPPLER COMPLETE: CPT | Performed by: INTERNAL MEDICINE

## 2022-07-10 PROCEDURE — 1100000003 HC PRIVATE W/ TELEMETRY

## 2022-07-10 PROCEDURE — 97162 PT EVAL MOD COMPLEX 30 MIN: CPT

## 2022-07-10 PROCEDURE — 93306 TTE W/DOPPLER COMPLETE: CPT

## 2022-07-10 PROCEDURE — 76376 3D RENDER W/INTRP POSTPROCES: CPT | Performed by: INTERNAL MEDICINE

## 2022-07-10 PROCEDURE — 2580000003 HC RX 258: Performed by: FAMILY MEDICINE

## 2022-07-10 PROCEDURE — 6360000002 HC RX W HCPCS: Performed by: FAMILY MEDICINE

## 2022-07-10 PROCEDURE — 93880 EXTRACRANIAL BILAT STUDY: CPT

## 2022-07-10 PROCEDURE — 80061 LIPID PANEL: CPT

## 2022-07-10 PROCEDURE — 2580000003 HC RX 258: Performed by: STUDENT IN AN ORGANIZED HEALTH CARE EDUCATION/TRAINING PROGRAM

## 2022-07-10 PROCEDURE — 97165 OT EVAL LOW COMPLEX 30 MIN: CPT

## 2022-07-10 PROCEDURE — 6370000000 HC RX 637 (ALT 250 FOR IP): Performed by: FAMILY MEDICINE

## 2022-07-10 PROCEDURE — 1100000000 HC RM PRIVATE

## 2022-07-10 PROCEDURE — 97530 THERAPEUTIC ACTIVITIES: CPT

## 2022-07-10 PROCEDURE — 84132 ASSAY OF SERUM POTASSIUM: CPT

## 2022-07-10 PROCEDURE — 97535 SELF CARE MNGMENT TRAINING: CPT

## 2022-07-10 PROCEDURE — 2580000003 HC RX 258: Performed by: EMERGENCY MEDICINE

## 2022-07-10 RX ORDER — ASPIRIN 325 MG
325 TABLET ORAL DAILY
Status: DISCONTINUED | OUTPATIENT
Start: 2022-07-10 | End: 2022-07-11

## 2022-07-10 RX ORDER — PROMETHAZINE HYDROCHLORIDE 25 MG/1
12.5 TABLET ORAL EVERY 6 HOURS PRN
Status: DISCONTINUED | OUTPATIENT
Start: 2022-07-10 | End: 2022-07-19 | Stop reason: HOSPADM

## 2022-07-10 RX ORDER — 0.9 % SODIUM CHLORIDE 0.9 %
1000 INTRAVENOUS SOLUTION INTRAVENOUS ONCE
Status: COMPLETED | OUTPATIENT
Start: 2022-07-10 | End: 2022-07-10

## 2022-07-10 RX ORDER — ONDANSETRON 2 MG/ML
4 INJECTION INTRAMUSCULAR; INTRAVENOUS EVERY 6 HOURS PRN
Status: DISCONTINUED | OUTPATIENT
Start: 2022-07-10 | End: 2022-07-19 | Stop reason: HOSPADM

## 2022-07-10 RX ORDER — MAGNESIUM SULFATE IN WATER 40 MG/ML
2000 INJECTION, SOLUTION INTRAVENOUS PRN
Status: DISCONTINUED | OUTPATIENT
Start: 2022-07-10 | End: 2022-07-19 | Stop reason: HOSPADM

## 2022-07-10 RX ORDER — POTASSIUM CHLORIDE 20 MEQ/1
40 TABLET, EXTENDED RELEASE ORAL PRN
Status: DISCONTINUED | OUTPATIENT
Start: 2022-07-10 | End: 2022-07-19 | Stop reason: HOSPADM

## 2022-07-10 RX ORDER — SODIUM CHLORIDE 9 MG/ML
INJECTION, SOLUTION INTRAVENOUS PRN
Status: DISCONTINUED | OUTPATIENT
Start: 2022-07-10 | End: 2022-07-19 | Stop reason: HOSPADM

## 2022-07-10 RX ORDER — SODIUM CHLORIDE 0.9 % (FLUSH) 0.9 %
5-40 SYRINGE (ML) INJECTION PRN
Status: DISCONTINUED | OUTPATIENT
Start: 2022-07-10 | End: 2022-07-19 | Stop reason: HOSPADM

## 2022-07-10 RX ORDER — ENOXAPARIN SODIUM 100 MG/ML
40 INJECTION SUBCUTANEOUS DAILY
Status: DISCONTINUED | OUTPATIENT
Start: 2022-07-10 | End: 2022-07-19 | Stop reason: HOSPADM

## 2022-07-10 RX ORDER — MAGNESIUM HYDROXIDE/ALUMINUM HYDROXICE/SIMETHICONE 120; 1200; 1200 MG/30ML; MG/30ML; MG/30ML
30 SUSPENSION ORAL EVERY 6 HOURS PRN
Status: DISCONTINUED | OUTPATIENT
Start: 2022-07-10 | End: 2022-07-19 | Stop reason: HOSPADM

## 2022-07-10 RX ORDER — VALSARTAN 80 MG/1
80 TABLET ORAL DAILY
Status: DISCONTINUED | OUTPATIENT
Start: 2022-07-10 | End: 2022-07-11

## 2022-07-10 RX ORDER — SODIUM CHLORIDE 9 MG/ML
INJECTION, SOLUTION INTRAVENOUS CONTINUOUS
Status: DISCONTINUED | OUTPATIENT
Start: 2022-07-10 | End: 2022-07-12

## 2022-07-10 RX ORDER — POTASSIUM CHLORIDE 7.45 MG/ML
10 INJECTION INTRAVENOUS PRN
Status: DISCONTINUED | OUTPATIENT
Start: 2022-07-10 | End: 2022-07-19 | Stop reason: HOSPADM

## 2022-07-10 RX ORDER — ACETAMINOPHEN 325 MG/1
650 TABLET ORAL EVERY 6 HOURS PRN
Status: DISCONTINUED | OUTPATIENT
Start: 2022-07-10 | End: 2022-07-19 | Stop reason: HOSPADM

## 2022-07-10 RX ORDER — POLYETHYLENE GLYCOL 3350 17 G/17G
17 POWDER, FOR SOLUTION ORAL DAILY
Status: DISCONTINUED | OUTPATIENT
Start: 2022-07-10 | End: 2022-07-19 | Stop reason: HOSPADM

## 2022-07-10 RX ORDER — ACETAMINOPHEN 650 MG/1
650 SUPPOSITORY RECTAL EVERY 6 HOURS PRN
Status: DISCONTINUED | OUTPATIENT
Start: 2022-07-10 | End: 2022-07-19 | Stop reason: HOSPADM

## 2022-07-10 RX ORDER — SODIUM CHLORIDE 0.9 % (FLUSH) 0.9 %
5-40 SYRINGE (ML) INJECTION EVERY 12 HOURS SCHEDULED
Status: DISCONTINUED | OUTPATIENT
Start: 2022-07-10 | End: 2022-07-19 | Stop reason: HOSPADM

## 2022-07-10 RX ADMIN — Medication 5 UNITS: at 18:06

## 2022-07-10 RX ADMIN — ENOXAPARIN SODIUM 40 MG: 100 INJECTION SUBCUTANEOUS at 09:51

## 2022-07-10 RX ADMIN — VALSARTAN 80 MG: 80 TABLET ORAL at 09:51

## 2022-07-10 RX ADMIN — ASPIRIN 325 MG ORAL TABLET 325 MG: 325 PILL ORAL at 09:51

## 2022-07-10 RX ADMIN — SODIUM CHLORIDE, PRESERVATIVE FREE 10 ML: 5 INJECTION INTRAVENOUS at 09:54

## 2022-07-10 RX ADMIN — SODIUM CHLORIDE: 9 INJECTION, SOLUTION INTRAVENOUS at 18:01

## 2022-07-10 RX ADMIN — SODIUM CHLORIDE, PRESERVATIVE FREE 10 ML: 5 INJECTION INTRAVENOUS at 21:52

## 2022-07-10 RX ADMIN — SODIUM CHLORIDE 1000 ML: 9 INJECTION, SOLUTION INTRAVENOUS at 04:12

## 2022-07-10 RX ADMIN — POLYETHYLENE GLYCOL 3350 17 G: 17 POWDER, FOR SOLUTION ORAL at 09:51

## 2022-07-10 RX ADMIN — SODIUM CHLORIDE: 9 INJECTION, SOLUTION INTRAVENOUS at 07:55

## 2022-07-10 ASSESSMENT — PAIN DESCRIPTION - DESCRIPTORS: DESCRIPTORS: SORE;ACHING

## 2022-07-10 ASSESSMENT — PAIN - FUNCTIONAL ASSESSMENT: PAIN_FUNCTIONAL_ASSESSMENT: PREVENTS OR INTERFERES WITH ALL ACTIVE AND SOME PASSIVE ACTIVITIES

## 2022-07-10 ASSESSMENT — PAIN DESCRIPTION - ORIENTATION
ORIENTATION: RIGHT
ORIENTATION: RIGHT

## 2022-07-10 ASSESSMENT — PAIN SCALES - GENERAL
PAINLEVEL_OUTOF10: 5
PAINLEVEL_OUTOF10: 5
PAINLEVEL_OUTOF10: 0

## 2022-07-10 ASSESSMENT — PAIN DESCRIPTION - LOCATION
LOCATION: SHOULDER
LOCATION: SHOULDER

## 2022-07-10 NOTE — PROGRESS NOTES
PHYSICAL THERAPY Initial Assessment and AM  (Link to Caseload Tracking: PT Visit Days : 1  Acknowledge Orders  Time In/Out  PT Charge Capture  Rehab Caseload Tracker    Em Norman is a 78 y.o. male   PRIMARY DIAGNOSIS: Rhabdomyolysis  Rhabdomyolysis [M62.82]  Fall, initial encounter [W19. XXXA]  Non-traumatic rhabdomyolysis [M62.82]       Reason for Referral: Generalized Muscle Weakness (M62.81)  Other lack of cordination (R27.8)  Difficulty in walking, Not elsewhere classified (R26.2)  Other abnormalities of gait and mobility (R26.89)  History of falling (Z91.81)  Inpatient: Payor: Cynthia Benites / Plan: Shannan Brett / Product Type: *No Product type* /     ASSESSMENT:     REHAB RECOMMENDATIONS:   Recommendation to date pending progress:  Setting:   Short-term Rehab    Equipment:     To Be Determined     ASSESSMENT:  Mr. Imani Peterson is an 78year old M who presents to hospital after a fall at home; he reports he lost his cane since it fell off the chair he tyipcally hangs it on, which resulted in him losing his balance. He hit his head and his RUE shoulder. All imaging of C-spine, CT head, and R shoulder negative for acute injury. This date pt performs mobility including bed mobility, sitting balance activities (including scooting) with Mod Ax2, sit <> stand transfers and steps to bedside chair with Min Ax2, BUE support at RW. Pt very guarded with RUE shoulder, which is impacting his mobility. He is weak and unsteady on his feet. Pt presents as functioning below his baseline, with deficits in mobility including transfers, gait, balance, and activity tolerance. Pt will benefit from skilled therapy services to address stated deficits to promote return to highest level of function, independence, and safety. Will continue to follow.      [address] [facility] [address] [address] AM-PAC 6 Clicks Basic Mobility Inpatient Short Form  AM-PAC Mobility Inpatient   How much difficulty turning over in bed?: A Lot  How much difficulty sitting down on / standing up from a chair with arms?: A Little  How much difficulty moving from lying on back to sitting on side of bed?: A Lot  How much help from another person moving to and from a bed to a chair?: A Little  How much help from another person needed to walk in hospital room?: A Little  How much help from another person for climbing 3-5 steps with a railing?: A Lot  AM-PAC Inpatient Mobility Raw Score : 15  AM-PAC Inpatient T-Scale Score : 39.45  Mobility Inpatient CMS 0-100% Score: 57.7  Mobility Inpatient CMS G-Code Modifier : CK    SUBJECTIVE:   Mr. Juancarlos Mota states, \"I was down for awhile\"     Social/Functional Lives With: Alone  Type of Home: Apartment  Home Layout: One level  Home Access: Stairs to enter with rails  Entrance Stairs - Number of Steps: 13  Home Equipment: Cane    OBJECTIVE:     PAIN: Delrae Popper / O2: PRECAUTION / Scott Grumet / Marinus Saliva:   Pre Treatment:   Pain Assessment: 0-10  Pain Level: 5  Pain Location: Shoulder  Pain Orientation: Right  Pain Descriptors: Sore;Aching  Functional Pain Assessment: Prevents or interferes with all active and some passive activities      Post Treatment: 5/10 Vitals        Oxygen      IV and Purewick    RESTRICTIONS/PRECAUTIONS:  Restrictions/Precautions: Fall Risk,Bed Alarm                 GROSS EVALUATION: Intact Impaired (Comments):   AROM [x]     PROM [x]    Strength []   Generalized weakness   Balance [] Sitting - Static: Fair  Sitting - Dynamic: Fair,-  Standing - Static: Fair,-  Standing - Dynamic: Poor   Posture [] Forward Head  Rounded Shoulders   Sensation [x]     Coordination [x]      Tone []     Edema []    Activity Tolerance []   limited    []      COGNITION/  PERCEPTION: Intact Impaired (Comments):   Orientation [x]     Vision [x]     Hearing [x]     Cognition  [x]       MOBILITY: I Mod I S SBA CGA Min Mod Max Total  NT x2 Comments:   Bed Mobility    Rolling [] [] [] [] [] [] [x] [x] [] [] [x]    Supine to Sit [] [] [] [] [] [] [] [x] [] [] [x]    Scooting [] [] [] [] [] [] [x] [x] [] [] [x]    Sit to Supine [] [] [] [] [] [] [x] [x] [] [] [x]    Transfers    Sit to Stand [] [] [] [] [] [x] [] [] [] [] [x]    Bed to Chair [] [] [] [] [] [x] [] [] [] [] [x]    Stand to Sit [] [] [] [] [] [x] [] [] [] [] [x]     [] [] [] [] [] [] [] [] [] [] []    I=Independent, Mod I=Modified Independent, S=Supervision, SBA=Standby Assistance, CGA=Contact Guard Assistance,   Min=Minimal Assistance, Mod=Moderate Assistance, Max=Maximal Assistance, Total=Total Assistance, NT=Not Tested    GAIT: I Mod I S SBA CGA Min Mod Max Total  NT x2 Comments:   Level of Assistance [] [] [] [] [] [x] [] [] [] [] [x]    Distance 5 feet    DME Gait Belt and Rolling Walker    Gait Quality Decreased step clearance, Decreased step length, Decreased stance, Path deviations  and Trunk sway increased    Weightbearing Status Restrictions/Precautions  Restrictions/Precautions: Fall Risk,Bed Alarm    Stairs      I=Independent, Mod I=Modified Independent, S=Supervision, SBA=Standby Assistance, CGA=Contact Guard Assistance,   Min=Minimal Assistance, Mod=Moderate Assistance, Max=Maximal Assistance, Total=Total Assistance, NT=Not Tested    PLAN:   ACUTE PHYSICAL THERAPY GOALS:   (Developed with and agreed upon by patient and/or caregiver.)  (1.) Venkat Espinal will move from supine to sit and sit to supine , scoot up and down and roll side to side with MODIFIED INDEPENDENCE within 7 treatment day(s). (2.) Venkat Espinal will transfer from bed to chair and chair to bed with MODIFIED INDEPENDENCE using the least restrictive device within 7 treatment day(s). (3.) Venkat Espinal will ambulate with MODIFIED INDEPENDENCE for 250 feet with the least restrictive device within 7 treatment day(s). (4.) Venkat Espinal will perform standing static and dynamic balance activities x 20 minutes with MODIFIED INDEPENDENCE to improve safety within 7 treatment day(s).   (5.) Dustin Coughlin

## 2022-07-10 NOTE — H&P
Hospitalist History and Physical   Admit Date:  2022 10:20 PM   Name:  Vy Calles   Age:  78 y.o. Sex:  male  :  1940   MRN:  [id number]   Room:  [facility]/    Presenting Complaint: Fall     Reason(s) for Admission: Rhabdomyolysis [M62.82]  Fall, initial encounter [W19. XXXA]  Non-traumatic rhabdomyolysis [M62.82]     History of Present Illness:   Vy Calles is a 78 y.o. male with medical history of pelvic fracture, allergic rhinitis, scarlet fever who presented with unwitnessed fall. Patient stated that he went and use the bathroom to urinate this morning and suddenly became dizzy and disoriented. Patient attempted to get back into his bed but unfortunately struck his head against a piece of furniture. Patient stated he did not lose consciousness but was unable to get himself up back into bed. Patient's daughter from [place] and was unable to reach patient and subsequently called paramedics to go to patient's home. Patient was found on the floor found to be in discomfort with his head and right shoulder. Patient denied any cardiac chest pain, shortness of breath, abdominal pain, fever/chills. Review of Systems:  10 systems reviewed and negative except as noted in HPI.   Assessment & Plan:   Rhabdomyolysis  -CK greater than 700  - Repeat CK tomorrow  - Continue IV fluids  - PT/OT  - PT recommending short-term rehab    Frequent falls/dizziness  - Longstanding history of frequent falls  -History of broken pelvis  - Telemetry  - Echo pending  - PT/OT  - Orthostatics  - Carotid Doppler    Essential hypertension  - Continue home valsartan    Hyperkalemia  - On admission potassium 6.8  - 7/10 potassium currently 4.1  - Continue to monitor daily BMP    Proximal atrial fibrillation  - Per daughter patient was supposed to go and have follow-up with cardiologist but never went  - Patient poor candidate for anticoagulation secondary to frequent falls  - Recommend outpatient cardiology  - Telemetry    Stage IIIa chronic kidney disease  - Continue to monitor daily BMP  - Creatinine on admission 1.4  - Avoid nephrotoxic agents    Chronic diastolic congestive heart failure  - Echo pending  - On maintenance IV fluids secondary to rhabdomyolysis, continue to monitor for fluid overload  - Patient should follow-up outpatient cardiology at discharge      Dispo/Discharge Planning:   Dispo pending clinical course. PT recommending short-term rehab    Diet: ADULT DIET; Regular  VTE ppx: Lovenox  Code status: Full Code    Hospital Problems:  Principal Problem:    Rhabdomyolysis  Active Problems:    Essential hypertension    Paroxysmal atrial fibrillation (HCC)    Stage 3a chronic kidney disease ([address]    Chronic diastolic congestive heart failure ([facility]  Resolved Problems:    Chronic hyponatremia       Past History:     Past Medical History:   Diagnosis Date    Allergic rhinitis     Fractured pelvis ([facility]     Rell Cocker off of roof.  History of chicken pox Childhood    Scarlet fever Childhood    Zenker's diverticulum     In the throat. 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  VASECTOMY        Social History     Tobacco Use    Smoking status: Former Smoker     Quit date: 1976     Years since quittin.11    Smokeless tobacco: Never Used   Substance Use Topics    Alcohol use: Yes     Alcohol/week: 1.7 standard drinks      Social History     Substance and Sexual Activity   Drug Use No     Family History   Problem Relation Age of Onset    Heart Disease Father     Cancer Neg Hx     Stroke Father     Diabetes Father     Heart Disease Mother     Hypertension Mother       Family history reviewed and negative except as noted above.       Immunization History   Administered Date(s) Administered    COVID-19, PFIZER PURPLE top, DILUTE for use, (age 15 y+), 30mcg/0.3mL 2021, 2021  Influenza Trivalent 01/15/2014    Influenza, High Dose (Fluzone 65 yrs and older) 12/01/2017, 12/05/2018    Influenza, High-dose, Jason Staple, 65 yrs +, IM (Fluzone) 12/08/2020, 10/13/2021    Influenza, Triv, inactivated, subunit, adjuvanted, IM (Fluad 65 yrs and older) 12/09/2019    PPD Test 08/26/2021    Pneumococcal Conjugate 13-valent ([id number]) 12/01/2017    Pneumococcal Polysaccharide ([id number]) 07/15/2013    Tdap (Boostrix, Adacel) 01/01/2019     Allergies   Allergen Reactions    Cephalosporins Other (See Comments)     Prior to Admit Medications:  Current Outpatient Medications   Medication Instructions    valsartan (DIOVAN) 80 MG tablet TAKE 1 TABLET BY MOUTH DAILY         Objective:     Patient Vitals for the past 24 hrs:   Temp Pulse Resp BP SpO2   07/10/22 1220 97.9 °F (36.6 °C) 83 24 127/73 96 %   07/10/22 0726 97.9 °F (36.6 °C) 89 (!) 32 (!) 172/87 96 %   07/10/22 0609 -- -- -- -- 95 %   07/10/22 0528 -- 84 29 (!) 164/96 90 %   07/10/22 0458 -- 86 (!) 34 (!) 181/92 95 %   07/10/22 0405 -- -- 23 -- 94 %   07/10/22 0322 97.7 °F (36.5 °C) -- -- -- --   07/10/22 0239 -- 86 (!) 35 (!) 169/96 (!) 79 %   07/10/22 0229 -- 83 28 (!) 166/89 95 %   07/10/22 0159 -- 76 24 (!) 184/98 93 %   07/10/22 0129 -- 82 21 (!) 176/97 97 %   07/10/22 0059 -- 76 27 (!) 176/88 95 %   07/10/22 0029 -- 79 29 (!) 177/96 93 %   07/09/22 2359 -- 84 25 (!) 174/99 95 %       Oxygen Therapy  SpO2: 96 %  Pulse Oximeter Device Mode: Continuous  O2 Device: None (Room air)    Estimated body mass index is 23.67 kg/m² as calculated from the following:    Height as of this encounter: 5' 4\" (1.626 m). Weight as of this encounter: 137 lb 14.4 oz (62.6 kg). Intake/Output Summary (Last 24 hours) at 7/10/2022 1442  Last data filed at 7/10/2022 0551  Gross per 24 hour   Intake 3193.58 ml   Output --   Net 3193.58 ml         Physical Exam:  Blood pressure 127/73, pulse 83, temperature 97.9 °F (36.6 °C), temperature source Oral, resp. rate 24, height 5' 4\" (1.626 m), weight 137 lb 14.4 oz (62.6 kg), SpO2 96 %. General:    Well nourished. Frail, elderly  Head:  Normocephalic, laceration to the top of patient's scalp  Eyes:  Sclerae appear normal.  Pupils equally round. ENT:  Nares appear normal, no drainage. Moist oral mucosa  Neck:  No restricted ROM. Trachea midline   CV:   RRR. No m/r/g. No jugular venous distension. Lungs:   CTAB. No wheezing, rhonchi, or rales. Symmetric expansion. Abdomen: Bowel sounds present. Soft, nontender, nondistended. Extremities: No cyanosis or clubbing. No edema  Skin:     No rashes and normal coloration. Warm and dry. Bruising to chest.  Neuro:  CN II-XII grossly intact. Sensation intact. A&Ox3  Psych:  Normal mood and affect.       I have reviewed ordered lab tests and independently visualized imaging below:    Last 24hr Labs:  Recent Results (from the past 24 hour(s))   CBC with Auto Differential    Collection Time: 07/09/22 11:35 PM   Result Value Ref Range    WBC 12.1 (H) 4.3 - 11.1 K/uL    RBC 5.03 4.23 - 5.6 M/uL    Hemoglobin 16.1 13.6 - 17.2 g/dL    Hematocrit 48.5 41.1 - 50.3 %    MCV 96.4 79.6 - 97.8 FL    MCH 32.0 26.1 - 32.9 PG    MCHC 33.2 31.4 - 35.0 g/dL    RDW 13.9 11.9 - 14.6 %    Platelets 020 999 - 632 K/uL    MPV 10.8 9.4 - 12.3 FL    nRBC 0.00 0.0 - 0.2 K/uL    Seg Neutrophils 85 (H) 43 - 78 %    Lymphocytes 6 (L) 13 - 44 %    Monocytes 9 4.0 - 12.0 %    Eosinophils % 0 (L) 0.5 - 7.8 %    Basophils 0 0.0 - 2.0 %    Immature Granulocytes 0 0.0 - 5.0 %    Segs Absolute 10.3 (H) 1.7 - 8.2 K/UL    Absolute Lymph # 0.7 0.5 - 4.6 K/UL    Absolute Mono # 1.1 0.1 - 1.3 K/UL    Absolute Eos # 0.0 0.0 - 0.8 K/UL    Basophils Absolute 0.0 0.0 - 0.2 K/UL    Absolute Immature Granulocyte 0.0 0.0 - 0.5 K/UL    RBC Comment NORMOCYTIC/NORMOCHROMIC      WBC Comment Result Confirmed By Smear      Platelet Comment PLATELET AGGREGATES PRESENT      Differential Type AUTOMATED     Comprehensive Metabolic Panel    Collection Time: 07/09/22 11:35 PM   Result Value Ref Range    Sodium 135 (L) 136 - 145 mmol/L    Potassium 6.8 (HH) 3.5 - 5.1 mmol/L    Chloride 102 98 - 107 mmol/L    CO2 21 21 - 32 mmol/L    Anion Gap 12 7 - 16 mmol/L    Glucose 81 65 - 100 mg/dL    BUN 25 (H) 8 - 23 MG/DL    CREATININE 1.40 0.8 - 1.5 MG/DL    GFR African American >60 >60 ml/min/1.73m2    GFR Non- 51 (L) >60 ml/min/1.73m2    Calcium 9.2 8.3 - 10.4 MG/DL    Total Bilirubin 2.5 (H) 0.2 - 1.1 MG/DL    ALT 37 12 - 65 U/L     (H) 15 - 37 U/L    Alk Phosphatase 177 (H) 50 - 136 U/L    Total Protein 9.0 (H) 6.3 - 8.2 g/dL    Albumin 2.7 (L) 3.2 - 4.6 g/dL    Globulin 6.3 (H) 2.3 - 3.5 g/dL    Albumin/Globulin Ratio 0.4 (L) 1.2 - 3.5     CK    Collection Time: 07/09/22 11:35 PM   Result Value Ref Range    Total  (H) 21 - 215 U/L   Urinalysis w rflx microscopic    Collection Time: 07/10/22  2:43 AM   Result Value Ref Range    Color, UA YELLOW/STRAW      Appearance CLOUDY      Specific [place], UA 1.021 1.001 - 1.023      pH, Urine 7.0 5.0 - 9.0      Protein,  (A) NEG mg/dL    Glucose, UA Negative mg/dL    Ketones, Urine 15 (A) NEG mg/dL    Bilirubin Urine Negative NEG      Blood, Urine SMALL (A) NEG      Urobilinogen, Urine 1.0 0.2 - 1.0 EU/dL    Nitrite, Urine Negative NEG      Leukocyte Esterase, Urine Negative NEG      WBC, UA 0-4 (A) NORM /hpf    RBC, UA 0-5 (A) NORM /hpf    Epithelial Cells UA 0-5 (A) NORM /hpf    BACTERIA, URINE Negative (A) NORM /hpf    Casts 0-2 (A) NORM /lpf   Potassium    Collection Time: 07/10/22  3:21 AM   Result Value Ref Range    Potassium 4.1 3.5 - 5.1 mmol/L       Other Studies:  XR SHOULDER RIGHT (MIN 2 VIEWS)    Result Date: 7/10/2022  EXAMINATION: XR SHOULDER RIGHT (MIN 2 VIEWS) HISTORY: Shoulder pain. TECHNIQUE: 8/24/2018 of the right shoulder. COMPARISON: None available. FINDINGS: There is no evidence of acute fracture or dislocation.  The acromioclavicular and glenohumeral joints are intact. The bones appear diffusely osteopenic. Visualized ribs again show multiple healed fractures. Soft tissues and upper lung field are unremarkable. No evidence of acute fracture or dislocation. The bones appear diffusely osteopenic. CT HEAD WO CONTRAST    Result Date: 7/9/2022  EXAMINATION: CT HEAD WO CONTRAST 7/9/2022 10:50 PM ACCESSION NUMBER: [id number] COMPARISON: None available INDICATION: fall hematoma on back of head TECHNIQUE: Multiple-row detector helical CT examination of the head without intravenous contrast. Radiation dose reduction techniques were used for this study. Our CT scanners use one or all of the following: Automated exposure control, adjustment of the mA and/or kV according to patient size, iterative reconstruction. FINDINGS: The ventricles are within normal limits for the degree of global brain parenchymal volume loss. There is no midline shift. The basilar cisterns are patent. There is no cerebellar tonsillar ectopia or herniation. Hypodensities in the periventricular and subcortical white matter are nonspecific, but they are most likely to represent chronic microangiopathy. Prior bilateral lens replacement. There is no evidence of acute intracranial hemorrhage or extra-axial collections. There is no CT evidence of acute infarction. The paranasal sinuses and mastoid air cells are well aerated and clear. No evidence of skull fracture. No evidence of acute intracranial abnormality. CT CERVICAL SPINE WO CONTRAST    Result Date: 7/9/2022  EXAMINATION: CT CERVICAL SPINE WO CONTRAST HISTORY: fall striking back of head. TECHNIQUE: Noncontrast CT of cervical spine was performed in the axial plane. Coronal and sagittal reformatted images were created and reviewed. Dose reduction technique used: Automated exposure control/Adjustment of the mA and/or kV according to patient size/Use of iterative reconstruction technique. COMPARISON: None.  FINDINGS: No evidence of acute fracture or traumatic subluxation. There is normal cervical lordosis. Vertebral body heights and intervertebral disc spaces are maintained. The occipital condyles and visualized skull base is unremarkable. Multilevel degenerative changes are present throughout the cervical spine. No high-grade spinal canal or neural foraminal stenosis is identified. There is no abnormal prevertebral soft tissue edema. No fluid collections are identified within the paraspinal soft tissues. No CT evidence of an acute fracture or traumatic subluxation. Echocardiogram:  No results found for this or any previous visit.       Meds previously ordered:  Orders Placed This Encounter   Medications    0.9 % sodium chloride bolus    valsartan (DIOVAN) tablet 80 mg    aspirin tablet 325 mg    calcium-cholecalciferol 500-200 MG-UNIT per tablet 1 tablet    0.9 % sodium chloride infusion    sodium chloride flush 0.9 % injection 5-40 mL    sodium chloride flush 0.9 % injection 5-40 mL    0.9 % sodium chloride infusion    OR Linked Order Group     potassium chloride (KLOR-CON M) extended release tablet 40 mEq     potassium bicarb-citric acid (EFFER-K) effervescent tablet 40 mEq     potassium chloride 10 mEq/100 mL IVPB (Peripheral Line)    magnesium sulfate 2000 mg in 50 mL IVPB premix    enoxaparin (LOVENOX) injection 40 mg     Order Specific Question:   Indication of Use     Answer:   Prophylaxis-DVT/PE    OR Linked Order Group     promethazine (PHENERGAN) tablet 12.5 mg     ondansetron (ZOFRAN) injection 4 mg    polyethylene glycol (GLYCOLAX) packet 17 g    bisacodyl (DULCOLAX) EC tablet 5 mg    aluminum & magnesium hydroxide-simethicone (MAALOX) 200-200-20 MG/5ML suspension 30 mL    OR Linked Order Group     acetaminophen (TYLENOL) tablet 650 mg     acetaminophen (TYLENOL) suppository 650 mg    tuberculin injection 5 Units       Signed:  Ngoc Giron MD    Part of this note may have been written by using a voice dictation software. The note has been proof read but may still contain some grammatical/other typographical errors.

## 2022-07-10 NOTE — PROGRESS NOTES
OCCUPATIONAL THERAPY Initial Assessment and Daily Note       OT Visit Days: 1  Acknowledge Orders  Time  OT Charge Capture  Rehab Caseload Tracker      Mj Sy is a 78 y.o. male   PRIMARY DIAGNOSIS: Rhabdomyolysis  Rhabdomyolysis [M62.82]  Fall, initial encounter [W19. XXXA]  Non-traumatic rhabdomyolysis [M62.82]       Reason for Referral: Generalized Muscle Weakness (M62.81)  Other lack of cordination (R27.8)  Difficulty in walking, Not elsewhere classified (R26.2)  Inpatient: Payor: Aisha Charles / Plan: Mary Sat / Product Type: *No Product type* /     ASSESSMENT:     REHAB RECOMMENDATIONS:   Recommendation to date pending progress:  Setting:   Short-term Rehab    Equipment:     To Be Determined     ASSESSMENT:  Mr. Houston Gaytan presented to the hospital after suffering and unwitnessed fall. Pt reported he was down for 3 days. Complains of R shoulder pain but x-ray was negative. At baseline, pt lives alone and is independent for ADLs and IADLs--reported he still drives. Today, pt was received supine in bed. ModA for L and R rolling. MaxA for toileting. Completed bed mobility Franco x2. Franco for UB/dressing and mod-maxA for LB bathing/dressing. Sit>stand and SPT to chair Franco x2 with RW. Pt is unsteady and a falls risk--recommend STR at this time. Mj Sy currently demonstrates overall deficits in strength, balance, activity tolerance, and ADL performance. Patient would benefit from skilled OT services at this time in order to address functional deficits and OT goals stated above.      [facility] [address] [address] AM-PAC 6 Clicks Daily Activity Inpatient Short Form:    AM-PAC Daily Activity Inpatient   How much help for putting on and taking off regular lower body clothing?: A Lot  How much help for Bathing?: A Lot  How much help for Toileting?: A Lot  How much help for putting on and taking off regular upper body clothing?: A Little  How much help for taking care of personal grooming?: A Little  How much help for eating meals?: None  AM-PAC Inpatient Daily Activity Raw Score: 16  AM-PAC Inpatient ADL T-Scale Score : 35.96  ADL Inpatient CMS 0-100% Score: 53.32  ADL Inpatient CMS G-Code Modifier : CK      SUBJECTIVE:     Mr. Ledesma Safer states, \"I go to the [employer] everyday. \"     Social/Functional Lives With: Alone  Type of Home: Apartment  Home Layout: One level  Home Access: Stairs to enter with rails  Entrance Stairs - Number of Steps: 13  Home Equipment: Cane    OBJECTIVE:     Clifm Vanessa / Kasandra Bucklin / Shaji Shall: IV and male external catheter    RESTRICTIONS/PRECAUTIONS:  Restrictions/Precautions: Fall Risk,Bed Alarm    PAIN: Elysia Leather / O2:   Pre Treatment:   Pain Assessment: 0-10  Pain Level: 5  Pain Location: Shoulder  Pain Orientation: Right      Post Treatment: no change        Vitals          Oxygen            GROSS EVALUATION: INTACT IMPAIRED   (See Comments)   UE AROM [] []LUE limited by shoulder pain    UE PROM [] []imited by shoulder pain   Strength []   generalized weakness    Posture / Balance [] Fair+ sitting, fair standing    Sensation [x]     Coordination []       Tone [x]       Edema [x]    Activity Tolerance []  limited by pain     Hand Dominance R [] L []      COGNITION/  PERCEPTION: INTACT IMPAIRED   (See Comments)   Orientation [x]     Vision [x]     Hearing [x]     Cognition  [] Decreased safety awareness and poor insight into functional deficits    Perception []       MOBILITY: I Mod I S SBA CGA Min Mod Max Total  NT x2 Comments:   Bed Mobility    Rolling [] [] [] [] [] [] [x] [] [] [] []    Supine to Sit [] [] [] [] [] [x] [] [] [] [] [x]    Scooting [] [] [] [] [] [] [] [x] [] [] []    Sit to Supine [] [] [] [] [] [] [] [] [] [x] [] Left sitting in the chair    Transfers    Sit to Stand [] [] [] [] [] [x] [] [] [] [] [x]    Bed to Chair [] [] [] [] [] [x] [] [] [] [] [x] RW   Stand to Sit [] [] [] [] [] [x] [] [] [] [] []    Tub/Shower [] [] [] [] [] [] [] [] [] [x] [] Toilet [] [] [] [] [] [] [] [] [] [x] []      [] [] [] [] [] [] [] [] [] [] []    I=Independent, Mod I=Modified Independent, S=Supervision/Setup, SBA=Standby Assistance, CGA=Contact Guard Assistance, Min=Minimal Assistance, Mod=Moderate Assistance, Max=Maximal Assistance, Total=Total Assistance, NT=Not Tested    ACTIVITIES OF DAILY LIVING: I Mod I S SBA CGA Min Mod Max Total NT Comments   BASIC ADLs:              Upper Body Bathing  [] [] [] [] [] [x] [] [] [] [] EOB   Lower Body Bathing [] [] [] [] [] [] [] [x] [] [] EOB   Toileting [] [] [] [] [] [] [] [x] [] [] Soiled in urine upon arrival   Upper Body Dressing [] [] [] [] [] [x] [] [] [] [] Doffed shirt and donned gown    Lower Body Dressing [] [] [] [] [] [] [] [x] [] [] Socks    Feeding [] [] [] [] [] [] [] [] [] [x]    Grooming [] [] [] [] [x] [] [] [] [] [] Washed face EOB   Personal Device Care [] [] [] [] [] [] [] [] [] [x]    Functional Mobility [] [] [] [] [] [x] [] [] [] [] x2   I=Independent, Mod I=Modified Independent, S=Supervision/Setup, SBA=Standby Assistance, CGA=Contact Guard Assistance, Min=Minimal Assistance, Mod=Moderate Assistance, Max=Maximal Assistance, Total=Total Assistance, NT=Not Tested    PLAN:     FREQUENCY/DURATION   OT Plan of Care: 3 times/week for duration of hospital stay or until stated goals are met, whichever comes first.    ACUTE OCCUPATIONAL THERAPY GOALS:   (Developed with and agreed upon by patient and/or caregiver.)  1. Patient will complete lower body bathing and dressing with MIN A and adaptive equipment as needed. 2.Patient will complete upper body bathing and dressing with MOD I and adaptive equipment as needed. 3. Patient will complete toileting with MIN A.   4. Patient will tolerate 25 minutes of OT treatment with 1-2 rest breaks to increase activity tolerance for ADLs. 5. Patient will complete functional transfers with SUPERVISION and adaptive equipment as needed.    6. Patient will complete functional activity with MOD I and adaptive equipment as needed. Timeframe: 7 visits     PROBLEM LIST:   (Skilled intervention is medically necessary to address:)  Decreased ADL/Functional Activities  Decreased Activity Tolerance  Decreased AROM/PROM  Decreased Balance  Decreased Cognition  Decreased Coordination  Decreased Safety Awareness  Decreased Strength  Decreased Transfer Abilities   INTERVENTIONS PLANNED:  (Benefits and precautions of occupational therapy have been discussed with the patient.)  Self Care Training  Therapeutic Activity  Therapeutic Exercise/HEP  Neuromuscular Re-education  Manual Therapy  Education         TREATMENT:     EVALUATION: LOW COMPLEXITY: (Untimed Charge)    TREATMENT:   Co-Treatment PT/OT necessary due to patient's decreased overall endurance/tolerance levels, as well as need for high level skilled assistance to complete functional transfers/mobility and functional tasks  Self Care (15 minutes): Patient participated in upper body bathing, lower body bathing, toileting, upper body dressing, lower body dressing and grooming ADLs in supported sitting, unsupported sitting and supine with minimal verbal cueing to increase independence, decrease assistance required, increase activity tolerance and increase safety awareness. Patient also participated in functional mobility and functional transfer training to increase independence, decrease assistance required, increase activity tolerance and increase safety awareness. TREATMENT GRID:  N/A    AFTER TREATMENT PRECAUTIONS: Call light within reach, Chair, Needs within reach and RN notified    INTERDISCIPLINARY COLLABORATION:  RN/ PCT, PT/ PTA and OT/ AMADOR    EDUCATION:  Education Given To: Patient  Education Provided: Role of Therapy;Plan of Care; Fall Prevention Strategies  Education Outcome: Verbalized understanding;Demonstrated understanding    TOTAL TREATMENT DURATION AND TIME:  Time In: 1008  Time Out: 1034  Minutes: [address], OT

## 2022-07-10 NOTE — ED PROVIDER NOTES
[facility] Emergency Department Provider Note                   PCP:                Caryn Bell MD               Age: 78 y.o. Sex: male     No diagnosis found. DISPOSITION          MDM  Number of Diagnoses or Management Options  Diagnosis management comments: 53-year-old male patient, currently living alone, suffered unwitnessed fall secondary to disorientation and dizziness at 4 AM this morning. He remained on the floor throughout the day until providers were called by his daughter in [place]. They are required to break down patient's tumor in order to get to him. He presents to this department with reports of ongoing, diffuse weakness, headache and right arm pain/shoulder pain. Amount and/or Complexity of Data Reviewed  Clinical lab tests: ordered and reviewed  Tests in the radiology section of CPT®: ordered and reviewed  Tests in the medicine section of CPT®: ordered and reviewed  Review and summarize past medical records: yes  Independent visualization of images, tracings, or specimens: yes    Risk of Complications, Morbidity, and/or Mortality  Presenting problems: high  Diagnostic procedures: high  Management options: high         Orders Placed This Encounter   Procedures    CT HEAD WO CONTRAST    XR SHOULDER RIGHT (MIN 2 VIEWS)    CT CERVICAL SPINE WO CONTRAST    CBC with Auto Differential    Comprehensive Metabolic Panel    CK    Urinalysis w rflx microscopic    EKG 12 Lead        Rody Arguello is a 78 y.o. male who presents to the Emergency Department with chief complaint of    Chief Complaint   Patient presents with   Theodoro Milder Fall      78year-old male patient presenting to this department with reports of unwitnessed fall. Patient states he got up to urinate this morning around 4 AM.  He states he became dizzy and disoriented. He attempted to sit back down in bed but fell to the floor striking his head against a piece of furniture.   He denies loss of consciousness but was unable to help himself to a standing position or get back in bed. Patient's daughter in [place] was unable to reach patient, subsequently called paramedics to had to break down patient's door in order to get into the home. They found patient on the floor. Patient reports mild discomfort in his head as well as significant right shoulder pain which he states he injured during the fall. Patient states he was just too weak to stand. He denies any recent fever, chills, chest pain pressure or tightness. He reports no nausea or vomiting and denies any changes in urinary habits. Patient reportedly lives alone currently. Review of Systems   Constitutional: Positive for fatigue. Negative for chills. HENT: Negative for congestion. Eyes: Negative for visual disturbance. Respiratory: Negative for cough, chest tightness, shortness of breath and wheezing. Cardiovascular: Negative for chest pain and leg swelling. Gastrointestinal: Negative for abdominal distention, abdominal pain, constipation, nausea and vomiting. Genitourinary: Negative for difficulty urinating, dysuria, flank pain and hematuria. Musculoskeletal: Positive for arthralgias. Negative for back pain, neck pain and neck stiffness. Skin: Negative for color change. Neurological: Positive for dizziness, weakness, light-headedness and headaches. Negative for numbness. All other systems reviewed and are negative. Past Medical History:   Diagnosis Date    Allergic rhinitis     Fractured pelvis ([address] 2006    Eva Libel off of roof.  History of chicken pox Childhood    Scarlet fever Childhood    Zenker's diverticulum     In the throat. 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     VASECTOMY          Family History   Problem Relation Age of Onset    Heart Disease Father     Cancer Neg Hx     Stroke Father     Diabetes Father     Heart Disease Mother     Hypertension Mother            Social Connections:     Frequency of Communication with Friends and Family: Not on file    Frequency of Social Gatherings with Friends and Family: Not on file    Attends Anglican Services: Not on file    Active Member of Clubs or Organizations: Not on file    Attends Club or Organization Meetings: Not on file    Marital Status: Not on file        Allergies   Allergen Reactions    Cephalosporins Other (See Comments)        Vitals signs and nursing note reviewed. No data found. Physical Exam  Vitals and nursing note reviewed. Constitutional:       General: He is not in acute distress. Appearance: Normal appearance. He is normal weight. He is not ill-appearing or toxic-appearing. Comments: Elderly, slightly disheveled-appearing, alert and oriented x4. No acute distress, speaks in clear, fluid sentences. HENT:      Head: Normocephalic. Comments: Superficial, linear laceration to the top of patient's scalp, measures 3 cm in length. Right Ear: Tympanic membrane, ear canal and external ear normal.      Left Ear: Tympanic membrane, ear canal and external ear normal.      Nose: Nose normal.      Mouth/Throat:      Mouth: Mucous membranes are moist.   Eyes:      General: No scleral icterus. Right eye: No discharge. Left eye: No discharge. Extraocular Movements: Extraocular movements intact. Cardiovascular:      Rate and Rhythm: Normal rate and regular rhythm. Pulses: Normal pulses. Heart sounds: Normal heart sounds. Pulmonary:      Effort: Pulmonary effort is normal. No respiratory distress. Chest:          Comments: There is bruising over the sternal end of the right clavicle. This area is slightly tender to palpation. Abdominal:      General: Abdomen is flat. There is no distension. Palpations: There is no mass. Tenderness: There is no abdominal tenderness.  There is no right CVA tenderness, left CVA tenderness, guarding or rebound. Negative signs include Bee's sign and McBurney's sign. Hernia: No hernia is present. Comments: Abdomen is unremarkable on exam.   Musculoskeletal:         General: No swelling, tenderness or deformity. Normal range of motion. Cervical back: Normal range of motion. Comments: Patient ranges lower extremities without difficulty. No reproducible pain in the hips bilaterally with internal and external rotation. No reproducible pain over the [facility] joint, proximal right arm elbow or wrist.   Skin:     General: Skin is warm. Capillary Refill: Capillary refill takes less than 2 seconds. Comments: Findings of chronic peripheral vascular disease in the lower extremities bilaterally   Neurological:      General: No focal deficit present. Mental Status: He is alert. Psychiatric:         Mood and Affect: Mood normal.          Procedures      Labs Reviewed   CBC WITH AUTO DIFFERENTIAL   COMPREHENSIVE METABOLIC PANEL   CK   URINALYSIS        CT HEAD WO CONTRAST    (Results Pending)   XR SHOULDER RIGHT (MIN 2 VIEWS)    (Results Pending)   CT CERVICAL SPINE WO CONTRAST    (Results Pending)                          Voice dictation software was used during the making of this note. This software is not perfect and grammatical and other typographical errors may be present. This note has not been completely proofread for errors.       [address] [facility],   07/09/22 3505

## 2022-07-10 NOTE — ED TRIAGE NOTES
Pt fell and hit head on dresser. Hematoma on back of head as well. Possibility of dislocated or broken shoulder. Hx of htn, has not taken meds today. Pt has been on the floor since 4 am. Pt also has edema and is taking lasix. Denies LOC or blood thinners.

## 2022-07-10 NOTE — CARE COORDINATION
Consult received. Assessment information obtained from Ag Parrish [phone], as advised by primary nurse. Demographics verified. Patient resides alone in a two level apartment with stairs. Patient is  and has two children. (  Son is .) Daughter resides in [place] and is concerned with patient's current mobility and ability to care for himself at this time. Patient with a recent decline and has experienced difficulty with completing ADL's. Patient insured and has challenges with obtaining medications in the community. Discharge planning: Patient evaluated by therapy and recommended STR. Patient and daughter are agreeable to referral to [facility], as the patient has completed REHAB at this facility in the past. Referral placed. Patient has been connected to [facility] in the past, to arrange in home services. CM also informed daughter, this agency can also assist with senior placement options. Daughter will consider in home service following REHAB and will assist with process if necessary. According to daughter, patient's income exceeds [place] Medicaid guidelines. CM discussed [employer] to assist with fall risk if patient should remain in home following REHAB. CM offered supportive resources, to assist with care needs.     CM following care plan.          07/10/22 2882   Service Assessment   History Provided By Child/Family  (Daughter- Génesis Pierre [phone].)   Primary [address] is: Legal Next of Kin   PCP Verified by CM Yes   Last Visit to PCP Within last 3 months   Prior Functional Level Other (see comment)  (patient with limited mobility and has difficulty completing ADL's.)   Can patient return to prior living arrangement No   Ability to make needs known:   (Assessment completed with daughter as advised by primary nurse.)   Family able to assist with home care needs: No  (No local family, to assist with care.) Social/Functional History   Lives With Alone   Type of Home Apartment   Home Access Stairs to enter with rails   [address], rolling;Cane   ADL Assistance Needs assistance   Ambulation Assistance Needs assistance   Transfer Assistance Needs assistance   Active  Yes   Mode of Transportation Car   Occupation Retired   Discharge Planning   Type of [address] Prior To Admission None   [address] Medications No   Patient expects to be discharged to: [facility] Discharge   Transition of [address] (CM Consult) Discharge Planning;SNF   [address] (SNF)   [address] Provided?  No   Mode of Transport at Discharge BLS   Confirm Follow Up Transport Family

## 2022-07-11 PROBLEM — R29.6 FREQUENT FALLS: Status: ACTIVE | Noted: 2022-07-11

## 2022-07-11 LAB
ALBUMIN SERPL-MCNC: 2.4 G/DL (ref 3.2–4.6)
ALBUMIN/GLOB SERPL: 0.5 {RATIO} (ref 1.2–3.5)
ALP SERPL-CCNC: 163 U/L (ref 50–136)
ALT SERPL-CCNC: 30 U/L (ref 12–65)
ANION GAP SERPL CALC-SCNC: 3 MMOL/L (ref 7–16)
AST SERPL-CCNC: 34 U/L (ref 15–37)
BASOPHILS # BLD: 0.1 K/UL (ref 0–0.2)
BASOPHILS NFR BLD: 1 % (ref 0–2)
BILIRUB SERPL-MCNC: 1.2 MG/DL (ref 0.2–1.1)
BUN SERPL-MCNC: 38 MG/DL (ref 8–23)
CALCIUM SERPL-MCNC: 8.5 MG/DL (ref 8.3–10.4)
CHLORIDE SERPL-SCNC: 105 MMOL/L (ref 98–107)
CK SERPL-CCNC: 201 U/L (ref 21–215)
CO2 SERPL-SCNC: 27 MMOL/L (ref 21–32)
CREAT SERPL-MCNC: 1.4 MG/DL (ref 0.8–1.5)
DIFFERENTIAL METHOD BLD: ABNORMAL
EOSINOPHIL # BLD: 0.1 K/UL (ref 0–0.8)
EOSINOPHIL NFR BLD: 1 % (ref 0.5–7.8)
ERYTHROCYTE [DISTWIDTH] IN BLOOD BY AUTOMATED COUNT: 14.1 % (ref 11.9–14.6)
GLOBULIN SER CALC-MCNC: 4.9 G/DL (ref 2.3–3.5)
GLUCOSE SERPL-MCNC: 90 MG/DL (ref 65–100)
HCT VFR BLD AUTO: 40.3 % (ref 41.1–50.3)
HGB BLD-MCNC: 13.5 G/DL (ref 13.6–17.2)
IMM GRANULOCYTES # BLD AUTO: 0.1 K/UL (ref 0–0.5)
IMM GRANULOCYTES NFR BLD AUTO: 1 % (ref 0–5)
LYMPHOCYTES # BLD: 0.8 K/UL (ref 0.5–4.6)
LYMPHOCYTES NFR BLD: 10 % (ref 13–44)
MCH RBC QN AUTO: 32 PG (ref 26.1–32.9)
MCHC RBC AUTO-ENTMCNC: 33.5 G/DL (ref 31.4–35)
MCV RBC AUTO: 95.5 FL (ref 79.6–97.8)
MM INDURATION, POC: 0 MM (ref 0–5)
MONOCYTES # BLD: 0.7 K/UL (ref 0.1–1.3)
MONOCYTES NFR BLD: 9 % (ref 4–12)
NEUTS SEG # BLD: 6.2 K/UL (ref 1.7–8.2)
NEUTS SEG NFR BLD: 78 % (ref 43–78)
NRBC # BLD: 0 K/UL (ref 0–0.2)
PLATELET # BLD AUTO: 231 K/UL (ref 150–450)
PMV BLD AUTO: 10 FL (ref 9.4–12.3)
POTASSIUM SERPL-SCNC: 4.3 MMOL/L (ref 3.5–5.1)
PPD, POC: NEGATIVE
PROT SERPL-MCNC: 7.3 G/DL (ref 6.3–8.2)
RBC # BLD AUTO: 4.22 M/UL (ref 4.23–5.6)
SODIUM SERPL-SCNC: 135 MMOL/L (ref 138–145)
TSH W FREE THYROID IF ABNORMAL: 1.84 UIU/ML (ref 0.36–3.74)
WBC # BLD AUTO: 7.9 K/UL (ref 4.3–11.1)

## 2022-07-11 PROCEDURE — 1100000000 HC RM PRIVATE

## 2022-07-11 PROCEDURE — 6370000000 HC RX 637 (ALT 250 FOR IP): Performed by: INTERNAL MEDICINE

## 2022-07-11 PROCEDURE — 82550 ASSAY OF CK (CPK): CPT

## 2022-07-11 PROCEDURE — 97535 SELF CARE MNGMENT TRAINING: CPT

## 2022-07-11 PROCEDURE — 2580000003 HC RX 258: Performed by: STUDENT IN AN ORGANIZED HEALTH CARE EDUCATION/TRAINING PROGRAM

## 2022-07-11 PROCEDURE — 97530 THERAPEUTIC ACTIVITIES: CPT

## 2022-07-11 PROCEDURE — 36415 COLL VENOUS BLD VENIPUNCTURE: CPT

## 2022-07-11 PROCEDURE — 99223 1ST HOSP IP/OBS HIGH 75: CPT | Performed by: INTERNAL MEDICINE

## 2022-07-11 PROCEDURE — 2580000003 HC RX 258: Performed by: FAMILY MEDICINE

## 2022-07-11 PROCEDURE — 1100000003 HC PRIVATE W/ TELEMETRY

## 2022-07-11 PROCEDURE — 80053 COMPREHEN METABOLIC PANEL: CPT

## 2022-07-11 PROCEDURE — 84443 ASSAY THYROID STIM HORMONE: CPT

## 2022-07-11 PROCEDURE — 97112 NEUROMUSCULAR REEDUCATION: CPT

## 2022-07-11 PROCEDURE — 6360000002 HC RX W HCPCS: Performed by: FAMILY MEDICINE

## 2022-07-11 PROCEDURE — 85025 COMPLETE CBC W/AUTO DIFF WBC: CPT

## 2022-07-11 PROCEDURE — 6370000000 HC RX 637 (ALT 250 FOR IP): Performed by: FAMILY MEDICINE

## 2022-07-11 PROCEDURE — 2500000003 HC RX 250 WO HCPCS: Performed by: HOSPITALIST

## 2022-07-11 RX ORDER — CARVEDILOL 12.5 MG/1
12.5 TABLET ORAL 2 TIMES DAILY WITH MEALS
Status: DISCONTINUED | OUTPATIENT
Start: 2022-07-11 | End: 2022-07-12

## 2022-07-11 RX ORDER — VALSARTAN 80 MG/1
80 TABLET ORAL ONCE
Status: COMPLETED | OUTPATIENT
Start: 2022-07-11 | End: 2022-07-11

## 2022-07-11 RX ORDER — ASPIRIN 81 MG/1
81 TABLET ORAL DAILY
Status: DISCONTINUED | OUTPATIENT
Start: 2022-07-12 | End: 2022-07-19 | Stop reason: HOSPADM

## 2022-07-11 RX ORDER — VALSARTAN 160 MG/1
160 TABLET ORAL 2 TIMES DAILY
Status: DISCONTINUED | OUTPATIENT
Start: 2022-07-11 | End: 2022-07-19 | Stop reason: HOSPADM

## 2022-07-11 RX ORDER — CARVEDILOL 6.25 MG/1
6.25 TABLET ORAL 2 TIMES DAILY WITH MEALS
Status: DISCONTINUED | OUTPATIENT
Start: 2022-07-11 | End: 2022-07-11

## 2022-07-11 RX ORDER — LABETALOL HYDROCHLORIDE 5 MG/ML
10 INJECTION, SOLUTION INTRAVENOUS EVERY 6 HOURS PRN
Status: DISCONTINUED | OUTPATIENT
Start: 2022-07-11 | End: 2022-07-19 | Stop reason: HOSPADM

## 2022-07-11 RX ADMIN — ASPIRIN 325 MG ORAL TABLET 325 MG: 325 PILL ORAL at 09:53

## 2022-07-11 RX ADMIN — Medication 1 TABLET: at 12:25

## 2022-07-11 RX ADMIN — VALSARTAN 80 MG: 80 TABLET ORAL at 09:53

## 2022-07-11 RX ADMIN — SODIUM CHLORIDE: 9 INJECTION, SOLUTION INTRAVENOUS at 06:39

## 2022-07-11 RX ADMIN — VALSARTAN 80 MG: 80 TABLET ORAL at 22:02

## 2022-07-11 RX ADMIN — SODIUM CHLORIDE, PRESERVATIVE FREE 10 ML: 5 INJECTION INTRAVENOUS at 09:53

## 2022-07-11 RX ADMIN — SODIUM CHLORIDE: 9 INJECTION, SOLUTION INTRAVENOUS at 20:00

## 2022-07-11 RX ADMIN — SODIUM CHLORIDE, PRESERVATIVE FREE 10 ML: 5 INJECTION INTRAVENOUS at 21:40

## 2022-07-11 RX ADMIN — ENOXAPARIN SODIUM 40 MG: 100 INJECTION SUBCUTANEOUS at 09:53

## 2022-07-11 RX ADMIN — CARVEDILOL 12.5 MG: 12.5 TABLET, FILM COATED ORAL at 17:47

## 2022-07-11 ASSESSMENT — ENCOUNTER SYMPTOMS
WHEEZING: 0
BLOATING: 0
NAUSEA: 0
COLOR CHANGE: 0
ABDOMINAL PAIN: 0
DIARRHEA: 0
NAIL CHANGES: 0
SHORTNESS OF BREATH: 0
BACK PAIN: 0
DOUBLE VISION: 0
COUGH: 0
BLURRED VISION: 0

## 2022-07-11 ASSESSMENT — PAIN SCALES - GENERAL
PAINLEVEL_OUTOF10: 0

## 2022-07-11 NOTE — PROGRESS NOTES
PHYSICAL THERAPY Daily Note  (Link to Caseload Tracking: PT Visit Days : 2  Time In/Out PT Charge Capture  Rehab Caseload Tracker  Orders    Shaina Mcadams is a 78 y.o. male   PRIMARY DIAGNOSIS: Rhabdomyolysis  Rhabdomyolysis [M62.82]  Fall, initial encounter [W19. XXXA]  Non-traumatic rhabdomyolysis [M62.82]     Inpatient: Payor: Josue Ruby / Plan: Roverto Martinez / Product Type: *No Product type* /     ASSESSMENT:     REHAB RECOMMENDATIONS:   Recommendation to date pending progress:  Setting:   Short-term Rehab    Equipment:     To Be Determined     ASSESSMENT:  Mr. Julio Uribe presents resting in bed, agreeable to therapy and willing to get out of bed. Min A for bed mobility, except increased assist with scooting due to RUE pain making it difficult. Pt requiring less assist with transfers today, and able to increase ambulation distance. Noted veering to the L and decreased balance control requiring CGA to Min A to maintain safety. Pt does endorse feeling unsteady on his feet and weak. Slow, steady progress today towards stated goals which continue to be appropriate. Pt presents as functioning below his baseline, with deficits in mobility including transfers, gait, balance, and activity tolerance. Pt will benefit from skilled therapy services to address stated deficits to promote return to highest level of function, independence, and safety. Will continue to follow.      SUBJECTIVE:   Mr. Julio Uribe states, \"I will go for a stroll with you\"     Social/Functional Lives With: Alone  Type of Home: Apartment  Home Layout: One level  Home Access: Stairs to enter with rails  Entrance Stairs - Number of Steps: 13  Home Equipment: Ale Carr  ADL Assistance: Needs assistance  Ambulation Assistance: Needs assistance  Transfer Assistance: Needs assistance  Active : Yes  Mode of Transportation: Car  Occupation: Retired  OBJECTIVE:     PAIN: Kurt Mungo / O2: Jason Hernandez / Madhav Shepard / Blanca Salguero:   Pre Treatment:   Pain Assessment: None - Denies Pain      Post Treatment: Grimacing with attempt at movement of RUE; but reports no pain at end of session.  Vitals        Oxygen    IV    RESTRICTIONS/PRECAUTIONS:  Restrictions/Precautions  Restrictions/Precautions: Fall Risk,Bed Alarm  Restrictions/Precautions: Fall Risk,Bed Alarm     MOBILITY: I Mod I S SBA CGA Min Mod Max Total  NT x2 Comments:   Bed Mobility    Rolling [] [] [] [] [] [x] [] [] [] [] []    Supine to Sit [] [] [] [] [] [x] [] [] [] [] []    Scooting [] [] [] [] [] [] [x] [] [] [] [x]    Sit to Supine [] [] [] [] [] [x] [] [] [] [] []    Transfers    Sit to Stand [] [] [] [] [x] [x] [] [] [] [] []    Bed to Chair [] [] [] [] [] [x] [] [] [] [] [] x1-2   Stand to Sit [] [] [] [] [x] [] [] [] [] [] []     [] [] [] [] [] [] [] [] [] [] []    I=Independent, Mod I=Modified Independent, S=Supervision, SBA=Standby Assistance, CGA=Contact Guard Assistance,   Min=Minimal Assistance, Mod=Moderate Assistance, Max=Maximal Assistance, Total=Total Assistance, NT=Not Tested    BALANCE: Good Fair+ Fair Fair- Poor NT Comments   Sitting Static [] [x] [] [] [] []    Sitting Dynamic [] [] [x] [] [] []              Standing Static [] [] [x] [] [] []    Standing Dynamic [] [] [] [x] [] []      GAIT: I Mod I S SBA CGA Min Mod Max Total  NT x2 Comments:   Level of Assistance [] [] [] [] [x] [x] [] [] [] [] []    Distance 80 feet    DME Gait Belt and Rolling Walker    Gait Quality Decreased step clearance, Decreased step length, Decreased stance, Path deviations , Shuffling  and Trunk sway increased    Weightbearing Status      Stairs      I=Independent, Mod I=Modified Independent, S=Supervision, SBA=Standby Assistance, CGA=Contact Guard Assistance,   Min=Minimal Assistance, Mod=Moderate Assistance, Max=Maximal Assistance, Total=Total Assistance, NT=Not Tested    PLAN:   ACUTE PHYSICAL THERAPY GOALS:   (Developed with and agreed upon by patient and/or caregiver.)  (1.) Luis Manuel Shine Vicki will move from supine to sit and sit to supine , scoot up and down and roll side to side with MODIFIED INDEPENDENCE within 7 treatment day(s). (2.) Lina Coats will transfer from bed to chair and chair to bed with MODIFIED INDEPENDENCE using the least restrictive device within 7 treatment day(s). (3.) Lina Coats will ambulate with MODIFIED INDEPENDENCE for 250 feet with the least restrictive device within 7 treatment day(s). (4.) Lina Coats will perform standing static and dynamic balance activities x 20 minutes with MODIFIED INDEPENDENCE to improve safety within 7 treatment day(s). (5.) Lina Coats will ascend and descend 13 stairs using bilateral hand rail(s) with MODIFIED INDEPENDENCE to improve functional mobility and safety within 7 treatment day(s). (6.) Lina Coats will perform therapeutic exercises x 20 min for HEP with INDEPENDENCE to improve strength, endurance, and functional mobility within 7 treatment day(s). FREQUENCY AND DURATION: 3 times/week for duration of hospital stay or until stated goals are met, whichever comes first.    TREATMENT:   TREATMENT:   Therapeutic Activity (39 Minutes): Therapeutic activity included Supine to Sit, Scooting, Transfer Training, Ambulation on level ground, Sitting balance  and Standing balance to improve functional Activity tolerance, Balance, Coordination, Mobility and Strength.     TREATMENT GRID:  N/A    AFTER TREATMENT PRECAUTIONS: Bed/Chair Locked, Call light within reach, Chair, Heels floated, RN notified and Visitors at bedside    INTERDISCIPLINARY COLLABORATION:  RN/ PCT, PT/ PTA and OT/ AMADOR    EDUCATION:      TIME IN/OUT:  Time In: 1328  Time Out: Antoinette 192  Minutes: 325 Tanya Hamilton, PT

## 2022-07-11 NOTE — PLAN OF CARE
Problem: Discharge Planning  Goal: Discharge to home or other facility with appropriate resources  7/10/2022 2024 by Nick Jaquez RN  Outcome: Progressing  7/10/2022 1705 by Otto Biggs RN  Outcome: Progressing  Flowsheets  Taken 7/10/2022 1652  Discharge to home or other facility with appropriate resources:   Identify barriers to discharge with patient and caregiver   Identify discharge learning needs (meds, wound care, etc)   Refer to discharge planning if patient needs post-hospital services based on physician order or complex needs related to functional status, cognitive ability or social support system   Arrange for needed discharge resources and transportation as appropriate  Taken 7/10/2022 0734  Discharge to home or other facility with appropriate resources:   Identify barriers to discharge with patient and caregiver   Arrange for needed discharge resources and transportation as appropriate   Identify discharge learning needs (meds, wound care, etc)     Problem: Safety - Adult  Goal: Free from fall injury  7/10/2022 2024 by Nick Jaquez RN  Outcome: Progressing  7/10/2022 1705 by Otto Biggs RN  Outcome: Progressing     Problem: Pain  Goal: Verbalizes/displays adequate comfort level or baseline comfort level  7/10/2022 2024 by Nick Jaquez RN  Outcome: Progressing  7/10/2022 1705 by Otto Biggs RN  Outcome: Progressing     Problem: ABCDS Injury Assessment  Goal: Absence of physical injury  Outcome: Progressing

## 2022-07-11 NOTE — PROGRESS NOTES
Hospitalist Progress Note   Admit Date:  2022 10:20 PM   Name:  Martie Severs   Age:  78 y.o. Sex:  male  :  1940   MRN:  [id number]   Room:      Presenting Complaint: Fall     Reason(s) for Admission: Rhabdomyolysis [M62.82]  Fall, initial encounter [W19. XXXA]  Non-traumatic rhabdomyolysis [facility] [place] Course & Interval History:   Martie Severs is a 78 y.o. male with medical history of pelvic fracture, allergic rhinitis, scarlet fever who presented with unwitnessed fall after he went and use the bathroom to urinate and suddenly became dizzy and disoriented. Patient attempted to get back into his bed but unfortunately struck his head against a piece of furniture. Patient stated he did not lose consciousness but was unable to get himself up back into bed. Patient's daughter from [place] and was unable to reach patient and subsequently called paramedics to go to patient's home. Patient was found on the floor found to be in discomfort with his head and right shoulder. Pt is admitted for rhabdomyolysis with CPK >700 on admission. He was started on IVF. Carotid duplex negative for significant stenosis. XR shoulder negative for fracture of dislocation. CT head and CT C-spine on admission overall unremarkable. TTE has been ordered for dizziness and shows EF 47% with indeterminate diastolic dysfxn d/t afib. Subjective/24hr Events (22): Saw patient this afternoon. He was alert and oriented x3 and follows commands well. He stated that his blood pressure only gets high when he gets to the hospital or when he goes to the doctor. He stated that he is doing fine and absolutely does not agree that he has A. Fib. No fever, chills, SOB, chest pain, abdominal pain, nausea, vomiting. Assessment & Plan:     Rhabdomyolysis  CPK >700 on admission. Has CKD in renal function was at baseline  Continue IVF today and consider d/c in AM  Repeat CPK this a.m. 201    Frequent falls  Carotid duplex negative for significant stenosis. XR shoulder negative for fracture of dislocation. CT head and CT C-spine on admission overall unremarkable  Fall precautions  Limit sedating meds  Delirium precautions  PT/OT--STR recommended  Check orthostatic VS.     Hyperkalemia, resolved    Essential hypertension  Continue valsartan  Uncontrolled, add Coreg BID    Paroxysmal atrial fibrillation   EKG on admission shows afib vs aflutter. Per daughter on admission patient was supposed to have follow-up with cardiology but never went. Per PCP's note pt was not compliant with Card maty  May be a poor candidate for anticoagulation due to frequent falls. He has declined [facility] [place] PCP on visit 4/13/22 as well  Monitor on telemetry--reported afib  Add Coreg for BP control as well  Keep K>4, Mag>2  Consult cardiology, appreciate recs    Stage 3a chronic kidney disease  Baseline Scr 1.3-1.4 at baseline  Avoid nephrotoxic meds  Cr at baseline    Chronic diastolic congestive heart failure  TTE 7/10 with EF 47% with indeterminate diastolic dysfxn d/t afib. Cautious with IVF to avoid fluid overload  Will get cardiology to see here to optimize therapy prior to discharge        Discharge Planning:      Hopeful will be medically ready for discharge in 1 day after cardiology eval. STR recommended. Diet:  ADULT DIET;  Regular  DVT PPx: Lovenox SQ  Code status: Full Code    Hospital Problems:  Principal Problem:    Rhabdomyolysis  Active Problems:    Hyperkalemia    Essential hypertension    Paroxysmal atrial fibrillation (HCC)    Stage 3a chronic kidney disease ([facility] [address]    Chronic diastolic congestive heart failure ([facility] [address]  Resolved Problems:    Chronic hyponatremia      Objective:     Patient Vitals for the past 24 hrs:   Temp Pulse Resp BP SpO2   07/11/22 1107 97.7 °F (36.5 °C) 81 18 (!) 182/96 93 %   07/11/22 0727 97.9 °F (36.6 °C) 82 17 (!) 185/101 96 %   07/11/22 0515 -- -- -- (!) 182/98 --   07/11/22 Platelets 058 710 - 954 K/uL    MPV 10.8 9.4 - 12.3 FL    nRBC 0.00 0.0 - 0.2 K/uL    Seg Neutrophils 85 (H) 43 - 78 %    Lymphocytes 6 (L) 13 - 44 %    Monocytes 9 4.0 - 12.0 %    Eosinophils % 0 (L) 0.5 - 7.8 %    Basophils 0 0.0 - 2.0 %    Immature Granulocytes 0 0.0 - 5.0 %    Segs Absolute 10.3 (H) 1.7 - 8.2 K/UL    Absolute Lymph # 0.7 0.5 - 4.6 K/UL    Absolute Mono # 1.1 0.1 - 1.3 K/UL    Absolute Eos # 0.0 0.0 - 0.8 K/UL    Basophils Absolute 0.0 0.0 - 0.2 K/UL    Absolute Immature Granulocyte 0.0 0.0 - 0.5 K/UL    RBC Comment NORMOCYTIC/NORMOCHROMIC      WBC Comment Result Confirmed By Smear      Platelet Comment PLATELET AGGREGATES PRESENT      Differential Type AUTOMATED     Comprehensive Metabolic Panel    Collection Time: 07/09/22 11:35 PM   Result Value Ref Range    Sodium 135 (L) 136 - 145 mmol/L    Potassium 6.8 (HH) 3.5 - 5.1 mmol/L    Chloride 102 98 - 107 mmol/L    CO2 21 21 - 32 mmol/L    Anion Gap 12 7 - 16 mmol/L    Glucose 81 65 - 100 mg/dL    BUN 25 (H) 8 - 23 MG/DL    CREATININE 1.40 0.8 - 1.5 MG/DL    GFR African American >60 >60 ml/min/1.73m2    GFR Non- 51 (L) >60 ml/min/1.73m2    Calcium 9.2 8.3 - 10.4 MG/DL    Total Bilirubin 2.5 (H) 0.2 - 1.1 MG/DL    ALT 37 12 - 65 U/L     (H) 15 - 37 U/L    Alk Phosphatase 177 (H) 50 - 136 U/L    Total Protein 9.0 (H) 6.3 - 8.2 g/dL    Albumin 2.7 (L) 3.2 - 4.6 g/dL    Globulin 6.3 (H) 2.3 - 3.5 g/dL    Albumin/Globulin Ratio 0.4 (L) 1.2 - 3.5     CK    Collection Time: 07/09/22 11:35 PM   Result Value Ref Range    Total  (H) 21 - 215 U/L   EKG 12 Lead    Collection Time: 07/09/22 11:36 PM   Result Value Ref Range    Ventricular Rate 79 BPM    Atrial Rate 156 BPM    QRS Duration 116 ms    Q-T Interval 414 ms    QTc Calculation (Bazett) 475 ms    R Axis 65 degrees    T Axis 245 degrees    Diagnosis Atrial fibrillation    Urinalysis w rflx microscopic    Collection Time: 07/10/22  2:43 AM   Result Value Ref Range    Color, UA YELLOW/STRAW      Appearance CLOUDY      Specific [place], UA 1.021 1.001 - 1.023      pH, Urine 7.0 5.0 - 9.0      Protein,  (A) NEG mg/dL    Glucose, UA Negative mg/dL    Ketones, Urine 15 (A) NEG mg/dL    Bilirubin Urine Negative NEG      Blood, Urine SMALL (A) NEG      Urobilinogen, Urine 1.0 0.2 - 1.0 EU/dL    Nitrite, Urine Negative NEG      Leukocyte Esterase, Urine Negative NEG      WBC, UA 0-4 (A) NORM /hpf    RBC, UA 0-5 (A) NORM /hpf    Epithelial Cells UA 0-5 (A) NORM /hpf    BACTERIA, URINE Negative (A) NORM /hpf    Casts 0-2 (A) NORM /lpf   Potassium    Collection Time: 07/10/22  3:21 AM   Result Value Ref Range    Potassium 4.1 3.5 - 5.1 mmol/L   Lipid Panel    Collection Time: 07/10/22  3:21 AM   Result Value Ref Range    Cholesterol, Total 125 <200 MG/DL    Triglycerides 73 35 - 150 MG/DL    HDL 44 40 - 60 MG/DL    LDL Calculated 66.4 <100 MG/DL    VLDL Cholesterol Calculated 14.6 6.0 - 23.0 MG/DL    Chol/HDL Ratio 2.8     Transthoracic echocardiogram (TTE) complete with contrast, bubble, strain, and 3D PRN    Collection Time: 07/10/22  3:21 PM   Result Value Ref Range    LA Minor Axis 7.5 cm    LA Major Axis 7.5 cm    LA Area 2C 29.5 cm2    LA Area 4C 31.0 cm2    LA Volume BP 95 (A) 18 - 58 mL    LA Diameter 5.1 cm    AV Mean Velocity 0.8 m/s    AV Mean Gradient 3 mmHg    AV VTI 21.0 cm    AV Peak Velocity 1.3 m/s    AV Peak Gradient 7 mmHg    AV Area by VTI 2.9 cm2    AV Area by Peak Velocity 2.7 cm2    Aortic Root 3.3 cm    Ascending Aorta 3.3 cm    EF 3D 47 %    LVOT SV 60.8 ml    LV EDV 3D 175 mL    LV ESV 3D 93 mL    LV Mass 3D 150.0 g    IVSd 1.1 (A) 0.6 - 1.0 cm    LVIDd 5.4 4.2 - 5.9 cm    LVIDs 4.2 cm    LVOT Diameter 2.2 cm    LVOT Mean Gradient 2 mmHg    LVOT VTI 16.0 cm    LVOT Peak Velocity 0.9 m/s    LVOT Peak Gradient 3 mmHg    LVPWd 0.9 0.6 - 1.0 cm    LV E' Lateral Velocity 13 cm/s    LV E' Septal Velocity 10 cm/s    LVOT Area 3.8 cm2    IVC Proxmal 2.1 cm    MV Nyquist Velocity 35 cm/s    MR Radius PISA 0.60 cm    MR .0 cm    MR Peak Velocity 5.2 m/s    MR Peak Gradient 108 mmHg    MV E Wave Deceleration Time 164.0 ms    MV E Velocity 0.96 m/s    MV EROA PISA 15.2 cm2    RV Basal Dimension 3.6 cm    TAPSE 1.5 (A) 1.7 cm    TR Max Velocity 2.95 m/s    TR Peak Gradient 35 mmHg    Body Surface Area 1.68 m2    Fractional Shortening 2D 22 28 - 44 %    LV ESV Index 3D 56 mL/m2    LV EDV Index 3D 105 mL/m2    LVIDd Index 3.23 cm/m2    LVIDs Index 2.51 cm/m2    LV RWT Ratio 0.33     LV Mass 2D 206.7 88 - 224 g    LV Mass 2D Index 123.8 (A) 49 - 115 g/m2    LV Mass Index 3D 89.8 g/m2    E/E' Ratio (Averaged) 8.49     E/E' Lateral 7.38     E/E' Septal 9.60     LA Volume Index BP 57 (A) 16 - 34 ml/m2    LVOT Stroke Volume Index 36.4 mL/m2    LA Size Index 3.05 cm/m2    LA/AO Root Ratio 1.55     Ao Root Index 1.98 cm/m2    Ascending Aorta Index 1.98 cm/m2    AV Velocity Ratio 0.69     LVOT:AV VTI Index 0.76     BENNETT/BSA VTI 1.7 cm2/m2    BENNETT/BSA Peak Velocity 1.6 cm2/m2    MR Regurg Volume PISA 2,358. 62 mL    Est. RA Pressure 8 mmHg    RVSP 43 mmHg   CK    Collection Time: 07/11/22  5:57 AM   Result Value Ref Range    Total  21 - 215 U/L   Comprehensive Metabolic Panel w/ Reflex to MG    Collection Time: 07/11/22  5:57 AM   Result Value Ref Range    Sodium 135 (L) 138 - 145 mmol/L    Potassium 4.3 3.5 - 5.1 mmol/L    Chloride 105 98 - 107 mmol/L    CO2 27 21 - 32 mmol/L    Anion Gap 3 (L) 7 - 16 mmol/L    Glucose 90 65 - 100 mg/dL    BUN 38 (H) 8 - 23 MG/DL    CREATININE 1.40 0.8 - 1.5 MG/DL    GFR African American >60 >60 ml/min/1.73m2    GFR Non- 51 (L) >60 ml/min/1.73m2    Calcium 8.5 8.3 - 10.4 MG/DL    Total Bilirubin 1.2 (H) 0.2 - 1.1 MG/DL    ALT 30 12 - 65 U/L    AST 34 15 - 37 U/L    Alk Phosphatase 163 (H) 50 - 136 U/L    Total Protein 7.3 6.3 - 8.2 g/dL    Albumin 2.4 (L) 3.2 - 4.6 g/dL    Globulin 4.9 (H) 2.3 - 3.5 g/dL    Albumin/Globulin Ratio 0.5 (L) 1.2 - 3.5     CBC with Auto Differential    Collection Time: 07/11/22  5:57 AM   Result Value Ref Range    WBC 7.9 4.3 - 11.1 K/uL    RBC 4.22 (L) 4.23 - 5.6 M/uL    Hemoglobin 13.5 (L) 13.6 - 17.2 g/dL    Hematocrit 40.3 (L) 41.1 - 50.3 %    MCV 95.5 79.6 - 97.8 FL    MCH 32.0 26.1 - 32.9 PG    MCHC 33.5 31.4 - 35.0 g/dL    RDW 14.1 11.9 - 14.6 %    Platelets 696 948 - 823 K/uL    MPV 10.0 9.4 - 12.3 FL    nRBC 0.00 0.0 - 0.2 K/uL    Differential Type AUTOMATED      Seg Neutrophils 78 43 - 78 %    Lymphocytes 10 (L) 13 - 44 %    Monocytes 9 4.0 - 12.0 %    Eosinophils % 1 0.5 - 7.8 %    Basophils 1 0.0 - 2.0 %    Immature Granulocytes 1 0.0 - 5.0 %    Segs Absolute 6.2 1.7 - 8.2 K/UL    Absolute Lymph # 0.8 0.5 - 4.6 K/UL    Absolute Mono # 0.7 0.1 - 1.3 K/UL    Absolute Eos # 0.1 0.0 - 0.8 K/UL    Basophils Absolute 0.1 0.0 - 0.2 K/UL    Absolute Immature Granulocyte 0.1 0.0 - 0.5 K/UL       Other Studies:  Vascular duplex carotid bilateral   Final Result   SONYA:  No significant stenosis. LICA:  No significant stenosis. XR SHOULDER RIGHT (MIN 2 VIEWS)   Final Result   No evidence of acute fracture or dislocation. The bones appear diffusely   osteopenic. CT CERVICAL SPINE WO CONTRAST   Final Result   No CT evidence of an acute fracture or traumatic subluxation. CT HEAD WO CONTRAST   Final Result      No evidence of acute intracranial abnormality.                       Current Meds:  Current Facility-Administered Medications   Medication Dose Route Frequency    labetalol (NORMODYNE;TRANDATE) injection 10 mg  10 mg IntraVENous Q6H PRN    oyster shell calcium w/D 500-200 MG-UNIT tablet 1 tablet  1 tablet Oral Daily    valsartan (DIOVAN) tablet 80 mg  80 mg Oral Daily    aspirin tablet 325 mg  325 mg Oral Daily    0.9 % sodium chloride infusion   IntraVENous Continuous    sodium chloride flush 0.9 % injection 5-40 mL  5-40 mL IntraVENous 2 times per day    sodium chloride flush 0.9 % injection 5-40 mL  5-40 mL IntraVENous PRN    0.9 % sodium chloride infusion   IntraVENous PRN    potassium chloride (KLOR-CON M) extended release tablet 40 mEq  40 mEq Oral PRN    Or    potassium bicarb-citric acid (EFFER-K) effervescent tablet 40 mEq  40 mEq Oral PRN    Or    potassium chloride 10 mEq/100 mL IVPB (Peripheral Line)  10 mEq IntraVENous PRN    magnesium sulfate 2000 mg in 50 mL IVPB premix  2,000 mg IntraVENous PRN    enoxaparin (LOVENOX) injection 40 mg  40 mg SubCUTAneous Daily    promethazine (PHENERGAN) tablet 12.5 mg  12.5 mg Oral Q6H PRN    Or    ondansetron (ZOFRAN) injection 4 mg  4 mg IntraVENous Q6H PRN    polyethylene glycol (GLYCOLAX) packet 17 g  17 g Oral Daily    bisacodyl (DULCOLAX) EC tablet 5 mg  5 mg Oral Daily PRN    aluminum & magnesium hydroxide-simethicone (MAALOX) 200-200-20 MG/5ML suspension 30 mL  30 mL Oral Q6H PRN    acetaminophen (TYLENOL) tablet 650 mg  650 mg Oral Q6H PRN    Or    acetaminophen (TYLENOL) suppository 650 mg  650 mg Rectal Q6H PRN    tuberculin injection 5 Units  5 Units IntraDERmal Once       Signed: Darvin Schwab, DO    Part of this note may have been written by using a voice dictation software. The note has been proof read but may still contain some grammatical/other typographical errors.

## 2022-07-11 NOTE — PROGRESS NOTES
MD notified of /98 and HR 84. MD ordered PRN labetalol, see MAR. MD also notified that pt is adamant about giving another urine sample. Will pass on to day shift nurse.

## 2022-07-11 NOTE — PROGRESS NOTES
OCCUPATIONAL THERAPY Daily Note     OT Visit Days: 2   Time  OT Charge Capture  Rehab Caseload Tracker  OT Orders    Bladimir Chavira is a 78 y.o. male   PRIMARY DIAGNOSIS: Rhabdomyolysis  Rhabdomyolysis [M62.82]  Fall, initial encounter [W19. XXXA]  Non-traumatic rhabdomyolysis [M62.82]       Inpatient: Payor: Layo Swain / Plan: [facility] [place] / Product Type: *No Product type* /     ASSESSMENT:     REHAB RECOMMENDATIONS: CURRENT LEVEL OF FUNCTION:  (Most Recently Demonstrated)   Recommendation to date pending progress:  Setting:   Short-term Rehab    Equipment:     Rolling Walker Bathing:   Not Tested  Dressing:   Not Tested  Feeding/Grooming:   Supervision/Setup  Toileting:   Not Tested  Functional Mobility:   Contact Guard Assist     ASSESSMENT:  Mr. Rosie Fox received supine in bed ready to work with therapy. Supine > sit Min A due to neglecting RUE due to R sh pain, weakness. Poor posture sitting EOB. Static sitting EOB F balance, leaning to L. Functional mobility bed > chair > hallway > chair CGA via RW with LOB x1. Pt is off center to L when using RW and has difficulty staying inside RW. Pt refused standing EOS for brushing teeth. Seated in chair brushing teeth with RH (dominant hand) using compensatory strategies including excessive neck FL and only bending at elbow to brush teeth. Pt attempted drinking out of cup with RH with great difficulty due to increased R sh. Pain requiring A of LH. Pt slowly progressing with mobility and AROM/functional use of R sh. Continue POC. SUBJECTIVE:     Mr. Rosie Fox states, \"That feels much better! \"     Social/Functional Lives With: Alone  Type of Home: Apartment  Home Layout: One level  Home Access: Stairs to enter with rails  Entrance Stairs - Number of Steps: 13  Home Equipment: Jamas Simpler  ADL Assistance: Needs assistance  Ambulation Assistance: Needs assistance  Transfer Assistance: Needs assistance  Active : Yes  Mode of Transportation: Car  Occupation: Retired    OBJECTIVE:     TAYLER / Adrian Aguilera / Karen Cookie: IV    RESTRICTIONS/PRECAUTIONS:  Restrictions/Precautions  Restrictions/Precautions: Fall Risk,Bed Alarm        PAIN: VITALS / O2:   Pre Treatment:   Pain Assessment: 0-10  Pain Level: 0  Only pain with use of R sh.         Post Treatment: none Vitals          Oxygen            MOBILITY: I Mod I S SBA CGA Min Mod Max Total  NT x2 Comments:   Bed Mobility    Rolling [] [] [] [] [] [x] [] [] [] [] []    Supine to Sit [] [] [] [] [] [x] [] [] [] [] []    Scooting [] [] [] [] [] [x] [] [] [] [] []    Sit to Supine [] [] [] [] [] [] [] [] [] [x] []    Transfers    Sit to Stand [] [] [] [] [x] [] [] [] [] [] []    Bed to Chair [] [] [] [] [x] [] [] [] [] [] []    Stand to Sit [] [] [] [] [x] [] [] [] [] [] []    Tub/Shower [] [] [] [] [] [] [] [] [] [x] []     Toilet [] [] [] [] [] [] [] [] [] [x] []      [] [] [] [] [] [] [] [] [] [] []    I=Independent, Mod I=Modified Independent, S=Supervision/Setup, SBA=Standby Assistance, CGA=Contact Guard Assistance, Min=Minimal Assistance, Mod=Moderate Assistance, Max=Maximal Assistance, Total=Total Assistance, NT=Not Tested    ACTIVITIES OF DAILY LIVING: I Mod I S SBA CGA Min Mod Max Total NT Comments   BASIC ADLs:              Upper Body   Bathing [] [] [] [] [] [] [] [] [] [x]    Lower Body Bathing [] [] [] [] [] [] [] [] [] [x]    Toileting [] [] [] [] [] [] [] [] [] [x]    Upper Body Dressing [] [] [] [] [] [] [] [] [] [x]    Lower Body Dressing [] [] [] [] [] [] [] [] [] [x]    Feeding [] [] [] [] [] [] [] [] [] [x]    Grooming [] [] [x] [] [] [] [] [] [] []    Personal Device Care [] [] [] [] [] [] [] [] [] [x]    Functional Mobility [] [] [] [] [x] [] [] [] [] []    I=Independent, Mod I=Modified Independent, S=Supervision/Setup, SBA=Standby Assistance, CGA=Contact Guard Assistance, Min=Minimal Assistance, Mod=Moderate Assistance, Max=Maximal Assistance, Total=Total Assistance, NT=Not Tested    BALANCE: Good Fair+ Fair Fair- Poor NT Comments   Sitting Static [] [] [x] [] [] []    Sitting Dynamic [] [] [] [] [] [x]              Standing Static [] [x] [x] [] [] []    Standing Dynamic [] [x] [x] [] [] []        PLAN:     FREQUENCY/DURATION   OT Plan of Care: 3 times/week for duration of hospital stay or until stated goals are met, whichever comes first.    ACUTE OCCUPATIONAL THERAPY GOALS:   (Developed with and agreed upon by patient and/or caregiver.)  1. Patient will complete lower body bathing and dressing with MIN A and adaptive equipment as needed. 2.Patient will complete upper body bathing and dressing with MOD I and adaptive equipment as needed. 3. Patient will complete toileting with MIN A.   4. Patient will tolerate 25 minutes of OT treatment with 1-2 rest breaks to increase activity tolerance for ADLs. 5. Patient will complete functional transfers with SUPERVISION and adaptive equipment as needed. 6. Patient will complete functional activity with MOD I and adaptive equipment as needed. TREATMENT:     TREATMENT:   Co-Treatment PT/OT necessary due to patient's decreased overall endurance/tolerance levels, as well as need for high level skilled assistance to complete functional transfers/mobility and functional tasks  Neuromuscular Re-education (20 Minutes): Neuromuscular Re-education included Balance Training, Coordination training, Postural training, Sitting balance training and Standing balance training to improve Balance, Coordination and Functional Mobility. Self Care (18 minutes): Patient participated in grooming ADLs in supported sitting with no verbal cueing to increase independence, decrease assistance required and increase activity tolerance. Patient also participated in functional mobility training to increase independence, decrease assistance required, increase activity tolerance and increase safety awareness.      TREATMENT GRID:  N/A    AFTER TREATMENT PRECAUTIONS: Bed/Chair Locked, Call light within reach, Chair, Needs within reach and RN notified    INTERDISCIPLINARY COLLABORATION:  RN/ PCT, PT/ PTA and OT/ AMADOR    EDUCATION:       TOTAL TREATMENT DURATION AND TIME:  Time In: 1328  Time Out: [address] 192  Minutes: Curly [id number], OT

## 2022-07-11 NOTE — H&P
Patient seen and examined by me. Agree with above note by physician extender. Key findings are: Patient with recurrent falls and established mild LV systolic dysfunction and persistent atrial fibrillation atrial flutter. Patient has no signs or symptoms of tacky mediated arrhythmias. He has poorly controlled hypertension and what appears to be noncardiogenic falls now with mild rhabdomyolysis. He denies any overt symptoms of syncope or chest pain. Vitals:    07/11/22 0512 07/11/22 0515 07/11/22 0727 07/11/22 1107   BP:  (!) 182/98 (!) 185/101 (!) 182/96   Pulse: 84  82 81   Resp: 17  17 18   Temp: 97.9 °F (36.6 °C)  97.9 °F (36.6 °C) 97.7 °F (36.5 °C)   TempSrc: Oral  Oral Oral   SpO2: 96%  96% 93%   Weight: 145 lb 8.1 oz (66 kg)      Height:            General: Patient is alert and oriented, no apparent distress  HEENT: Pupils equal reactive, oropharynx clear  Chest: Clear to auscultation bilaterally  Cardiovascular: Irregular  Abdomen: Soft positive bowel sounds  Extremities: Soft no edema with intact distal pulses          Left Ventricle: Mildly reduced left ventricular systolic function. EF 3D is 47%. Left ventricle size is normal. Normal wall thickness. Mild global hypokinesis present. Indeterminate diastolic function due to atrial fibrillation.   Right Ventricle: Right ventricle size is normal. Normal systolic function.   Aortic Valve: Moderately calcified cusp. Trace regurgitation. No significant stenosis. LVOT:AV VTI Index is 0.76. AV area by continuity VTI is 2.9 cm2.    Mitral Valve: Mildly thickened leaflet, at the anterior and posterior leaflets. Mild to moderate regurgitation.   Tricuspid Valve: Mild regurgitation. Mildly elevated RVSP. The estimated RVSP is 43 mmHg.   IVC/SVC: IVC diameter is greater than 21 mm and decreases greater than 50% during inspiration; therefore the estimated right atrial pressure is intermediate (~8 mmHg).     Technical qualifiers: Color flow Doppler was performed and pulse wave and/or continuous wave Doppler was performed. Principal Problem:    Rhabdomyolysis  Plan: Gentle hydration. Active Problems:    Hyperkalemia  Plan: Resolved    Frequent falls  Plan: Patient likely needs to be considered for assisted living or more supervision and assistance within his home    Essential hypertension  Plan: Blood pressure remains poorly controlled we will increase carvedilol to 12.5 mg twice daily and valsartan 260 mg twice daily    Paroxysmal atrial fibrillation ([facility] [address]  Plan: Rate controlled and refuses anticoagulation. We will readdress anticoagulation prior to discharge. He has been noncompliant with office follow-up    Stage 3a chronic kidney disease ([facility] [address]  Plan: Stable    Chronic diastolic congestive heart failure ([facility] [address]  Plan: Patient on appropriate therapy with carvedilol and valsartan. Volume status appears stable. LVEF is mildly reduced. Patient has been noncompliant with office follow-up. Recommend continuing current medical therapy with adjusting carvedilol and valsartan to address poorly controlled hypertension. Suspect patient's falls or noncardiogenic in etiology. Benny Becerra MD      [address] Cardiology Initial Cardiac Evaluation                 Date of  Admission: 7/9/2022 10:20 PM     Primary Care Physician:Ezequiel Andrade MD  Primary Cardiologist: [facility]  Referring Physician: Hospitalist   Supervising Cardiologist: Dr. Arturo Lawrence    Reason for cardiac evaluation: A. Fib and CHF, was suppose to see cardiology outpt but did not follow up       Yuliet Ernst is a 78 y.o. male with prior h/o hypertension, CKD stage IIIa, mixed dyslipidemia, likely persistent A. fib (not on [address]) and left intertrochanteric femur fracture d/t a fall  Aug 2021 s/p repair.      Patient presented to ED at [facility] with unwitnessed fall after he went and use the bathroom to urinate and suddenly became dizzy and disoriented.  Patient attempted to get back into his bed but unfortunately struck his head against a piece of furniture. Patient stated he did not lose consciousness but was unable to get himself up back into bed.  Patient's daughter from [place] and was unable to reach patient and subsequently called paramedics to go to patient's home.  Labs in Ed showed CPK>700, Cr at baseline and EKG showed A. Fib/flutter. Patient had declined [address] with his PCP in past visit 2022. Cardiology was consulted for A. Fib and CHF, was suppose to see Cardiology outpt but did not f/u. Patient Active Problem List   Diagnosis    Essential hypertension    Paroxysmal atrial fibrillation (HCC)    Mixed hyperlipidemia    Stage 3a chronic kidney disease ([facility] [address]    Chronic diastolic congestive heart failure (HCC)    Rhabdomyolysis    Benign prostatic hyperplasia    Hypocalcemia    Hyperkalemia    Frequent falls       Past Medical History:   Diagnosis Date    Allergic rhinitis     Fractured pelvis ([facility] [address]     [employer] off of roof.  History of chicken pox Childhood    Scarlet fever Childhood    Zenker's diverticulum     In the throat. 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     VASECTOMY       Allergies   Allergen Reactions    Cephalosporins Other (See Comments)      Family History   Problem Relation Age of Onset    Heart Disease Father     Cancer Neg Hx     Stroke Father     Diabetes Father     Heart Disease Mother     Hypertension Mother       Social History     Socioeconomic History    Marital status:      Spouse name: Not on file    Number of children: Not on file    Years of education: Not on file    Highest education level: Not on file   Occupational History    Not on file   Tobacco Use    Smoking status: Former Smoker     Quit date: 1976     Years since quittin.11    Smokeless tobacco: Never Used   Substance and Sexual Activity    Alcohol use: Yes     Alcohol/week: 1.7 standard drinks    Drug use: No    Sexual activity: Not on file   Other Topics Concern    Not on file   Social History Narrative    Not on file     Social Determinants of Health     Financial Resource Strain:     Difficulty of Paying Living Expenses: Not on file   Food Insecurity:     Worried About Running Out of Food in the Last Year: Not on file    Dustin of Food in the Last Year: Not on file   Transportation Needs:     Lack of Transportation (Medical): Not on file    Lack of Transportation (Non-Medical):  Not on file   Physical Activity:     Days of Exercise per Week: Not on file    Minutes of Exercise per Session: Not on file   Stress:     Feeling of Stress : Not on file   Social Connections:     Frequency of Communication with Friends and Family: Not on file    Frequency of Social Gatherings with Friends and Family: Not on file    Attends Denominational Services: Not on file    Active Member of Clubs or Organizations: Not on file    Attends Club or Organization Meetings: Not on file    Marital Status: Not on file   Intimate Partner Violence:     Fear of Current or Ex-Partner: Not on file    Emotionally Abused: Not on file    Physically Abused: Not on file    Sexually Abused: Not on file   Housing Stability:     Unable to Pay for Housing in the Last Year: Not on file    Number of Places Lived in the Last Year: Not on file    Unstable Housing in the Last Year: Not on file       Current Facility-Administered Medications   Medication Dose Route Frequency    labetalol (NORMODYNE;TRANDATE) injection 10 mg  10 mg IntraVENous Q6H PRN    oyster shell calcium w/D 500-200 MG-UNIT tablet 1 tablet  1 tablet Oral Daily    carvedilol (COREG) tablet 6.25 mg  6.25 mg Oral BID WC    valsartan (DIOVAN) tablet 80 mg  80 mg Oral Daily    aspirin tablet 325 mg  325 mg Oral Daily    0.9 % sodium chloride infusion   IntraVENous Continuous    sodium chloride flush 0.9 % injection 5-40 mL  5-40 mL IntraVENous 2 times per day    sodium chloride flush 0.9 % injection 5-40 mL  5-40 mL IntraVENous PRN    0.9 % sodium chloride infusion   IntraVENous PRN    potassium chloride (KLOR-CON M) extended release tablet 40 mEq  40 mEq Oral PRN    Or    potassium bicarb-citric acid (EFFER-K) effervescent tablet 40 mEq  40 mEq Oral PRN    Or    potassium chloride 10 mEq/100 mL IVPB (Peripheral Line)  10 mEq IntraVENous PRN    magnesium sulfate 2000 mg in 50 mL IVPB premix  2,000 mg IntraVENous PRN    enoxaparin (LOVENOX) injection 40 mg  40 mg SubCUTAneous Daily    promethazine (PHENERGAN) tablet 12.5 mg  12.5 mg Oral Q6H PRN    Or    ondansetron (ZOFRAN) injection 4 mg  4 mg IntraVENous Q6H PRN    polyethylene glycol (GLYCOLAX) packet 17 g  17 g Oral Daily    bisacodyl (DULCOLAX) EC tablet 5 mg  5 mg Oral Daily PRN    aluminum & magnesium hydroxide-simethicone (MAALOX) 200-200-20 MG/5ML suspension 30 mL  30 mL Oral Q6H PRN    acetaminophen (TYLENOL) tablet 650 mg  650 mg Oral Q6H PRN    Or    acetaminophen (TYLENOL) suppository 650 mg  650 mg Rectal Q6H PRN    tuberculin injection 5 Units  5 Units IntraDERmal Once       Review of Systems   Constitutional: Negative for chills, decreased appetite, diaphoresis, fever, malaise/fatigue, weight gain and weight loss. HENT: Negative for congestion. Eyes: Negative for blurred vision and double vision. Cardiovascular: Negative for chest pain, dyspnea on exertion, irregular heartbeat, leg swelling, near-syncope, palpitations and syncope. Respiratory: Negative for cough, shortness of breath and wheezing. Endocrine: Negative for cold intolerance and heat intolerance. Hematologic/Lymphatic: Does not bruise/bleed easily. Skin: Negative for color change and nail changes. Musculoskeletal: Positive for falls. Negative for back pain and muscle weakness.    Gastrointestinal: Negative for bloating, abdominal pain, diarrhea, melena and nausea. Genitourinary: Negative for dysuria and frequency. Neurological: Negative for dizziness, focal weakness, headaches, light-headedness and weakness. Psychiatric/Behavioral: Negative for altered mental status. Physical Exam  Vitals:    07/11/22 0512 07/11/22 0515 07/11/22 0727 07/11/22 1107   BP:  (!) 182/98 (!) 185/101 (!) 182/96   Pulse: 84  82 81   Resp: 17  17 18   Temp: 97.9 °F (36.6 °C)  97.9 °F (36.6 °C) 97.7 °F (36.5 °C)   TempSrc: Oral  Oral Oral   SpO2: 96%  96% 93%   Weight: 145 lb 8.1 oz (66 kg)      Height:           Physical Exam:  General: Well Developed, Well Nourished, No Acute Distress  HEENT: pupils equal and round, no abnormalities noted  Neck: supple, no JVD, no carotid bruits  Heart: S1S2 with RRR without murmurs or gallops  Lungs: Clear throughout auscultation bilaterally without adventitious sounds  Abd: soft, nontender, nondistended, with good bowel sounds  Ext: warm, no edema, calves supple/nontender, pulses 2+ bilaterally  Skin: warm and dry  Psychiatric: Normal mood and affect  Neurologic: Alert and oriented X 3    Cardiographics    Telemetry: A. Fib/flutter  ECG: rate controlled A. Fib     Echocardiogram: 07/09/22    TRANSTHORACIC ECHOCARDIOGRAM (TTE) COMPLETE (CONTRAST/BUBBLE/3D PRN) 07/10/2022  5:09 PM (Final)    Interpretation Summary    Left Ventricle: Mildly reduced left ventricular systolic function. EF 3D is 47%. Left ventricle size is normal. Normal wall thickness. Mild global hypokinesis present. Indeterminate diastolic function due to atrial fibrillation.   Right Ventricle: Right ventricle size is normal. Normal systolic function.   Aortic Valve: Moderately calcified cusp. Trace regurgitation. No significant stenosis. LVOT:AV VTI Index is 0.76. AV area by continuity VTI is 2.9 cm2.    Mitral Valve: Mildly thickened leaflet, at the anterior and posterior leaflets. Mild to moderate regurgitation.   Tricuspid Valve: Mild regurgitation.  Mildly elevated RVSP. The estimated RVSP is 43 mmHg.   IVC/SVC: IVC diameter is greater than 21 mm and decreases greater than 50% during inspiration; therefore the estimated right atrial pressure is intermediate (~8 mmHg).   Technical qualifiers: Color flow Doppler was performed and pulse wave and/or continuous wave Doppler was performed. Signed by: Kendy Wong DO on 7/10/2022  5:09 PM      Labs:   Recent Labs     07/09/22  2335 07/09/22  2335 07/10/22  0321 07/11/22  0557   *  --   --  135*   K 6.8*   < > 4.1 4.3   BUN 25*  --   --  38*   WBC 12.1*  --   --  7.9   HGB 16.1  --   --  13.5*   HCT 48.5  --   --  40.3*     --   --  231   HDL  --   --  44  --     < > = values in this interval not displayed. Assessment/Plan:     Assessment:      Principal Problem:    Rhabdomyolysis- per primary team    Active Problems:    Hyperkalemia- resolved       Frequent falls- jet primary team       Essential hypertension- not controlled       Paroxysmal atrial fibrillation ([facility] [address]- rate controlled; likely at least persistent (last EKG 8/2021 was a. Fib as well). Continue BB, patient opt for no OAC. Stage 3a chronic kidney disease ([address]- at baseline      Chronic diastolic congestive heart failure (HCC)-echo yesterday with EF 47%. Looks euvolemic. Will continue coreg. Can follow up outpt. We appreciate the opportunity to participate in this patient's care.      REJI Lawler - CNP  Supervising MD: Dr. Sergio Hernandez

## 2022-07-12 PROBLEM — R00.1 SINUS BRADYCARDIA: Status: ACTIVE | Noted: 2022-07-12

## 2022-07-12 LAB
ALBUMIN SERPL-MCNC: 2.3 G/DL (ref 3.2–4.6)
ALBUMIN/GLOB SERPL: 0.5 {RATIO} (ref 1.2–3.5)
ALP SERPL-CCNC: 154 U/L (ref 50–136)
ALT SERPL-CCNC: 28 U/L (ref 12–65)
ANION GAP SERPL CALC-SCNC: 4 MMOL/L (ref 7–16)
AST SERPL-CCNC: 30 U/L (ref 15–37)
BASOPHILS # BLD: 0.1 K/UL (ref 0–0.2)
BASOPHILS NFR BLD: 1 % (ref 0–2)
BILIRUB SERPL-MCNC: 1 MG/DL (ref 0.2–1.1)
BUN SERPL-MCNC: 32 MG/DL (ref 8–23)
CALCIUM SERPL-MCNC: 8.3 MG/DL (ref 8.3–10.4)
CHLORIDE SERPL-SCNC: 105 MMOL/L (ref 98–107)
CO2 SERPL-SCNC: 24 MMOL/L (ref 21–32)
CREAT SERPL-MCNC: 1.1 MG/DL (ref 0.8–1.5)
DIFFERENTIAL METHOD BLD: ABNORMAL
EOSINOPHIL # BLD: 0.3 K/UL (ref 0–0.8)
EOSINOPHIL NFR BLD: 4 % (ref 0.5–7.8)
ERYTHROCYTE [DISTWIDTH] IN BLOOD BY AUTOMATED COUNT: 14.3 % (ref 11.9–14.6)
GLOBULIN SER CALC-MCNC: 4.6 G/DL (ref 2.3–3.5)
GLUCOSE SERPL-MCNC: 94 MG/DL (ref 65–100)
HCT VFR BLD AUTO: 37.3 % (ref 41.1–50.3)
HGB BLD-MCNC: 12.7 G/DL (ref 13.6–17.2)
IMM GRANULOCYTES # BLD AUTO: 0.1 K/UL (ref 0–0.5)
IMM GRANULOCYTES NFR BLD AUTO: 1 % (ref 0–5)
LYMPHOCYTES # BLD: 0.9 K/UL (ref 0.5–4.6)
LYMPHOCYTES NFR BLD: 12 % (ref 13–44)
MAGNESIUM SERPL-MCNC: 1.8 MG/DL (ref 1.8–2.4)
MCH RBC QN AUTO: 32.5 PG (ref 26.1–32.9)
MCHC RBC AUTO-ENTMCNC: 34 G/DL (ref 31.4–35)
MCV RBC AUTO: 95.4 FL (ref 79.6–97.8)
MM INDURATION, POC: 0 MM (ref 0–5)
MONOCYTES # BLD: 0.6 K/UL (ref 0.1–1.3)
MONOCYTES NFR BLD: 8 % (ref 4–12)
NEUTS SEG # BLD: 5.6 K/UL (ref 1.7–8.2)
NEUTS SEG NFR BLD: 74 % (ref 43–78)
NRBC # BLD: 0 K/UL (ref 0–0.2)
PLATELET # BLD AUTO: 227 K/UL (ref 150–450)
PMV BLD AUTO: 10.2 FL (ref 9.4–12.3)
POTASSIUM SERPL-SCNC: 4.4 MMOL/L (ref 3.5–5.1)
PPD, POC: NEGATIVE
PROT SERPL-MCNC: 6.9 G/DL (ref 6.3–8.2)
RBC # BLD AUTO: 3.91 M/UL (ref 4.23–5.6)
SODIUM SERPL-SCNC: 133 MMOL/L (ref 138–145)
WBC # BLD AUTO: 7.5 K/UL (ref 4.3–11.1)

## 2022-07-12 PROCEDURE — 6370000000 HC RX 637 (ALT 250 FOR IP): Performed by: INTERNAL MEDICINE

## 2022-07-12 PROCEDURE — 6370000000 HC RX 637 (ALT 250 FOR IP): Performed by: FAMILY MEDICINE

## 2022-07-12 PROCEDURE — 36415 COLL VENOUS BLD VENIPUNCTURE: CPT

## 2022-07-12 PROCEDURE — 1100000000 HC RM PRIVATE

## 2022-07-12 PROCEDURE — 6360000002 HC RX W HCPCS: Performed by: FAMILY MEDICINE

## 2022-07-12 PROCEDURE — 85025 COMPLETE CBC W/AUTO DIFF WBC: CPT

## 2022-07-12 PROCEDURE — 80053 COMPREHEN METABOLIC PANEL: CPT

## 2022-07-12 PROCEDURE — 1100000003 HC PRIVATE W/ TELEMETRY

## 2022-07-12 PROCEDURE — 2580000003 HC RX 258: Performed by: FAMILY MEDICINE

## 2022-07-12 PROCEDURE — 83735 ASSAY OF MAGNESIUM: CPT

## 2022-07-12 PROCEDURE — 99232 SBSQ HOSP IP/OBS MODERATE 35: CPT | Performed by: INTERNAL MEDICINE

## 2022-07-12 RX ORDER — CARVEDILOL 6.25 MG/1
6.25 TABLET ORAL 2 TIMES DAILY WITH MEALS
Status: DISCONTINUED | OUTPATIENT
Start: 2022-07-12 | End: 2022-07-14

## 2022-07-12 RX ADMIN — SODIUM CHLORIDE, PRESERVATIVE FREE 10 ML: 5 INJECTION INTRAVENOUS at 20:41

## 2022-07-12 RX ADMIN — ASPIRIN 81 MG: 81 TABLET ORAL at 09:11

## 2022-07-12 RX ADMIN — VALSARTAN 160 MG: 80 TABLET ORAL at 20:40

## 2022-07-12 RX ADMIN — POLYETHYLENE GLYCOL 3350 17 G: 17 POWDER, FOR SOLUTION ORAL at 09:12

## 2022-07-12 RX ADMIN — SODIUM CHLORIDE, PRESERVATIVE FREE 10 ML: 5 INJECTION INTRAVENOUS at 09:11

## 2022-07-12 RX ADMIN — ENOXAPARIN SODIUM 40 MG: 100 INJECTION SUBCUTANEOUS at 09:13

## 2022-07-12 RX ADMIN — VALSARTAN 160 MG: 80 TABLET ORAL at 09:12

## 2022-07-12 RX ADMIN — Medication 1 TABLET: at 09:11

## 2022-07-12 ASSESSMENT — PAIN SCALES - GENERAL
PAINLEVEL_OUTOF10: 0
PAINLEVEL_OUTOF10: 0

## 2022-07-12 NOTE — PROGRESS NOTES
ICU and cardiology notified of HR dropping into 30-40s with pauses. ICU charge nurse at bedside. Monitor has been notified if pt sustains in 30-40s to call ICU and primary RN.

## 2022-07-12 NOTE — CARE COORDINATION
Dispo update:  Accepted STR bed offer from [facility], and Ms. Malu Nesbitt will start insurance precert with [facility] Medicare. This plan is ok with . Isaac Sonisingh in room 65 and with his daughter Ms. Peter Magallanes at cell [phone].

## 2022-07-12 NOTE — PROGRESS NOTES
Patient has historically refused oral anticoagulation. He is educated about the risk for stroke. He is also falling more frequently now and this is a relative contraindication to anticoagulation. Stage 3a chronic kidney disease (HCC)  Plan: Stable    Chronic diastolic congestive heart failure ([facility] [address]  Plan: Patient on appropriate therapy with carvedilol and valsartan. Volume status appears stable. LVEF is mildly reduced. Patient has been noncompliant with office follow-up. .  Suspect patient's falls or noncardiogenic in etiology.         Sherren Muck, MD  7/12/2022 4:39 PM

## 2022-07-12 NOTE — FLOWSHEET NOTE
07/12/22 0041   Vitals   Heart Rate 54   Resp 18   /70   MAP (mmHg) 88   Oxygen Therapy   SpO2 95 %   O2 Device None (Room air)

## 2022-07-12 NOTE — PLAN OF CARE
Problem: Discharge Planning  Goal: Discharge to home or other facility with appropriate resources  Outcome: Progressing  Flowsheets (Taken 7/12/2022 0645 by Amauri Cruz, RN)  Discharge to home or other facility with appropriate resources:   Identify barriers to discharge with patient and caregiver   Arrange for needed discharge resources and transportation as appropriate   Identify discharge learning needs (meds, wound care, etc)     Problem: Safety - Adult  Goal: Free from fall injury  Outcome: Progressing  Flowsheets (Taken 7/12/2022 0645 by Amauri Cruz, RN)  Free From Fall Injury: Instruct family/caregiver on patient safety     Problem: Pain  Goal: Verbalizes/displays adequate comfort level or baseline comfort level  Outcome: Progressing  Flowsheets (Taken 7/12/2022 0645 by Amauri Cruz, RN)  Verbalizes/displays adequate comfort level or baseline comfort level:   Encourage patient to monitor pain and request assistance   Assess pain using appropriate pain scale   Administer analgesics based on type and severity of pain and evaluate response   Implement non-pharmacological measures as appropriate and evaluate response     Problem: ABCDS Injury Assessment  Goal: Absence of physical injury  Outcome: Progressing  Flowsheets (Taken 7/12/2022 0645 by Amauri Cruz, RN)  Absence of Physical Injury: Implement safety measures based on patient assessment

## 2022-07-12 NOTE — PROGRESS NOTES
MD notified of pt only wanting one 80 mg PO dose of valsartan. Orders given to give one time dose of valsartan 80 mg and the 180 mg valsartan dose was not given.

## 2022-07-12 NOTE — PLAN OF CARE
Problem: Discharge Planning  Goal: Discharge to home or other facility with appropriate resources  Outcome: Progressing  Flowsheets (Taken 7/11/2022 0645 by Minesh Gamboa RN)  Discharge to home or other facility with appropriate resources:   Identify barriers to discharge with patient and caregiver   Identify discharge learning needs (meds, wound care, etc)   Arrange for needed discharge resources and transportation as appropriate     Problem: Safety - Adult  Goal: Free from fall injury  Outcome: Progressing  Flowsheets (Taken 7/11/2022 0645 by Minesh Gamboa RN)  Free From Fall Injury: Instruct family/caregiver on patient safety     Problem: Pain  Goal: Verbalizes/displays adequate comfort level or baseline comfort level  Outcome: Progressing  Flowsheets (Taken 7/11/2022 0645 by Minesh Gamboa RN)  Verbalizes/displays adequate comfort level or baseline comfort level:   Encourage patient to monitor pain and request assistance   Assess pain using appropriate pain scale   Administer analgesics based on type and severity of pain and evaluate response   Implement non-pharmacological measures as appropriate and evaluate response     Problem: ABCDS Injury Assessment  Goal: Absence of physical injury  Outcome: Progressing  Flowsheets (Taken 7/11/2022 0645 by Minesh Gamboa RN)  Absence of Physical Injury: Implement safety measures based on patient assessment

## 2022-07-12 NOTE — PROGRESS NOTES
Hospitalist Progress Note   Admit Date:  2022 10:20 PM   Name:  Charmayne Spark   Age:  78 y.o. Sex:  male  :  1940   MRN:  [id number]   Room:  [facility]    Reason(s) for Admission: Rhabdomyolysis [M62.82]  Fall, initial encounter [W19. XXXA]  Non-traumatic rhabdomyolysis [facility] Course & Interval History:   Mr. You Eastman is a nice 77 y/o WM with a h/o pelvic fracture, allergic rhinitis, scarlet fever who was admitted to our facility on  with mild acute rhabdomyolysis. He had a fall in the bathroom at his home and again in the bedroom after that. His daughter from [address] [place] [address] was unable to get a hold of him so she called EMS who brought him to the ER. . He was started on IVF. Carotid duplex negative for significant stenosis. XR shoulder negative for fracture of dislocation. CT head and CT C-spine on admission overall unremarkable. TTE showed EF 47% with indeterminate diastolic dysfxn d/t afib. Coreg added on  for HTN but he developed asymptomatic bradycardia. Subjective/24hr Events (22): Bradycardia overnight into the 30s, patient says he did not have any symptoms. HR back in the 60s now. Feels well. No chest pain or SOB. Assessment & Plan:   # Sinus bradycardia   - Stop Coreg for now. Con't remote tele. Already had echo, mildly reduced EF. Cardiology following. # Non-traumatic rhabdomyolysis   - Started on IVFs, CK normal on . Stop IVFs. # HyperK   - Resolved. # Recurrent/frequent falls   - Imaging on presentation unremarkable   - Therapy following and recs placement but pt wants to go home. # Atrial fibrillation   - Prior notes indicate non-compliance with Cardiology follow up. Hold Coreg as above. Poor OAC candidate due to recurrent falls at home. # HTN   - Con't ARB    # CKD 3a   - At baseline. # Chronic dCHF   - Euvolemic, stop IVFs today . Discharge Planning: Wants to go home. Diet:  ADULT DIET;  Regular  DVT PPx: Lovenox  Code status: Full Code    Hospital Problems           Last Modified POA    * (Principal) Rhabdomyolysis 7/10/2022 Yes    Hyperkalemia 7/10/2022 Yes    Frequent falls 7/11/2022 Yes    Sinus bradycardia 7/12/2022 Yes    Essential hypertension (Chronic) 7/10/2022 Yes    Paroxysmal atrial fibrillation (HCC) (Chronic) 7/10/2022 Yes    Stage 3a chronic kidney disease (HCC) (Chronic) 7/10/2022 Yes    Chronic diastolic congestive heart failure (HCC) (Chronic) 7/10/2022 Yes          Objective:     Patient Vitals for the past 24 hrs:   Temp Pulse Resp BP SpO2   07/12/22 0912 -- -- -- (!) 146/86 --   07/12/22 0725 97.7 °F (36.5 °C) 59 18 (!) 146/86 96 %   07/12/22 0454 97.9 °F (36.6 °C) 73 17 (!) 148/89 96 %   07/12/22 0041 -- 54 18 126/70 95 %   07/12/22 0008 97.3 °F (36.3 °C) 65 16 118/80 97 %   07/11/22 2005 97.5 °F (36.4 °C) 80 17 134/76 97 %   07/11/22 1747 -- -- -- (!) 156/100 --   07/11/22 1617 97.9 °F (36.6 °C) 67 17 (!) 156/100 92 %   07/11/22 1107 97.7 °F (36.5 °C) 81 18 (!) 182/96 93 %       Estimated body mass index is 26.22 kg/m² as calculated from the following:    Height as of this encounter: 5' 4\" (1.626 m). Weight as of this encounter: 152 lb 12.5 oz (69.3 kg). Intake/Output Summary (Last 24 hours) at 7/12/2022 0945  Last data filed at 7/12/2022 0851  Gross per 24 hour   Intake 300 ml   Output 200 ml   Net 100 ml         Physical Exam:   Blood pressure (!) 146/86, pulse 59, temperature 97.7 °F (36.5 °C), temperature source Oral, resp. rate 18, height 5' 4\" (1.626 m), weight 152 lb 12.5 oz (69.3 kg), SpO2 96 %. General:    Well nourished. No overt distress. Appears stated age. Awake and alert. Head:  Normocephalic, atraumatic  Eyes:  Sclerae appear normal. Pupils equally round. ENT:  Nares appear normal, no drainage. Moist oral mucosa  Neck:  No restricted ROM. Trachea midline. CV:   RRR, HR 60s. No m/r/g. No jugular venous distension. Lungs:   CTAB. No wheezing, rhonchi, or rales.  Respirations even, unlabored. Abdomen: Bowel sounds present. Soft, nontender, nondistended. Extremities: No cyanosis or clubbing. Trace b/l LE edema. Skin:     No rashes and normal coloration. Warm and dry. Neuro:  CN II-XII grossly intact. Sensation intact. A&Ox3  Psych:  Normal mood and affect.       I have reviewed ordered lab tests and independently visualized imaging below:    Recent Labs:  Recent Results (from the past 48 hour(s))   Transthoracic echocardiogram (TTE) complete with contrast, bubble, strain, and 3D PRN    Collection Time: 07/10/22  3:21 PM   Result Value Ref Range    LA Minor Axis 7.5 cm    LA Major Axis 7.5 cm    LA Area 2C 29.5 cm2    LA Area 4C 31.0 cm2    LA Volume BP 95 (A) 18 - 58 mL    LA Diameter 5.1 cm    AV Mean Velocity 0.8 m/s    AV Mean Gradient 3 mmHg    AV VTI 21.0 cm    AV Peak Velocity 1.3 m/s    AV Peak Gradient 7 mmHg    AV Area by VTI 2.9 cm2    AV Area by Peak Velocity 2.7 cm2    Aortic Root 3.3 cm    Ascending Aorta 3.3 cm    EF 3D 47 %    LVOT SV 60.8 ml    LV EDV 3D 175 mL    LV ESV 3D 93 mL    LV Mass 3D 150.0 g    IVSd 1.1 (A) 0.6 - 1.0 cm    LVIDd 5.4 4.2 - 5.9 cm    LVIDs 4.2 cm    LVOT Diameter 2.2 cm    LVOT Mean Gradient 2 mmHg    LVOT VTI 16.0 cm    LVOT Peak Velocity 0.9 m/s    LVOT Peak Gradient 3 mmHg    LVPWd 0.9 0.6 - 1.0 cm    LV E' Lateral Velocity 13 cm/s    LV E' Septal Velocity 10 cm/s    LVOT Area 3.8 cm2    IVC Proxmal 2.1 cm    MV Nyquist Velocity 35 cm/s    MR Radius PISA 0.60 cm    MR .0 cm    MR Peak Velocity 5.2 m/s    MR Peak Gradient 108 mmHg    MV E Wave Deceleration Time 164.0 ms    MV E Velocity 0.96 m/s    MV EROA PISA 15.2 cm2    RV Basal Dimension 3.6 cm    TAPSE 1.5 (A) 1.7 cm    TR Max Velocity 2.95 m/s    TR Peak Gradient 35 mmHg    Body Surface Area 1.68 m2    Fractional Shortening 2D 22 28 - 44 %    LV ESV Index 3D 56 mL/m2    LV EDV Index 3D 105 mL/m2    LVIDd Index 3.23 cm/m2    LVIDs Index 2.51 cm/m2    LV RWT Ratio 0.33     LV Mass 2D 206.7 88 - 224 g    LV Mass 2D Index 123.8 (A) 49 - 115 g/m2    LV Mass Index 3D 89.8 g/m2    E/E' Ratio (Averaged) 8.49     E/E' Lateral 7.38     E/E' Septal 9.60     LA Volume Index BP 57 (A) 16 - 34 ml/m2    LVOT Stroke Volume Index 36.4 mL/m2    LA Size Index 3.05 cm/m2    LA/AO Root Ratio 1.55     Ao Root Index 1.98 cm/m2    Ascending Aorta Index 1.98 cm/m2    AV Velocity Ratio 0.69     LVOT:AV VTI Index 0.76     BENNETT/BSA VTI 1.7 cm2/m2    BENNETT/BSA Peak Velocity 1.6 cm2/m2    MR Regurg Volume PISA 2,358. 62 mL    Est. RA Pressure 8 mmHg    RVSP 43 mmHg   TSH with Reflex    Collection Time: 07/11/22  5:56 AM   Result Value Ref Range    TSH w Free Thyroid if Abnormal 1.84 0.358 - 3.740 UIU/ML   CK    Collection Time: 07/11/22  5:57 AM   Result Value Ref Range    Total  21 - 215 U/L   Comprehensive Metabolic Panel w/ Reflex to MG    Collection Time: 07/11/22  5:57 AM   Result Value Ref Range    Sodium 135 (L) 138 - 145 mmol/L    Potassium 4.3 3.5 - 5.1 mmol/L    Chloride 105 98 - 107 mmol/L    CO2 27 21 - 32 mmol/L    Anion Gap 3 (L) 7 - 16 mmol/L    Glucose 90 65 - 100 mg/dL    BUN 38 (H) 8 - 23 MG/DL    CREATININE 1.40 0.8 - 1.5 MG/DL    GFR African American >60 >60 ml/min/1.73m2    GFR Non- 51 (L) >60 ml/min/1.73m2    Calcium 8.5 8.3 - 10.4 MG/DL    Total Bilirubin 1.2 (H) 0.2 - 1.1 MG/DL    ALT 30 12 - 65 U/L    AST 34 15 - 37 U/L    Alk Phosphatase 163 (H) 50 - 136 U/L    Total Protein 7.3 6.3 - 8.2 g/dL    Albumin 2.4 (L) 3.2 - 4.6 g/dL    Globulin 4.9 (H) 2.3 - 3.5 g/dL    Albumin/Globulin Ratio 0.5 (L) 1.2 - 3.5     CBC with Auto Differential    Collection Time: 07/11/22  5:57 AM   Result Value Ref Range    WBC 7.9 4.3 - 11.1 K/uL    RBC 4.22 (L) 4.23 - 5.6 M/uL    Hemoglobin 13.5 (L) 13.6 - 17.2 g/dL    Hematocrit 40.3 (L) 41.1 - 50.3 %    MCV 95.5 79.6 - 97.8 FL    MCH 32.0 26.1 - 32.9 PG    MCHC 33.5 31.4 - 35.0 g/dL    RDW 14.1 11.9 - 14.6 %    Platelets 967 369 - 022 K/uL    MPV 10.0 9.4 - 12.3 FL    nRBC 0.00 0.0 - 0.2 K/uL    Differential Type AUTOMATED      Seg Neutrophils 78 43 - 78 %    Lymphocytes 10 (L) 13 - 44 %    Monocytes 9 4.0 - 12.0 %    Eosinophils % 1 0.5 - 7.8 %    Basophils 1 0.0 - 2.0 %    Immature Granulocytes 1 0.0 - 5.0 %    Segs Absolute 6.2 1.7 - 8.2 K/UL    Absolute Lymph # 0.8 0.5 - 4.6 K/UL    Absolute Mono # 0.7 0.1 - 1.3 K/UL    Absolute Eos # 0.1 0.0 - 0.8 K/UL    Basophils Absolute 0.1 0.0 - 0.2 K/UL    Absolute Immature Granulocyte 0.1 0.0 - 0.5 K/UL   PLEASE READ & DOCUMENT PPD TEST IN 24 HRS    Collection Time: 07/11/22  5:46 PM   Result Value Ref Range    PPD, (POC) Negative Negative    mm Induration 0 0 - 5 mm         Other Studies:  Transthoracic echocardiogram (TTE) complete with contrast, bubble, strain, and 3D PRN    Result Date: 7/10/2022    Left Ventricle: Mildly reduced left ventricular systolic function. EF 3D is 47%. Left ventricle size is normal. Normal wall thickness. Mild global hypokinesis present. Indeterminate diastolic function due to atrial fibrillation.   Right Ventricle: Right ventricle size is normal. Normal systolic function.   Aortic Valve: Moderately calcified cusp. Trace regurgitation. No significant stenosis. LVOT:AV VTI Index is 0.76. AV area by continuity VTI is 2.9 cm2.   Mitral Valve: Mildly thickened leaflet, at the anterior and posterior leaflets. Mild to moderate regurgitation.   Tricuspid Valve: Mild regurgitation. Mildly elevated RVSP. The estimated RVSP is 43 mmHg.   IVC/SVC: IVC diameter is greater than 21 mm and decreases greater than 50% during inspiration; therefore the estimated right atrial pressure is intermediate (~8 mmHg).   Technical qualifiers: Color flow Doppler was performed and pulse wave and/or continuous wave Doppler was performed. Vascular duplex carotid bilateral    Result Date: 7/10/2022  TITLE:  Carotid and Vertebral Ultrasound Examination. INDICATION:  Syncope. Recent fall.  TECHNIQUE: Grayscale, Color Doppler, and Spectral Doppler Ultrasound interrogation performed. Peak Systolic Velocity, ICA-to-CCA ratio, and End-Diastolic Velocity are recorded. The estimate of stenosis is inferred from velocity measurements cross referenced to published correlations as defined by the Society of Radiologists in [address] [place] [address] Drive Radiology 2003; 229; 340-346. COMPARISON: None. RIGHT NECK:  The peak systolic velocity in the Common Carotid Artery = 69 cm/sec; Internal Carotid Artery = 106 cm/sec. Ratio = 1.5. Diffuse plaque. Antegrade flow in the vertebral artery. LEFT  NECK:  The peak systolic velocity in the Common Carotid Artery = 61 cm/sec; Internal Carotid Artery = 85 cm/sec. Ratio = 1.4. Diffuse plaque. Antegrade flow in the vertebral artery. SONYA:  No significant stenosis. LICA:  No significant stenosis.       Current Meds:  Current Facility-Administered Medications   Medication Dose Route Frequency    labetalol (NORMODYNE;TRANDATE) injection 10 mg  10 mg IntraVENous Q6H PRN    oyster shell calcium w/D 500-200 MG-UNIT tablet 1 tablet  1 tablet Oral Daily    aspirin EC tablet 81 mg  81 mg Oral Daily    [Held by provider] carvedilol (COREG) tablet 12.5 mg  12.5 mg Oral BID WC    valsartan (DIOVAN) tablet 160 mg  160 mg Oral BID    sodium chloride flush 0.9 % injection 5-40 mL  5-40 mL IntraVENous 2 times per day    sodium chloride flush 0.9 % injection 5-40 mL  5-40 mL IntraVENous PRN    0.9 % sodium chloride infusion   IntraVENous PRN    potassium chloride (KLOR-CON M) extended release tablet 40 mEq  40 mEq Oral PRN    Or    potassium bicarb-citric acid (EFFER-K) effervescent tablet 40 mEq  40 mEq Oral PRN    Or    potassium chloride 10 mEq/100 mL IVPB (Peripheral Line)  10 mEq IntraVENous PRN    magnesium sulfate 2000 mg in 50 mL IVPB premix  2,000 mg IntraVENous PRN    enoxaparin (LOVENOX) injection 40 mg  40 mg SubCUTAneous Daily    promethazine (PHENERGAN) tablet 12.5 mg  12.5 mg Oral Q6H PRN    Or    ondansetron (ZOFRAN) injection 4 mg  4 mg IntraVENous Q6H PRN    polyethylene glycol (GLYCOLAX) packet 17 g  17 g Oral Daily    bisacodyl (DULCOLAX) EC tablet 5 mg  5 mg Oral Daily PRN    aluminum & magnesium hydroxide-simethicone (MAALOX) 200-200-20 MG/5ML suspension 30 mL  30 mL Oral Q6H PRN    acetaminophen (TYLENOL) tablet 650 mg  650 mg Oral Q6H PRN    Or    acetaminophen (TYLENOL) suppository 650 mg  650 mg Rectal Q6H PRN       Signed:  Aldair Ceballos MD    Part of this note may have been written by using a voice dictation software. The note has been proof read but may still contain some grammatical/other typographical errors.

## 2022-07-12 NOTE — MANAGEMENT PLAN
Notified via [employer] that patient's HR is dropping into 30-40 with pauses while sleeping. Patient is currently awake with AFIB HR 60s. Spoke with primary RN via phone. Will continue to monitor for now.  ? Sleep apnea related as this is only happening while sleeping    Marylen Bourbon, APRN - DEANGELO  12:39 AM

## 2022-07-13 LAB
ANION GAP SERPL CALC-SCNC: 6 MMOL/L (ref 7–16)
BUN SERPL-MCNC: 32 MG/DL (ref 8–23)
CALCIUM SERPL-MCNC: 8.5 MG/DL (ref 8.3–10.4)
CHLORIDE SERPL-SCNC: 104 MMOL/L (ref 98–107)
CO2 SERPL-SCNC: 22 MMOL/L (ref 21–32)
CREAT SERPL-MCNC: 1.3 MG/DL (ref 0.8–1.5)
GLUCOSE SERPL-MCNC: 99 MG/DL (ref 65–100)
MAGNESIUM SERPL-MCNC: 1.8 MG/DL (ref 1.8–2.4)
MM INDURATION, POC: 0 MM (ref 0–5)
MM INDURATION, POC: 0 MM (ref 0–5)
POTASSIUM SERPL-SCNC: 4.5 MMOL/L (ref 3.5–5.1)
PPD, POC: NEGATIVE
PPD, POC: NEGATIVE
SARS-COV-2 RDRP RESP QL NAA+PROBE: DETECTED
SARS-COV-2 RNA RESP QL NAA+PROBE: NOT DETECTED
SARS-COV-2: NORMAL
SODIUM SERPL-SCNC: 132 MMOL/L (ref 138–145)
SOURCE: ABNORMAL
SOURCE: NORMAL

## 2022-07-13 PROCEDURE — 6370000000 HC RX 637 (ALT 250 FOR IP): Performed by: FAMILY MEDICINE

## 2022-07-13 PROCEDURE — 1100000000 HC RM PRIVATE

## 2022-07-13 PROCEDURE — 6360000002 HC RX W HCPCS: Performed by: FAMILY MEDICINE

## 2022-07-13 PROCEDURE — 6370000000 HC RX 637 (ALT 250 FOR IP): Performed by: HOSPITALIST

## 2022-07-13 PROCEDURE — 80048 BASIC METABOLIC PNL TOTAL CA: CPT

## 2022-07-13 PROCEDURE — 1100000003 HC PRIVATE W/ TELEMETRY

## 2022-07-13 PROCEDURE — 2580000003 HC RX 258: Performed by: FAMILY MEDICINE

## 2022-07-13 PROCEDURE — 87635 SARS-COV-2 COVID-19 AMP PRB: CPT

## 2022-07-13 PROCEDURE — U0005 INFEC AGEN DETEC AMPLI PROBE: HCPCS

## 2022-07-13 PROCEDURE — 36415 COLL VENOUS BLD VENIPUNCTURE: CPT

## 2022-07-13 PROCEDURE — 6370000000 HC RX 637 (ALT 250 FOR IP): Performed by: INTERNAL MEDICINE

## 2022-07-13 PROCEDURE — 99232 SBSQ HOSP IP/OBS MODERATE 35: CPT | Performed by: INTERNAL MEDICINE

## 2022-07-13 PROCEDURE — 83735 ASSAY OF MAGNESIUM: CPT

## 2022-07-13 RX ORDER — AMLODIPINE BESYLATE 10 MG/1
10 TABLET ORAL DAILY
Status: DISCONTINUED | OUTPATIENT
Start: 2022-07-13 | End: 2022-07-19 | Stop reason: HOSPADM

## 2022-07-13 RX ORDER — GUAIFENESIN 100 MG/5ML
200 SOLUTION ORAL EVERY 6 HOURS PRN
Status: DISCONTINUED | OUTPATIENT
Start: 2022-07-13 | End: 2022-07-19 | Stop reason: HOSPADM

## 2022-07-13 RX ORDER — BENZONATATE 100 MG/1
100 CAPSULE ORAL EVERY 4 HOURS PRN
Status: DISCONTINUED | OUTPATIENT
Start: 2022-07-13 | End: 2022-07-19 | Stop reason: HOSPADM

## 2022-07-13 RX ADMIN — Medication 1 TABLET: at 08:15

## 2022-07-13 RX ADMIN — SODIUM CHLORIDE, PRESERVATIVE FREE 10 ML: 5 INJECTION INTRAVENOUS at 08:31

## 2022-07-13 RX ADMIN — GUAIFENESIN 200 MG: 200 SOLUTION ORAL at 22:12

## 2022-07-13 RX ADMIN — VALSARTAN 160 MG: 80 TABLET ORAL at 08:15

## 2022-07-13 RX ADMIN — SODIUM CHLORIDE, PRESERVATIVE FREE 10 ML: 5 INJECTION INTRAVENOUS at 22:12

## 2022-07-13 RX ADMIN — GUAIFENESIN 200 MG: 200 SOLUTION ORAL at 01:56

## 2022-07-13 RX ADMIN — ASPIRIN 81 MG: 81 TABLET ORAL at 08:15

## 2022-07-13 RX ADMIN — POLYETHYLENE GLYCOL 3350 17 G: 17 POWDER, FOR SOLUTION ORAL at 08:27

## 2022-07-13 RX ADMIN — VALSARTAN 160 MG: 80 TABLET ORAL at 22:12

## 2022-07-13 RX ADMIN — ENOXAPARIN SODIUM 40 MG: 100 INJECTION SUBCUTANEOUS at 08:17

## 2022-07-13 ASSESSMENT — PAIN SCALES - GENERAL: PAINLEVEL_OUTOF10: 0

## 2022-07-13 NOTE — PROGRESS NOTES
Physical Therapy Note:    Attempted to see patient this AM for physical therapy treatment  session. Patient is currently receiving bed bath with PCT. Will follow and re-attempt as schedule permits/patient available.  Thank you,    Rachel Long 87

## 2022-07-13 NOTE — PROGRESS NOTES
- Con't ARB     # CKD 3a              - At baseline. Cr 1.3 today 7/13.     # Chronic dCHF              - Euvolemic. Discharge Planning: STR planned. Medically stable. Diet:  ADULT DIET; Regular  DVT PPx: Lovenox  Code status: Full Code    Hospital Problems           Last Modified POA    * (Principal) Rhabdomyolysis 7/10/2022 Yes    Hyperkalemia 7/10/2022 Yes    Frequent falls 7/11/2022 Yes    Sinus bradycardia 7/12/2022 Yes    Essential hypertension (Chronic) 7/10/2022 Yes    Paroxysmal atrial fibrillation (HCC) (Chronic) 7/10/2022 Yes    Stage 3a chronic kidney disease (HCC) (Chronic) 7/10/2022 Yes    Chronic diastolic congestive heart failure (HCC) (Chronic) 7/10/2022 Yes          Objective:     Patient Vitals for the past 24 hrs:   Temp Pulse Resp BP SpO2   07/13/22 0748 -- 72 -- -- --   07/13/22 0745 98.8 °F (37.1 °C) 74 18 (!) 177/106 96 %   07/13/22 0526 -- -- -- (!) 175/88 --   07/13/22 0516 98.2 °F (36.8 °C) 66 17 (!) 172/97 95 %   07/13/22 0003 97.5 °F (36.4 °C) 72 17 (!) 165/91 94 %   07/12/22 2009 98.1 °F (36.7 °C) 69 16 (!) 163/88 94 %   07/12/22 1544 98.2 °F (36.8 °C) 72 18 (!) 165/88 93 %   07/12/22 1105 97.9 °F (36.6 °C) 59 19 (!) 153/81 94 %   07/12/22 0912 -- -- -- (!) 146/86 --       Estimated body mass index is 26.91 kg/m² as calculated from the following:    Height as of this encounter: 5' 4\" (1.626 m). Weight as of this encounter: 156 lb 12 oz (71.1 kg). Intake/Output Summary (Last 24 hours) at 7/13/2022 0816  Last data filed at 7/12/2022 0851  Gross per 24 hour   Intake 300 ml   Output --   Net 300 ml         Physical Exam:   Blood pressure (!) 177/106, pulse 72, temperature 98.8 °F (37.1 °C), temperature source Oral, resp. rate 18, height 5' 4\" (1.626 m), weight 156 lb 12 oz (71.1 kg), SpO2 96 %. General:    Well nourished. No overt distress. Head:  Normocephalic, atraumatic  Eyes:  Sclerae appear normal. Pupils equally round. ENT:  Nares appear normal, no drainage.  Moist oral mucosa  Neck:  No restricted ROM. Trachea midline. CV:   RRR. No m/r/g. No jugular venous distension. Lungs:   CTAB. No wheezing, rhonchi, or rales. Respirations even, unlabored. Abdomen: Bowel sounds present. Soft, nontender, nondistended. Extremities: No cyanosis or clubbing. No edema. Skin:     No rashes and normal coloration. Warm and dry. Neuro:  CN II-XII grossly intact. Sensation intact. A&Ox3  Psych:  Normal mood and affect.       I have reviewed ordered lab tests and independently visualized imaging below:    Recent Labs:  Recent Results (from the past 48 hour(s))   PLEASE READ & DOCUMENT PPD TEST IN 24 HRS    Collection Time: 07/11/22  5:46 PM   Result Value Ref Range    PPD, (POC) Negative Negative    mm Induration 0 0 - 5 mm   CBC with Auto Differential    Collection Time: 07/12/22  9:30 AM   Result Value Ref Range    WBC 7.5 4.3 - 11.1 K/uL    RBC 3.91 (L) 4.23 - 5.6 M/uL    Hemoglobin 12.7 (L) 13.6 - 17.2 g/dL    Hematocrit 37.3 (L) 41.1 - 50.3 %    MCV 95.4 79.6 - 97.8 FL    MCH 32.5 26.1 - 32.9 PG    MCHC 34.0 31.4 - 35.0 g/dL    RDW 14.3 11.9 - 14.6 %    Platelets 719 458 - 212 K/uL    MPV 10.2 9.4 - 12.3 FL    nRBC 0.00 0.0 - 0.2 K/uL    Differential Type AUTOMATED      Seg Neutrophils 74 43 - 78 %    Lymphocytes 12 (L) 13 - 44 %    Monocytes 8 4.0 - 12.0 %    Eosinophils % 4 0.5 - 7.8 %    Basophils 1 0.0 - 2.0 %    Immature Granulocytes 1 0.0 - 5.0 %    Segs Absolute 5.6 1.7 - 8.2 K/UL    Absolute Lymph # 0.9 0.5 - 4.6 K/UL    Absolute Mono # 0.6 0.1 - 1.3 K/UL    Absolute Eos # 0.3 0.0 - 0.8 K/UL    Basophils Absolute 0.1 0.0 - 0.2 K/UL    Absolute Immature Granulocyte 0.1 0.0 - 0.5 K/UL   Comprehensive Metabolic Panel w/ Reflex to MG    Collection Time: 07/12/22  9:30 AM   Result Value Ref Range    Sodium 133 (L) 138 - 145 mmol/L    Potassium 4.4 3.5 - 5.1 mmol/L    Chloride 105 98 - 107 mmol/L    CO2 24 21 - 32 mmol/L    Anion Gap 4 (L) 7 - 16 mmol/L    Glucose 94 65 - 100 IntraVENous Q6H PRN    oyster shell calcium w/D 500-200 MG-UNIT tablet 1 tablet  1 tablet Oral Daily    aspirin EC tablet 81 mg  81 mg Oral Daily    valsartan (DIOVAN) tablet 160 mg  160 mg Oral BID    sodium chloride flush 0.9 % injection 5-40 mL  5-40 mL IntraVENous 2 times per day    sodium chloride flush 0.9 % injection 5-40 mL  5-40 mL IntraVENous PRN    0.9 % sodium chloride infusion   IntraVENous PRN    potassium chloride (KLOR-CON M) extended release tablet 40 mEq  40 mEq Oral PRN    Or    potassium bicarb-citric acid (EFFER-K) effervescent tablet 40 mEq  40 mEq Oral PRN    Or    potassium chloride 10 mEq/100 mL IVPB (Peripheral Line)  10 mEq IntraVENous PRN    magnesium sulfate 2000 mg in 50 mL IVPB premix  2,000 mg IntraVENous PRN    enoxaparin (LOVENOX) injection 40 mg  40 mg SubCUTAneous Daily    promethazine (PHENERGAN) tablet 12.5 mg  12.5 mg Oral Q6H PRN    Or    ondansetron (ZOFRAN) injection 4 mg  4 mg IntraVENous Q6H PRN    polyethylene glycol (GLYCOLAX) packet 17 g  17 g Oral Daily    bisacodyl (DULCOLAX) EC tablet 5 mg  5 mg Oral Daily PRN    aluminum & magnesium hydroxide-simethicone (MAALOX) 200-200-20 MG/5ML suspension 30 mL  30 mL Oral Q6H PRN    acetaminophen (TYLENOL) tablet 650 mg  650 mg Oral Q6H PRN    Or    acetaminophen (TYLENOL) suppository 650 mg  650 mg Rectal Q6H PRN       Signed:  Phillip Armendariz MD    Part of this note may have been written by using a voice dictation software. The note has been proof read but may still contain some grammatical/other typographical errors.

## 2022-07-13 NOTE — PROGRESS NOTES
Blood pressure is elevated we will restart carvedilol 6.25 mg twice daily. Continue valsartan. May need to address blood pressure per protocol. Paroxysmal atrial fibrillation (HCC)  Plan: Rate controlled and not anticoagulated. Patient has historically refused oral anticoagulation. He is educated about the risk for stroke. He is also falling more frequently now and this is a relative contraindication to anticoagulation. Stage 3a chronic kidney disease (HCC)  Plan: Stable    Chronic diastolic congestive heart failure ([facility] [address]  Plan: Patient on appropriate therapy with carvedilol and valsartan. Volume status appears stable. LVEF is mildly reduced. Patient has been noncompliant with office follow-up. .  Suspect patient's falls or noncardiogenic in etiology. Patient is stable from cardiovascular standpoint. He should be stable for discharge home or short-term rehab. Patient may follow-up in office in 2 weeks. We will be on standby.     Addy Westbrook MD  7/13/2022 12:02 PM

## 2022-07-13 NOTE — FLOWSHEET NOTE
07/13/22 0928   Treatment Team Notification   Reason for Communication Critical results   Type of Critical Result Laboratory   Critical Lab Result Type Other (comment)  (covid +)   Team Member Name  [address] Team Role Attending Provider   Method of Communication Face to face   Response See orders   Notification Time [phone]

## 2022-07-13 NOTE — CARE COORDINATION
Dispo update:  Mr. Juancarlos Mota tested positive this morning on the rapid (antigen) COVID test, and updated MsDuy Damaris Simba at [facility]. Per their policy, he cannot be admitted for STR. However, the repeat PCR COVID test is negative. Left message for Mariela Quinteros at [facility]:  Can Mr. Juancarlos Mota be admitted for STR? Addendum:  Ms. Mariela Quinteros said that we need to repeat the COVID PCR swab 24 hours after today's, and if negative, then they would be able to accept Mr. Juancarlos Mota, after insurance precert (e.g, Friday July 13th).

## 2022-07-13 NOTE — FLOWSHEET NOTE
07/13/22 0745   Vital Signs   Temp 98.8 °F (37.1 °C)   Temp Source Oral   Heart Rate 74   Heart Rate Source Monitor   Resp 18   BP (!) 177/106   BP Location Left upper arm   BP Method Automatic   MAP (Calculated) 129.67   Patient Position Supine   Level of Consciousness Alert (0)   MEWS Score 1     Pt refusing all bp medication besides his valsartan. Dr. Robert Mederos notified.

## 2022-07-14 ENCOUNTER — TELEPHONE (OUTPATIENT)
Dept: INTERNAL MEDICINE CLINIC | Facility: CLINIC | Age: 82
End: 2022-07-14

## 2022-07-14 DIAGNOSIS — R05.3 CHRONIC COUGH: Primary | ICD-10-CM

## 2022-07-14 LAB
SARS-COV-2 RNA RESP QL NAA+PROBE: DETECTED
SARS-COV-2: NORMAL
SOURCE: ABNORMAL

## 2022-07-14 PROCEDURE — 2580000003 HC RX 258: Performed by: FAMILY MEDICINE

## 2022-07-14 PROCEDURE — 97530 THERAPEUTIC ACTIVITIES: CPT

## 2022-07-14 PROCEDURE — 2500000003 HC RX 250 WO HCPCS: Performed by: HOSPITALIST

## 2022-07-14 PROCEDURE — 6370000000 HC RX 637 (ALT 250 FOR IP): Performed by: HOSPITALIST

## 2022-07-14 PROCEDURE — 6370000000 HC RX 637 (ALT 250 FOR IP): Performed by: FAMILY MEDICINE

## 2022-07-14 PROCEDURE — U0005 INFEC AGEN DETEC AMPLI PROBE: HCPCS

## 2022-07-14 PROCEDURE — 1100000000 HC RM PRIVATE

## 2022-07-14 PROCEDURE — 6370000000 HC RX 637 (ALT 250 FOR IP): Performed by: INTERNAL MEDICINE

## 2022-07-14 PROCEDURE — 6360000002 HC RX W HCPCS: Performed by: FAMILY MEDICINE

## 2022-07-14 PROCEDURE — 97535 SELF CARE MNGMENT TRAINING: CPT

## 2022-07-14 PROCEDURE — 1100000003 HC PRIVATE W/ TELEMETRY

## 2022-07-14 RX ADMIN — SODIUM CHLORIDE, PRESERVATIVE FREE 5 ML: 5 INJECTION INTRAVENOUS at 21:43

## 2022-07-14 RX ADMIN — AMLODIPINE BESYLATE 10 MG: 10 TABLET ORAL at 11:36

## 2022-07-14 RX ADMIN — VALSARTAN 160 MG: 80 TABLET ORAL at 11:36

## 2022-07-14 RX ADMIN — ASPIRIN 81 MG: 81 TABLET ORAL at 08:43

## 2022-07-14 RX ADMIN — Medication 1 TABLET: at 08:43

## 2022-07-14 RX ADMIN — SODIUM CHLORIDE, PRESERVATIVE FREE 10 ML: 5 INJECTION INTRAVENOUS at 08:45

## 2022-07-14 RX ADMIN — ENOXAPARIN SODIUM 40 MG: 100 INJECTION SUBCUTANEOUS at 08:43

## 2022-07-14 RX ADMIN — GUAIFENESIN 200 MG: 200 SOLUTION ORAL at 14:33

## 2022-07-14 ASSESSMENT — PAIN SCALES - GENERAL
PAINLEVEL_OUTOF10: 0
PAINLEVEL_OUTOF10: 0

## 2022-07-14 NOTE — PROGRESS NOTES
OCCUPATIONAL THERAPY Daily Note     OT Visit Days: 2   Time  OT Charge Capture  Rehab Caseload Tracker  OT Orders    Bertin Roman is a 78 y.o. male   PRIMARY DIAGNOSIS: Rhabdomyolysis  Rhabdomyolysis [M62.82]  Fall, initial encounter [W19. XXXA]  Non-traumatic rhabdomyolysis [M62.82]       Inpatient: Payor: [address] / Plan: [facility] [address] [place] / Product Type: *No Product type* /     ASSESSMENT:     REHAB RECOMMENDATIONS: CURRENT LEVEL OF FUNCTION:  (Most Recently Demonstrated)   Recommendation to date pending progress:  Setting:   Short-term Rehab    Equipment:     Rolling Walker Bathing:   Not Tested  Dressing:   Maximal Assist  Feeding/Grooming:   Supervision/Setup  Toileting:   Not Tested  Functional Mobility:   Minimal Assist x2     ASSESSMENT:  Mr. Cory King received supine in bed ready to work with therapy. Supine > sit Min A x2 due to neglecting RUE due to R sh pain, weakness. Static sitting EOB F balance, leaning to L. Functional mobility bed > Min A x2 via RW due to weakness, nervousness of falling. Pt could not tolerate standing for ADLs due to fatigue, cough, weakness. Seated in chair brushing teeth with RH (dominant hand) with less compensatory strategies used to brush teeth. Pt did not do was well with mobility today. Functional use of R sh. Slowly improving. Continue POC. SUBJECTIVE:     Mr. Cory King states, \"I still don't think I have COVID. \"     Social/Functional Lives With: Alone  Type of Home: Apartment  Home Layout: One level  Home Access: Stairs to enter with rails  Entrance Stairs - Number of Steps: 13  Home Equipment: Emilyri Phani  ADL Assistance: Needs assistance  Ambulation Assistance: Needs assistance  Transfer Assistance: Needs assistance  Active : Yes  Mode of Transportation: Car  Occupation: Retired    OBJECTIVE:     LINES / Minetta Gilbert / AIRWAY: IV    RESTRICTIONS/PRECAUTIONS:  Restrictions/Precautions  Restrictions/Precautions: Fall Risk,Bed Alarm        PAIN: VITALS / O2:   Pre Treatment:   Pain Assessment: 0-10  Pain Level: 0  Only pain with use of R sh.         Post Treatment: none Vitals          Oxygen            MOBILITY: I Mod I S SBA CGA Min Mod Max Total  NT x2 Comments:   Bed Mobility    Rolling [] [] [] [] [] [] [] [] [] [] []    Supine to Sit [] [] [] [] [] [x] [] [] [] [] [x]    Scooting [] [] [] [] [] [] [] [x] [] [] []    Sit to Supine [] [] [] [] [] [] [] [] [] [x] []    Transfers    Sit to Stand [] [] [] [] [] [x] [] [] [] [] [x]    Bed to Chair [] [] [] [] [] [x] [] [] [] [] [x]    Stand to Sit [] [] [] [] [] [x] [] [] [] [] [x]    Tub/Shower [] [] [] [] [] [] [] [] [] [x] []     Toilet [] [] [] [] [] [] [] [] [] [x] []      [] [] [] [] [] [] [] [] [] [] []    I=Independent, Mod I=Modified Independent, S=Supervision/Setup, SBA=Standby Assistance, CGA=Contact Guard Assistance, Min=Minimal Assistance, Mod=Moderate Assistance, Max=Maximal Assistance, Total=Total Assistance, NT=Not Tested    ACTIVITIES OF DAILY LIVING: I Mod I S SBA CGA Min Mod Max Total NT Comments   BASIC ADLs:              Upper Body   Bathing [] [] [] [] [] [] [] [] [] [x]    Lower Body Bathing [] [] [] [] [] [] [] [] [] [x]    Toileting [] [] [] [] [] [] [] [] [] [x]    Upper Body Dressing [] [] [x] [] [] [] [] [] [] []    Lower Body Dressing [] [] [] [] [] [] [] [] [] [x]    Feeding [] [] [] [] [] [] [] [] [] [x]    Grooming [] [] [x] [] [] [] [] [] [] []    Personal Device Care [] [] [] [] [] [] [] [] [] [x]    Functional Mobility [] [] [] [] [] [x] [] [] [] [] x2   I=Independent, Mod I=Modified Independent, S=Supervision/Setup, SBA=Standby Assistance, CGA=Contact Guard Assistance, Min=Minimal Assistance, Mod=Moderate Assistance, Max=Maximal Assistance, Total=Total Assistance, NT=Not Tested    BALANCE: Good Fair+ Fair Fair- Poor NT Comments   Sitting Static [] [] [x] [] [] []    Sitting Dynamic [x] [x] [] [] [] [] Supported seating             Standing Static [] [x] [x] [] [] []    Standing Dynamic [] [x] [x] [] [] []        PLAN:     FREQUENCY/DURATION   OT Plan of Care: 3 times/week for duration of hospital stay or until stated goals are met, whichever comes first.    ACUTE OCCUPATIONAL THERAPY GOALS:   (Developed with and agreed upon by patient and/or caregiver.)  1. Patient will complete lower body bathing and dressing with MIN A and adaptive equipment as needed. 2.Patient will complete upper body bathing and dressing with MOD I and adaptive equipment as needed. 3. Patient will complete toileting with MIN A.   4. Patient will tolerate 25 minutes of OT treatment with 1-2 rest breaks to increase activity tolerance for ADLs. 5. Patient will complete functional transfers with SUPERVISION and adaptive equipment as needed. 6. Patient will complete functional activity with MOD I and adaptive equipment as needed. TREATMENT:     TREATMENT:   Co-Treatment PT/OT necessary due to patient's decreased overall endurance/tolerance levels, as well as need for high level skilled assistance to complete functional transfers/mobility and functional tasks  Self Care (41 minutes): Patient participated in upper body dressing and grooming ADLs in supported sitting with no verbal cueing to increase independence, decrease assistance required and increase activity tolerance. Patient also participated in functional mobility training to increase independence, decrease assistance required, increase activity tolerance and increase safety awareness.      TREATMENT GRID:  N/A    AFTER TREATMENT PRECAUTIONS: Bed/Chair Locked, Call light within reach, Chair, Needs within reach and RN notified    INTERDISCIPLINARY COLLABORATION:  RN/ PCT, PT/ PTA and OT/ AMADOR    EDUCATION:       TOTAL TREATMENT DURATION AND TIME:  Time In: 6497  Time Out: [address]  Minutes: Elisha Marr 38, OT

## 2022-07-14 NOTE — TELEPHONE ENCOUNTER
Informed patient referral has been sent to Pulmonary and he would receive an call in the next 2-3 weeks.

## 2022-07-14 NOTE — PROGRESS NOTES
Hourly rounds completed. All needs met. Pt c/o cough. Interventions per MAR. Pt hypertensive throughout the shift, despite of scheduled bp meds. Attempted to administer PRN bp meds to help decrease bp, however, pt refused after education. Will give report to the oncoming day shift nurse.

## 2022-07-14 NOTE — PROGRESS NOTES
TRANSFER - IN REPORT:    Verbal report received from [facility] on Burnie Record  being received from 6th floor for routine progression of patient care      Report consisted of patient's Situation, Background, Assessment and   Recommendations(SBAR). Information from the following report(s) Nurse Handoff Report, Intake/Output and MAR was reviewed with the receiving nurse. Opportunity for questions and clarification was provided. Assessment completed upon patient's arrival to unit and care assumed.

## 2022-07-14 NOTE — PROGRESS NOTES
Hospitalist Progress Note   Admit Date:  2022 10:20 PM   Name:  Michelle Hernandez   Age:  78 y.o. Sex:  male  :  1940   MRN:  [id number]   Room:  [facility]/    Reason(s) for Admission: Rhabdomyolysis [M62.82]  Fall, initial encounter [W19. XXXA]  Non-traumatic rhabdomyolysis [facility] Course & Interval History:   Mr. Priscilla Muhammad is a nice 77 y/o [address] [facility] h/o pelvic fracture, allergic rhinitis, scarlet fever who was admitted to our facility on  with mild acute rhabdomyolysis. He had a fall in the bathroom at his home and again in the bedroom after that. His daughter from [facility] was unable to get a hold of him so she called EMS who brought him to the ER. .  He was started on IVF. Carotid duplex negative for significant stenosis. XR shoulder negative for fracture of dislocation. CT head and CT C-spine on admission overall unremarkable. TTE showed EF 47% with indeterminate diastolic dysfxn d/t afib. Coreg added on  for HTN but he developed asymptomatic bradycardia. Coreg reduced but he continued to refuse to take it for fear of recurrent bradycardia. Prepared for discharge but rapid COVID was positive. PCR was negative, however repeat PCR on  was positive so will transfer to 8th floor. Not on O2. Subjective/24hr Events (22): Has been refusing BP meds and BP is obviously uncontrolled. He seems to be confused about the dosing. We had a long discussion about risks of uncontrolled BP which he claims to have understood. No further bradycardia. Tolerating breakfast. Repeat COVID PCR today is positive. No chest pain or SOB otherwise. Assessment & Plan:   # COVID +   - Incidental finding. Rapid positive and PCR negative on , repeat PCR  is positive. On RA so no tx indicated. Spoke to Pharmacy on  and he does not meet criteria for monoclonal ab infusion here (which is used high risk ER patients not requiring admission). Will transfer to 8th floor.     # HTN, uncontrolled   - Refused meds yesterday and this AM. Stop BB which he adamantly refuses. We discussed his Norvasc and valsartan doses and he agrees to take them now. # Asymptomatic sinus bradycardia              - Resolved with cessation of Coreg and he refuses to take any BB because of this.     # Non-traumatic rhabdomyolysis              - Resolved. Off IVFs.    # HyperK              - Resolved.     # Recurrent/frequent falls              - Imaging on presentation unremarkable              - Planning for placement.     # Atrial fibrillation              - Prior notes indicate non-compliance with Cardiology follow up. Poor OAC candidate due to recurrent falls at home and has refused BB due to bradycardia.     # CKD 3a              - At baseline.      # Chronic dCHF              - Euvolemic. Discharge Planning: Placement recommended. Diet:  ADULT DIET;  Regular  DVT PPx: Lovenox  Code status: Full Code    Hospital Problems           Last Modified POA    * (Principal) Rhabdomyolysis 7/10/2022 Yes    Hyperkalemia 7/10/2022 Yes    Frequent falls 7/11/2022 Yes    Sinus bradycardia 7/12/2022 Yes    Essential hypertension (Chronic) 7/10/2022 Yes    Paroxysmal atrial fibrillation (HCC) (Chronic) 7/10/2022 Yes    Stage 3a chronic kidney disease (HCC) (Chronic) 7/10/2022 Yes    Chronic diastolic congestive heart failure (HCC) (Chronic) 7/10/2022 Yes          Objective:     Patient Vitals for the past 24 hrs:   Temp Pulse Resp BP SpO2   07/14/22 1204 98.2 °F (36.8 °C) 77 18 (!) 175/103 95 %   07/14/22 0807 98.1 °F (36.7 °C) 75 18 (!) 180/115 96 %   07/14/22 0755 -- 58 -- -- --   07/14/22 0434 98 °F (36.7 °C) 65 18 (!) 179/107 94 %   07/14/22 0052 -- 64 -- -- --   07/14/22 0002 98.1 °F (36.7 °C) 57 18 (!) 180/93 94 %   07/13/22 2211 -- 63 -- -- --   07/13/22 1943 99.3 °F (37.4 °C) 78 24 (!) 189/130 95 %   07/13/22 1832 -- 76 -- -- --   07/13/22 1610 98.6 °F (37 °C) 69 18 (!) 177/97 93 %       Estimated body mass index is 26.26 kg/m² as calculated from the following:    Height as of this encounter: 5' 4\" (1.626 m). Weight as of this encounter: 153 lb (69.4 kg). No intake or output data in the 24 hours ending 07/14/22 1222      Physical Exam:   Blood pressure (!) 175/103, pulse 77, temperature 98.2 °F (36.8 °C), temperature source Oral, resp. rate 18, height 5' 4\" (1.626 m), weight 153 lb (69.4 kg), SpO2 95 %. General:    Well nourished. No overt distress. Head:  Normocephalic, atraumatic  Eyes:  Sclerae appear normal. Pupils equally round. ENT:  Nares appear normal, no drainage. Moist oral mucosa  Neck:  No restricted ROM. Trachea midline. CV:   RRR. No m/r/g. No jugular venous distension. Lungs:   CTAB. No wheezing, rhonchi, or rales. Respirations even, unlabored. Abdomen: Bowel sounds present. Soft, nontender, nondistended. Extremities: No cyanosis or clubbing. No edema. Skin:     No rashes and normal coloration. Warm and dry. Neuro:  CN II-XII grossly intact. Sensation intact. A&Ox3  Psych:  Normal mood and affect.       I have reviewed ordered lab tests and independently visualized imaging below:    Recent Labs:  Recent Results (from the past 48 hour(s))   PLEASE READ & DOCUMENT PPD TEST IN 48 HRS    Collection Time: 07/12/22  6:22 PM   Result Value Ref Range    PPD, (POC) Negative Negative    mm Induration 0 0 - 5 mm   PLEASE READ & DOCUMENT PPD TEST IN 48 HRS    Collection Time: 07/13/22  3:45 AM   Result Value Ref Range    PPD, (POC) Negative Negative    mm Induration 0 0 - 5 mm   Basic Metabolic Panel    Collection Time: 07/13/22  5:40 AM   Result Value Ref Range    Sodium 132 (L) 138 - 145 mmol/L    Potassium 4.5 3.5 - 5.1 mmol/L    Chloride 104 98 - 107 mmol/L    CO2 22 21 - 32 mmol/L    Anion Gap 6 (L) 7 - 16 mmol/L    Glucose 99 65 - 100 mg/dL    BUN 32 (H) 8 - 23 MG/DL    CREATININE 1.30 0.8 - 1.5 MG/DL    GFR African American >60 >60 ml/min/1.73m2    GFR Non- 56 (L) >60 ml/min/1.73m2    Calcium 8.5 8.3 - 10.4 MG/DL   Magnesium    Collection Time: 07/13/22  5:40 AM   Result Value Ref Range    Magnesium 1.8 1.8 - 2.4 mg/dL   COVID-19    Collection Time: 07/13/22  8:22 AM    Specimen: Nasopharyngeal Swab   Result Value Ref Range    SARS-CoV-2 Please find results under separate order     COVID-19, Rapid    Collection Time: 07/13/22  8:22 AM    Specimen: Nasopharyngeal   Result Value Ref Range    Source Nasopharyngeal      SARS-CoV-2, Rapid Detected (AA) NOTD     COVID-19    Collection Time: 07/13/22  8:22 AM    Specimen: Nasopharyngeal   Result Value Ref Range    Source Nasopharyngeal      SARS-CoV-2, PCR Not detected NOTD     PLEASE READ & DOCUMENT PPD TEST IN 72 HRS    Collection Time: 07/13/22  7:15 PM   Result Value Ref Range    PPD, (POC) Negative Negative    mm Induration 0 0 - 5 mm   COVID-19    Collection Time: 07/14/22  8:47 AM    Specimen: Nasopharyngeal Swab   Result Value Ref Range    SARS-CoV-2 Please find results under separate order     COVID-19    Collection Time: 07/14/22  8:47 AM    Specimen: Nasopharyngeal   Result Value Ref Range    Source Nasopharyngeal      SARS-CoV-2, PCR Detected (A) NOTD           Other Studies:  No results found.     Current Meds:  Current Facility-Administered Medications   Medication Dose Route Frequency    benzonatate (TESSALON) capsule 100 mg  100 mg Oral Q4H PRN    guaiFENesin (ROBITUSSIN) 100 MG/5ML oral solution 200 mg  200 mg Oral Q6H PRN    amLODIPine (NORVASC) tablet 10 mg  10 mg Oral Daily    labetalol (NORMODYNE;TRANDATE) injection 10 mg  10 mg IntraVENous Q6H PRN    oyster shell calcium w/D 500-200 MG-UNIT tablet 1 tablet  1 tablet Oral Daily    aspirin EC tablet 81 mg  81 mg Oral Daily    valsartan (DIOVAN) tablet 160 mg  160 mg Oral BID    sodium chloride flush 0.9 % injection 5-40 mL  5-40 mL IntraVENous 2 times per day    sodium chloride flush 0.9 % injection 5-40 mL  5-40 mL IntraVENous PRN    0.9 % sodium chloride infusion   IntraVENous PRN    potassium chloride (KLOR-CON M) extended release tablet 40 mEq  40 mEq Oral PRN    Or    potassium bicarb-citric acid (EFFER-K) effervescent tablet 40 mEq  40 mEq Oral PRN    Or    potassium chloride 10 mEq/100 mL IVPB (Peripheral Line)  10 mEq IntraVENous PRN    magnesium sulfate 2000 mg in 50 mL IVPB premix  2,000 mg IntraVENous PRN    enoxaparin (LOVENOX) injection 40 mg  40 mg SubCUTAneous Daily    promethazine (PHENERGAN) tablet 12.5 mg  12.5 mg Oral Q6H PRN    Or    ondansetron (ZOFRAN) injection 4 mg  4 mg IntraVENous Q6H PRN    polyethylene glycol (GLYCOLAX) packet 17 g  17 g Oral Daily    bisacodyl (DULCOLAX) EC tablet 5 mg  5 mg Oral Daily PRN    aluminum & magnesium hydroxide-simethicone (MAALOX) 200-200-20 MG/5ML suspension 30 mL  30 mL Oral Q6H PRN    acetaminophen (TYLENOL) tablet 650 mg  650 mg Oral Q6H PRN    Or    acetaminophen (TYLENOL) suppository 650 mg  650 mg Rectal Q6H PRN       Signed:  Jimi Gordon MD    Part of this note may have been written by using a voice dictation software. The note has been proof read but may still contain some grammatical/other typographical errors.

## 2022-07-14 NOTE — FLOWSHEET NOTE
07/14/22 0807   Vital Signs   Temp 98.1 °F (36.7 °C)   Temp Source Oral   Heart Rate 75   Heart Rate Source Monitor   Resp 18   BP (!) 180/115   BP Location Left upper arm   MAP (Calculated) 136.67   Patient Position Supine   Level of Consciousness Alert (0)   MEWS Score 1     Pt's bp elevated. Pt refusing all bp medications! Explained to pt the risk of not taking his bp medication and his bp staying high and he insisted that he would only take 40 mg of valsartan. He said \"this is the only drug and amount that will bring my bp down\". Explained to pt why he needed a higher dose but he is still refusing all bp medications. Dr. Jonathan Gatica notified.

## 2022-07-14 NOTE — PROGRESS NOTES
TRANSFER - OUT REPORT:    Verbal report given to [facility] AT [facility], RN on Danielle Berry  being transferred to 8th floor for routine progression of patient care       Report consisted of patient's Situation, Background, Assessment and   Recommendations(SBAR). Information from the following report(s) Nurse Handoff Report was reviewed with the receiving nurse. Lines:   Peripheral IV 07/10/22 Left;Ventral Hand (Active)   Site Assessment Clean, dry & intact 07/14/22 1457   Line Status Capped 07/14/22 [address] disinfected 07/14/22 0338   Phlebitis Assessment No symptoms 07/14/22 1457   Infiltration Assessment 0 07/14/22 1457   Alcohol Cap Used Yes 07/14/22 0338   Dressing Status Old drainage noted 07/14/22 1457   Dressing Type Transparent 07/14/22 1457        Opportunity for questions and clarification was provided.       Patient transported with:  [employer]

## 2022-07-14 NOTE — PROGRESS NOTES
Pain      Post Treatment: 0/10 Vitals        Oxygen  O2 Therapy: Room air Telemetry     RESTRICTIONS/PRECAUTIONS:  Restrictions/Precautions  Restrictions/Precautions: Fall Risk,Bed Alarm  Restrictions/Precautions: Fall Risk,Bed Alarm     MOBILITY: I Mod I S SBA CGA Min Mod Max Total  NT x2 Comments:   Bed Mobility    Rolling [] [] [] [] [] [x] [] [] [] [] []    Supine to Sit [] [] [] [] [] [x] [] [] [] [] []    Scooting [] [] [] [] [] [] [] [x] [] [] [x]    Sit to Supine [] [] [] [] [] [x] [] [] [] [] []    Transfers    Sit to Stand [] [] [] [] [] [x] [] [] [] [] [x]    Bed to Chair [] [] [] [] [] [x] [] [] [] [] [] x1-2   Stand to Sit [] [] [] [] [] [x] [] [] [] [] [x]     [] [] [] [] [] [] [] [] [] [] []    I=Independent, Mod I=Modified Independent, S=Supervision, SBA=Standby Assistance, CGA=Contact Guard Assistance,   Min=Minimal Assistance, Mod=Moderate Assistance, Max=Maximal Assistance, Total=Total Assistance, NT=Not Tested    BALANCE: Good Fair+ Fair Fair- Poor NT Comments   Sitting Static [] [x] [] [] [] []    Sitting Dynamic [] [] [x] [] [] []              Standing Static [] [] [x] [] [] []    Standing Dynamic [] [] [] [x] [] []      GAIT: I Mod I S SBA CGA Min Mod Max Total  NT x2 Comments:   Level of Assistance [] [] [] [] [x] [x] [] [] [] [] []    Distance 5 feet    DME Gait Belt and Rolling Walker    Gait Quality Decreased step clearance, Decreased step length, Decreased stance, Path deviations , Shuffling  and Trunk sway increased    Weightbearing Status      Stairs      I=Independent, Mod I=Modified Independent, S=Supervision, SBA=Standby Assistance, CGA=Contact Guard Assistance,   Min=Minimal Assistance, Mod=Moderate Assistance, Max=Maximal Assistance, Total=Total Assistance, NT=Not Tested    PLAN:   ACUTE PHYSICAL THERAPY GOALS:   (Developed with and agreed upon by patient and/or caregiver.)  (1.) Mj Sy will move from supine to sit and sit to supine , scoot up and down and roll side to side with MODIFIED INDEPENDENCE within 7 treatment day(s). (2.) Shaina Mcadams will transfer from bed to chair and chair to bed with MODIFIED INDEPENDENCE using the least restrictive device within 7 treatment day(s). (3.) Shaina Mcadams will ambulate with MODIFIED INDEPENDENCE for 250 feet with the least restrictive device within 7 treatment day(s). (4.) Shainadeny Mcadams will perform standing static and dynamic balance activities x 20 minutes with MODIFIED INDEPENDENCE to improve safety within 7 treatment day(s). (5.) Shaina Mcadams will ascend and descend 13 stairs using bilateral hand rail(s) with MODIFIED INDEPENDENCE to improve functional mobility and safety within 7 treatment day(s). (6.) Shaina Mcadams will perform therapeutic exercises x 20 min for HEP with INDEPENDENCE to improve strength, endurance, and functional mobility within 7 treatment day(s). FREQUENCY AND DURATION: 3 times/week for duration of hospital stay or until stated goals are met, whichever comes first.    TREATMENT:   TREATMENT:   Therapeutic Activity (41 Minutes): Therapeutic activity included Supine to Sit, Scooting, Lateral Scooting, Transfer Training, Ambulation on level ground, Sitting balance  and Standing balance to improve functional Activity tolerance, Balance, Coordination, Mobility and Strength.     TREATMENT GRID:  N/A    AFTER TREATMENT PRECAUTIONS: Bed/Chair Locked, Call light within reach, Chair, Heels floated, RN notified and Visitors at bedside    INTERDISCIPLINARY COLLABORATION:  RN/ PCT, PT/ PTA and OT/ AMADOR    EDUCATION:      TIME IN/OUT:  Time In: 1352  Time Out: [address]  Minutes: [address], [address] [facility]

## 2022-07-14 NOTE — TELEPHONE ENCOUNTER
Pt Daughter called and stated that pt had two fall last week with no fractures but it landed him in the hospital while in the hospital pt tested positive for COVID and had to stay shortly after pt tested negative so they were going to move him to a rehab center well shortly after that decision pt tested positive for for Covid for a second time and now has to stay for another 10 days. Pt daughter states they are having trouble with his BP being stable Pt states that she is in search of a pulmonologist for the pt and states that a referral was sent in the past but was unable to complete his appointment at the time due to asessive  Coughing.  Wants to know if another can be placed

## 2022-07-15 PROCEDURE — 1100000000 HC RM PRIVATE

## 2022-07-15 PROCEDURE — 6370000000 HC RX 637 (ALT 250 FOR IP): Performed by: INTERNAL MEDICINE

## 2022-07-15 PROCEDURE — 2580000003 HC RX 258: Performed by: FAMILY MEDICINE

## 2022-07-15 PROCEDURE — 6360000002 HC RX W HCPCS: Performed by: FAMILY MEDICINE

## 2022-07-15 PROCEDURE — 6370000000 HC RX 637 (ALT 250 FOR IP): Performed by: STUDENT IN AN ORGANIZED HEALTH CARE EDUCATION/TRAINING PROGRAM

## 2022-07-15 PROCEDURE — 6370000000 HC RX 637 (ALT 250 FOR IP): Performed by: FAMILY MEDICINE

## 2022-07-15 PROCEDURE — 1100000003 HC PRIVATE W/ TELEMETRY

## 2022-07-15 RX ORDER — LANOLIN ALCOHOL/MO/W.PET/CERES
400 CREAM (GRAM) TOPICAL DAILY
Status: COMPLETED | OUTPATIENT
Start: 2022-07-15 | End: 2022-07-17

## 2022-07-15 RX ADMIN — Medication 400 MG: at 08:24

## 2022-07-15 RX ADMIN — AMLODIPINE BESYLATE 10 MG: 10 TABLET ORAL at 08:24

## 2022-07-15 RX ADMIN — Medication 1 TABLET: at 08:24

## 2022-07-15 RX ADMIN — SODIUM CHLORIDE, PRESERVATIVE FREE 10 ML: 5 INJECTION INTRAVENOUS at 08:24

## 2022-07-15 RX ADMIN — ASPIRIN 81 MG: 81 TABLET ORAL at 08:24

## 2022-07-15 RX ADMIN — VALSARTAN 160 MG: 80 TABLET ORAL at 08:24

## 2022-07-15 RX ADMIN — VALSARTAN 160 MG: 80 TABLET ORAL at 21:03

## 2022-07-15 RX ADMIN — ENOXAPARIN SODIUM 40 MG: 100 INJECTION SUBCUTANEOUS at 08:24

## 2022-07-15 RX ADMIN — SODIUM CHLORIDE, PRESERVATIVE FREE 10 ML: 5 INJECTION INTRAVENOUS at 23:29

## 2022-07-15 ASSESSMENT — PAIN SCALES - GENERAL
PAINLEVEL_OUTOF10: 0

## 2022-07-15 NOTE — PROGRESS NOTES
Hospitalist Progress Note   Admit Date:  2022 10:20 PM   Name:  Casimiro Morton   Age:  78 y.o. Sex:  male  :  1940   MRN:  [id number]   Room:  [facility]    Reason(s) for Admission: Rhabdomyolysis [M62.82]  Fall, initial encounter [W19. XXXA]  Non-traumatic rhabdomyolysis [facility] Course & Interval History:   Mr. Kiah Brambila is a nice 79 y/o WM with a h/o pelvic fracture, allergic rhinitis, scarlet fever who was admitted to our facility on  with mild acute rhabdomyolysis. He had a fall in the bathroom at his home and again in the bedroom after that. His daughter from [address] [place] [address] was unable to get a hold of him so she called EMS who brought him to the ER. . He was started on IVF. Carotid duplex negative for significant stenosis. XR shoulder negative for fracture of dislocation. CT head and CT C-spine on admission overall unremarkable. TTE showed EF 47% with indeterminate diastolic dysfxn d/t afib. Coreg added on  for HTN but he developed asymptomatic bradycardia. Coreg reduced but he continued to refuse to take it for fear of recurrent bradycardia. Prepared for discharge but rapid COVID was positive. PCR was negative, however repeat PCR on  was positive so will transfer to 8th floor. Not on O2. Subjective/24hr Events (07/15/22): Has been refusing BP meds and BP is obviously uncontrolled. He seems to be confused about the dosing. We had a long discussion about risks of uncontrolled BP which he claims to have understood. No further bradycardia. Tolerating breakfast. Repeat COVID PCR today is positive. No chest pain or SOB otherwise. Assessment & Plan:   # COVID +   - Incidental finding. Rapid positive and PCR negative on , repeat PCR  is positive. On RA so no tx indicated.        # HTN, uncontrolle  Pt refusing BB   Continue Norvasc and valsartan       # Asymptomatic sinus bradycardia        Pt refusing Coreg      # Non-traumatic rhabdomyolysis              - Resolved. # HyperK              - Resolved. # Recurrent/frequent falls              - Imaging on presentation unremarkable             # Atrial fibrillation              - Prior notes indicate non-compliance with Cardiology follow up. Poor OAC candidate due to recurrent falls at home and has refused BB due to bradycardia. # CKD 3a              - At baseline. # Chronic dCHF              - Euvolemic. Discharge Planning: Awaiting placement to [address] [place] [address], pending covid bed availability. May be on 7/19. CM working on [employer]. Diet:  ADULT DIET; Regular  DVT PPx: Lovenox  Code status: Full Code    Hospital Problems             Last Modified POA    * (Principal) Rhabdomyolysis 7/10/2022 Yes    Hyperkalemia 7/10/2022 Yes    Frequent falls 7/11/2022 Yes    Sinus bradycardia 7/12/2022 Yes    Essential hypertension (Chronic) 7/10/2022 Yes    Paroxysmal atrial fibrillation (HCC) (Chronic) 7/10/2022 Yes    Stage 3a chronic kidney disease (HCC) (Chronic) 7/10/2022 Yes    Chronic diastolic congestive heart failure (HCC) (Chronic) 7/10/2022 Yes       Objective:     Patient Vitals for the past 24 hrs:   Temp Pulse Resp BP SpO2   07/15/22 1525 100.4 °F (38 °C) 77 16 139/72 95 %   07/15/22 1126 99.7 °F (37.6 °C) 63 17 138/78 92 %   07/15/22 0735 98.8 °F (37.1 °C) 66 16 (!) 151/73 93 %   07/15/22 0326 98.4 °F (36.9 °C) 67 16 138/89 97 %   07/14/22 2320 98.8 °F (37.1 °C) 65 18 130/69 97 %   07/14/22 2136 -- 60 -- 128/61 --   07/14/22 2016 98.6 °F (37 °C) 79 18 (!) 152/83 95 %       Estimated body mass index is 26.26 kg/m² as calculated from the following:    Height as of this encounter: 5' 4\" (1.626 m). Weight as of this encounter: 153 lb (69.4 kg).     Intake/Output Summary (Last 24 hours) at 7/15/2022 1738  Last data filed at 7/15/2022 1459  Gross per 24 hour   Intake 480 ml   Output --   Net 480 ml         Physical Exam:   Blood pressure 139/72, pulse 77, temperature 100.4 °F (38 °C), temperature source Oral, resp. rate 16, height 5' 4\" (1.626 m), weight 153 lb (69.4 kg), SpO2 95 %. General:    Well nourished. No overt distress. Head:  Normocephalic, atraumatic  Eyes:  Sclerae appear normal. Pupils equally round. ENT:  Nares appear normal, no drainage. Moist oral mucosa  Neck:  No restricted ROM. Trachea midline. CV:   RRR. No m/r/g. No jugular venous distension. Lungs:   CTAB. No wheezing, rhonchi, or rales. Respirations even, unlabored. Abdomen: Bowel sounds present. Soft, nontender, nondistended. Extremities: No cyanosis or clubbing. No edema. Skin:     No rashes and normal coloration. Warm and dry. Neuro:  CN II-XII grossly intact. Sensation intact. A&Ox3  Psych:  Normal mood and affect. I have reviewed ordered lab tests and independently visualized imaging below:    Recent Labs:  Recent Results (from the past 48 hour(s))   PLEASE READ & DOCUMENT PPD TEST IN 72 HRS    Collection Time: 07/13/22  7:15 PM   Result Value Ref Range    PPD, (POC) Negative Negative    mm Induration 0 0 - 5 mm   COVID-19    Collection Time: 07/14/22  8:47 AM    Specimen: Nasopharyngeal Swab   Result Value Ref Range    SARS-CoV-2 Please find results under separate order     COVID-19    Collection Time: 07/14/22  8:47 AM    Specimen: Nasopharyngeal   Result Value Ref Range    Source Nasopharyngeal      SARS-CoV-2, PCR Detected (A) NOTD           Other Studies:  No results found.     Current Meds:  Current Facility-Administered Medications   Medication Dose Route Frequency    magnesium oxide (MAG-OX) tablet 400 mg  400 mg Oral Daily    benzonatate (TESSALON) capsule 100 mg  100 mg Oral Q4H PRN    guaiFENesin (ROBITUSSIN) 100 MG/5ML oral solution 200 mg  200 mg Oral Q6H PRN    amLODIPine (NORVASC) tablet 10 mg  10 mg Oral Daily    labetalol (NORMODYNE;TRANDATE) injection 10 mg  10 mg IntraVENous Q6H PRN    oyster shell calcium w/D 500-200 MG-UNIT tablet 1 tablet  1 tablet Oral Daily    aspirin EC tablet 81 mg  81 mg Oral Daily    valsartan (DIOVAN) tablet 160 mg  160 mg Oral BID    sodium chloride flush 0.9 % injection 5-40 mL  5-40 mL IntraVENous 2 times per day    sodium chloride flush 0.9 % injection 5-40 mL  5-40 mL IntraVENous PRN    0.9 % sodium chloride infusion   IntraVENous PRN    potassium chloride (KLOR-CON M) extended release tablet 40 mEq  40 mEq Oral PRN    Or    potassium bicarb-citric acid (EFFER-K) effervescent tablet 40 mEq  40 mEq Oral PRN    Or    potassium chloride 10 mEq/100 mL IVPB (Peripheral Line)  10 mEq IntraVENous PRN    magnesium sulfate 2000 mg in 50 mL IVPB premix  2,000 mg IntraVENous PRN    enoxaparin (LOVENOX) injection 40 mg  40 mg SubCUTAneous Daily    promethazine (PHENERGAN) tablet 12.5 mg  12.5 mg Oral Q6H PRN    Or    ondansetron (ZOFRAN) injection 4 mg  4 mg IntraVENous Q6H PRN    polyethylene glycol (GLYCOLAX) packet 17 g  17 g Oral Daily    bisacodyl (DULCOLAX) EC tablet 5 mg  5 mg Oral Daily PRN    aluminum & magnesium hydroxide-simethicone (MAALOX) 200-200-20 MG/5ML suspension 30 mL  30 mL Oral Q6H PRN    acetaminophen (TYLENOL) tablet 650 mg  650 mg Oral Q6H PRN    Or    acetaminophen (TYLENOL) suppository 650 mg  650 mg Rectal Q6H PRN       Signed:  Orquidea Lopez MD    Part of this note may have been written by using a voice dictation software. The note has been proof read but may still contain some grammatical/other typographical errors.

## 2022-07-15 NOTE — PROGRESS NOTES
Pt resting in bed comfortably at this time, alert and oriented times 4. No distress noted, respirations even and unlabored. Pt denies pain at this time. Pt has allevyn on top of head from fall. Pt instructed to call for assistance if needed, call light in place, will continue to monitor.

## 2022-07-15 NOTE — PROGRESS NOTES
SBAR report received from [facility]. Pt resting quietly in bed eating dinner; A&O x4. Respirations even and unlabored on room air. Pt denies pain and reports no further needs at this time. No signs of distress noted. Abrasion noted to scalp with Allevyn. Safety measures are in place. Will continue to monitor.

## 2022-07-15 NOTE — PROGRESS NOTES
Pt resting quietly in bed. Respirations even and unlabored on room air. Pt denies pain and reports no further needs at this time. No signs of distress noted. Safety measures are in place. Will continue to monitor and give report to oncoming RN.

## 2022-07-15 NOTE — PROGRESS NOTES
Pt resting in bed comfortably at this time, alert and oriented times 4. No distress noted, respirations even and unlabored on room air. External cath in place and draining. Pt instructed to call for assistance if needed, call light in place, will continue to monitor.

## 2022-07-15 NOTE — CARE COORDINATION
CM spoke with the patient's daughter about the two snfs that are accepting covid positive patients. Daughter and patient chose [facility]. CM contacted Mushtaq Beverly, admissions liaison about bed availability. A bed will be available possibly on Tuesday. [facility] has initiated [facility].

## 2022-07-16 PROCEDURE — 6370000000 HC RX 637 (ALT 250 FOR IP): Performed by: INTERNAL MEDICINE

## 2022-07-16 PROCEDURE — 2580000003 HC RX 258: Performed by: FAMILY MEDICINE

## 2022-07-16 PROCEDURE — 1100000000 HC RM PRIVATE

## 2022-07-16 PROCEDURE — 6370000000 HC RX 637 (ALT 250 FOR IP): Performed by: STUDENT IN AN ORGANIZED HEALTH CARE EDUCATION/TRAINING PROGRAM

## 2022-07-16 PROCEDURE — 6370000000 HC RX 637 (ALT 250 FOR IP): Performed by: FAMILY MEDICINE

## 2022-07-16 PROCEDURE — 6360000002 HC RX W HCPCS: Performed by: FAMILY MEDICINE

## 2022-07-16 RX ADMIN — SODIUM CHLORIDE, PRESERVATIVE FREE 10 ML: 5 INJECTION INTRAVENOUS at 09:43

## 2022-07-16 RX ADMIN — AMLODIPINE BESYLATE 10 MG: 10 TABLET ORAL at 09:41

## 2022-07-16 RX ADMIN — Medication 400 MG: at 09:42

## 2022-07-16 RX ADMIN — VALSARTAN 160 MG: 80 TABLET ORAL at 09:42

## 2022-07-16 RX ADMIN — VALSARTAN 160 MG: 80 TABLET ORAL at 21:55

## 2022-07-16 RX ADMIN — ASPIRIN 81 MG: 81 TABLET ORAL at 09:42

## 2022-07-16 RX ADMIN — SODIUM CHLORIDE, PRESERVATIVE FREE 10 ML: 5 INJECTION INTRAVENOUS at 21:55

## 2022-07-16 RX ADMIN — Medication 1 TABLET: at 09:42

## 2022-07-16 ASSESSMENT — PAIN SCALES - GENERAL
PAINLEVEL_OUTOF10: 0
PAINLEVEL_OUTOF10: 0

## 2022-07-16 NOTE — PROGRESS NOTES
Patient has been observed during hourly rounds and has been sleeping quietly. He will continue to be monitored and assisted as needed, and will prepare to give report to oncoming nurse. Call light is in reach of patient and bed is in low position with wheels locked.

## 2022-07-16 NOTE — PROGRESS NOTES
Bedside report received from night nurse Ja Calderon. Assessment done as noted  Respiration even and unlabored 20/min at rest. Denies pain or nausea at present. Afebrile this morning. Non-productive coughing at interval.  RA O2 92% at rest. Remains on Droplet plus isolation for positive COVID-19 screening. Ordered PPE in place and in use. Encouraged to call with needs.

## 2022-07-16 NOTE — PROGRESS NOTES
Received report on patient , resting quietly in bed, alert and oriented x 4. Respirations even and unlabored. Call light in reach of patient.

## 2022-07-16 NOTE — PROGRESS NOTES
Hospitalist Progress Note   Admit Date:  2022 10:20 PM   Name:  Warren Sevilla   Age:  78 y.o. Sex:  male  :  1940   MRN:  [id number]   Room:  [facility]    Reason(s) for Admission: Rhabdomyolysis [M62.82]  Fall, initial encounter [W19. XXXA]  Non-traumatic rhabdomyolysis [facility] Course & Interval History:   Mr. Elzbieta Ling is a nice 79 y/o WM with a h/o pelvic fracture, allergic rhinitis, scarlet fever who was admitted to our facility on  with mild acute rhabdomyolysis. He had a fall in the bathroom at his home and again in the bedroom after that. His daughter from [place] was unable to get a hold of him so she called EMS who brought him to the ER. . He was started on IVF. Carotid duplex negative for significant stenosis. XR shoulder negative for fracture of dislocation. CT head and CT C-spine on admission overall unremarkable. TTE showed EF 47% with indeterminate diastolic dysfxn d/t afib. Coreg added on  for HTN but he developed asymptomatic bradycardia. Coreg reduced but he continued to refuse to take it for fear of recurrent bradycardia. Prepared for discharge but rapid COVID was positive. PCR was negative, however repeat PCR on  was positive so will transfer to 8th floor. Not on O2. Subjective/24hr Events (22): Pt denies   No chest pain or SOB otherwise. Assessment & Plan:   # COVID +   - Incidental finding. Rapid positive and PCR negative on , repeat PCR  is positive. On RA so no tx indicated. # HTN, uncontrolled  Pt refusing BB   Continue Norvasc and   Increased valsartan 160mg bid      # Asymptomatic sinus bradycardia        Pt refusing Coreg      # Non-traumatic rhabdomyolysis              - Resolved. # HyperK              - Resolved. # Recurrent/frequent falls     Imaging on presentation unremarkable             # Atrial fibrillation    Prior notes indicate non-compliance with Cardiology follow up.    Poor OAC candidate due to recurrent falls at home and has refused BB due to bradycardia. # CKD 3a              - At baseline. # Chronic dCHF              - Euvolemic. Discharge Planning: Awaiting placement to [address] [place] [address], pending covid bed availability. May be on 7/19. CM working on [employer]. Diet:  ADULT DIET; Regular  DVT PPx: Lovenox  Code status: Full Code    Hospital Problems             Last Modified POA    * (Principal) Rhabdomyolysis 7/10/2022 Yes    Hyperkalemia 7/10/2022 Yes    Frequent falls 7/11/2022 Yes    Sinus bradycardia 7/12/2022 Yes    Essential hypertension (Chronic) 7/10/2022 Yes    Paroxysmal atrial fibrillation (HCC) (Chronic) 7/10/2022 Yes    Stage 3a chronic kidney disease (HCC) (Chronic) 7/10/2022 Yes    Chronic diastolic congestive heart failure (HCC) (Chronic) 7/10/2022 Yes       Objective:     Patient Vitals for the past 24 hrs:   Temp Pulse Resp BP SpO2   07/16/22 1139 97.9 °F (36.6 °C) 65 16 (!) 156/72 96 %   07/16/22 0802 98.2 °F (36.8 °C) 71 16 (!) 156/87 93 %   07/16/22 0337 98.5 °F (36.9 °C) 80 16 (!) 145/82 96 %   07/15/22 2259 98.3 °F (36.8 °C) 89 16 (!) 162/81 92 %   07/15/22 1954 98.8 °F (37.1 °C) 71 16 138/81 92 %       Estimated body mass index is 26.26 kg/m² as calculated from the following:    Height as of this encounter: 5' 4\" (1.626 m). Weight as of this encounter: 153 lb (69.4 kg). Intake/Output Summary (Last 24 hours) at 7/16/2022 1537  Last data filed at 7/16/2022 1338  Gross per 24 hour   Intake 712 ml   Output 2050 ml   Net -1338 ml         Physical Exam:   Blood pressure (!) 156/72, pulse 65, temperature 97.9 °F (36.6 °C), temperature source Oral, resp. rate 16, height 5' 4\" (1.626 m), weight 153 lb (69.4 kg), SpO2 96 %. General:    Well nourished. No overt distress. Head:  Normocephalic, atraumatic  Eyes:  Sclerae appear normal. Pupils equally round. ENT:  Nares appear normal, no drainage. Moist oral mucosa  Neck:  No restricted ROM. Trachea midline. CV:   RRR. No m/r/g. No jugular venous distension. Lungs:   CTAB. No wheezing, rhonchi, or rales. Respirations even, unlabored. Abdomen: Bowel sounds present. Soft, nontender, nondistended. Extremities: No cyanosis or clubbing. No edema. Skin:     No rashes and normal coloration. Warm and dry. Neuro:  CN II-XII grossly intact. Sensation intact. A&Ox3  Psych:  Normal mood and affect. I have reviewed ordered lab tests and independently visualized imaging below:    Recent Labs:  No results found for this or any previous visit (from the past 48 hour(s)). Other Studies:  No results found.     Current Meds:  Current Facility-Administered Medications   Medication Dose Route Frequency    magnesium oxide (MAG-OX) tablet 400 mg  400 mg Oral Daily    benzonatate (TESSALON) capsule 100 mg  100 mg Oral Q4H PRN    guaiFENesin (ROBITUSSIN) 100 MG/5ML oral solution 200 mg  200 mg Oral Q6H PRN    amLODIPine (NORVASC) tablet 10 mg  10 mg Oral Daily    labetalol (NORMODYNE;TRANDATE) injection 10 mg  10 mg IntraVENous Q6H PRN    oyster shell calcium w/D 500-200 MG-UNIT tablet 1 tablet  1 tablet Oral Daily    aspirin EC tablet 81 mg  81 mg Oral Daily    valsartan (DIOVAN) tablet 160 mg  160 mg Oral BID    sodium chloride flush 0.9 % injection 5-40 mL  5-40 mL IntraVENous 2 times per day    sodium chloride flush 0.9 % injection 5-40 mL  5-40 mL IntraVENous PRN    0.9 % sodium chloride infusion   IntraVENous PRN    potassium chloride (KLOR-CON M) extended release tablet 40 mEq  40 mEq Oral PRN    Or    potassium bicarb-citric acid (EFFER-K) effervescent tablet 40 mEq  40 mEq Oral PRN    Or    potassium chloride 10 mEq/100 mL IVPB (Peripheral Line)  10 mEq IntraVENous PRN    magnesium sulfate 2000 mg in 50 mL IVPB premix  2,000 mg IntraVENous PRN    enoxaparin (LOVENOX) injection 40 mg  40 mg SubCUTAneous Daily    promethazine (PHENERGAN) tablet 12.5 mg  12.5 mg Oral Q6H PRN    Or    ondansetron (ZOFRAN) injection 4 mg  4 mg IntraVENous Q6H PRN    polyethylene glycol (GLYCOLAX) packet 17 g  17 g Oral Daily    bisacodyl (DULCOLAX) EC tablet 5 mg  5 mg Oral Daily PRN    aluminum & magnesium hydroxide-simethicone (MAALOX) 200-200-20 MG/5ML suspension 30 mL  30 mL Oral Q6H PRN    acetaminophen (TYLENOL) tablet 650 mg  650 mg Oral Q6H PRN    Or    acetaminophen (TYLENOL) suppository 650 mg  650 mg Rectal Q6H PRN       Signed:  Amelia Romero MD    Part of this note may have been written by using a voice dictation software. The note has been proof read but may still contain some grammatical/other typographical errors.

## 2022-07-17 LAB
ANION GAP SERPL CALC-SCNC: 3 MMOL/L (ref 7–16)
BASOPHILS # BLD: 0.1 K/UL (ref 0–0.2)
BASOPHILS NFR BLD: 1 % (ref 0–2)
BUN SERPL-MCNC: 30 MG/DL (ref 8–23)
CALCIUM SERPL-MCNC: 8.3 MG/DL (ref 8.3–10.4)
CHLORIDE SERPL-SCNC: 100 MMOL/L (ref 98–107)
CO2 SERPL-SCNC: 28 MMOL/L (ref 21–32)
CREAT SERPL-MCNC: 1.1 MG/DL (ref 0.8–1.5)
DIFFERENTIAL METHOD BLD: ABNORMAL
EOSINOPHIL # BLD: 0.3 K/UL (ref 0–0.8)
EOSINOPHIL NFR BLD: 5 % (ref 0.5–7.8)
ERYTHROCYTE [DISTWIDTH] IN BLOOD BY AUTOMATED COUNT: 14.4 % (ref 11.9–14.6)
GLUCOSE SERPL-MCNC: 92 MG/DL (ref 65–100)
HCT VFR BLD AUTO: 39.5 % (ref 41.1–50.3)
HGB BLD-MCNC: 13.1 G/DL (ref 13.6–17.2)
IMM GRANULOCYTES # BLD AUTO: 0.1 K/UL (ref 0–0.5)
IMM GRANULOCYTES NFR BLD AUTO: 2 % (ref 0–5)
LYMPHOCYTES # BLD: 0.8 K/UL (ref 0.5–4.6)
LYMPHOCYTES NFR BLD: 14 % (ref 13–44)
MCH RBC QN AUTO: 32.2 PG (ref 26.1–32.9)
MCHC RBC AUTO-ENTMCNC: 33.2 G/DL (ref 31.4–35)
MCV RBC AUTO: 97.1 FL (ref 79.6–97.8)
MONOCYTES # BLD: 0.6 K/UL (ref 0.1–1.3)
MONOCYTES NFR BLD: 11 % (ref 4–12)
NEUTS SEG # BLD: 3.6 K/UL (ref 1.7–8.2)
NEUTS SEG NFR BLD: 67 % (ref 43–78)
NRBC # BLD: 0 K/UL (ref 0–0.2)
PHOSPHATE SERPL-MCNC: 2.8 MG/DL (ref 2.3–3.7)
PLATELET # BLD AUTO: 240 K/UL (ref 150–450)
PMV BLD AUTO: 9.7 FL (ref 9.4–12.3)
POTASSIUM SERPL-SCNC: 4.4 MMOL/L (ref 3.5–5.1)
RBC # BLD AUTO: 4.07 M/UL (ref 4.23–5.6)
SODIUM SERPL-SCNC: 131 MMOL/L (ref 138–145)
WBC # BLD AUTO: 5.4 K/UL (ref 4.3–11.1)

## 2022-07-17 PROCEDURE — 36415 COLL VENOUS BLD VENIPUNCTURE: CPT

## 2022-07-17 PROCEDURE — 6360000002 HC RX W HCPCS: Performed by: FAMILY MEDICINE

## 2022-07-17 PROCEDURE — 85025 COMPLETE CBC W/AUTO DIFF WBC: CPT

## 2022-07-17 PROCEDURE — 1100000000 HC RM PRIVATE

## 2022-07-17 PROCEDURE — 2580000003 HC RX 258: Performed by: FAMILY MEDICINE

## 2022-07-17 PROCEDURE — 80048 BASIC METABOLIC PNL TOTAL CA: CPT

## 2022-07-17 PROCEDURE — 6370000000 HC RX 637 (ALT 250 FOR IP): Performed by: FAMILY MEDICINE

## 2022-07-17 PROCEDURE — 6370000000 HC RX 637 (ALT 250 FOR IP): Performed by: INTERNAL MEDICINE

## 2022-07-17 PROCEDURE — 84100 ASSAY OF PHOSPHORUS: CPT

## 2022-07-17 PROCEDURE — 6370000000 HC RX 637 (ALT 250 FOR IP): Performed by: STUDENT IN AN ORGANIZED HEALTH CARE EDUCATION/TRAINING PROGRAM

## 2022-07-17 RX ORDER — HYDRALAZINE HYDROCHLORIDE 20 MG/ML
5 INJECTION INTRAMUSCULAR; INTRAVENOUS ONCE
Status: COMPLETED | OUTPATIENT
Start: 2022-07-17 | End: 2022-07-17

## 2022-07-17 RX ADMIN — Medication 1 TABLET: at 09:34

## 2022-07-17 RX ADMIN — ASPIRIN 81 MG: 81 TABLET ORAL at 09:34

## 2022-07-17 RX ADMIN — ENOXAPARIN SODIUM 40 MG: 100 INJECTION SUBCUTANEOUS at 09:35

## 2022-07-17 RX ADMIN — AMLODIPINE BESYLATE 10 MG: 10 TABLET ORAL at 09:34

## 2022-07-17 RX ADMIN — SODIUM CHLORIDE, PRESERVATIVE FREE 10 ML: 5 INJECTION INTRAVENOUS at 09:36

## 2022-07-17 RX ADMIN — HYDRALAZINE HYDROCHLORIDE 5 MG: 20 INJECTION INTRAMUSCULAR; INTRAVENOUS at 04:22

## 2022-07-17 RX ADMIN — Medication 400 MG: at 09:34

## 2022-07-17 RX ADMIN — SODIUM CHLORIDE, PRESERVATIVE FREE 5 ML: 5 INJECTION INTRAVENOUS at 20:57

## 2022-07-17 RX ADMIN — VALSARTAN 160 MG: 80 TABLET ORAL at 20:57

## 2022-07-17 RX ADMIN — VALSARTAN 160 MG: 80 TABLET ORAL at 09:34

## 2022-07-17 ASSESSMENT — PAIN SCALES - GENERAL
PAINLEVEL_OUTOF10: 0
PAINLEVEL_OUTOF10: 0

## 2022-07-17 NOTE — PROGRESS NOTES
SBAR report received from [facility]. Pt resting quietly in bed watching TV; A&O x4. Respirations even and unlabored on room air. Pt denies pain and reports no further needs at this time. Primo-fit external catheter in place and draining. Safety measures are in place. Will continue to monitor.

## 2022-07-17 NOTE — PROGRESS NOTES
CM was informed by RN that patient wanted to speak with CM. CM made contact with patient via phone. Patient was requesting an update on his d/c plan. CM informed patient that a referral has been made to [facility] and possible a bed will be available on Tuesday per pervious CM note. Patient was agreeable to the plan. CM will continue to monitor and remain available for any needs or concerns that may occur.

## 2022-07-17 NOTE — PROGRESS NOTES
Hospitalist Progress Note   Admit Date:  2022 10:20 PM   Name:  Em Norman   Age:  78 y.o. Sex:  male  :  1940   MRN:  [id number]   Room:  [facility]    Reason(s) for Admission: Rhabdomyolysis [M62.82]  Fall, initial encounter [W19. XXXA]  Non-traumatic rhabdomyolysis [facility] Course & Interval History:   Mr. Imani Peterson is a nice 79 y/o WM with a h/o pelvic fracture, allergic rhinitis, scarlet fever who was admitted to our facility on  with mild acute rhabdomyolysis. He had a fall in the bathroom at his home and again in the bedroom after that. His daughter from [place] was unable to get a hold of him so she called EMS who brought him to the ER. . He was started on IVF. Carotid duplex negative for significant stenosis. XR shoulder negative for fracture of dislocation. CT head and CT C-spine on admission overall unremarkable. TTE showed EF 47% with indeterminate diastolic dysfxn d/t afib. Coreg added on  for HTN but he developed asymptomatic bradycardia. Coreg reduced but he continued to refuse to take it for fear of recurrent bradycardia. Prepared for discharge but rapid COVID was positive. PCR was negative, however repeat PCR on  was positive so will transfer to 8th floor. Not on O2. Subjective/24hr Events (22): Pt denies   No chest pain or SOB otherwise. Assessment & Plan:   # COVID +   - Incidental finding. Rapid positive and PCR negative on , repeat PCR  is positive. On RA so no tx indicated. # HTN, uncontrolled  Pt refusing BB   Continue Norvasc and   Increased valsartan 160mg bid      # Asymptomatic sinus bradycardia        Pt refusing Coreg      # Non-traumatic rhabdomyolysis              - Resolved. # HyperK              - Resolved. # Recurrent/frequent falls     Imaging on presentation unremarkable             # Atrial fibrillation    Prior notes indicate non-compliance with Cardiology follow up.    Poor OAC candidate due to recurrent falls at home and has refused BB due to bradycardia. # CKD 3a              - At baseline. # Chronic dCHF              - Euvolemic. Discharge Planning: Awaiting placement to [address] [place] [address], pending covid bed availability. May be on 7/19. CM working on [employer]. Diet:  ADULT DIET; Regular  DVT PPx: Lovenox  Code status: Full Code    Hospital Problems             Last Modified POA    * (Principal) Rhabdomyolysis 7/10/2022 Yes    Hyperkalemia 7/10/2022 Yes    Frequent falls 7/11/2022 Yes    Sinus bradycardia 7/12/2022 Yes    Essential hypertension (Chronic) 7/10/2022 Yes    Paroxysmal atrial fibrillation (HCC) (Chronic) 7/10/2022 Yes    Stage 3a chronic kidney disease (HCC) (Chronic) 7/10/2022 Yes    Chronic diastolic congestive heart failure (HCC) (Chronic) 7/10/2022 Yes       Objective:     Patient Vitals for the past 24 hrs:   Temp Pulse Resp BP SpO2   07/17/22 1534 98.8 °F (37.1 °C) 71 18 (!) 127/56 93 %   07/17/22 1109 98.8 °F (37.1 °C) 63 18 134/67 93 %   07/17/22 0934 -- -- -- 135/66 --   07/17/22 0747 98.1 °F (36.7 °C) 74 20 135/66 95 %   07/17/22 0735 -- 74 -- -- --   07/17/22 0500 -- 73 -- (!) 128/59 --   07/17/22 0400 -- 70 -- (!) 152/82 --   07/17/22 0357 98.1 °F (36.7 °C) 80 16 (!) 156/80 97 %   07/16/22 2330 -- 70 -- (!) 144/80 --   07/16/22 2314 97.9 °F (36.6 °C) 81 16 (!) 160/83 98 %   07/16/22 2200 -- 66 -- (!) 142/90 --   07/16/22 2155 -- -- -- (!) 140/80 --   07/16/22 2010 98 °F (36.7 °C) 71 16 134/79 93 %       Estimated body mass index is 26.26 kg/m² as calculated from the following:    Height as of this encounter: 5' 4\" (1.626 m). Weight as of this encounter: 153 lb (69.4 kg). Intake/Output Summary (Last 24 hours) at 7/17/2022 1632  Last data filed at 7/17/2022 0930  Gross per 24 hour   Intake 357 ml   Output 300 ml   Net 57 ml         Physical Exam:   Blood pressure (!) 127/56, pulse 71, temperature 98.8 °F (37.1 °C), temperature source Oral, resp.  rate 18, height 5' 4\" (1.626 m), weight 153 lb (69.4 kg), SpO2 93 %. General:    Well nourished. No overt distress. Head:  Normocephalic, atraumatic  Eyes:  Sclerae appear normal. Pupils equally round. ENT:  Nares appear normal, no drainage. Moist oral mucosa  Neck:  No restricted ROM. Trachea midline. CV:   RRR. No m/r/g. No jugular venous distension. Lungs:   CTAB. No wheezing, rhonchi, or rales. Respirations even, unlabored. Abdomen: Bowel sounds present. Soft, nontender, nondistended. Extremities: No cyanosis or clubbing. No edema. Skin:     No rashes and normal coloration. Warm and dry. Neuro:  CN II-XII grossly intact. Sensation intact. A&Ox3  Psych:  Normal mood and affect.       I have reviewed ordered lab tests and independently visualized imaging below:    Recent Labs:  Recent Results (from the past 48 hour(s))   CBC with Auto Differential    Collection Time: 07/17/22  3:22 AM   Result Value Ref Range    WBC 5.4 4.3 - 11.1 K/uL    RBC 4.07 (L) 4.23 - 5.6 M/uL    Hemoglobin 13.1 (L) 13.6 - 17.2 g/dL    Hematocrit 39.5 (L) 41.1 - 50.3 %    MCV 97.1 79.6 - 97.8 FL    MCH 32.2 26.1 - 32.9 PG    MCHC 33.2 31.4 - 35.0 g/dL    RDW 14.4 11.9 - 14.6 %    Platelets 777 991 - 649 K/uL    MPV 9.7 9.4 - 12.3 FL    nRBC 0.00 0.0 - 0.2 K/uL    Differential Type AUTOMATED      Seg Neutrophils 67 43 - 78 %    Lymphocytes 14 13 - 44 %    Monocytes 11 4.0 - 12.0 %    Eosinophils % 5 0.5 - 7.8 %    Basophils 1 0.0 - 2.0 %    Immature Granulocytes 2 0.0 - 5.0 %    Segs Absolute 3.6 1.7 - 8.2 K/UL    Absolute Lymph # 0.8 0.5 - 4.6 K/UL    Absolute Mono # 0.6 0.1 - 1.3 K/UL    Absolute Eos # 0.3 0.0 - 0.8 K/UL    Basophils Absolute 0.1 0.0 - 0.2 K/UL    Absolute Immature Granulocyte 0.1 0.0 - 0.5 K/UL   Basic Metabolic Panel w/ Reflex to MG    Collection Time: 07/17/22  3:22 AM   Result Value Ref Range    Sodium 131 (L) 138 - 145 mmol/L    Potassium 4.4 3.5 - 5.1 mmol/L    Chloride 100 98 - 107 mmol/L    CO2 28 21 - 32 mmol/L acetaminophen (TYLENOL) tablet 650 mg  650 mg Oral Q6H PRN    Or    acetaminophen (TYLENOL) suppository 650 mg  650 mg Rectal Q6H PRN       Signed:  Sherrie Loya MD    Part of this note may have been written by using a voice dictation software. The note has been proof read but may still contain some grammatical/other typographical errors.

## 2022-07-17 NOTE — PROGRESS NOTES
Bedside report received from night nurse Laura Espinal. Assessment done as noted  Respiration even and unlabored 20/min; denies pain or nausea at present. Afebrile this morning. Productive/non-productive coughing at interval. RA O2 sats 93% at rest. Remains on Droplet plus isolation for positive COVID-19 screening. Ordered PPE in place and in use. Encouraged to call with needs.

## 2022-07-17 NOTE — PROGRESS NOTES
Resting quietly at present. NAD noted. Safety maintained through out the shift. Pt belonging (wallet) given to pt's daughter Cleve You per pt's request. To report off to on coming nurse.

## 2022-07-18 PROCEDURE — 97535 SELF CARE MNGMENT TRAINING: CPT

## 2022-07-18 PROCEDURE — 1100000000 HC RM PRIVATE

## 2022-07-18 PROCEDURE — 6370000000 HC RX 637 (ALT 250 FOR IP): Performed by: INTERNAL MEDICINE

## 2022-07-18 PROCEDURE — 6370000000 HC RX 637 (ALT 250 FOR IP): Performed by: FAMILY MEDICINE

## 2022-07-18 PROCEDURE — 2580000003 HC RX 258: Performed by: FAMILY MEDICINE

## 2022-07-18 PROCEDURE — 97530 THERAPEUTIC ACTIVITIES: CPT

## 2022-07-18 PROCEDURE — 6360000002 HC RX W HCPCS: Performed by: FAMILY MEDICINE

## 2022-07-18 RX ADMIN — SODIUM CHLORIDE, PRESERVATIVE FREE 10 ML: 5 INJECTION INTRAVENOUS at 08:50

## 2022-07-18 RX ADMIN — VALSARTAN 160 MG: 80 TABLET ORAL at 08:48

## 2022-07-18 RX ADMIN — ENOXAPARIN SODIUM 40 MG: 100 INJECTION SUBCUTANEOUS at 08:49

## 2022-07-18 RX ADMIN — Medication 1 TABLET: at 08:48

## 2022-07-18 RX ADMIN — VALSARTAN 160 MG: 80 TABLET ORAL at 20:29

## 2022-07-18 RX ADMIN — ASPIRIN 81 MG: 81 TABLET ORAL at 08:48

## 2022-07-18 RX ADMIN — SODIUM CHLORIDE, PRESERVATIVE FREE 5 ML: 5 INJECTION INTRAVENOUS at 20:29

## 2022-07-18 ASSESSMENT — PAIN SCALES - GENERAL
PAINLEVEL_OUTOF10: 0

## 2022-07-18 NOTE — CARE COORDINATION
[facility] accepted patient for rehab. A bed will likely be available tomorrow if patient is stable for discharge.

## 2022-07-18 NOTE — PROGRESS NOTES
PHYSICAL THERAPY Daily Note and AM  (Link to Caseload Tracking: PT Visit Days : 4  Time In/Out PT Charge Capture  Rehab Caseload Tracker  Orders    Ruma Ahumada is a 78 y.o. male   PRIMARY DIAGNOSIS: Rhabdomyolysis  Rhabdomyolysis [M62.82]  Fall, initial encounter [W19. XXXA]  Non-traumatic rhabdomyolysis [M62.82]     Inpatient: Payor: Nasim Elizabeth / Plan: Niki Wong / Product Type: *No Product type* /     ASSESSMENT:     REHAB RECOMMENDATIONS:   Recommendation to date pending progress:  Setting:  Short-term Rehab    Equipment:    To Be Determined     ASSESSMENT:  Mr. Gennaro Soulier presents supine on arrival, on RA. Pt with less assist this date to EOB, CGA to MIN A using bed rail for support. Pt with fair sitting balance, very kyphotic, sitting in posterior tilt, some difficulty with scooting to EOB. Pt performed sit to stand with RW and CGA/MIN A, ambulated in room with RW to bathroom 10' and assisted to commode. Once finished toileting, pt required CGA for static standing balance and RW support for cleaning BM and brief placement with OT. Pt ambulated 10' back to bed with RW and CGA to MIN A. Pt on RA, stats >90% with activity. Pt overall making progress toward goals. PT to cont to follow for acute care needs.       SUBJECTIVE:   Mr. Gennaro Soulier states, \"I am tired and I don't want to get up     Social/Functional Lives With: Alone  Type of Home: Apartment  Home Layout: One level  Home Access: Stairs to enter with rails  Entrance Stairs - Number of Steps: 13  Home Equipment: Frederich Gu, rolling, Cane  ADL Assistance: Needs assistance  Ambulation Assistance: Needs assistance  Transfer Assistance: Needs assistance  Active : Yes  Mode of Transportation: Car  Occupation: Retired  OBJECTIVE:     PAIN: VITALS / O2: PRECAUTION / Mohsen Party / DRAINS:   Pre Treatment:   Pain Assessment: None - Denies Pain      Post Treatment: 0/10 Vitals        Oxygen  O2 Therapy: Room air Telemetry Vicki will transfer from bed to chair and chair to bed with MODIFIED INDEPENDENCE using the least restrictive device within 7 treatment day(s). (3.) Isabelle Morton will ambulate with MODIFIED INDEPENDENCE for 250 feet with the least restrictive device within 7 treatment day(s). (4.) Isabelle Morton will perform standing static and dynamic balance activities x 20 minutes with MODIFIED INDEPENDENCE to improve safety within 7 treatment day(s). (5.) Isabelle Morton will ascend and descend 13 stairs using bilateral hand rail(s) with MODIFIED INDEPENDENCE to improve functional mobility and safety within 7 treatment day(s). (6.) Isabelle Morton will perform therapeutic exercises x 20 min for HEP with INDEPENDENCE to improve strength, endurance, and functional mobility within 7 treatment day(s). FREQUENCY AND DURATION: 3 times/week for duration of hospital stay or until stated goals are met, whichever comes first.    TREATMENT:   TREATMENT:   Co-Treatment PT/OT necessary due to patient's decreased overall endurance/tolerance levels, as well as need for high level skilled assistance to complete functional transfers/mobility and functional tasks  Therapeutic Activity (29 Minutes): Therapeutic activity included Supine to Sit, Sit to Supine, Scooting, Transfer Training, Ambulation on level ground, Sitting balance , and Standing balance to improve functional Activity tolerance, Balance, Coordination, Mobility, and Strength.     TREATMENT GRID:  N/A    AFTER TREATMENT PRECAUTIONS: Bed, Bed/Chair Locked, Call light within reach, RN notified, and Side rails x3    INTERDISCIPLINARY COLLABORATION:  RN/ PCT, PT/ PTA and OT/ AMADOR    EDUCATION: Education Given To: Patient  Education Provided: Transfer Training  Education Method: Verbal  Barriers to Learning: None  Education Outcome: Continued education needed    TIME IN/OUT:  Time In: 1136  Time Out: [address]  Minutes: CURTIS Sung

## 2022-07-18 NOTE — PROGRESS NOTES
Pt resting in bed. No signs of distress noted. Primo-fit external cather in place. Safety measures are in place. SBAR report given to Sha Rojas RN.

## 2022-07-18 NOTE — PROGRESS NOTES
[facility] Pt resting in bed watching TV; A&O x4. Respirations even and unlabored on room air. Pt denies pain and reports no further needs at this time. No signs of distress noted. Safety measures are in place. Will continue to monitor.

## 2022-07-18 NOTE — PROGRESS NOTES
[] [] [] [] []    Sit to Supine [] [] [] [] [] [] [] [] [] [x] []    Transfers    Sit to Stand [] [] [] [] [x] [] [] [] [] [] []    Bed to Chair [] [] [] [] [] [] [] [] [] [] []    Stand to Sit [] [] [] [] [] [x] [] [] [] [] []    Tub/Shower [] [] [] [] [] [] [] [] [] [] []     Toilet [] [] [] [] [] [] [] [x] [] [] []      [] [] [] [] [] [] [] [] [] [] []    I=Independent, Mod I=Modified Independent, S=Supervision/Setup, SBA=Standby Assistance, CGA=Contact Guard Assistance, Min=Minimal Assistance, Mod=Moderate Assistance, Max=Maximal Assistance, Total=Total Assistance, NT=Not Tested    ACTIVITIES OF DAILY LIVING: I Mod I S SBA CGA Min Mod Max Total NT Comments   BASIC ADLs:              Upper Body   Bathing [] [] [] [] [] [] [] [] [] [x]    Lower Body Bathing [] [] [] [] [] [] [] [] [] [x]    Toileting [] [] [] [] [] [] [] [] [x] []    Upper Body Dressing [] [] [] [] [] [] [] [] [] [x]    Lower Body Dressing [] [] [] [] [] [] [] [] [] [x]    Feeding [] [] [] [] [] [] [] [] [] [x]    Grooming [] [] [] [] [] [] [] [] [] []    Personal Device Care [] [] [] [] [] [] [] [] [] [x]    Functional Mobility [] [] [] [] [x] [] [] [] [] []    I=Independent, Mod I=Modified Independent, S=Supervision/Setup, SBA=Standby Assistance, CGA=Contact Guard Assistance, Min=Minimal Assistance, Mod=Moderate Assistance, Max=Maximal Assistance, Total=Total Assistance, NT=Not Tested    BALANCE: Good Fair+ Fair Fair- Poor NT Comments   Sitting Static [] [] [x] [] [] []    Sitting Dynamic [x] [x] [] [] [] [] Supported seating             Standing Static [] [x] [x] [] [] []    Standing Dynamic [] [x] [x] [] [] []        PLAN:     FREQUENCY/DURATION   OT Plan of Care: 3 times/week for duration of hospital stay or until stated goals are met, whichever comes first.    ACUTE OCCUPATIONAL THERAPY GOALS:   (Developed with and agreed upon by patient and/or caregiver.)  1.  Patient will complete lower body bathing and dressing with MIN A and adaptive equipment as needed. 2.Patient will complete upper body bathing and dressing with MOD I and adaptive equipment as needed. 3. Patient will complete toileting with MIN A.   4. Patient will tolerate 25 minutes of OT treatment with 1-2 rest breaks to increase activity tolerance for ADLs. 5. Patient will complete functional transfers with SUPERVISION and adaptive equipment as needed. 6. Patient will complete functional activity with MOD I and adaptive equipment as needed. TREATMENT:     TREATMENT:   Self Care (29 minutes): Patient participated in toileting ADLs in unsupported sitting with maximal verbal, manual, and tactile cueing to increase independence, decrease assistance required, and increase activity tolerance. Patient also participated in functional mobility and functional transfer training to increase independence, decrease assistance required, increase activity tolerance, and increase safety awareness.      TREATMENT GRID:  N/A    AFTER TREATMENT PRECAUTIONS: Bed, Bed/Chair Locked, Call light within reach, Needs within reach, RN notified, and Side rails x3    INTERDISCIPLINARY COLLABORATION:  RN/ PCT, PT/ PTA and OT/ AMADOR    EDUCATION:       TOTAL TREATMENT DURATION AND TIME:  Time In: 1136  Time Out: 1205  Minutes: [facility]

## 2022-07-18 NOTE — FLOWSHEET NOTE
Patient on room air. Safety measures noted. Will continue to monitor per policy.      07/18/22 0712   Handoff   Communication Given Shift Handoff   Handoff Received From SINTIA Sr   Handoff Communication Face to Face   Time Handoff Given 1014

## 2022-07-18 NOTE — PROGRESS NOTES
Hospitalist Progress Note   Admit Date:  2022 10:20 PM   Name:  Gagan Chacon   Age:  78 y.o. Sex:  male  :  1940   MRN:  [id number]   Room:  [facility]/    Reason(s) for Admission: Rhabdomyolysis [M62.82]  Fall, initial encounter [W19. XXXA]  Non-traumatic rhabdomyolysis [facility] Course & Interval History:   Mr. Tl Paez is a nice 77 y/o WM with a h/o pelvic fracture, allergic rhinitis, scarlet fever who was admitted to our facility on  with mild acute rhabdomyolysis. He had a fall in the bathroom at his home and again in the bedroom after that. His daughter from [place] was unable to get a hold of him so she called EMS who brought him to the ER. . He was started on IVF. Carotid duplex negative for significant stenosis. XR shoulder negative for fracture of dislocation. CT head and CT C-spine on admission overall unremarkable. TTE showed EF 47% with indeterminate diastolic dysfxn d/t afib. Coreg added on  for HTN but he developed asymptomatic bradycardia. Coreg reduced but he continued to refuse to take it for fear of recurrent bradycardia. Prepared for discharge but rapid COVID was positive. PCR was negative, however repeat PCR on  was positive so will transfer to 8th floor. Not on O2. Subjective/24hr Events (22): He had a BM today. Pt denies   chest pain or SOB otherwise. Assessment & Plan:   # COVID +   - Incidental finding. Rapid positive and PCR negative on , repeat PCR  is positive. On RA so no tx indicated. # HTN, uncontrolled  Pt refusing BB   Continue Norvasc and   Increased valsartan 160mg bid      # Asymptomatic sinus bradycardia        Pt refusing Coreg      # Non-traumatic rhabdomyolysis              - Resolved. # HyperK              - Resolved. # Recurrent/frequent falls     Imaging on presentation unremarkable             # Atrial fibrillation    Prior notes indicate non-compliance with Cardiology follow up.    Poor OAC candidate due to recurrent falls at home and has refused BB due to bradycardia. # CKD 3a              - At baseline. # Chronic dCHF              - Euvolemic. Discharge Planning: Awaiting placement to [address] [place] [address], pending [facility] bed availability. May be on 7/19. CM working on [employer]. Diet:  ADULT DIET; Regular  DVT PPx: Lovenox  Code status: Full Code    Hospital Problems             Last Modified POA    * (Principal) Rhabdomyolysis 7/10/2022 Yes    Hyperkalemia 7/10/2022 Yes    Frequent falls 7/11/2022 Yes    Sinus bradycardia 7/12/2022 Yes    Essential hypertension (Chronic) 7/10/2022 Yes    Paroxysmal atrial fibrillation (HCC) (Chronic) 7/10/2022 Yes    Stage 3a chronic kidney disease (HCC) (Chronic) 7/10/2022 Yes    Chronic diastolic congestive heart failure (HCC) (Chronic) 7/10/2022 Yes       Objective:     Patient Vitals for the past 24 hrs:   Temp Pulse Resp BP SpO2   07/18/22 1507 98 °F (36.7 °C) 75 18 (!) 147/78 95 %   07/18/22 1112 97.8 °F (36.6 °C) 68 18 (!) 145/71 96 %   07/18/22 0850 -- -- -- (!) 148/77 --   07/18/22 0736 98.7 °F (37.1 °C) 67 18 (!) 148/77 97 %   07/18/22 0258 98.4 °F (36.9 °C) 76 16 (!) 150/85 96 %   07/17/22 2238 98.6 °F (37 °C) 79 16 135/83 95 %   07/17/22 2021 98.6 °F (37 °C) 76 20 137/70 93 %       Estimated body mass index is 24.87 kg/m² as calculated from the following:    Height as of this encounter: 5' 4\" (1.626 m). Weight as of this encounter: 144 lb 14.4 oz (65.7 kg). Intake/Output Summary (Last 24 hours) at 7/18/2022 1550  Last data filed at 7/18/2022 1337  Gross per 24 hour   Intake 610 ml   Output 1350 ml   Net -740 ml         Physical Exam:   Blood pressure (!) 147/78, pulse 75, temperature 98 °F (36.7 °C), temperature source Oral, resp. rate 18, height 5' 4\" (1.626 m), weight 144 lb 14.4 oz (65.7 kg), SpO2 95 %. General:    Well nourished. No overt distress.   Head:  Normocephalic, atraumatic  Eyes:  Sclerae appear normal. Pupils equally round.  ENT:  Nares appear normal, no drainage. Moist oral mucosa  Neck:  No restricted ROM. Trachea midline. CV:   RRR. No m/r/g. No jugular venous distension. Lungs:   CTAB. No wheezing, rhonchi, or rales. Respirations even, unlabored. Abdomen: Bowel sounds present. Soft, nontender, nondistended. Extremities: No cyanosis or clubbing. No edema. Skin:     No rashes and normal coloration. Warm and dry. Neuro:  CN II-XII grossly intact. Sensation intact. A&Ox3  Psych:  Normal mood and affect.       I have reviewed ordered lab tests and independently visualized imaging below:    Recent Labs:  Recent Results (from the past 48 hour(s))   CBC with Auto Differential    Collection Time: 07/17/22  3:22 AM   Result Value Ref Range    WBC 5.4 4.3 - 11.1 K/uL    RBC 4.07 (L) 4.23 - 5.6 M/uL    Hemoglobin 13.1 (L) 13.6 - 17.2 g/dL    Hematocrit 39.5 (L) 41.1 - 50.3 %    MCV 97.1 79.6 - 97.8 FL    MCH 32.2 26.1 - 32.9 PG    MCHC 33.2 31.4 - 35.0 g/dL    RDW 14.4 11.9 - 14.6 %    Platelets 098 182 - 935 K/uL    MPV 9.7 9.4 - 12.3 FL    nRBC 0.00 0.0 - 0.2 K/uL    Differential Type AUTOMATED      Seg Neutrophils 67 43 - 78 %    Lymphocytes 14 13 - 44 %    Monocytes 11 4.0 - 12.0 %    Eosinophils % 5 0.5 - 7.8 %    Basophils 1 0.0 - 2.0 %    Immature Granulocytes 2 0.0 - 5.0 %    Segs Absolute 3.6 1.7 - 8.2 K/UL    Absolute Lymph # 0.8 0.5 - 4.6 K/UL    Absolute Mono # 0.6 0.1 - 1.3 K/UL    Absolute Eos # 0.3 0.0 - 0.8 K/UL    Basophils Absolute 0.1 0.0 - 0.2 K/UL    Absolute Immature Granulocyte 0.1 0.0 - 0.5 K/UL   Basic Metabolic Panel w/ Reflex to MG    Collection Time: 07/17/22  3:22 AM   Result Value Ref Range    Sodium 131 (L) 138 - 145 mmol/L    Potassium 4.4 3.5 - 5.1 mmol/L    Chloride 100 98 - 107 mmol/L    CO2 28 21 - 32 mmol/L    Anion Gap 3 (L) 7 - 16 mmol/L    Glucose 92 65 - 100 mg/dL    BUN 30 (H) 8 - 23 MG/DL    CREATININE 1.10 0.8 - 1.5 MG/DL    GFR African American >60 >60 ml/min/1.73m2    GFR Non-African American >60 >60 ml/min/1.73m2    Calcium 8.3 8.3 - 10.4 MG/DL   Phosphorus    Collection Time: 07/17/22  3:22 AM   Result Value Ref Range    Phosphorus 2.8 2.3 - 3.7 MG/DL           Other Studies:  No results found.     Current Meds:  Current Facility-Administered Medications   Medication Dose Route Frequency    benzonatate (TESSALON) capsule 100 mg  100 mg Oral Q4H PRN    guaiFENesin (ROBITUSSIN) 100 MG/5ML oral solution 200 mg  200 mg Oral Q6H PRN    amLODIPine (NORVASC) tablet 10 mg  10 mg Oral Daily    labetalol (NORMODYNE;TRANDATE) injection 10 mg  10 mg IntraVENous Q6H PRN    oyster shell calcium w/D 500-200 MG-UNIT tablet 1 tablet  1 tablet Oral Daily    aspirin EC tablet 81 mg  81 mg Oral Daily    valsartan (DIOVAN) tablet 160 mg  160 mg Oral BID    sodium chloride flush 0.9 % injection 5-40 mL  5-40 mL IntraVENous 2 times per day    sodium chloride flush 0.9 % injection 5-40 mL  5-40 mL IntraVENous PRN    0.9 % sodium chloride infusion   IntraVENous PRN    potassium chloride (KLOR-CON M) extended release tablet 40 mEq  40 mEq Oral PRN    Or    potassium bicarb-citric acid (EFFER-K) effervescent tablet 40 mEq  40 mEq Oral PRN    Or    potassium chloride 10 mEq/100 mL IVPB (Peripheral Line)  10 mEq IntraVENous PRN    magnesium sulfate 2000 mg in 50 mL IVPB premix  2,000 mg IntraVENous PRN    enoxaparin (LOVENOX) injection 40 mg  40 mg SubCUTAneous Daily    promethazine (PHENERGAN) tablet 12.5 mg  12.5 mg Oral Q6H PRN    Or    ondansetron (ZOFRAN) injection 4 mg  4 mg IntraVENous Q6H PRN    polyethylene glycol (GLYCOLAX) packet 17 g  17 g Oral Daily    bisacodyl (DULCOLAX) EC tablet 5 mg  5 mg Oral Daily PRN    aluminum & magnesium hydroxide-simethicone (MAALOX) 200-200-20 MG/5ML suspension 30 mL  30 mL Oral Q6H PRN    acetaminophen (TYLENOL) tablet 650 mg  650 mg Oral Q6H PRN    Or    acetaminophen (TYLENOL) suppository 650 mg  650 mg Rectal Q6H PRN       Signed:  Ronald Cao MD    Part of this note may have been written by using a voice dictation software. The note has been proof read but may still contain some grammatical/other typographical errors.

## 2022-07-19 VITALS
OXYGEN SATURATION: 94 % | DIASTOLIC BLOOD PRESSURE: 76 MMHG | WEIGHT: 144.2 LBS | TEMPERATURE: 97.8 F | HEIGHT: 64 IN | SYSTOLIC BLOOD PRESSURE: 124 MMHG | HEART RATE: 70 BPM | RESPIRATION RATE: 16 BRPM | BODY MASS INDEX: 24.62 KG/M2

## 2022-07-19 PROCEDURE — 6360000002 HC RX W HCPCS: Performed by: FAMILY MEDICINE

## 2022-07-19 PROCEDURE — 6370000000 HC RX 637 (ALT 250 FOR IP): Performed by: INTERNAL MEDICINE

## 2022-07-19 PROCEDURE — 2580000003 HC RX 258: Performed by: FAMILY MEDICINE

## 2022-07-19 PROCEDURE — 6370000000 HC RX 637 (ALT 250 FOR IP): Performed by: FAMILY MEDICINE

## 2022-07-19 RX ORDER — BENZONATATE 100 MG/1
100 CAPSULE ORAL EVERY 4 HOURS PRN
Qty: 7 CAPSULE | Refills: 0 | Status: SHIPPED | OUTPATIENT
Start: 2022-07-19 | End: 2022-07-26

## 2022-07-19 RX ORDER — ASPIRIN 81 MG/1
81 TABLET ORAL DAILY
Qty: 30 TABLET | Refills: 3 | Status: SHIPPED | OUTPATIENT
Start: 2022-07-20

## 2022-07-19 RX ORDER — MAGNESIUM HYDROXIDE/ALUMINUM HYDROXICE/SIMETHICONE 120; 1200; 1200 MG/30ML; MG/30ML; MG/30ML
30 SUSPENSION ORAL EVERY 6 HOURS PRN
Qty: 100 ML | Refills: 0 | Status: SHIPPED | OUTPATIENT
Start: 2022-07-19

## 2022-07-19 RX ORDER — AMLODIPINE BESYLATE 10 MG/1
10 TABLET ORAL DAILY
Qty: 30 TABLET | Refills: 3 | Status: SHIPPED | OUTPATIENT
Start: 2022-07-20

## 2022-07-19 RX ADMIN — Medication 1 TABLET: at 08:05

## 2022-07-19 RX ADMIN — SODIUM CHLORIDE, PRESERVATIVE FREE 10 ML: 5 INJECTION INTRAVENOUS at 08:09

## 2022-07-19 RX ADMIN — ASPIRIN 81 MG: 81 TABLET ORAL at 08:05

## 2022-07-19 RX ADMIN — ENOXAPARIN SODIUM 40 MG: 100 INJECTION SUBCUTANEOUS at 08:07

## 2022-07-19 RX ADMIN — VALSARTAN 160 MG: 80 TABLET ORAL at 08:04

## 2022-07-19 ASSESSMENT — PAIN SCALES - GENERAL
PAINLEVEL_OUTOF10: 0

## 2022-07-19 NOTE — DISCHARGE SUMMARY
Hospitalist Discharge Summary   Admit Date:  2022 10:20 PM   DC Note date: 2022  Name:  Say Sanford   Age:  78 y.o. Sex:  male  :  1940   MRN:  [id number]   Room:  [facility]  PCP:  Redd East MD    Presenting Complaint: Fall    Initial Admission Diagnosis: Rhabdomyolysis [M62.82]  Fall, initial encounter [W19. XXXA]  Non-traumatic rhabdomyolysis [M62.82]     Problem List for this Hospitalization:  [unfilled]  Did Patient have Sepsis (YES OR NO): No    Hospital Course:  Mr. Juanito Mtz is a nice 77 y/o WM with a h/o pelvic fracture, allergic rhinitis, scarlet fever who was admitted to our facility on  with mild acute rhabdomyolysis. He had a fall in the bathroom at his home and again in the bedroom after that. His daughter from [place] was unable to get a hold of him so she called EMS who brought him to the ER. . He was started on IVF. Carotid duplex negative for significant stenosis. XR shoulder negative for fracture of dislocation. CT head and CT C-spine on admission overall unremarkable. TTE showed EF 47% with indeterminate diastolic dysfxn d/t afib. Coreg added on  for HTN but he developed asymptomatic bradycardia. Coreg reduced but he continued to refuse to take it for fear of recurrent bradycardia. Prepared for discharge but rapid COVID was positive. PCR was negative, however repeat PCR on  was positive so was transferred to 8th floor. He is asymptomatic and not on O2. Pt is hemodynamically stable for discharge. Disposition: [unfilled]  Diet: ADULT DIET; Regular  Code Status: Full Code     Follow Up Orders:  No follow-ups on file. [unfilled]    Follow up labs/diagnostics (ultimately defer to outpatient provider): Follow-up with PCP in 3 to 5 days    Time spent in patient discharge and coordination 30 minutes. Plan was discussed with patient. All questions answered. Patient was stable at time of discharge.   Instructions given to call a physician or return if any concerns. Discharge Info:     Current Discharge Medication List             Details   aspirin 81 MG EC tablet Take 1 tablet by mouth in the morning. Qty: 30 tablet, Refills: 3      aluminum & magnesium hydroxide-simethicone (MAALOX) 200-200-20 MG/5ML SUSP suspension Take 30 mLs by mouth every 6 hours as needed for Indigestion  Qty: 100 mL, Refills: 0      amLODIPine (NORVASC) 10 MG tablet Take 1 tablet by mouth in the morning. Qty: 30 tablet, Refills: 3      benzonatate (TESSALON) 100 MG capsule Take 1 capsule by mouth every 4 hours as needed for Cough  Qty: 7 capsule, Refills: 0      Calcium Carb-Cholecalciferol (OYSTER SHELL CALCIUM W/D) 500-200 MG-UNIT TABS tablet Take 1 tablet by mouth in the morning. Qty: 30 tablet, Refills: 0                Details   valsartan (DIOVAN) 80 MG tablet TAKE 1 TABLET BY MOUTH DAILY              Procedures done this admission:  * No surgery found *    Consults this admission:  IP CONSULT TO SOCIAL WORK  IP CONSULT TO CARDIOLOGY    Echocardiogram/EKG results:  @BSHSILASTIMGCAT(GCV2287:1)@     No results found for this or any previous visit (from the past 1 hour(s)). Diagnostic Imaging/Tests:   XR SHOULDER RIGHT (MIN 2 VIEWS)    Result Date: 7/10/2022  EXAMINATION: XR SHOULDER RIGHT (MIN 2 VIEWS) HISTORY: Shoulder pain. TECHNIQUE: 8/24/2018 of the right shoulder. COMPARISON: None available. FINDINGS: There is no evidence of acute fracture or dislocation. The acromioclavicular and glenohumeral joints are intact. The bones appear diffusely osteopenic. Visualized ribs again show multiple healed fractures. Soft tissues and upper lung field are unremarkable. No evidence of acute fracture or dislocation. The bones appear diffusely osteopenic.     CT HEAD WO CONTRAST    Result Date: 7/9/2022  EXAMINATION: CT HEAD WO CONTRAST 7/9/2022 10:50 PM ACCESSION NUMBER: [id number] COMPARISON: None available INDICATION: fall hematoma on back of head TECHNIQUE: Multiple-row detector helical CT examination of the head without intravenous contrast. Radiation dose reduction techniques were used for this study. Our CT scanners use one or all of the following: Automated exposure control, adjustment of the mA and/or kV according to patient size, iterative reconstruction. FINDINGS: The ventricles are within normal limits for the degree of global brain parenchymal volume loss. There is no midline shift. The basilar cisterns are patent. There is no cerebellar tonsillar ectopia or herniation. Hypodensities in the periventricular and subcortical white matter are nonspecific, but they are most likely to represent chronic microangiopathy. Prior bilateral lens replacement. There is no evidence of acute intracranial hemorrhage or extra-axial collections. There is no CT evidence of acute infarction. The paranasal sinuses and mastoid air cells are well aerated and clear. No evidence of skull fracture. No evidence of acute intracranial abnormality. CT CERVICAL SPINE WO CONTRAST    Result Date: 7/9/2022  EXAMINATION: CT CERVICAL SPINE WO CONTRAST HISTORY: fall striking back of head. TECHNIQUE: Noncontrast CT of cervical spine was performed in the axial plane. Coronal and sagittal reformatted images were created and reviewed. Dose reduction technique used: Automated exposure control/Adjustment of the mA and/or kV according to patient size/Use of iterative reconstruction technique. COMPARISON: None. FINDINGS: No evidence of acute fracture or traumatic subluxation. There is normal cervical lordosis. Vertebral body heights and intervertebral disc spaces are maintained. The occipital condyles and visualized skull base is unremarkable. Multilevel degenerative changes are present throughout the cervical spine. No high-grade spinal canal or neural foraminal stenosis is identified. There is no abnormal prevertebral soft tissue edema.  No fluid collections are identified within the paraspinal soft tissues. No CT evidence of an acute fracture or traumatic subluxation. Transthoracic echocardiogram (TTE) complete with contrast, bubble, strain, and 3D PRN    Result Date: 7/10/2022    Left Ventricle: Mildly reduced left ventricular systolic function. EF 3D is 47%. Left ventricle size is normal. Normal wall thickness. Mild global hypokinesis present. Indeterminate diastolic function due to atrial fibrillation. Right Ventricle: Right ventricle size is normal. Normal systolic function. Aortic Valve: Moderately calcified cusp. Trace regurgitation. No significant stenosis. LVOT:AV VTI Index is 0.76. AV area by continuity VTI is 2.9 cm2. Mitral Valve: Mildly thickened leaflet, at the anterior and posterior leaflets. Mild to moderate regurgitation. Tricuspid Valve: Mild regurgitation. Mildly elevated RVSP. The estimated RVSP is 43 mmHg. IVC/SVC: IVC diameter is greater than 21 mm and decreases greater than 50% during inspiration; therefore the estimated right atrial pressure is intermediate (~8 mmHg). Technical qualifiers: Color flow Doppler was performed and pulse wave and/or continuous wave Doppler was performed. Vascular duplex carotid bilateral    Result Date: 7/10/2022  TITLE:  Carotid and Vertebral Ultrasound Examination. INDICATION:  Syncope. Recent fall. TECHNIQUE: Grayscale, Color Doppler, and Spectral Doppler Ultrasound interrogation performed. Peak Systolic Velocity, ICA-to-CCA ratio, and End-Diastolic Velocity are recorded. The estimate of stenosis is inferred from velocity measurements cross referenced to published correlations as defined by the Society of Radiologists in [facility] 13 Radiology 2003; 229; 340-346. COMPARISON: None. RIGHT NECK:  The peak systolic velocity in the Common Carotid Artery = 69 cm/sec; Internal Carotid Artery = 106 cm/sec. Ratio = 1.5. Diffuse plaque. Antegrade flow in the vertebral artery.  LEFT  NECK:  The peak systolic velocity in the Common Carotid Artery = 61 cm/sec; Internal Carotid Artery = 85 cm/sec. Ratio = 1.4. Diffuse plaque. Antegrade flow in the vertebral artery. SONYA:  No significant stenosis. LICA:  No significant stenosis. [unfilled]    Labs: Results:       BMP, Mg, Phos Recent Labs     07/17/22 0322   *   K 4.4      CO2 28   BUN 30*   PHOS 2.8      CBC Recent Labs     07/17/22 0322   WBC 5.4   RBC 4.07*   HGB 13.1*   HCT 39.5*         LFT No results for input(s): ALT, TP, ALB, GLOB in the last 72 hours. Invalid input(s): SGOT, TBIL, AP, AGRAT, GPT   Cardiac Testing No results found for: BNP, CPK, RCK1, CKMB   Coagulation Tests Lab Results   Component Value Date/Time    INR 1.0 08/25/2021 10:44 AM      A1c No results found for: HBA1C   Lipid Panel Lab Results   Component Value Date/Time    CHOL 125 07/10/2022 03:21 AM    HDL 44 07/10/2022 03:21 AM    VLDL 12 04/13/2022 11:27 AM      Thyroid Panel Lab Results   Component Value Date/Time    TSH 2.670 04/13/2022 11:27 AM    TSH 1.650 08/12/2021 10:14 AM        Most Recent UA No results found for: MUCUS, UCOM       All Labs from Last 24 Hrs:  No results found for this or any previous visit (from the past 24 hour(s)).     Current Med List in Hospital:   Current Facility-Administered Medications   Medication Dose Route Frequency    benzonatate (TESSALON) capsule 100 mg  100 mg Oral Q4H PRN    guaiFENesin (ROBITUSSIN) 100 MG/5ML oral solution 200 mg  200 mg Oral Q6H PRN    amLODIPine (NORVASC) tablet 10 mg  10 mg Oral Daily    labetalol (NORMODYNE;TRANDATE) injection 10 mg  10 mg IntraVENous Q6H PRN    oyster shell calcium w/D 500-200 MG-UNIT tablet 1 tablet  1 tablet Oral Daily    aspirin EC tablet 81 mg  81 mg Oral Daily    valsartan (DIOVAN) tablet 160 mg  160 mg Oral BID    sodium chloride flush 0.9 % injection 5-40 mL  5-40 mL IntraVENous 2 times per day    sodium chloride flush 0.9 % injection 5-40 mL  5-40 mL IntraVENous PRN    0.9 % sodium 07/18/22 1920 98.1 °F (36.7 °C) 82 16 (!) 149/82 95 %   07/18/22 1507 98 °F (36.7 °C) 75 18 (!) 147/78 95 %     @BSHSIFLOW(2444:last)@    Estimated body mass index is 24.75 kg/m² as calculated from the following:    Height as of this encounter: 5' 4\" (1.626 m). Weight as of this encounter: 144 lb 3.2 oz (65.4 kg). Intake/Output Summary (Last 24 hours) at 7/19/2022 1427  Last data filed at 7/19/2022 1000  Gross per 24 hour   Intake 420 ml   Output 1750 ml   Net -1330 ml         Physical Exam:    General:    Well nourished. No overt distress  Head:  Normocephalic, atraumatic  Eyes:  Sclerae appear normal.  Pupils equally round. HENT:  Nares appear normal, no drainage. Moist mucous membranes  Neck:  No restricted ROM. Trachea midline  CV:   RRR. No m/r/g. No JVD  Lungs:   CTAB. No wheezing, rhonchi, or rales. Even, unlabored  Abdomen:   Soft, nontender, nondistended. Extremities: Warm and dry. No cyanosis or clubbing. No edema. Skin:     No rashes. Normal coloration  Neuro:  CN II-XII grossly intact. Psych:  Normal mood and affect.       Signed:  Rob Gaffney MD    Part of this note may have been written by using a voice

## 2022-07-19 NOTE — PLAN OF CARE
Problem: Discharge Planning  Goal: Discharge to home or other facility with appropriate resources  Recent Flowsheet Documentation  Taken 7/19/2022 0805 by Chinyere Rodriguez RN  Discharge to home or other facility with appropriate resources:   Identify barriers to discharge with patient and caregiver   Arrange for needed discharge resources and transportation as appropriate   Identify discharge learning needs (meds, wound care, etc)     Problem: Pain  Goal: Verbalizes/displays adequate comfort level or baseline comfort level  Recent Flowsheet Documentation  Taken 7/19/2022 0804 by Estuardo Cazares RN  Verbalizes/displays adequate comfort level or baseline comfort level: Encourage patient to monitor pain and request assistance     Problem: Chronic Conditions and Co-morbidities  Goal: Patient's chronic conditions and co-morbidity symptoms are monitored and maintained or improved  Recent Flowsheet Documentation  Taken 7/19/2022 0805 by Chinyere Rodriguez RN  Care Plan - Patient's Chronic Conditions and Co-Morbidity Symptoms are Monitored and Maintained or Improved: Monitor and assess patient's chronic conditions and comorbid symptoms for stability, deterioration, or improvement

## 2022-07-19 NOTE — CARE COORDINATION
07/19/22 1435   Discharge Planning   Patient expects to be discharged to: [facility]   Transition of Care Consult (CM Consult) SNF   Partner SNF Yes   [address] Provided? No   Mode of Transport at Discharge BLS   Confirm Follow Up Transport Other (see comment)   Condition of Participation: Discharge Planning   The Patient and/or Patient Representative was provided with a Choice of Provider? Patient   The Patient and/Or Patient Representative agree with the Discharge Plan? Yes   Patient is discharging via [facility] to [facility]. CM called patient's daughter and made her aware of discharge time.

## 2022-07-19 NOTE — PROGRESS NOTES
Patient discharged via EMS to [facility]. Mask in place. No oxygen needed. No further acute needs noted at this time.

## 2022-07-19 NOTE — FLOWSHEET NOTE
Pt received in stable condition on room air. Safety measures in place.      07/19/22 0704   Handoff   Communication Given Shift Handoff   Handoff Received From SINTIA Rg   Handoff Communication Face to Face   Time Handoff Given 5352

## 2022-07-19 NOTE — PROGRESS NOTES
Discharge - OUT REPORT:    Verbal report given to Theodora Sanford  being transferred to [facility] for routine progression of patient care       Report consisted of patient's Situation, Background, Assessment and   Recommendations(SBAR). Information from the following report(s) Nurse Handoff Report, Intake/Output, MAR, and Recent Results was reviewed with the receiving nurse. Lines:       Opportunity for questions and clarification was provided.       Patient transported with:  [employer]

## 2022-07-20 ENCOUNTER — CARE COORDINATION (OUTPATIENT)
Dept: CARE COORDINATION | Facility: CLINIC | Age: 82
End: 2022-07-20

## 2022-07-20 NOTE — CARE COORDINATION
[facility] Discharge Call    2022    Patient: Lina Coats Patient : 1940   MRN: [id number]  Reason for Admission: fall, rhabdo  Discharge Date: 22 RARS: Readmission Risk Score: 12.7         Discharge Facility: [facility]    Transition of care outreach postponed for 14 days due to patient's discharge to SNF.    Care Transitions Post Acute Facility Transition            Care Transitions Interventions         Future Appointments   Date Time Provider Zoraida Waters   10/13/2022 11:00 AM Ab Puente MD [address] [place] ADOLFO Torres RN

## 2022-08-04 ENCOUNTER — CARE COORDINATION (OUTPATIENT)
Dept: CARE COORDINATION | Facility: CLINIC | Age: 82
End: 2022-08-04

## 2022-08-04 NOTE — CARE COORDINATION
[facility] Update Call    2022    Patient: Ruma Ahumada Patient : 1940   MRN: [id number]  Reason for Admission: RHabdomyolysis  Discharge Date: 22 RARS: Readmission Risk Score: 12.7     CTN left VM with Miah Shirley in  for update on tentative d/c dates. CTN to await return call.     Care Transitions Post Acute Facility Update    Care Transitions Interventions  Post Acute Facility Update

## 2022-08-12 ENCOUNTER — CARE COORDINATION (OUTPATIENT)
Dept: CARE COORDINATION | Facility: CLINIC | Age: 82
End: 2022-08-12

## 2022-08-12 NOTE — CARE COORDINATION
[facility] Discharge Call    2022    Patient: Radha Zapata Patient : 1940   MRN: [id number]  Reason for Admission: fall  Discharge Date: 22 RARS: Readmission Risk Score: 12.7         Discharge Facility: [place] [address] d/c  per facility    Care Transitions Outreach Attempt    Call within 2 business days of discharge: Yes   Attempted to reach patient for transitions of care follow up. Unable to reach patient. Patient: Radha Zapata Patient : 1940 MRN: [id number]    Last Discharge [facility]       Date Complaint Diagnosis Description Type Department Provider    22 Fall Fall, initial encounter . .. ED to Hosp-Admission (Discharged) (ADMITTED) JASSI Holt MD; Chapito Gerard. .. Was this an external facility discharge?  No Discharge Facility: [address]    Noted following upcoming appointments from discharge chart review:   [facility] follow up appointment(s):   Future Appointments   Date Time Provider Zoraida Waters   10/13/2022 11:00 AM Mayo Spear MD [address] [facility]     Non-[facility] follow up appointment(s): na     Care Transitions Post Acute Facility Transition            Care Transitions Interventions         Future Appointments   Date Time Provider Zoraida Waters   10/13/2022 11:00 AM Mayo Spear MD [address] [facility]       Sergio Juares RN

## 2022-08-15 ENCOUNTER — CARE COORDINATION (OUTPATIENT)
Dept: CARE COORDINATION | Facility: CLINIC | Age: 82
End: 2022-08-15

## 2022-08-15 NOTE — CARE COORDINATION
[facility] Update Call    8/15/2022    Patient: Jodi Bobo Patient : 1940   MRN: [id number]  Reason for Admission: fall  Discharge Date: 22 RARS: Readmission Risk Score: 12.7    Patient d/c from [facility] 8/3. Care Transitions Outreach Attempt    Call within 2 business days of discharge: Yes   Attempted to reach patient for transitions of care follow up. Unable to reach patient. Patient: Jodi Bobo Patient : 1940 MRN: [id number]    Last Discharge [facility]       Date Complaint Diagnosis Description Type Department Provider    22 Fall Fall, initial encounter . .. ED to Hosp-Admission (Discharged) (ADMITTED) [id number] Yeny Flores MD; Jessie Galicia. .. Was this an external facility discharge?  No Discharge Facility: [facility]    Noted following upcoming appointments from discharge chart review:   [facility] follow up appointment(s):   Future Appointments   Date Time Provider Zoraida Waters   10/13/2022 11:00 AM Angela Matos MD [address] [facility]     Non-[facility] follow up appointment(s): na        Care Transitions Post Acute Facility Update    Care Transitions Interventions  Post Acute Facility Update

## 2023-09-06 NOTE — PROGRESS NOTES
Problem: Interdisciplinary Rounds  Goal: Interdisciplinary Rounds  Interdisciplinary team rounds were held 10/26/2017 with the following team members:Care Management, Nursing, Nutrition, Pastoral Care, Pharmacy and Physician. Plan of care discussed. See clinical pathway and/or care plan for interventions and desired outcomes. Subjective   History of Present Illness  Is a 62-year-old female with longstanding history of GERD, hypertension presenting to the emergency department with some nausea and vomiting.  Patient states that she has not been able to keep any of her medications down because she is been so nauseous today.  She feels very weak.  She complained of some pain in the epigastric region.  Feels a burning sensation.  Patient feels that she is dehydrated because she has been vomiting so much today.  She is also been having some loose stools for the last 3 days.  She denies any blood.  No mucus.  She does not have any fevers or chills.  No headache or change in vision.  No focal weakness.  No chest pain or shortness of breath.    History provided by:  Patient   used: No      Review of Systems   Constitutional:  Negative for chills and fever.   HENT:  Negative for congestion, ear pain and sore throat.    Eyes:  Negative for visual disturbance.   Respiratory:  Negative for shortness of breath.    Cardiovascular:  Negative for chest pain.   Gastrointestinal:  Positive for abdominal pain, diarrhea, nausea and vomiting.   Genitourinary:  Negative for difficulty urinating.   Musculoskeletal:  Negative for arthralgias.   Skin:  Negative for rash.   Neurological:  Negative for dizziness, weakness and numbness.   Psychiatric/Behavioral:  Negative for agitation.      Past Medical History:   Diagnosis Date    Acid reflux     Acute bronchitis     Cardiac murmur     Depression with anxiety 10/5/2020    Diabetes mellitus     Gastroesophageal reflux disease 5/13/2016    H/O echocardiogram 08/07/2012    i. LVEF 65%.ii. Mild LVH.iii. Borderline evidence of atrial septal aneurysm.  No PFO.     History of nuclear stress test 08/22/2014    Negative for ischemia and scars; LVEF 77%.      History of TIAs 7/15/2021    Hyperlipidemia     Hypertension     Impacted cerumen of both ears     Migraine     Migraines 10/31/2019     Self-catheterizes urinary bladder     Sinusitis     Stroke     Tobacco abuse     quit 4 days ago.      Urticaria     Vitamin D deficiency 2016       No Known Allergies    Past Surgical History:   Procedure Laterality Date    CAPSULE ENDOSCOPY  2021    Procedure: PILLCAM DEPLOYMENT;  Surgeon: Mikael Worthy MD;  Location:  [facility];  Service: Gastroenterology;;    COLONOSCOPY      COLONOSCOPY N/A 2021    Procedure: COLONOSCOPY;  Surgeon: Mikael Worthy MD;  Location:  [facility];  Service: Gastroenterology;  Laterality: N/A;    DENTAL PROCEDURE      ENDOSCOPY N/A 2021    Procedure: ESOPHAGOGASTRODUODENOSCOPY;  Surgeon: Brunner, Mark I, MD;  Location:  [facility];  Service: Gastroenterology;  Laterality: N/A;    ENDOSCOPY N/A 2021    Procedure: ESOPHAGOGASTRODUODENOSCOPY;  Surgeon: Mikael Worthy MD;  Location:  [facility];  Service: Gastroenterology;  Laterality: N/A;    ENDOSCOPY WITH JTUBE N/A 2022    Procedure: ESOPHAGOGASTRODUODENOSCOPY WITH JEJUNAL TUBE INSERTION;  Surgeon: Thom Glover MD;  Location:  [facility];  Service: Gastroenterology;  Laterality: N/A;    UPPER GASTROINTESTINAL ENDOSCOPY         Family History   Problem Relation Age of Onset    Anxiety disorder Other     Arthritis Other     ADD / ADHD Other     Heart disease Other         cardiac disorder    Depression Other     Diabetes Other     Hyperlipidemia Other     Hypertension Other     Lung cancer Other     Osteoporosis Other     Hypertension Brother     Diabetes Brother     Hyperlipidemia Mother     Hypertension Mother     Colon cancer Neg Hx        Social History     Socioeconomic History    Marital status:    Tobacco Use    Smoking status: Former     Packs/day: 0.25     Years: 30.00     Pack years: 7.50     Types: Cigarettes     Quit date: 2019     Years since quittin.2    Smokeless tobacco: Never    Tobacco comments:     BC PL never smoker     Vaping Use    Vaping Use: Never used   Substance and Sexual Activity    Alcohol use: Yes     Alcohol/week: 1.0 standard drink     Types: 1 Glasses of wine per week     Comment: ocassional    Drug use: Yes     Frequency: 2.0 times per week     Types: Marijuana    Sexual activity: Not Currently     Comment: Single            Objective   Physical Exam  Vitals and nursing note reviewed.   Constitutional:       General: She is not in acute distress.     Appearance: She is not ill-appearing or toxic-appearing.   HENT:      Mouth/Throat:      Pharynx: No posterior oropharyngeal erythema.   Eyes:      Conjunctiva/sclera: Conjunctivae normal.      Pupils: Pupils are equal, round, and reactive to light.   Cardiovascular:      Rate and Rhythm: Regular rhythm. Tachycardia present.   Pulmonary:      Effort: Pulmonary effort is normal. No respiratory distress.   Abdominal:      General: Abdomen is flat. There is no distension.      Palpations: There is no mass.      Tenderness: There is abdominal tenderness. There is no guarding or rebound.      Comments: Minimal epigastric tenderness   Musculoskeletal:         General: No deformity. Normal range of motion.   Skin:     General: Skin is warm.      Findings: No rash.   Neurological:      General: No focal deficit present.      Mental Status: She is alert and oriented to person, place, and time.      Motor: No weakness.       Procedures           ED Course  ED Course as of 09/06/23 0512   Wed Sep 06, 2023   0155 BP(!): 209/101 [JK]   0155 Temp: 98.6 °F (37 °C) [JK]   0155 Temp src: Oral [JK]   0155 Heart Rate: 103 [JK]   0155 Resp: 18 [JK]   0155 SpO2: 99 %  Patient's repeat vitals, telemetry tracing, and pulse oximetry tracing were directly viewed and interpreted by myself.  Initial blood pressure was 209/101, heart rate slightly elevated 103 bpm [JK]   0155 CBC & Differential(!) [JK]   0155 Comprehensive Metabolic Panel(!) [JK]   0155 Lipase [JK]   0155 Urinalysis, Microscopic  Only - Urine, Clean Catch(!) [JK]   0155 Urinalysis With Culture If Indicated - Urine, Clean Catch(!)  Laboratory studies were reviewed and interpreted directly by myself.  CBC was normal, CMP showed some elevated glucose with a level of 316, creatinine slightly elevated 1.2, urinalysis showed bacteria, white blood cells, leukocytes as well as some glucose [JK]   0510 CT Abdomen Pelvis Without Contrast  Imaging was directly visualized by myself, per my interpretations, CT of the abdomen pelvis did not show any acute process. [JK]   0510 Patient received multiple rounds of antihypertensive medications, however she continues to remain hypertensive.  Symptoms are consistent with hypertensive urgency.  We did place patient on nicardipine drip.  Patient mated to the hospital for further evaluation and treatment [JK]   0510 Based on the patient's presentation, history and diffuse work-up in the emergency department, the patient is deemed appropriate for admission to the hospital for further evaluation and treatment.  This was discussed with the patient at bedside.  They are in agreement with the current medical management.    Admitting physician: Dr. Martinez    Discussion was had with admitting physician regarding the laboratory and imaging findings.  We did discuss current therapeutics in the emergency department and progression of the patient.  Working diagnosis was conveyed to the admitting physician, as well as current status and prognosis for the patient.  They are in agreement with these findings and have accepted admission.    Shared decision making:   After full review of the patient's clinical presentation, review of any work-up including but not limited to laboratory studies and radiology obtained, I had a discussion with the patient.  Treatment options were discussed as well as the risks, benefits and consequences.  I discussed all findings with the patient and family members if available.  During the  discussion, treatment goals were understood by all as well as any misconceptions which were addressed with the patient.  Ample time was given for any questions they may have had.  They are in agreement with the treatment plan as well as final disposition. [JK]      ED Course User Index  [JK] Nas Patel MD                                           Medical Decision Making  This is a 62-year-old female presented to the emergency department with some nausea vomiting diarrhea and abdominal discomfort.  The patient's symptoms seem consistent with gastroenteritis and dyspepsia.  Overall she appears nontoxic.  Her abdomen is relatively benign at this time.  Is no evidence of acute abdomen or peritonitis.  Patient is afebrile.  IV access will be established the patient.  She was provided symptomatic treatment.  Work-up initiated.      Differential diagnosis: Gastroenteritis, gastritis, gastroparesis, dyspepsia, peptic ulcer disease, pancreatitis, acute kidney injury, electrolyte abnormality      Amount and/or Complexity of Data Reviewed  External Data Reviewed: labs and notes.     Details: External laboratories, imaging as well as notes were reviewed personally by myself.  All relevant studies were used to guide decision making.     Date of previous record: 8/11/2023    Source of note: Primary care physician    Summary: Patient was seen and evaluated for some chronic diarrhea.  She did have PCR and stool panel performed.  I did review this.  She did have some E. coli as well as a positive C. difficile PCR, however there was no antigen.  I did review the other notes and records on file.  Labs: ordered. Decision-making details documented in ED Course.  Radiology: ordered and independent interpretation performed. Decision-making details documented in ED Course.    Risk  OTC drugs.  Prescription drug management.    Critical Care  Total time providing critical care: 39 minutes (Authorized and performed by: Nas  MD Amanda  I personally spent a total of 39 minutes of critical care time with the patient.  Due to the high probability of clinically significant, life-threatening deterioration, the patient required my highest level of care to intervene emergently.  These interventions, including, but not limited to, establishing IV access, continuous pulse oximeter and telemetry monitoring, frequent monitoring and reevaluations, management the patient's airway and cardiovascular system, discussion with other consultants as needed, which bear directly on the management the patient.  This also includes obtaining history, examining the patient, frequent reevaluations and coordinating high level of care.  Failure to emergently initiate these interventions would carry a high probability of resulting in sudden, clinically significant or life threatening deterioration in the patient's condition.  This does not include time spent on separately reported billable procedures.)      Final diagnoses:   Hypertensive urgency   Acute kidney injury   Hyperglycemia   Acute superficial gastritis without hemorrhage       ED Disposition  ED Disposition       ED Disposition   Decision to Admit    Condition   --    Comment   --               No follow-up provider specified.       Medication List      No changes were made to your prescriptions during this visit.            Nas Patel MD  09/06/23 0512

## 2023-10-20 ENCOUNTER — APPOINTMENT (OUTPATIENT)
Dept: MRI IMAGING | Age: 83
End: 2023-10-20
Payer: MEDICARE

## 2023-10-20 ENCOUNTER — APPOINTMENT (OUTPATIENT)
Dept: NON INVASIVE DIAGNOSTICS | Age: 83
End: 2023-10-20
Attending: FAMILY MEDICINE
Payer: MEDICARE

## 2023-10-20 ENCOUNTER — APPOINTMENT (OUTPATIENT)
Dept: CT IMAGING | Age: 83
End: 2023-10-20
Payer: MEDICARE

## 2023-10-20 ENCOUNTER — HOSPITAL ENCOUNTER (OUTPATIENT)
Age: 83
Setting detail: OBSERVATION
Discharge: HOSPICE | End: 2023-10-21
Attending: EMERGENCY MEDICINE | Admitting: FAMILY MEDICINE
Payer: MEDICARE

## 2023-10-20 DIAGNOSIS — G45.9 TRANSIENT ISCHEMIC ATTACK: Primary | ICD-10-CM

## 2023-10-20 DIAGNOSIS — I48.0 PAROXYSMAL ATRIAL FIBRILLATION (HCC): ICD-10-CM

## 2023-10-20 PROBLEM — R29.90 STROKE-LIKE SYMPTOMS: Status: ACTIVE | Noted: 2023-10-20

## 2023-10-20 LAB
ALBUMIN SERPL-MCNC: 3.4 G/DL (ref 3.2–4.6)
ALBUMIN/GLOB SERPL: 0.7 (ref 0.4–1.6)
ALP SERPL-CCNC: 148 U/L (ref 50–136)
ALT SERPL-CCNC: 34 U/L (ref 12–65)
ANION GAP SERPL CALC-SCNC: 5 MMOL/L (ref 2–11)
AST SERPL-CCNC: 21 U/L (ref 15–37)
BASOPHILS # BLD: 0.1 K/UL (ref 0–0.2)
BASOPHILS NFR BLD: 1 % (ref 0–2)
BILIRUB SERPL-MCNC: 1 MG/DL (ref 0.2–1.1)
BUN SERPL-MCNC: 37 MG/DL (ref 8–23)
CALCIUM SERPL-MCNC: 9.2 MG/DL (ref 8.3–10.4)
CHLORIDE SERPL-SCNC: 106 MMOL/L (ref 101–110)
CHOLEST SERPL-MCNC: 196 MG/DL
CO2 SERPL-SCNC: 27 MMOL/L (ref 21–32)
CREAT SERPL-MCNC: 1.6 MG/DL (ref 0.8–1.5)
DIFFERENTIAL METHOD BLD: NORMAL
ECHO AO ASC DIAM: 3.3 CM
ECHO AO ASCENDING AORTA INDEX: 1.94 CM/M2
ECHO AO ROOT DIAM: 2.9 CM
ECHO AO ROOT INDEX: 1.71 CM/M2
ECHO AV AREA PEAK VELOCITY: 1.7 CM2
ECHO AV AREA VTI: 1.8 CM2
ECHO AV AREA/BSA PEAK VELOCITY: 1 CM2/M2
ECHO AV AREA/BSA VTI: 1.1 CM2/M2
ECHO AV MEAN GRADIENT: 4 MMHG
ECHO AV MEAN VELOCITY: 0.9 M/S
ECHO AV PEAK GRADIENT: 8 MMHG
ECHO AV PEAK VELOCITY: 1.4 M/S
ECHO AV VELOCITY RATIO: 0.57
ECHO AV VTI: 26.4 CM
ECHO BSA: 1.72 M2
ECHO LA AREA 2C: 24.3 CM2
ECHO LA AREA 4C: 30.7 CM2
ECHO LA MAJOR AXIS: 7.5 CM
ECHO LA MINOR AXIS: 6.6 CM
ECHO LA VOL 2C: 75 ML (ref 18–58)
ECHO LA VOL 4C: 102 ML (ref 18–58)
ECHO LA VOL BP: 93 ML (ref 18–58)
ECHO LA VOL/BSA BIPLANE: 55 ML/M2 (ref 16–34)
ECHO LA VOLUME INDEX A2C: 44 ML/M2 (ref 16–34)
ECHO LA VOLUME INDEX A4C: 60 ML/M2 (ref 16–34)
ECHO LV FRACTIONAL SHORTENING: 23 % (ref 28–44)
ECHO LV INTERNAL DIMENSION DIASTOLE INDEX: 2.59 CM/M2
ECHO LV INTERNAL DIMENSION DIASTOLIC: 4.4 CM (ref 4.2–5.9)
ECHO LV INTERNAL DIMENSION SYSTOLIC INDEX: 2 CM/M2
ECHO LV INTERNAL DIMENSION SYSTOLIC: 3.4 CM
ECHO LV IVSD: 0.9 CM (ref 0.6–1)
ECHO LV MASS 2D: 118.6 G (ref 88–224)
ECHO LV MASS INDEX 2D: 69.8 G/M2 (ref 49–115)
ECHO LV POSTERIOR WALL DIASTOLIC: 0.8 CM (ref 0.6–1)
ECHO LV RELATIVE WALL THICKNESS RATIO: 0.36
ECHO LVOT AREA: 3.1 CM2
ECHO LVOT AV VTI INDEX: 0.57
ECHO LVOT DIAM: 2 CM
ECHO LVOT MEAN GRADIENT: 1 MMHG
ECHO LVOT MEAN GRADIENT: 1 MMHG
ECHO LVOT PEAK GRADIENT: 2 MMHG
ECHO LVOT PEAK VELOCITY: 0.8 M/S
ECHO LVOT STROKE VOLUME INDEX: 27.7 ML/M2
ECHO LVOT SV: 47.1 ML
ECHO LVOT VTI: 15 CM
ECHO PV ACCELERATION TIME (AT): 74 MS
ECHO PV MAX VELOCITY: 1 M/S
ECHO PV PEAK GRADIENT: 4 MMHG
ECHO RV BASAL DIMENSION: 3.7 CM
EKG ATRIAL RATE: 78 BPM
EKG DIAGNOSIS: NORMAL
EKG Q-T INTERVAL: 403 MS
EKG QRS DURATION: 114 MS
EKG QTC CALCULATION (BAZETT): 454 MS
EKG R AXIS: 62 DEGREES
EKG T AXIS: 12 DEGREES
EKG VENTRICULAR RATE: 76 BPM
EOSINOPHIL # BLD: 0.4 K/UL (ref 0–0.8)
EOSINOPHIL NFR BLD: 5 % (ref 0.5–7.8)
ERYTHROCYTE [DISTWIDTH] IN BLOOD BY AUTOMATED COUNT: 13.2 % (ref 11.9–14.6)
EST. AVERAGE GLUCOSE BLD GHB EST-MCNC: 103 MG/DL
GLOBULIN SER CALC-MCNC: 5 G/DL (ref 2.8–4.5)
GLUCOSE BLD STRIP.AUTO-MCNC: 89 MG/DL (ref 65–100)
GLUCOSE SERPL-MCNC: 94 MG/DL (ref 65–100)
HBA1C MFR BLD: 5.2 % (ref 4.8–5.6)
HCT VFR BLD AUTO: 43.5 % (ref 41.1–50.3)
HDLC SERPL-MCNC: 61 MG/DL (ref 40–60)
HDLC SERPL: 3.2
HGB BLD-MCNC: 14.6 G/DL (ref 13.6–17.2)
IMM GRANULOCYTES # BLD AUTO: 0.1 K/UL (ref 0–0.5)
IMM GRANULOCYTES NFR BLD AUTO: 1 % (ref 0–5)
INR BLD: 1 (ref 0.9–1.2)
INR PPP: 1
LDLC SERPL CALC-MCNC: 121.4 MG/DL
LYMPHOCYTES # BLD: 1.3 K/UL (ref 0.5–4.6)
LYMPHOCYTES NFR BLD: 16 % (ref 13–44)
MCH RBC QN AUTO: 32.1 PG (ref 26.1–32.9)
MCHC RBC AUTO-ENTMCNC: 33.6 G/DL (ref 31.4–35)
MCV RBC AUTO: 95.6 FL (ref 82–102)
MONOCYTES # BLD: 0.6 K/UL (ref 0.1–1.3)
MONOCYTES NFR BLD: 8 % (ref 4–12)
NEUTS SEG # BLD: 5.6 K/UL (ref 1.7–8.2)
NEUTS SEG NFR BLD: 69 % (ref 43–78)
NRBC # BLD: 0 K/UL (ref 0–0.2)
PLATELET # BLD AUTO: 234 K/UL (ref 150–450)
PMV BLD AUTO: 9.9 FL (ref 9.4–12.3)
POTASSIUM SERPL-SCNC: 4.4 MMOL/L (ref 3.5–5.1)
PROT SERPL-MCNC: 8.4 G/DL (ref 6.3–8.2)
PROTHROMBIN TIME: 13.7 SEC (ref 12.6–14.3)
PT BLD: 12.1 SECS (ref 9.6–11.6)
RBC # BLD AUTO: 4.55 M/UL (ref 4.23–5.6)
SERVICE CMNT-IMP: NORMAL
SODIUM SERPL-SCNC: 138 MMOL/L (ref 133–143)
TRIGL SERPL-MCNC: 68 MG/DL (ref 35–150)
VLDLC SERPL CALC-MCNC: 13.6 MG/DL (ref 6–23)
WBC # BLD AUTO: 8 K/UL (ref 4.3–11.1)

## 2023-10-20 PROCEDURE — 93005 ELECTROCARDIOGRAM TRACING: CPT | Performed by: EMERGENCY MEDICINE

## 2023-10-20 PROCEDURE — 6360000002 HC RX W HCPCS: Performed by: FAMILY MEDICINE

## 2023-10-20 PROCEDURE — 80053 COMPREHEN METABOLIC PANEL: CPT

## 2023-10-20 PROCEDURE — 6360000004 HC RX CONTRAST MEDICATION: Performed by: EMERGENCY MEDICINE

## 2023-10-20 PROCEDURE — 83036 HEMOGLOBIN GLYCOSYLATED A1C: CPT

## 2023-10-20 PROCEDURE — 72141 MRI NECK SPINE W/O DYE: CPT

## 2023-10-20 PROCEDURE — G0378 HOSPITAL OBSERVATION PER HR: HCPCS

## 2023-10-20 PROCEDURE — 82962 GLUCOSE BLOOD TEST: CPT

## 2023-10-20 PROCEDURE — 85610 PROTHROMBIN TIME: CPT

## 2023-10-20 PROCEDURE — 6370000000 HC RX 637 (ALT 250 FOR IP): Performed by: STUDENT IN AN ORGANIZED HEALTH CARE EDUCATION/TRAINING PROGRAM

## 2023-10-20 PROCEDURE — 99222 1ST HOSP IP/OBS MODERATE 55: CPT | Performed by: STUDENT IN AN ORGANIZED HEALTH CARE EDUCATION/TRAINING PROGRAM

## 2023-10-20 PROCEDURE — 85025 COMPLETE CBC W/AUTO DIFF WBC: CPT

## 2023-10-20 PROCEDURE — 2580000003 HC RX 258: Performed by: FAMILY MEDICINE

## 2023-10-20 PROCEDURE — 70498 CT ANGIOGRAPHY NECK: CPT

## 2023-10-20 PROCEDURE — 6360000004 HC RX CONTRAST MEDICATION: Performed by: FAMILY MEDICINE

## 2023-10-20 PROCEDURE — C8929 TTE W OR WO FOL WCON,DOPPLER: HCPCS

## 2023-10-20 PROCEDURE — 97530 THERAPEUTIC ACTIVITIES: CPT

## 2023-10-20 PROCEDURE — 6370000000 HC RX 637 (ALT 250 FOR IP): Performed by: EMERGENCY MEDICINE

## 2023-10-20 PROCEDURE — 93010 ELECTROCARDIOGRAM REPORT: CPT | Performed by: INTERNAL MEDICINE

## 2023-10-20 PROCEDURE — 80061 LIPID PANEL: CPT

## 2023-10-20 PROCEDURE — 97165 OT EVAL LOW COMPLEX 30 MIN: CPT

## 2023-10-20 PROCEDURE — 70551 MRI BRAIN STEM W/O DYE: CPT

## 2023-10-20 PROCEDURE — 70450 CT HEAD/BRAIN W/O DYE: CPT

## 2023-10-20 PROCEDURE — 99285 EMERGENCY DEPT VISIT HI MDM: CPT

## 2023-10-20 PROCEDURE — 93306 TTE W/DOPPLER COMPLETE: CPT | Performed by: INTERNAL MEDICINE

## 2023-10-20 RX ORDER — ONDANSETRON 2 MG/ML
4 INJECTION INTRAMUSCULAR; INTRAVENOUS EVERY 6 HOURS PRN
Status: DISCONTINUED | OUTPATIENT
Start: 2023-10-20 | End: 2023-10-21 | Stop reason: HOSPADM

## 2023-10-20 RX ORDER — ASPIRIN 81 MG/1
81 TABLET, CHEWABLE ORAL DAILY
Status: DISCONTINUED | OUTPATIENT
Start: 2023-10-21 | End: 2023-10-21 | Stop reason: HOSPADM

## 2023-10-20 RX ORDER — ENOXAPARIN SODIUM 100 MG/ML
30 INJECTION SUBCUTANEOUS EVERY 24 HOURS
Status: DISCONTINUED | OUTPATIENT
Start: 2023-10-20 | End: 2023-10-21 | Stop reason: HOSPADM

## 2023-10-20 RX ORDER — LABETALOL HYDROCHLORIDE 5 MG/ML
10 INJECTION, SOLUTION INTRAVENOUS EVERY 10 MIN PRN
Status: DISCONTINUED | OUTPATIENT
Start: 2023-10-20 | End: 2023-10-20

## 2023-10-20 RX ORDER — SODIUM CHLORIDE 9 MG/ML
INJECTION, SOLUTION INTRAVENOUS CONTINUOUS
Status: DISCONTINUED | OUTPATIENT
Start: 2023-10-20 | End: 2023-10-21

## 2023-10-20 RX ORDER — SODIUM CHLORIDE 9 MG/ML
INJECTION, SOLUTION INTRAVENOUS PRN
Status: DISCONTINUED | OUTPATIENT
Start: 2023-10-20 | End: 2023-10-21 | Stop reason: HOSPADM

## 2023-10-20 RX ORDER — SODIUM CHLORIDE 0.9 % (FLUSH) 0.9 %
5-40 SYRINGE (ML) INJECTION EVERY 12 HOURS SCHEDULED
Status: DISCONTINUED | OUTPATIENT
Start: 2023-10-20 | End: 2023-10-21 | Stop reason: HOSPADM

## 2023-10-20 RX ORDER — POLYETHYLENE GLYCOL 3350 17 G/17G
17 POWDER, FOR SOLUTION ORAL DAILY PRN
Status: DISCONTINUED | OUTPATIENT
Start: 2023-10-20 | End: 2023-10-21 | Stop reason: HOSPADM

## 2023-10-20 RX ORDER — CLOPIDOGREL BISULFATE 75 MG/1
75 TABLET ORAL DAILY
Status: DISCONTINUED | OUTPATIENT
Start: 2023-10-21 | End: 2023-10-21

## 2023-10-20 RX ORDER — HYDRALAZINE HYDROCHLORIDE 20 MG/ML
10 INJECTION INTRAMUSCULAR; INTRAVENOUS EVERY 6 HOURS PRN
Status: DISCONTINUED | OUTPATIENT
Start: 2023-10-20 | End: 2023-10-21 | Stop reason: HOSPADM

## 2023-10-20 RX ORDER — ASPIRIN 81 MG/1
324 TABLET, CHEWABLE ORAL
Status: COMPLETED | OUTPATIENT
Start: 2023-10-20 | End: 2023-10-20

## 2023-10-20 RX ORDER — ATORVASTATIN CALCIUM 80 MG/1
80 TABLET, FILM COATED ORAL DAILY
Status: DISCONTINUED | OUTPATIENT
Start: 2023-10-20 | End: 2023-10-21 | Stop reason: HOSPADM

## 2023-10-20 RX ORDER — CLOPIDOGREL BISULFATE 75 MG/1
300 TABLET ORAL ONCE
Status: COMPLETED | OUTPATIENT
Start: 2023-10-20 | End: 2023-10-20

## 2023-10-20 RX ORDER — ONDANSETRON 4 MG/1
4 TABLET, ORALLY DISINTEGRATING ORAL EVERY 8 HOURS PRN
Status: DISCONTINUED | OUTPATIENT
Start: 2023-10-20 | End: 2023-10-21 | Stop reason: HOSPADM

## 2023-10-20 RX ORDER — SODIUM CHLORIDE 0.9 % (FLUSH) 0.9 %
5-40 SYRINGE (ML) INJECTION PRN
Status: DISCONTINUED | OUTPATIENT
Start: 2023-10-20 | End: 2023-10-21 | Stop reason: HOSPADM

## 2023-10-20 RX ADMIN — SODIUM CHLORIDE, PRESERVATIVE FREE 0.45 ML: 5 INJECTION INTRAVENOUS at 15:27

## 2023-10-20 RX ADMIN — SODIUM CHLORIDE: 9 INJECTION, SOLUTION INTRAVENOUS at 12:00

## 2023-10-20 RX ADMIN — SODIUM CHLORIDE, PRESERVATIVE FREE 10 ML: 5 INJECTION INTRAVENOUS at 11:31

## 2023-10-20 RX ADMIN — ATORVASTATIN CALCIUM 80 MG: 80 TABLET, FILM COATED ORAL at 09:50

## 2023-10-20 RX ADMIN — IOPAMIDOL 75 ML: 755 INJECTION, SOLUTION INTRAVENOUS at 08:39

## 2023-10-20 RX ADMIN — SODIUM CHLORIDE, PRESERVATIVE FREE 10 ML: 5 INJECTION INTRAVENOUS at 21:37

## 2023-10-20 RX ADMIN — CLOPIDOGREL BISULFATE 300 MG: 75 TABLET ORAL at 09:50

## 2023-10-20 RX ADMIN — ASPIRIN 324 MG: 81 TABLET, CHEWABLE ORAL at 09:50

## 2023-10-20 ASSESSMENT — PAIN SCALES - GENERAL: PAINLEVEL_OUTOF10: 0

## 2023-10-20 ASSESSMENT — LIFESTYLE VARIABLES
HOW MANY STANDARD DRINKS CONTAINING ALCOHOL DO YOU HAVE ON A TYPICAL DAY: PATIENT DOES NOT DRINK
HOW OFTEN DO YOU HAVE A DRINK CONTAINING ALCOHOL: NEVER

## 2023-10-20 ASSESSMENT — PAIN - FUNCTIONAL ASSESSMENT: PAIN_FUNCTIONAL_ASSESSMENT: NONE - DENIES PAIN

## 2023-10-20 NOTE — ED PROVIDER NOTES
Emergency Department Provider Note       PCP: Yoli Gibbs MD   Age: 78 y.o. Sex: male     DISPOSITION Decision To Admit 10/20/2023 09:20:41 AM       ICD-10-CM    1. Transient ischemic attack  G45.9       2. Paroxysmal atrial fibrillation (HCC)  I48.0           Medical Decision Making     Complexity of Problems Addressed:  1 or more acute illnesses that pose a threat to life or bodily function. Data Reviewed and Analyzed:   I independently ordered and reviewed each unique test.  I reviewed external records: provider visit note from PCP. I reviewed external records: previous EKG including cardiologist interpretation. The patients assessment required an independent historian: ems. The reason they were needed is important historical information not provided by the patient. I independently ordered and interpreted the ED EKG in the absence of a Cardiologist.    Rate: 76  EKG Interpretation: EKG Interpretation: atrial fibrillation  ST Segments: Nonspecific ST segments - NO STEMI      I interpreted the CT Scan no intracranial hemorrhage, chronic ischemic white matter changes, no obvious infarct mass or tumor-confirmed by radiology no significant change compared to previous CT scan imaging. .    Discussion of management or test interpretation. Patient with stroke symptoms prior to arrival.  Patient within the 4 and half hour window, however his symptoms have resolved. Patient not a tPA candidate based on resolution of symptoms. He has some mild left upper lip asymmetry which he attributes to a remote injury from a baseball. EMS found left-sided weakness and right facial droop but patient has no weakness on the left, or pronator drift. No facial weakness on the right side present at this time. Patient does not seem to have sustained any injury from his fall. TIA work-up initiated.   The management of this patient was discussed with an external consultant.  -neurology consulted    Risk of Complications assessment    POCT Glucose    POCT INR    POCT Glucose    EKG 12 Lead        Medications given during this emergency department visit:  Medications   aspirin chewable tablet 324 mg (has no administration in time range)   iopamidol (ISOVUE-370) 76 % injection 75 mL (75 mLs IntraVENous Given 10/20/23 0839)       New Prescriptions    No medications on file        Past Medical History:   Diagnosis Date    Allergic rhinitis     Fractured pelvis (HCC) 2006    Fell off of roof. History of chicken pox Childhood    Scarlet fever Childhood    Zenker's diverticulum     In the throat. Past Surgical History:   Procedure Laterality Date    CATARACT REMOVAL      MALIGNANT SKIN LESION EXCISION      Multiple Basal Cells. VASECTOMY          Social History     Socioeconomic History    Marital status:    Tobacco Use    Smoking status: Former     Types: Cigarettes     Quit date: 1976     Years since quittin.8    Smokeless tobacco: Never   Substance and Sexual Activity    Alcohol use: Yes     Alcohol/week: 1.7 standard drinks of alcohol    Drug use: No        Previous Medications    ALUMINUM & MAGNESIUM HYDROXIDE-SIMETHICONE (MAALOX) 200-200-20 MG/5ML SUSP SUSPENSION    Take 30 mLs by mouth every 6 hours as needed for Indigestion    AMLODIPINE (NORVASC) 10 MG TABLET    Take 1 tablet by mouth in the morning. ASPIRIN 81 MG EC TABLET    Take 1 tablet by mouth in the morning. CALCIUM CARB-CHOLECALCIFEROL (OYSTER SHELL CALCIUM W/D) 500-200 MG-UNIT TABS TABLET    Take 1 tablet by mouth in the morning. VALSARTAN (DIOVAN) 80 MG TABLET    TAKE 1 TABLET BY MOUTH DAILY        Results for orders placed or performed during the hospital encounter of 10/20/23   CT HEAD WO CONTRAST    Narrative    CT HEAD WITHOUT CONTRAST, 10/20/2023     History: Right facial droop and left-sided weakness. Code stroke.     Comparison: CT head without contrast 2022     Technique:   5 mm axial scans from the skull base to the

## 2023-10-20 NOTE — ED NOTES
TRANSFER - OUT REPORT:    Verbal report given to Ginny Mcdermott  being transferred to 7th floor for routine progression of patient care       Report consisted of patient's Situation, Background, Assessment and   Recommendations(SBAR). Information from the following report(s) Nurse Handoff Report and ED SBAR was reviewed with the receiving nurse. Adrián Fall Assessment:    Presents to emergency department  because of falls (Syncope, seizure, or loss of consciousness): No  Age > 70: Yes  Altered Mental Status, Intoxication with alcohol or substance confusion (Disorientation, impaired judgment, poor safety awaremess, or inability to follow instructions): No  Impaired Mobility: Ambulates or transfers with assistive devices or assistance; Unable to ambulate or transer.: No  Nursing Judgement: No          Lines:   Peripheral IV 10/20/23 Left Antecubital (Active)   Site Assessment Clean, dry & intact 10/20/23 0832   Line Status Blood return noted 10/20/23 4270        Opportunity for questions and clarification was provided.       Patient transported with:  Registered Nurse           Christoper Habermann, RN  10/20/23 7807

## 2023-10-20 NOTE — ED TRIAGE NOTES
[facility], r sided facial droop, left sided weakness. Last seen normal 0629. States unwitnessed fall found between toilet and wall. Ems witnessed r sided facial droop and left sided weakness. Resolved by 8461. Currently A+Ox4 but delayed. Bgl 138. Asymmetry smile with left sided droop noted upon arrival. No weakness noted. No decreased sensation noted.

## 2023-10-20 NOTE — PROGRESS NOTES
SPEECH PATHOLOGY NOTE    Speech therapy consult received and appreciated. Patient off the floor for MRI at time of SLP attempt. Will follow up for evaluation at later time/date.      Merlinda Abrahams, MSP, CCC-SLP  Speech Language Pathologist  Acute Rehabilitation Services  Contact: Mi

## 2023-10-20 NOTE — PROGRESS NOTES
Physical Therapy Note:    Attempted to see patient this PM for physical therapy evaluation session. Patient BERENICE at procedure at time of attempt. Will follow and re-attempt as schedule permits/patient available.  Thank you,    Seth Guerra, PT     Rehab Caseload Tracker

## 2023-10-20 NOTE — CONSULTS
Consult    Patient: Roosevelt Piña MRN: [id number]     YOB: 1940  Age: 78 y.o. Sex: male      Subjective:      Roosevelt Piña is a 78 y.o. male who is being seen for 1 hour episode of ? Left facial droop and left-sided weakness. Reportedly patient had a fall at 630 am, was noted to have ? Left facial droop and left-sided weakness on arrival which resolved a little after 730 am.  Medical history is notable for chronic gait instability, for which she has used a walker in the last several months. He is not sure why his gait has been unstable. Denies bowel or bladder incontinence. He takes 2 blood pressure medicines but does not take aspirin or blood thinners. Past Medical History:   Diagnosis Date    Allergic rhinitis     Fractured pelvis ([address])     Nelli Dia off of roof. History of chicken pox Childhood    Scarlet fever Childhood    Zenker's diverticulum     In the throat. Past Surgical History:   Procedure Laterality Date    CATARACT REMOVAL      MALIGNANT SKIN LESION EXCISION      Multiple Basal Cells. VASECTOMY        Family History   Problem Relation Age of Onset    Heart Disease Father     Cancer Neg Hx     Stroke Father     Diabetes Father     Heart Disease Mother     Hypertension Mother      Social History     Tobacco Use    Smoking status: Former     Types: Cigarettes     Quit date: 1976     Years since quittin.8    Smokeless tobacco: Never   Substance Use Topics    Alcohol use:  Yes     Alcohol/week: 1.7 standard drinks of alcohol      Current Facility-Administered Medications   Medication Dose Route Frequency Provider Last Rate Last Admin    aspirin chewable tablet 324 mg  324 mg Oral NOW Bola Mohamud MD        clopidogrel (PLAVIX) tablet 300 mg  300 mg Oral Once [place] Erika, DO        atorvastatin (LIPITOR) tablet 80 mg  80 mg Oral Daily [place] James DO         Current Outpatient Medications   Medication Sig Dispense Refill    aspirin 81 MG EC No enhancing lesions. Impression  No evidence of large vessel occlusion. 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 Most recent MRA   No results found for this or any previous visit. Assessment and Plan  Neurology was consulted on this 47-year-old male with a reported 1 hour episode left face arm and leg weakness, resolved by the time of my assessment. Though I did note diffuse hyperreflexia more so in the left arm and leg, with left ankle clonus, and bilateral plantar extension. Of note, he was in active atrial fibrillation; he is not on any blood thinners. There is concern for TIA/acute CVA, though his cervical spine should also be evaluated to ensure no spinal cord compression. Admit for observation and completion of stroke work-up (TTE, MRI brain). We will also add on MRI cervical spine. Aspirin and Plavix load now, by 21 days dual antiplatelets then antiplatelet monotherapy. High-dose statin. Permissive hypertension <220/110 for the next 24 hours, then gradually lower avoiding precipitous drops. Patient should be on anticoagulation for his atrial fibrillation; he is hesitant at the current moment. I do recommend cardiology consultation for further evaluation and management as necessary.        Maxim Burris, DO  Neurology

## 2023-10-20 NOTE — H&P
Hospitalist History and Physical   Admit Date:  10/20/2023  8:02 AM   Name:  Callie Barroso   Age:  78 y.o. Sex:  male  :  1940   MRN:  [id number]   Room:  [facility]/    Presenting/Chief Complaint: Facial Droop     Reason(s) for Admission: Transient ischemic attack [G45.9]  Paroxysmal atrial fibrillation ([address]) [I48.0]  Stroke-like symptoms [R29.90]     History of Present Illness:   Callie Barroso is a 78 y.o. male with medical history of hypertension, paroxysmal A-fib, not on anticoagulation and CKD stage III presented to ED with left facial droop and left-sided weakness. Patient lives in an assisted living facility and reports he had a fall at home in the bathroom at 6:30 AM.  He called help and was apparently noted with left facial droop and left-sided weakness which was resolved within an hour. Patient reported his mouth was dry and that is why he was not able to talk. Symptoms have been completely resolved during my assessment. Patient denies blurred vision, headache, focal weakness, sensory loss, chest pain or shortness of breath. Patient reports he had chronic gait instability and using walker for several months. He is not at home on any blood thinner medicine. On arrival, code S was called. Patient hemodynamically stable, blood pressure slightly elevated. Noted in A-fib. Labs unremarkable except creatinine 1.6, baseline 1.1-1.4. Not a candidate for tPA due to low NIH score. CT head negative for acute pathology. Neurology recommend admission for further stroke work-up. Hospitalist consulted for admission.     Assessment & Plan:     TIA/CVA:  Left facial droop and left-sided weakness, lasted for 1 hour  CT head negative for acute pathology  MRI brain and echocardiogram ordered  MRI cervical spine ordered  Neurology consulted, appreciate recommendation  Neurology recommend aspirin and Plavix for 21 days and then antiplatelet monotherapy  Patient started on high-dose caliber thoracic aortic arch. Proximal vessels are patent. Calcification atherosclerotic disease seen at the origin of the bilateral vertebral arteries. No significant stenosis of the right vertebral artery. Less than 40% stenosis of the proximal left vertebral artery origin. Vertebral arteries remain patent. Densely calcified carotid bifurcation disease without significant stenosis of the proximal internal carotid arteries. Calcification seen in the distal internal carotid arteries without aneurysm or stenosis. No intracranial arterial stenosis, occlusion or aneurysm. Intracranial veins are patent. No enhancing lesions. No evidence of large vessel occlusion. Bilateral carotid bifurcation disease without significant stenosis of the proximal internal carotid arteries. CT HEAD WO CONTRAST    Result Date: 10/20/2023  CT HEAD WITHOUT CONTRAST, 10/20/2023 History: Right facial droop and left-sided weakness. Code stroke. Comparison: CT head without contrast 7/9/2022 Technique:   5 mm axial scans from the skull base to the vertex. All CT scans performed at this facility use one or all of the following: Automated exposure control, adjustment of the mA and/or kVp according to patient's size, iterative reconstruction. Findings:  No evidence of intracranial hemorrhage is seen. No abnormal extra-axial fluid collections are seen. Chronic cortical volume loss is seen. No evidence for acute hydrocephalus is seen. No evidence of midline shift or herniation is seen. No abnormal edema pattern is seen in a vascular distribution to suggest large artery infarction. Only ill-defined periventricular white matter hypoattenuation is seen which is not significantly changed from the prior study most consistent with advanced chronic microangiopathic changes. Evaluation with bone windows shows no acute osseous changes. The visualized sinuses, middle ears, and mastoid air cells are well aerated.      1.  No acute intracranial process

## 2023-10-20 NOTE — PLAN OF CARE
Problem: Discharge Planning  Goal: Discharge to home or other facility with appropriate resources  Outcome: Progressing  Flowsheets (Taken 10/20/2023 1034)  Discharge to home or other facility with appropriate resources: Identify barriers to discharge with patient and caregiver     Problem: ABCDS Injury Assessment  Goal: Absence of physical injury  Outcome: Progressing     Problem: Safety - Adult  Goal: Free from fall injury  Outcome: Progressing     Problem: Pain  Goal: Verbalizes/displays adequate comfort level or baseline comfort level  Outcome: Progressing

## 2023-10-20 NOTE — PROGRESS NOTES
ACUTE OCCUPATIONAL THERAPY GOALS:   (Developed with and agreed upon by patient and/or caregiver.)  1. Darius North will be modified independent with functional mobility for ADL iwithin 4 - 7 visits. 2. Darius North will be modified independent with total body bathing and dressing within 4 - 7 visits. 3. Darius North will state and demonstrate at least 5 energy conservation techniques during ADL/therapeutic activities within 4 - 7 visits. [address] will voice a plan for appropriate home modifications for home safety and fall prevention within 7 visits. [address] will participate at least 30 minutes of ADL with 3 or less rest breaks within 7 visits. 10. Darius North will complete a toilet transfer with modified independence within 7 visits. OCCUPATIONAL THERAPY Initial Assessment and Daily Note       OT Visit Days: 1  Acknowledge Orders  Time  OT Charge Capture  Rehab Caseload Tracker      Darius North is a 78 y.o. male   PRIMARY DIAGNOSIS: Stroke-like symptoms  Transient ischemic attack [G45.9]  Paroxysmal atrial fibrillation ([address]) [I48.0]  Stroke-like symptoms [R29.90]       Reason for Referral: Generalized Muscle Weakness (M62.81)  History of falling (Z91.81)  Observation: Payor: Celine Arteaga / Plan: Marcial Rival / Product Type: *No Product type* /     ASSESSMENT:     REHAB RECOMMENDATIONS:   Recommendation to date pending progress:  Setting:  Home Health Therapy    Equipment:    To Be Determined     ASSESSMENT:  Mr. Jones He was admitted to the hospital for CVA workup and found to have TIA. He lives in an [facility]. Oriented to person, place, year and situation. He was agreeable to OT, but very particular about not wearing socks, making the clock even, etc.  He completed bed mobility and functional mobility for ADL with CGA. B UE grossly equal in strength and coordination. He was left back supine in bed with all needs in reach. COGNITION/  PERCEPTION: INTACT IMPAIRED   (See Comments)   Orientation [x]     Vision []     Hearing []     Cognition  [] Following Commands:  Follows one step commands consistently   Perception []       MOBILITY: I Mod I S SBA CGA Min Mod Max Total  NT x2 Comments:   Bed Mobility    Rolling [] [] [] [] [] [] [] [] [] [x] []    Supine to Sit [] [] [] [] [x] [] [] [] [] [] []    Scooting [] [] [] [] [x] [] [] [] [] [] []    Sit to Supine [] [] [] [] [] [] [] [] [] [x] []    Transfers    Sit to Stand [] [] [] [] [x] [] [] [] [] [] []    Bed to Chair [] [] [] [] [x] [] [] [] [] [] []    Stand to Sit [] [] [] [] [x] [] [] [] [] [] []    Tub/Shower [] [] [] [] [] [] [] [] [] [x] []     Toilet [] [] [] [] [] [] [] [] [] [x] []      [] [] [] [] [] [] [] [] [] [] []    I=Independent, Mod I=Modified Independent, S=Supervision/Setup, SBA=Standby Assistance, CGA=Contact Guard Assistance, Min=Minimal Assistance, Mod=Moderate Assistance, Max=Maximal Assistance, Total=Total Assistance, NT=Not Tested    ACTIVITIES OF DAILY LIVING: I Mod I S SBA CGA Min Mod Max Total NT Comments   BASIC ADLs:              Upper Body Bathing  [] [] [] [] [] [] [] [] [] [x]     Lower Body Bathing [] [] [] [] [] [] [] [] [] [x]     Toileting [] [] [] [] [] [] [] [] [] [x]    Upper Body Dressing [] [] [] [x] [] [] [] [] [] [x]    Lower Body Dressing [] [] [] [] [] [] [] [] [] []    Feeding [] [] [] [] [] [] [] [] [] [x]    Grooming [] [] [] [] [] [] [] [] [] [x]    Personal Device Care [] [] [] [] [] [] [] [] [] [x]    Functional Mobility [] [] [] [] [] [] [] [] [] []    I=Independent, Mod I=Modified Independent, S=Supervision/Setup, SBA=Standby Assistance, CGA=Contact Guard Assistance, Min=Minimal Assistance, Mod=Moderate Assistance, Max=Maximal Assistance, Total=Total Assistance, NT=Not Tested    PLAN:   FREQUENCY/DURATION   OT Plan of Care: 3 times/week for duration of hospital stay or until stated goals are met, whichever comes

## 2023-10-21 VITALS
HEIGHT: 64 IN | SYSTOLIC BLOOD PRESSURE: 145 MMHG | TEMPERATURE: 97.9 F | RESPIRATION RATE: 18 BRPM | BODY MASS INDEX: 24.62 KG/M2 | OXYGEN SATURATION: 95 % | WEIGHT: 144.2 LBS | HEART RATE: 74 BPM | DIASTOLIC BLOOD PRESSURE: 77 MMHG

## 2023-10-21 PROBLEM — R29.90 STROKE-LIKE SYMPTOMS: Status: RESOLVED | Noted: 2023-10-20 | Resolved: 2023-10-21

## 2023-10-21 PROCEDURE — 6370000000 HC RX 637 (ALT 250 FOR IP): Performed by: FAMILY MEDICINE

## 2023-10-21 PROCEDURE — G0378 HOSPITAL OBSERVATION PER HR: HCPCS

## 2023-10-21 PROCEDURE — 97161 PT EVAL LOW COMPLEX 20 MIN: CPT

## 2023-10-21 PROCEDURE — 97530 THERAPEUTIC ACTIVITIES: CPT

## 2023-10-21 PROCEDURE — 2580000003 HC RX 258: Performed by: FAMILY MEDICINE

## 2023-10-21 RX ADMIN — SODIUM CHLORIDE: 9 INJECTION, SOLUTION INTRAVENOUS at 05:42

## 2023-10-21 RX ADMIN — SODIUM CHLORIDE, PRESERVATIVE FREE 10 ML: 5 INJECTION INTRAVENOUS at 09:27

## 2023-10-21 RX ADMIN — ATORVASTATIN CALCIUM 80 MG: 80 TABLET, FILM COATED ORAL at 09:27

## 2023-10-21 RX ADMIN — ASPIRIN 81 MG: 81 TABLET, CHEWABLE ORAL at 09:27

## 2023-10-21 RX ADMIN — CLOPIDOGREL BISULFATE 75 MG: 75 TABLET ORAL at 09:25

## 2023-10-21 NOTE — PROGRESS NOTES
ACUTE PHYSICAL THERAPY GOALS:   (Developed with and agreed upon by patient and/or caregiver.)    (1.) Markus Marcelo  will move from supine to sit and sit to supine  with INDEPENDENCE within 7 treatment day(s). (2.) Markus Marcelo will transfer from bed to chair and chair to bed with MODIFIED INDEPENDENCE using the least restrictive device within 7 treatment day(s). (3.) Markus Marcelo will ambulate with MODIFIED INDEPENDENCE for 500 feet with the least restrictive device within 7 treatment day(s). (4.) Markus Marcelo will perform standing static and dynamic balance activities x 20 minutes with SUPERVISION to improve safety within 7 treatment day(s). (5.) Markus Marcelo will perform therapeutic exercises x 20 min for HEP with INDEPENDENCE to improve strength, endurance, and functional mobility within 7 treatment day(s). PHYSICAL THERAPY Initial Assessment, Daily Note, and AM  (Link to Caseload Tracking: PT Visit Days : 1  Acknowledge Orders  Time In/Out  PT Charge Capture  Rehab Caseload Tracker    Markus Marcelo is a 78 y.o. male   PRIMARY DIAGNOSIS: Stroke-like symptoms  Transient ischemic attack [G45.9]  Paroxysmal atrial fibrillation ([address]) [I48.0]  Stroke-like symptoms [R29.90]       Reason for Referral: Generalized Muscle Weakness (M62.81)  Difficulty in walking, Not elsewhere classified (R26.2)  Observation: Payor: [facility] MEDICARE / Plan: Albina Day / Product Type: *No Product type* /     ASSESSMENT:     REHAB RECOMMENDATIONS:   Recommendation to date pending progress:  Setting:  Home Health Therapy    Equipment:    To Be Determined     ASSESSMENT:  Mr. Lucita Diggs  is a 78year old M who presents L facial droop weakness, had a fall. Pt reports he feels fine and is not sure why he is in the hospital. At baseline, pt uses a RW and lives at an long-term. This date pt performs mobility including bed mobility with CGA.  He was able to ambulate 250 ft and perform

## 2023-10-21 NOTE — CARE COORDINATION
CM met with patient / daughter Davey Maravilla at bedside to discuss discharge plan and needs. Patient has discharge orders for today. Patient alert/orient x4. Patient verified demographic, daughter reqesting that \"k\" be taken off \"Erich\". CM made an attempt to contact admission and left VM. Patient / daughter states that patient is from [facility]. CM reviewed patient medical chart and discharge summary. CM completed patient CMA. PT/OT consult. PT made an attempt to see patient. Patient was off the floor and wasn't seen. OT did recommend HH. CM made patient / daughter aware of the recommendation from and has declined [facility] services. Daughter made an attempt to convince patient the patient to accept the services and patient continue to decline [facility] services. CM put a package together for [facility] and provided to daughter with H&P, discharge summary and AVS.  Patient daughter has concerns about new medications. AVS was reviewed with (RN) Anais Mercer and Eliquis was discontinued. , no new medications. Daughter will transport patient back to [facility]. Patient has met all treatment goals / milestones. CM will continue to follow and remain available for any needs, concerns or questions that may arise. 10/21/23 1206   Service Assessment   Patient Orientation Alert and Oriented;Person;Place;Situation;Self   Cognition Alert   History Provided By Patient; Child/Family;Medical Record   Primary Caregiver Self   Support Systems Children   Patient's Healthcare Decision Maker is: Legal Next of Kin   PCP Verified by CM Yes   Last Visit to PCP Within last 3 months   Prior Functional Level Independent in ADLs/IADLs   Current Functional Level Independent in ADLs/IADLs   Can patient return to prior living arrangement Yes   Ability to make needs known: Good   Family able to assist with home care needs: Yes   Would you like for me to discuss the discharge plan with any other

## 2023-10-21 NOTE — PLAN OF CARE
Problem: Discharge Planning  Goal: Discharge to home or other facility with appropriate resources  10/21/2023 0940 by Nicholas Carrillo RN  Outcome: Progressing  Flowsheets (Taken 10/21/2023 0800)  Discharge to home or other facility with appropriate resources: Identify barriers to discharge with patient and caregiver  10/21/2023 0332 by Salo Hope RN  Outcome: Progressing     Problem: ABCDS Injury Assessment  Goal: Absence of physical injury  10/21/2023 0940 by Nicholas Carrillo RN  Outcome: Progressing  10/21/2023 0332 by Salo Hope RN  Outcome: Progressing     Problem: Safety - Adult  Goal: Free from fall injury  10/21/2023 0940 by Nicholas Carrillo RN  Outcome: Progressing  Flowsheets (Taken 10/21/2023 0800)  Free From Fall Injury: Instruct family/caregiver on patient safety  10/21/2023 0332 by Salo Hope RN  Outcome: Progressing     Problem: Pain  Goal: Verbalizes/displays adequate comfort level or baseline comfort level  10/21/2023 0940 by Nicholas Carrillo RN  Outcome: Progressing  10/21/2023 0332 by Salo Hope RN  Outcome: Progressing     Problem: Chronic Conditions and Co-morbidities  Goal: Patient's chronic conditions and co-morbidity symptoms are monitored and maintained or improved  Outcome: Progressing

## 2023-10-21 NOTE — PLAN OF CARE
Problem: Discharge Planning  Goal: Discharge to home or other facility with appropriate resources  10/21/2023 1439 by Sania Leong RN  Outcome: Adequate for Discharge  10/21/2023 0940 by Sania Leong RN  Outcome: Progressing  Flowsheets (Taken 10/21/2023 0800)  Discharge to home or other facility with appropriate resources: Identify barriers to discharge with patient and caregiver  10/21/2023 0332 by Jennifer Finley RN  Outcome: Progressing     Problem: ABCDS Injury Assessment  Goal: Absence of physical injury  10/21/2023 1439 by Sania Leong RN  Outcome: Adequate for Discharge  10/21/2023 0940 by Sania Leong RN  Outcome: Progressing  10/21/2023 0332 by Jennifer Finley RN  Outcome: Progressing     Problem: Safety - Adult  Goal: Free from fall injury  10/21/2023 1439 by Sania Leong RN  Outcome: Adequate for Discharge  10/21/2023 0940 by Sania Leong RN  Outcome: Progressing  Flowsheets (Taken 10/21/2023 0800)  Free From Fall Injury: Instruct family/caregiver on patient safety  10/21/2023 0332 by Jennifer Finley RN  Outcome: Progressing     Problem: Pain  Goal: Verbalizes/displays adequate comfort level or baseline comfort level  10/21/2023 1439 by Sania Leong RN  Outcome: Adequate for Discharge  10/21/2023 0940 by Sania Leong RN  Outcome: Progressing  10/21/2023 0332 by Jennifer Finley RN  Outcome: Progressing     Problem: Chronic Conditions and Co-morbidities  Goal: Patient's chronic conditions and co-morbidity symptoms are monitored and maintained or improved  10/21/2023 1439 by Sania Leong RN  Outcome: Adequate for Discharge  10/21/2023 0940 by Sania Leong RN  Outcome: Progressing

## 2023-10-21 NOTE — PROGRESS NOTES
MRI brain and C-spine reviewed. No acute infarction. 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 He has severely dilated left atrium, with active atrial fibrillation, rate controlled. He is at high risk for future strokes without anticoagulation. This was discussed with him, though at this time he adamantly refuses anticoagulation. I do recommend long-term follow-up with cardiology, where perhaps this can be readdressed at a future date. Continue antiplatelet monotherapy, and statin. He would benefit from physical therapy given his gait instability, as he will likely be a poor surgical candidate for any cervical spine decompression. No further work-up is necessary at this time. Neurology will sign off.        Madelyn Xie, DO  Neurology

## 2023-10-21 NOTE — PLAN OF CARE
Problem: Discharge Planning  Goal: Discharge to home or other facility with appropriate resources  10/21/2023 0332 by Lilo Henning RN  Outcome: Progressing  10/20/2023 1626 by Campos Murray RN  Outcome: Progressing  Flowsheets (Taken 10/20/2023 1034)  Discharge to home or other facility with appropriate resources: Identify barriers to discharge with patient and caregiver     Problem: ABCDS Injury Assessment  Goal: Absence of physical injury  10/21/2023 0332 by Lilo Henning RN  Outcome: Progressing  10/20/2023 1626 by Campos Murray RN  Outcome: Progressing     Problem: Safety - Adult  Goal: Free from fall injury  10/21/2023 0332 by Lilo Henning RN  Outcome: Progressing  10/20/2023 1626 by Campos Murray RN  Outcome: Progressing     Problem: Pain  Goal: Verbalizes/displays adequate comfort level or baseline comfort level  10/21/2023 0332 by Lilo Henning RN  Outcome: Progressing  10/20/2023 1626 by Campos Murray RN  Outcome: Progressing

## 2024-03-13 ENCOUNTER — OFFICE VISIT (OUTPATIENT)
Dept: UROLOGY | Age: 84
End: 2024-03-13
Payer: MEDICARE

## 2024-03-13 DIAGNOSIS — N39.0 RECURRENT UTI: ICD-10-CM

## 2024-03-13 DIAGNOSIS — N13.8 BPH WITH OBSTRUCTION/LOWER URINARY TRACT SYMPTOMS: ICD-10-CM

## 2024-03-13 DIAGNOSIS — N40.1 BPH WITH OBSTRUCTION/LOWER URINARY TRACT SYMPTOMS: ICD-10-CM

## 2024-03-13 DIAGNOSIS — R32 URINARY INCONTINENCE, UNSPECIFIED TYPE: Primary | ICD-10-CM

## 2024-03-13 PROCEDURE — 99204 OFFICE O/P NEW MOD 45 MIN: CPT | Performed by: NURSE PRACTITIONER

## 2024-03-13 PROCEDURE — 1123F ACP DISCUSS/DSCN MKR DOCD: CPT | Performed by: NURSE PRACTITIONER

## 2024-03-13 RX ORDER — TITANIUM DIOXIDE, OCTINOXATE, ZINC OXIDE 4.61; 1.6; .78 G/40ML; G/40ML; G/40ML
CREAM TOPICAL DAILY
COMMUNITY

## 2024-03-13 RX ORDER — TROSPIUM CHLORIDE 20 MG/1
20 TABLET, FILM COATED ORAL NIGHTLY
Qty: 90 TABLET | Refills: 3 | Status: SHIPPED | OUTPATIENT
Start: 2024-03-13

## 2024-03-13 ASSESSMENT — ENCOUNTER SYMPTOMS
BACK PAIN: 0
WHEEZING: 1
EYE DISCHARGE: 0
NAUSEA: 0
HEARTBURN: 0
DIARRHEA: 0
EYE PAIN: 0
SKIN LESIONS: 0
ABDOMINAL PAIN: 0
INDIGESTION: 0
BLOOD IN STOOL: 0
SHORTNESS OF BREATH: 1
VOMITING: 0
COUGH: 0
CONSTIPATION: 0

## 2024-03-13 NOTE — PROGRESS NOTES
weight loss.  Skin:  Negative for skin lesions, rash and itching.  Eyes:  Negative for visual disturbance, eye pain and eye discharge.  ENT:  Negative for difficulty articulating words, pain swallowing, high frequency hearing loss and dry mouth.  Respiratory: Positive for shortness of breath and wheezing. Negative for cough and blood in sputum.  Cardiovascular: Positive for regular rate and rhythm. Negative for chest pain, hypertension, irregular heartbeat, leg pain, leg swelling and varicose veins.  GI:  Negative for nausea, vomiting, abdominal pain, blood in stool, constipation, diarrhea, indigestion and heartburn.  Genitourinary:  Negative for urinary burning, hematuria, flank pain, recurrent UTIs, history of urolithiasis, nocturia, slower stream, straining, urgency, leakage w/ urge, frequent urination, incomplete emptying, erectile dysfunction, testicular pain, sexually transmitted disease, discharge, urethral stricture, menstrual problem, endometriosis and vaginal pain.  Musculoskeletal:  Negative for back pain, bone pain, arthralgias, tenderness, muscle weakness and neck pain.  Neurological:  Negative for dizziness, focal weakness, numbness, seizures and tremors.  Psychological:  Negative for depression and psychiatric problem.  Endocrine:  Negative for cold intolerance, thirst, excessive urination, fatigue and heat intolerance.  Hem/Lymphatic:  Negative for easy bleeding, easy bruising and frequent infections.      PHYSICAL EXAM    General appearance - well appearing and in no distress  Mental status - alert, oriented to person, place, and time  Neck - supple, no significant adenopathy  Chest/Lung-  Quiet, even and easy respiratory effort without use of accessory muscles  Skin - normal coloration and turgor, no rashes        Assessment and Plan    ICD-10-CM    1. Urinary incontinence, unspecified type  R32       2. 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

## 2024-04-08 ENCOUNTER — HOSPITAL ENCOUNTER (EMERGENCY)
Age: 84
Discharge: HOME | End: 2024-04-08
Attending: EMERGENCY MEDICINE
Payer: MEDICARE

## 2024-04-08 VITALS
TEMPERATURE: 97.8 F | HEIGHT: 64 IN | BODY MASS INDEX: 24.41 KG/M2 | DIASTOLIC BLOOD PRESSURE: 67 MMHG | WEIGHT: 143 LBS | SYSTOLIC BLOOD PRESSURE: 123 MMHG | OXYGEN SATURATION: 96 % | RESPIRATION RATE: 17 BRPM | HEART RATE: 83 BPM

## 2024-04-08 DIAGNOSIS — M54.50 ACUTE LEFT-SIDED LOW BACK PAIN WITHOUT SCIATICA: Primary | ICD-10-CM

## 2024-04-08 LAB
BILIRUB UR QL: NEGATIVE
GLUCOSE UR QL STRIP.AUTO: NEGATIVE MG/DL
KETONES UR-MCNC: NEGATIVE MG/DL
LEUKOCYTE ESTERASE UR QL STRIP: NEGATIVE
NITRITE UR QL: NEGATIVE
PH UR: 5.5 (ref 5–9)
PROT UR QL: NEGATIVE MG/DL
RBC # UR STRIP: NEGATIVE
SERVICE CMNT-IMP: NORMAL
SP GR UR: 1.02 (ref 1–1.02)
UROBILINOGEN UR QL: 0.2 EU/DL (ref 0.2–1)

## 2024-04-08 PROCEDURE — 99283 EMERGENCY DEPT VISIT LOW MDM: CPT

## 2024-04-08 PROCEDURE — 81003 URINALYSIS AUTO W/O SCOPE: CPT

## 2024-04-08 ASSESSMENT — PAIN - FUNCTIONAL ASSESSMENT: PAIN_FUNCTIONAL_ASSESSMENT: NONE - DENIES PAIN

## 2024-04-08 NOTE — ED TRIAGE NOTES
Pt arrived via EMS from an assisted living facility with c/o back pain that started when he woke up this am. Pt denies trauma or a fall. Pt ambulates with a walker. When EMS arrived Pt told EMS his back no longer was hurting. Staff and family suspected a UTI but Pt refused to provide urine sample, no symptoms of a UTI present.     EMS vitals: 97.5 T, 81 P. 97% r/a, 125/60 BP. Pt refused EMS to take BGL.

## 2024-04-08 NOTE — ED NOTES
Patient mobility status  with difficulty, uses a walker. Provider aware     I have reviewed discharge instructions with the patient.  The patient verbalized understanding.    Patient left ED via Discharge Method: stretcher to Home with  [facility] ambulance .    Opportunity for questions and clarification provided.     Patient given 0 scripts.           Kaylee Sarmiento, RN  04/08/24 [id number]

## 2024-04-08 NOTE — ED PROVIDER NOTES
Emergency Department Provider Note       PCP: Ezequiel Camacho MD   Age: 83 y.o.   Sex: male     DISPOSITION Decision To Discharge 04/08/2024 12:13:24 PM       ICD-10-CM    1. Acute left-sided low back pain without sciatica  M54.50     resolved          Medical Decision Making     Urinalysis is negative.  Patient will return to living facility.     1 acute, uncomplicated illness or injury.  Shared medical decision making was utilized in creating the patients health plan today.    I independently ordered and reviewed each unique test.                     History     Patient was sent to the emergency ferment from assisted living with complaints of low back pain the pain is after getting up to leave the \"all\" from severe pain in his lower back that was all the way across the lower back.  That has since resolved.  It lasted not than 15 to 20 minutes.  He denies any radiation of the pain.  He denies any dysuria hematuria or abdominal pain.  Patient is currently asymptomatic    The history is provided by the patient and medical records.     Physical Exam     Vitals signs and nursing note reviewed:  Vitals:    04/08/24 0900 04/08/24 0915   BP: (!) 113/96 94/66   Pulse: 80    Resp: 18    Temp: 98.3 °F (36.8 °C)    TempSrc: Oral    SpO2: 97% 98%   Weight: 64.9 kg (143 lb)    Height: 1.626 m (5' 4\")       Physical Exam  Vitals and nursing note reviewed.   Constitutional:       General: He is not in acute distress.     Appearance: Normal appearance. He is not ill-appearing or toxic-appearing.   HENT:      Head: Normocephalic and atraumatic.   Eyes:      Extraocular Movements: Extraocular movements intact.      Conjunctiva/sclera: Conjunctivae normal.      Pupils: Pupils are equal, round, and reactive to light.   Pulmonary:      Effort: Pulmonary effort is normal.      Breath sounds: Normal breath sounds.   Abdominal:      General: There is no distension.      Palpations: Abdomen is soft.      Tenderness: There is no abdominal

## 2024-04-08 NOTE — ED NOTES
TRANSFER - OUT REPORT:    Verbal report given to Xiomara Parham RN on [facility]  being transferred to [facility] for routine progression of patient care       Report consisted of patient's Situation, Background, Assessment and   Recommendations(SBAR).     Information from the following report(s) ED SBAR was reviewed with the receiving nurse.    [place] Fall Assessment:    Presents to emergency department  because of falls (Syncope, seizure, or loss of consciousness): No  Age > 70: Yes  Altered Mental Status, Intoxication with alcohol or substance confusion (Disorientation, impaired judgment, poor safety awaremess, or inability to follow instructions): No  Impaired Mobility: Ambulates or transfers with assistive devices or assistance; Unable to ambulate or transer.: No  Nursing Judgement: No          Lines:       Opportunity for questions and clarification was provided.      Patient transported with:  [facility] ambulance          Kaylee Sarmiento RN  04/08/24 0315

## 2024-04-11 ENCOUNTER — OFFICE VISIT (OUTPATIENT)
Dept: UROLOGY | Age: 84
End: 2024-04-11
Payer: MEDICARE

## 2024-04-11 DIAGNOSIS — N39.0 RECURRENT UTI: ICD-10-CM

## 2024-04-11 DIAGNOSIS — N40.1 BPH WITH OBSTRUCTION/LOWER URINARY TRACT SYMPTOMS: ICD-10-CM

## 2024-04-11 DIAGNOSIS — N13.8 BPH WITH OBSTRUCTION/LOWER URINARY TRACT SYMPTOMS: ICD-10-CM

## 2024-04-11 DIAGNOSIS — R32 URINARY INCONTINENCE, UNSPECIFIED TYPE: Primary | ICD-10-CM

## 2024-04-11 PROCEDURE — 1123F ACP DISCUSS/DSCN MKR DOCD: CPT | Performed by: NURSE PRACTITIONER

## 2024-04-11 PROCEDURE — 99214 OFFICE O/P EST MOD 30 MIN: CPT | Performed by: NURSE PRACTITIONER

## 2024-04-11 ASSESSMENT — ENCOUNTER SYMPTOMS
BACK PAIN: 0
NAUSEA: 0

## 2024-04-11 NOTE — PROGRESS NOTES
tablet by mouth at bedtime 90 tablet 3    amLODIPine (NORVASC) 10 MG tablet Take 1 tablet by mouth in the morning. 30 tablet 3    Calcium Carb-Cholecalciferol (OYSTER SHELL CALCIUM W/D) 500-200 MG-UNIT TABS tablet Take 1 tablet by mouth in the morning. 30 tablet 0    valsartan (DIOVAN) 80 MG tablet TAKE 1 TABLET BY MOUTH DAILY      aspirin 81 MG EC tablet Take 1 tablet by mouth in the morning. (Patient not taking: Reported on 10/20/2023) 30 tablet 3    aluminum & magnesium hydroxide-simethicone (MAALOX) 200-200-20 MG/5ML SUSP suspension Take 30 mLs by mouth every 6 hours as needed for Indigestion (Patient not taking: Reported on 10/20/2023) 100 mL 0     No current facility-administered medications for this visit.     Allergies   Allergen Reactions    Cephalosporins Other (See Comments)     Social History     Socioeconomic History    Marital status:      Spouse name: Not on file    Number of children: Not on file    Years of education: Not on file    Highest education level: Not on file   Occupational History    Not on file   Tobacco Use    Smoking status: Former     Current packs/day: 0.00     Types: Cigarettes     Quit date: 1976     Years since quittin.3    Smokeless tobacco: Never   Substance and Sexual Activity    Alcohol use: Yes     Alcohol/week: 1.7 standard drinks of alcohol    Drug use: No    Sexual activity: Not on file   Other Topics Concern    Not on file   Social History Narrative    Not on file     Social Determinants of Health     Financial Resource Strain: Not on file   Food Insecurity: Not on file   Transportation Needs: Not on file   Physical Activity: Not on file   Stress: Not on file   Social Connections: Not on file   Intimate Partner Violence: Not on file   Housing Stability: Not on file     Family History   Problem Relation Age of Onset    Heart Disease Father     Cancer Neg Hx     Stroke Father     Diabetes Father     Heart Disease Mother     Hypertension Mother        Review

## 2024-08-12 ENCOUNTER — NURSE ONLY (OUTPATIENT)
Dept: UROLOGY | Age: 84
End: 2024-08-12
Payer: MEDICARE

## 2024-08-12 DIAGNOSIS — R45.1 AGITATION: ICD-10-CM

## 2024-08-12 DIAGNOSIS — N39.0 RECURRENT UTI: Primary | ICD-10-CM

## 2024-08-12 LAB
AMORPHOUS CRYSTAL: NORMAL
BACTERIA URINE, POC: NEGATIVE
BILIRUBIN, URINE, POC: NORMAL
BLOOD URINE, POC: NEGATIVE
CASTS URINE, POC: NORMAL
CRYSTALS UA, POC: NORMAL
EPI CELLS URINE, POC: 0
GLUCOSE URINE, POC: NORMAL
KETONES, URINE, POC: NORMAL
LEUKOCYTE ESTERASE, URINE, POC: NEGATIVE
NITRITE, URINE, POC: NEGATIVE
OTHER, URINE: NORMAL
PH, URINE, POC: 6 (ref 4.6–8)
PROTEIN,URINE, POC: NORMAL
RBC, URINE, POC: 0
SPECIFIC GRAVITY, URINE, POC: 1.01 (ref 1–1.03)
URINALYSIS CLARITY, POC: NORMAL
URINALYSIS COLOR, POC: NORMAL
UROBILINOGEN, POC: NORMAL
WBC, URINE, POC: 0

## 2024-08-12 PROCEDURE — 81000 URINALYSIS NONAUTO W/SCOPE: CPT | Performed by: NURSE PRACTITIONER

## 2024-08-12 PROCEDURE — 99214 OFFICE O/P EST MOD 30 MIN: CPT | Performed by: NURSE PRACTITIONER

## 2024-08-12 PROCEDURE — 1123F ACP DISCUSS/DSCN MKR DOCD: CPT | Performed by: NURSE PRACTITIONER

## 2024-08-12 PROCEDURE — P9612 CATHETERIZE FOR URINE SPEC: HCPCS | Performed by: NURSE PRACTITIONER

## 2024-08-12 NOTE — PROGRESS NOTES
[facility]  [address]   [address]  [place] [address]  [phone]    Parish Cohen  : 1940    No chief complaint on file.         [facility]     Parish Cohen is a 84 y.o. male initially referred for urinary incontinence and UTI's. Lives at [facility]. Here w . She helps w some of his history. He saw [facility] urology in . He was started on Flomax 0.4 mg and cranberry tablet. No other f/u.      He was referred f/t urinary incontinence. He wears a brief. During the day he reports he can get to the bathroom wo issue. Overnight, he wakes up and has to urinate. He states it is difficult to get up at night so he will just use his brief. This occurs about 4 times overnight. He denies issues w weak stream or other bothersome LUTS. No gross hematuria or flank pain. Tried trospium 20 mg PO q HS wo change. Today he reports he want NO more treatment.      His care facility has been checking for UTI's. It is not clear what other sx he is having besides incontinence. He reports no h/o dysuria or bladder pain. He has PCR testing on record. More than one bacteria present. Concerning for inaccurate results. No culture results.  Seen at ER 24. Normal urine.      He reports NO issues today. His care facility and daughter request urine check d/t ?UTI. He is agitated. Patient denies dysuria or hematuria. He reports NO issues. He is agreeable to I/O cath. Unable to void on command at this time.       No personal or family ho urological CA. Non-smoker.                Past Medical History:   Diagnosis Date    Allergic rhinitis     Fractured pelvis (HCC) 2006    Fell off of roof.    History of chicken pox Childhood    Scarlet fever Childhood    Zenker's diverticulum     In the throat.     Past Surgical History:   Procedure Laterality Date    CATARACT REMOVAL      MALIGNANT SKIN LESION EXCISION      Multiple Basal Cells.    VASECTOMY       Current

## 2024-08-24 ENCOUNTER — HOSPITAL ENCOUNTER (INPATIENT)
Age: 84
LOS: 12 days | Discharge: NURSING, REHAB OR LONG-TERM CARE | DRG: 871 | End: 2024-09-05
Attending: GENERAL PRACTICE | Admitting: STUDENT IN AN ORGANIZED HEALTH CARE EDUCATION/TRAINING PROGRAM
Payer: MEDICARE

## 2024-08-24 ENCOUNTER — APPOINTMENT (OUTPATIENT)
Dept: CT IMAGING | Age: 84
DRG: 871 | End: 2024-08-24
Payer: MEDICARE

## 2024-08-24 ENCOUNTER — APPOINTMENT (OUTPATIENT)
Dept: GENERAL RADIOLOGY | Age: 84
DRG: 871 | End: 2024-08-24
Payer: MEDICARE

## 2024-08-24 DIAGNOSIS — I73.9 LEFT LEG CLAUDICATION (HCC): ICD-10-CM

## 2024-08-24 DIAGNOSIS — N39.0 URINARY TRACT INFECTION WITH HEMATURIA, SITE UNSPECIFIED: ICD-10-CM

## 2024-08-24 DIAGNOSIS — A41.9 SEPTICEMIA (HCC): Primary | ICD-10-CM

## 2024-08-24 DIAGNOSIS — R31.9 URINARY TRACT INFECTION WITH HEMATURIA, SITE UNSPECIFIED: ICD-10-CM

## 2024-08-24 DIAGNOSIS — I50.33 ACUTE ON CHRONIC DIASTOLIC CONGESTIVE HEART FAILURE (HCC): ICD-10-CM

## 2024-08-24 PROBLEM — J96.01 ACUTE HYPOXIC RESPIRATORY FAILURE (HCC): Status: ACTIVE | Noted: 2024-08-24

## 2024-08-24 PROBLEM — F03.90 DEMENTIA (HCC): Status: ACTIVE | Noted: 2024-08-24

## 2024-08-24 PROBLEM — Z86.73 HISTORY OF TIA (TRANSIENT ISCHEMIC ATTACK): Status: ACTIVE | Noted: 2024-08-24

## 2024-08-24 LAB
ALBUMIN SERPL-MCNC: 2.8 G/DL (ref 3.2–4.6)
ALBUMIN/GLOB SERPL: 0.6 (ref 1–1.9)
ALP SERPL-CCNC: 108 U/L (ref 40–129)
ALT SERPL-CCNC: 18 U/L (ref 12–65)
AMORPH CRY URNS QL MICRO: ABNORMAL
ANION GAP SERPL CALC-SCNC: 14 MMOL/L (ref 9–18)
AST SERPL-CCNC: 25 U/L (ref 15–37)
BACTERIA URNS QL MICRO: ABNORMAL /HPF
BASOPHILS # BLD: 0 K/UL (ref 0–0.2)
BASOPHILS NFR BLD: 0 % (ref 0–2)
BILIRUB SERPL-MCNC: 1 MG/DL (ref 0–1.2)
BILIRUB UR QL: NEGATIVE
BUN SERPL-MCNC: 76 MG/DL (ref 8–23)
CALCIUM SERPL-MCNC: 8.9 MG/DL (ref 8.8–10.2)
CASTS URNS QL MICRO: 0 /LPF
CHLORIDE SERPL-SCNC: 101 MMOL/L (ref 98–107)
CO2 SERPL-SCNC: 20 MMOL/L (ref 20–28)
CREAT SERPL-MCNC: 2.67 MG/DL (ref 0.8–1.3)
CRYSTALS URNS QL MICRO: ABNORMAL /LPF
DIFFERENTIAL METHOD BLD: ABNORMAL
EOSINOPHIL # BLD: 0 K/UL (ref 0–0.8)
EOSINOPHIL NFR BLD: 0 % (ref 0.5–7.8)
EPI CELLS #/AREA URNS HPF: ABNORMAL /HPF
ERYTHROCYTE [DISTWIDTH] IN BLOOD BY AUTOMATED COUNT: 13.9 % (ref 11.9–14.6)
GLOBULIN SER CALC-MCNC: 4.4 G/DL (ref 2.3–3.5)
GLUCOSE BLD STRIP.AUTO-MCNC: 95 MG/DL (ref 65–100)
GLUCOSE SERPL-MCNC: 118 MG/DL (ref 70–99)
GLUCOSE UR QL STRIP.AUTO: NEGATIVE MG/DL
HCT VFR BLD AUTO: 38.1 % (ref 41.1–50.3)
HGB BLD-MCNC: 13.1 G/DL (ref 13.6–17.2)
IMM GRANULOCYTES # BLD AUTO: 0.1 K/UL (ref 0–0.5)
IMM GRANULOCYTES NFR BLD AUTO: 1 % (ref 0–5)
KETONES UR-MCNC: NEGATIVE MG/DL
LACTATE SERPL-SCNC: 1.9 MMOL/L (ref 0.5–2)
LACTATE SERPL-SCNC: 2.1 MMOL/L (ref 0.5–2)
LEUKOCYTE ESTERASE UR QL STRIP: ABNORMAL
LYMPHOCYTES # BLD: 0.3 K/UL (ref 0.5–4.6)
LYMPHOCYTES NFR BLD: 2 % (ref 13–44)
MAGNESIUM SERPL-MCNC: 2.3 MG/DL (ref 1.8–2.4)
MCH RBC QN AUTO: 32.1 PG (ref 26.1–32.9)
MCHC RBC AUTO-ENTMCNC: 34.4 G/DL (ref 31.4–35)
MCV RBC AUTO: 93.4 FL (ref 82–102)
MONOCYTES # BLD: 0.6 K/UL (ref 0.1–1.3)
MONOCYTES NFR BLD: 3 % (ref 4–12)
MUCOUS THREADS URNS QL MICRO: 0 /LPF
NEUTS SEG # BLD: 16.6 K/UL (ref 1.7–8.2)
NEUTS SEG NFR BLD: 94 % (ref 43–78)
NITRITE UR QL: POSITIVE
NRBC # BLD: 0 K/UL (ref 0–0.2)
OTHER OBSERVATIONS: ABNORMAL
PH UR: >=9 (ref 5–9)
PLATELET # BLD AUTO: 194 K/UL (ref 150–450)
PMV BLD AUTO: 10.6 FL (ref 9.4–12.3)
POTASSIUM SERPL-SCNC: 5 MMOL/L (ref 3.5–5.1)
PROCALCITONIN SERPL-MCNC: 3.02 NG/ML (ref 0–0.1)
PROT SERPL-MCNC: 7.2 G/DL (ref 6.3–8.2)
PROT UR QL: >300 MG/DL
RBC # BLD AUTO: 4.08 M/UL (ref 4.23–5.6)
RBC # UR STRIP: ABNORMAL
RBC #/AREA URNS HPF: >100 /HPF
SERVICE CMNT-IMP: ABNORMAL
SERVICE CMNT-IMP: NORMAL
SODIUM SERPL-SCNC: 135 MMOL/L (ref 136–145)
SP GR UR: 1.01 (ref 1–1.02)
UROBILINOGEN UR QL: 1 EU/DL (ref 0.2–1)
WBC # BLD AUTO: 17.7 K/UL (ref 4.3–11.1)
WBC URNS QL MICRO: ABNORMAL /HPF

## 2024-08-24 PROCEDURE — 71260 CT THORAX DX C+: CPT

## 2024-08-24 PROCEDURE — 51798 US URINE CAPACITY MEASURE: CPT

## 2024-08-24 PROCEDURE — 71046 X-RAY EXAM CHEST 2 VIEWS: CPT

## 2024-08-24 PROCEDURE — 87040 BLOOD CULTURE FOR BACTERIA: CPT

## 2024-08-24 PROCEDURE — 87186 SC STD MICRODIL/AGAR DIL: CPT

## 2024-08-24 PROCEDURE — 93005 ELECTROCARDIOGRAM TRACING: CPT | Performed by: STUDENT IN AN ORGANIZED HEALTH CARE EDUCATION/TRAINING PROGRAM

## 2024-08-24 PROCEDURE — 83605 ASSAY OF LACTIC ACID: CPT

## 2024-08-24 PROCEDURE — 1100000000 HC RM PRIVATE

## 2024-08-24 PROCEDURE — 85025 COMPLETE CBC W/AUTO DIFF WBC: CPT

## 2024-08-24 PROCEDURE — 87154 CUL TYP ID BLD PTHGN 6+ TRGT: CPT

## 2024-08-24 PROCEDURE — 2580000003 HC RX 258: Performed by: STUDENT IN AN ORGANIZED HEALTH CARE EDUCATION/TRAINING PROGRAM

## 2024-08-24 PROCEDURE — 87077 CULTURE AEROBIC IDENTIFY: CPT

## 2024-08-24 PROCEDURE — 70450 CT HEAD/BRAIN W/O DYE: CPT

## 2024-08-24 PROCEDURE — 96374 THER/PROPH/DIAG INJ IV PUSH: CPT

## 2024-08-24 PROCEDURE — 81001 URINALYSIS AUTO W/SCOPE: CPT

## 2024-08-24 PROCEDURE — 6370000000 HC RX 637 (ALT 250 FOR IP): Performed by: STUDENT IN AN ORGANIZED HEALTH CARE EDUCATION/TRAINING PROGRAM

## 2024-08-24 PROCEDURE — 6360000002 HC RX W HCPCS: Performed by: STUDENT IN AN ORGANIZED HEALTH CARE EDUCATION/TRAINING PROGRAM

## 2024-08-24 PROCEDURE — 6360000004 HC RX CONTRAST MEDICATION: Performed by: STUDENT IN AN ORGANIZED HEALTH CARE EDUCATION/TRAINING PROGRAM

## 2024-08-24 PROCEDURE — 82962 GLUCOSE BLOOD TEST: CPT

## 2024-08-24 PROCEDURE — 87205 SMEAR GRAM STAIN: CPT

## 2024-08-24 PROCEDURE — 87088 URINE BACTERIA CULTURE: CPT

## 2024-08-24 PROCEDURE — 99285 EMERGENCY DEPT VISIT HI MDM: CPT

## 2024-08-24 PROCEDURE — 80053 COMPREHEN METABOLIC PANEL: CPT

## 2024-08-24 PROCEDURE — 84145 PROCALCITONIN (PCT): CPT

## 2024-08-24 PROCEDURE — 83735 ASSAY OF MAGNESIUM: CPT

## 2024-08-24 PROCEDURE — 87086 URINE CULTURE/COLONY COUNT: CPT

## 2024-08-24 RX ORDER — SODIUM CHLORIDE, SODIUM LACTATE, POTASSIUM CHLORIDE, AND CALCIUM CHLORIDE .6; .31; .03; .02 G/100ML; G/100ML; G/100ML; G/100ML
1000 INJECTION, SOLUTION INTRAVENOUS ONCE
Status: DISCONTINUED | OUTPATIENT
Start: 2024-08-24 | End: 2024-08-24

## 2024-08-24 RX ORDER — SODIUM CHLORIDE 0.9 % (FLUSH) 0.9 %
5-40 SYRINGE (ML) INJECTION EVERY 12 HOURS SCHEDULED
Status: DISCONTINUED | OUTPATIENT
Start: 2024-08-24 | End: 2024-09-05 | Stop reason: HOSPADM

## 2024-08-24 RX ORDER — 0.9 % SODIUM CHLORIDE 0.9 %
1000 INTRAVENOUS SOLUTION INTRAVENOUS
Status: COMPLETED | OUTPATIENT
Start: 2024-08-24 | End: 2024-08-24

## 2024-08-24 RX ORDER — VALSARTAN 80 MG/1
80 TABLET ORAL DAILY
Status: DISCONTINUED | OUTPATIENT
Start: 2024-08-25 | End: 2024-09-01

## 2024-08-24 RX ORDER — SODIUM CHLORIDE 0.9 % (FLUSH) 0.9 %
5-40 SYRINGE (ML) INJECTION PRN
Status: DISCONTINUED | OUTPATIENT
Start: 2024-08-24 | End: 2024-09-05 | Stop reason: HOSPADM

## 2024-08-24 RX ORDER — ACETAMINOPHEN 325 MG/1
650 TABLET ORAL EVERY 6 HOURS PRN
Status: DISCONTINUED | OUTPATIENT
Start: 2024-08-24 | End: 2024-09-05 | Stop reason: HOSPADM

## 2024-08-24 RX ORDER — FUROSEMIDE 10 MG/ML
40 INJECTION INTRAMUSCULAR; INTRAVENOUS ONCE
Status: DISCONTINUED | OUTPATIENT
Start: 2024-08-24 | End: 2024-08-24

## 2024-08-24 RX ORDER — FUROSEMIDE 10 MG/ML
20 INJECTION INTRAMUSCULAR; INTRAVENOUS ONCE
Status: COMPLETED | OUTPATIENT
Start: 2024-08-24 | End: 2024-08-24

## 2024-08-24 RX ORDER — ACETAMINOPHEN 650 MG/1
650 SUPPOSITORY RECTAL EVERY 6 HOURS PRN
Status: DISCONTINUED | OUTPATIENT
Start: 2024-08-24 | End: 2024-09-05 | Stop reason: HOSPADM

## 2024-08-24 RX ORDER — ONDANSETRON 2 MG/ML
4 INJECTION INTRAMUSCULAR; INTRAVENOUS EVERY 6 HOURS PRN
Status: DISCONTINUED | OUTPATIENT
Start: 2024-08-24 | End: 2024-09-05 | Stop reason: HOSPADM

## 2024-08-24 RX ORDER — AMLODIPINE BESYLATE 10 MG/1
10 TABLET ORAL DAILY
Status: DISCONTINUED | OUTPATIENT
Start: 2024-08-25 | End: 2024-09-01

## 2024-08-24 RX ORDER — TROSPIUM CHLORIDE 20 MG/1
20 TABLET, FILM COATED ORAL NIGHTLY
Status: DISCONTINUED | OUTPATIENT
Start: 2024-08-24 | End: 2024-08-24

## 2024-08-24 RX ORDER — IOPAMIDOL 755 MG/ML
100 INJECTION, SOLUTION INTRAVASCULAR
Status: COMPLETED | OUTPATIENT
Start: 2024-08-24 | End: 2024-08-24

## 2024-08-24 RX ORDER — ACETAMINOPHEN 500 MG
1000 TABLET ORAL
Status: DISCONTINUED | OUTPATIENT
Start: 2024-08-24 | End: 2024-08-24

## 2024-08-24 RX ORDER — CIPROFLOXACIN 2 MG/ML
400 INJECTION, SOLUTION INTRAVENOUS ONCE
Status: COMPLETED | OUTPATIENT
Start: 2024-08-24 | End: 2024-08-24

## 2024-08-24 RX ORDER — POLYETHYLENE GLYCOL 3350 17 G/17G
17 POWDER, FOR SOLUTION ORAL DAILY PRN
Status: DISCONTINUED | OUTPATIENT
Start: 2024-08-24 | End: 2024-09-05 | Stop reason: HOSPADM

## 2024-08-24 RX ORDER — SODIUM CHLORIDE 9 MG/ML
INJECTION, SOLUTION INTRAVENOUS PRN
Status: DISCONTINUED | OUTPATIENT
Start: 2024-08-24 | End: 2024-09-05 | Stop reason: HOSPADM

## 2024-08-24 RX ORDER — ONDANSETRON 4 MG/1
4 TABLET, ORALLY DISINTEGRATING ORAL EVERY 8 HOURS PRN
Status: DISCONTINUED | OUTPATIENT
Start: 2024-08-24 | End: 2024-09-05 | Stop reason: HOSPADM

## 2024-08-24 RX ADMIN — ACETAMINOPHEN 650 MG: 325 TABLET ORAL at 17:21

## 2024-08-24 RX ADMIN — CIPROFLOXACIN 400 MG: 2 INJECTION, SOLUTION INTRAVENOUS at 14:30

## 2024-08-24 RX ADMIN — SODIUM CHLORIDE, PRESERVATIVE FREE 10 ML: 5 INJECTION INTRAVENOUS at 19:56

## 2024-08-24 RX ADMIN — SODIUM CHLORIDE 1000 ML: 9 INJECTION, SOLUTION INTRAVENOUS at 14:29

## 2024-08-24 RX ADMIN — FUROSEMIDE 20 MG: 10 INJECTION, SOLUTION INTRAMUSCULAR; INTRAVENOUS at 19:55

## 2024-08-24 RX ADMIN — IOPAMIDOL 100 ML: 755 INJECTION, SOLUTION INTRAVENOUS at 17:57

## 2024-08-24 RX ADMIN — WATER 1000 MG: 1 INJECTION INTRAMUSCULAR; INTRAVENOUS; SUBCUTANEOUS at 19:16

## 2024-08-24 ASSESSMENT — PAIN SCALES - GENERAL
PAINLEVEL_OUTOF10: 0
PAINLEVEL_OUTOF10: 0

## 2024-08-24 ASSESSMENT — LIFESTYLE VARIABLES
HOW OFTEN DO YOU HAVE A DRINK CONTAINING ALCOHOL: NEVER
HOW MANY STANDARD DRINKS CONTAINING ALCOHOL DO YOU HAVE ON A TYPICAL DAY: PATIENT DOES NOT DRINK

## 2024-08-24 NOTE — ED TRIAGE NOTES
Pt arrives via EMS coming from [facility] with c/o worsening urinary incontinence with hematuria. Takes Eliquis. Staff report patient having fall yesterday that was unwitnessed and patient unable to recall the fall. Has dementia at baseline that is unchanged per staff. VS: /58, HR 93, SaO2 94% on RA, , 99% on 3LPM.

## 2024-08-24 NOTE — PLAN OF CARE
Problem: Discharge Planning  Goal: Discharge to home or other facility with appropriate resources  Recent Flowsheet Documentation  Taken 8/24/2024 1830 by Zee Ordonez RN  Discharge to home or other facility with appropriate resources:   Identify barriers to discharge with patient and caregiver   Arrange for needed discharge resources and transportation as appropriate   Identify discharge learning needs (meds, wound care, etc)   Refer to discharge planning if patient needs post-hospital services based on physician order or complex needs related to functional status, cognitive ability or social support system     Problem: Pain  Goal: Verbalizes/displays adequate comfort level or baseline comfort level  Outcome: Progressing

## 2024-08-24 NOTE — SIGNIFICANT EVENT
At 5:16 pm, received a message from Nurse. Pt hypoxic and required 7L HFNC. RR 40s.    Saw the patient. O2 sat fluctuating between 75% and 100%, at 7 L of O2.    Ordered stat EKG.  It showed atrial fibrillation but no RVR; heart rate 80s.    Ordered CT chest stat to rule out PE.  It showed no evidence of PE but there was volume overload in the chest.  He did have pitting edema on the lower extremities.    Will order echocardiogram and Lasix 40 mg IV once.  Will hold LR.    *7:15 pm. Currently satting at 95% with 3L O2. He hasn't received Lasix yet.    *7:50 pm. Due to soft BP, changed Lasix dose from 40 to 20 IV for gentle diuresis.

## 2024-08-24 NOTE — PLAN OF CARE
Problem: Safety - Adult  Goal: Free from fall injury  Outcome: Progressing     Problem: Pain  Goal: Verbalizes/displays adequate comfort level or baseline comfort level  8/24/2024 1954 by Zee Ordonez, RN  Outcome: Progressing  8/24/2024 1953 by Zee Ordonez, RN  Outcome: Progressing     Problem: Skin/Tissue Integrity  Goal: Absence of new skin breakdown  Description: 1.  Monitor for areas of redness and/or skin breakdown  2.  Assess vascular access sites hourly  3.  Every 4-6 hours minimum:  Change oxygen saturation probe site  4.  Every 4-6 hours:  If on nasal continuous positive airway pressure, respiratory therapy assess nares and determine need for appliance change or resting period.  Outcome: Progressing

## 2024-08-24 NOTE — ED PROVIDER NOTES
Housing Stability     Was there a time when you did not have a steady place to sleep: Not asked     Worried that the place you are staying is making you sick: Not asked        Previous Medications    ALUMINUM & MAGNESIUM HYDROXIDE-SIMETHICONE (MAALOX) 200-200-20 MG/5ML SUSP SUSPENSION    Take 30 mLs by mouth every 6 hours as needed for Indigestion    AMLODIPINE (NORVASC) 10 MG TABLET    Take 1 tablet by mouth in the morning.    ASPIRIN 81 MG EC TABLET    Take 1 tablet by mouth in the morning.    CALCIUM CARB-CHOLECALCIFEROL (OYSTER SHELL CALCIUM W/D) 500-200 MG-UNIT TABS TABLET    Take 1 tablet by mouth in the morning.    CRANBERRY 400 MG CAPS    daily    TROSPIUM (SANCTURA) 20 MG TABLET    Take 1 tablet by mouth at bedtime    VALSARTAN (DIOVAN) 80 MG TABLET    TAKE 1 TABLET BY MOUTH DAILY        Results for orders placed or performed during the hospital encounter of 08/24/24   XR CHEST (2 VW)    Narrative    Chest X-ray    INDICATION: fever    COMPARISON: 27 August 2021  TECHNIQUE: AP and lateral views of the chest were obtained.      Impression    Findings/impression: Low lung volumes accentuate the bronchovascular markings.  There are prominent interstitial markings seen throughout the lungs a component  of which is likely chronic. Stable appearance of the cardiomediastinal  silhouette. Likely atelectasis at the right lung base although superimposed  small pleural effusion/airspace disease not excluded.      Electronically signed by Leighton Irby   CT HEAD WO CONTRAST    Narrative    CT HEAD WO CONTRAST    HISTORY: Unwitnessed fall    COMPARISON: MRI of 10/20/2023    TECHNIQUE: Axial images were obtained of the head without intravenous contrast.  Coronal and sagittal reformatted images were rendered. All CT scans at this  facility are performed using low  dose modulation techniques as appropriate to  perform exam including the following: automated exposure control; use of  iterative reconstruction technique;

## 2024-08-24 NOTE — ED NOTES
TRANSFER - OUT REPORT:    Verbal report given to Doris PATRICIO on [facility]  being transferred to [facility] for routine progression of patient care       Report consisted of patient's Situation, Background, Assessment and   Recommendations(SBAR).     Information from the following report(s) Nurse Handoff Report was reviewed with the receiving nurse.    Adrián Fall Assessment:    Presents to emergency department  because of falls (Syncope, seizure, or loss of consciousness): No  Age > 70: Yes  Altered Mental Status, Intoxication with alcohol or substance confusion (Disorientation, impaired judgment, poor safety awaremess, or inability to follow instructions): Yes  Impaired Mobility: Ambulates or transfers with assistive devices or assistance; Unable to ambulate or transer.: Yes  Nursing Judgement: Yes          Lines:   Peripheral IV 08/24/24 Right Antecubital (Active)   Site Assessment Clean, dry & intact 08/24/24 1524   Line Status Blood return noted 08/24/24 1524   Line Care Cap changed 08/24/24 1524   Phlebitis Assessment No symptoms 08/24/24 1524   Infiltration Assessment 0 08/24/24 1524   Alcohol Cap Used No 08/24/24 1524   Dressing Status Clean, dry & intact 08/24/24 1524   Dressing Type Transparent 08/24/24 1524        Opportunity for questions and clarification was provided.      Patient transported with:  Epifanio Yoo RN  08/24/24 0660

## 2024-08-24 NOTE — H&P
Hospitalist History and Physical   Admit Date:  2024 12:54 PM   Name:  Parish Cohen   Age:  84 y.o.  Sex:  male  :  1940   MRN:  [id number]   Room:  [facility]/    Presenting/Chief Complaint: Hematuria     Reason(s) for Admission: Septicemia (HCC) [A41.9]  Sepsis (HCC) [A41.9]  Urinary tract infection with hematuria, site unspecified [N39.0, R31.9]     History of Present Illness:   Parish Cohen is a 84 y.o. male with medical history of HTN, atrial fibrillation on Eliquis, dementia, who presented with urinary incontinence.    The patient is a resident of nursing home [facility].  Staff members noted worsening of urinary incontinence with hematuria.  The patient is on Eliquis for A-fib.  Apparently, he also had unwitnessed fall, although the patient was not able to recall the event.  He has dementia, and his mentation seemed to be at baseline.  The patient was transferred to ER for further evaluation regarding urinary incontinence with hematuria.    In ER, vitals were significant with RR 32.  CBC showed WBC 17.7.  CMP showed creatinine 2.67 and BUN 76.  UA showed large blood, nitrite positive, large leukocyte esterase, and bacteria 4+.  Lactic acid 2.1.  He was given ciprofloxacin in ER.  Hospitalist was consulted for admission regarding sepsis.      Assessment & Plan:     *update  Acute hypoxic respiratory failure, suspecting acute on HFpEF  -unclear etiology. EKG did not show any RVR. CT chest ruled out PE, but had an evidence of pulmonary volume overload.  -will order echocardiogram  -will order Lasix. This may cause transient worsening of Cr.      Sepsis in the setting of UTI  -Met SIRS criteria with tachypnea and leukocytosis, with probable source of infection  -Noted UA with large blood, nitrite positive, large leukocyte esterase, bacteria 4+  -S/p ciprofloxacin in the ER  -Will order ceftriaxone.  Apparently he is allergic or reactive to cephalosporins, but there is no detail or

## 2024-08-25 ENCOUNTER — APPOINTMENT (OUTPATIENT)
Dept: NON INVASIVE DIAGNOSTICS | Age: 84
DRG: 871 | End: 2024-08-25
Attending: STUDENT IN AN ORGANIZED HEALTH CARE EDUCATION/TRAINING PROGRAM
Payer: MEDICARE

## 2024-08-25 PROBLEM — R65.20 SEVERE SEPSIS (HCC): Status: ACTIVE | Noted: 2024-08-24

## 2024-08-25 LAB
ACB COMPLEX DNA BLD POS QL NAA+NON-PROBE: NOT DETECTED
ACCESSION NUMBER, LLC1M: ABNORMAL
ALBUMIN SERPL-MCNC: 2.2 G/DL (ref 3.2–4.6)
ALBUMIN/GLOB SERPL: 0.5 (ref 1–1.9)
ALP SERPL-CCNC: 92 U/L (ref 40–129)
ALT SERPL-CCNC: 14 U/L (ref 12–65)
ANION GAP SERPL CALC-SCNC: 12 MMOL/L (ref 9–18)
AST SERPL-CCNC: 23 U/L (ref 15–37)
B FRAGILIS DNA BLD POS QL NAA+NON-PROBE: NOT DETECTED
BASOPHILS # BLD: 0.1 K/UL (ref 0–0.2)
BASOPHILS NFR BLD: 0 % (ref 0–2)
BILIRUB SERPL-MCNC: 0.7 MG/DL (ref 0–1.2)
BIOFIRE TEST COMMENT: ABNORMAL
BLACTX-M ISLT/SPM QL: NOT DETECTED
BLAIMP ISLT/SPM QL: NOT DETECTED
BLAKPC ISLT/SPM QL: NOT DETECTED
BLAOXA-48-LIKE ISLT/SPM QL: NOT DETECTED
BLAVIM ISLT/SPM QL: NOT DETECTED
BUN SERPL-MCNC: 81 MG/DL (ref 8–23)
C ALBICANS DNA BLD POS QL NAA+NON-PROBE: NOT DETECTED
C AURIS DNA BLD POS QL NAA+NON-PROBE: NOT DETECTED
C GATTII+NEOFOR DNA BLD POS QL NAA+N-PRB: NOT DETECTED
C GLABRATA DNA BLD POS QL NAA+NON-PROBE: NOT DETECTED
C KRUSEI DNA BLD POS QL NAA+NON-PROBE: NOT DETECTED
C PARAP DNA BLD POS QL NAA+NON-PROBE: NOT DETECTED
C TROPICLS DNA BLD POS QL NAA+NON-PROBE: NOT DETECTED
CALCIUM SERPL-MCNC: 8.5 MG/DL (ref 8.8–10.2)
CHLORIDE SERPL-SCNC: 102 MMOL/L (ref 98–107)
CO2 SERPL-SCNC: 19 MMOL/L (ref 20–28)
CREAT SERPL-MCNC: 2.69 MG/DL (ref 0.8–1.3)
DIFFERENTIAL METHOD BLD: ABNORMAL
E CLOAC COMP DNA BLD POS NAA+NON-PROBE: NOT DETECTED
E COLI DNA BLD POS QL NAA+NON-PROBE: NOT DETECTED
E FAECALIS DNA BLD POS QL NAA+NON-PROBE: NOT DETECTED
E FAECIUM DNA BLD POS QL NAA+NON-PROBE: NOT DETECTED
ECHO AV AREA PEAK VELOCITY: 2.5 CM2
ECHO AV AREA VTI: 2.2 CM2
ECHO AV AREA/BSA PEAK VELOCITY: 1.5 CM2/M2
ECHO AV AREA/BSA VTI: 1.3 CM2/M2
ECHO AV MEAN GRADIENT: 3 MMHG
ECHO AV MEAN VELOCITY: 0.8 M/S
ECHO AV PEAK GRADIENT: 6 MMHG
ECHO AV PEAK VELOCITY: 1.2 M/S
ECHO AV VELOCITY RATIO: 0.75
ECHO AV VTI: 22.3 CM
ECHO BSA: 1.71 M2
ECHO EST RA PRESSURE: 15 MMHG
ECHO IVC PROX: 2.2 CM
ECHO LA AREA 2C: 24.3 CM2
ECHO LA AREA 4C: 29 CM2
ECHO LA DIAMETER INDEX: 3 CM/M2
ECHO LA DIAMETER: 5.1 CM
ECHO LA MAJOR AXIS: 7.6 CM
ECHO LA MINOR AXIS: 7.3 CM
ECHO LA VOL BP: 76 ML (ref 18–58)
ECHO LA VOL MOD A2C: 65 ML (ref 18–58)
ECHO LA VOL MOD A4C: 89 ML (ref 18–58)
ECHO LA VOL/BSA BIPLANE: 45 ML/M2 (ref 16–34)
ECHO LA VOLUME INDEX MOD A2C: 38 ML/M2 (ref 16–34)
ECHO LA VOLUME INDEX MOD A4C: 52 ML/M2 (ref 16–34)
ECHO LV E' LATERAL VELOCITY: 9 CM/S
ECHO LV E' SEPTAL VELOCITY: 7 CM/S
ECHO LV EDV A2C: 85 ML
ECHO LV EDV A4C: 102 ML
ECHO LV EDV INDEX A4C: 60 ML/M2
ECHO LV EDV NDEX A2C: 50 ML/M2
ECHO LV EF PHYSICIAN: 55 %
ECHO LV EJECTION FRACTION A2C: 58 %
ECHO LV ESV A2C: 36 ML
ECHO LV ESV A4C: 57 ML
ECHO LV ESV INDEX A2C: 21 ML/M2
ECHO LV ESV INDEX A4C: 34 ML/M2
ECHO LV FRACTIONAL SHORTENING: 30 % (ref 28–44)
ECHO LV INTERNAL DIMENSION DIASTOLE INDEX: 2.94 CM/M2
ECHO LV INTERNAL DIMENSION DIASTOLIC: 5 CM (ref 4.2–5.9)
ECHO LV INTERNAL DIMENSION SYSTOLIC INDEX: 2.06 CM/M2
ECHO LV INTERNAL DIMENSION SYSTOLIC: 3.5 CM
ECHO LV IVSD: 1.1 CM (ref 0.6–1)
ECHO LV MASS 2D: 169.9 G (ref 88–224)
ECHO LV MASS INDEX 2D: 100 G/M2 (ref 49–115)
ECHO LV POSTERIOR WALL DIASTOLIC: 0.8 CM (ref 0.6–1)
ECHO LV RELATIVE WALL THICKNESS RATIO: 0.32
ECHO LVOT AREA: 3.5 CM2
ECHO LVOT AV VTI INDEX: 0.62
ECHO LVOT DIAM: 2.1 CM
ECHO LVOT MEAN GRADIENT: 1 MMHG
ECHO LVOT PEAK GRADIENT: 3 MMHG
ECHO LVOT PEAK VELOCITY: 0.9 M/S
ECHO LVOT STROKE VOLUME INDEX: 28.3 ML/M2
ECHO LVOT SV: 48.1 ML
ECHO LVOT VTI: 13.9 CM
ECHO MV A VELOCITY: 0.36 M/S
ECHO MV E DECELERATION TIME (DT): 162 MS
ECHO MV E VELOCITY: 0.96 M/S
ECHO MV E/A RATIO: 2.67
ECHO MV E/E' LATERAL: 10.67
ECHO MV E/E' RATIO (AVERAGED): 12.19
ECHO MV E/E' SEPTAL: 13.71
ECHO PV ACCELERATION TIME (AT): 130 MS
ECHO PV MAX VELOCITY: 0.8 M/S
ECHO PV PEAK GRADIENT: 2 MMHG
ECHO RV BASAL DIMENSION: 3.2 CM
ECHO RV FREE WALL PEAK S': 10 CM/S
EKG ATRIAL RATE: 80 BPM
EKG DIAGNOSIS: NORMAL
EKG DIAGNOSIS: NORMAL
EKG Q-T INTERVAL: 312 MS
EKG Q-T INTERVAL: 369 MS
EKG QRS DURATION: 137 MS
EKG QRS DURATION: 92 MS
EKG QTC CALCULATION (BAZETT): 420 MS
EKG QTC CALCULATION (BAZETT): 447 MS
EKG R AXIS: -45 DEGREES
EKG R AXIS: 51 DEGREES
EKG T AXIS: -52 DEGREES
EKG T AXIS: 91 DEGREES
EKG VENTRICULAR RATE: 109 BPM
EKG VENTRICULAR RATE: 93 BPM
ENTEROBACTERALES DNA BLD POS NAA+N-PRB: DETECTED
EOSINOPHIL # BLD: 0.3 K/UL (ref 0–0.8)
EOSINOPHIL NFR BLD: 1 % (ref 0.5–7.8)
ERYTHROCYTE [DISTWIDTH] IN BLOOD BY AUTOMATED COUNT: 14.1 % (ref 11.9–14.6)
GLOBULIN SER CALC-MCNC: 4.2 G/DL (ref 2.3–3.5)
GLUCOSE BLD STRIP.AUTO-MCNC: 101 MG/DL (ref 65–100)
GLUCOSE BLD STRIP.AUTO-MCNC: 120 MG/DL (ref 65–100)
GLUCOSE BLD STRIP.AUTO-MCNC: 166 MG/DL (ref 65–100)
GLUCOSE BLD STRIP.AUTO-MCNC: 99 MG/DL (ref 65–100)
GLUCOSE SERPL-MCNC: 103 MG/DL (ref 70–99)
GP B STREP DNA BLD POS QL NAA+NON-PROBE: NOT DETECTED
HAEM INFLU DNA BLD POS QL NAA+NON-PROBE: NOT DETECTED
HCT VFR BLD AUTO: 35.3 % (ref 41.1–50.3)
HGB BLD-MCNC: 11.6 G/DL (ref 13.6–17.2)
IMM GRANULOCYTES # BLD AUTO: 0.1 K/UL (ref 0–0.5)
IMM GRANULOCYTES NFR BLD AUTO: 1 % (ref 0–5)
K OXYTOCA DNA BLD POS QL NAA+NON-PROBE: NOT DETECTED
KLEBSIELLA SP DNA BLD POS QL NAA+NON-PRB: NOT DETECTED
KLEBSIELLA SP DNA BLD POS QL NAA+NON-PRB: NOT DETECTED
L MONOCYTOG DNA BLD POS QL NAA+NON-PROBE: NOT DETECTED
LYMPHOCYTES # BLD: 0.6 K/UL (ref 0.5–4.6)
LYMPHOCYTES NFR BLD: 3 % (ref 13–44)
MCH RBC QN AUTO: 31.4 PG (ref 26.1–32.9)
MCHC RBC AUTO-ENTMCNC: 32.9 G/DL (ref 31.4–35)
MCV RBC AUTO: 95.7 FL (ref 82–102)
MONOCYTES # BLD: 0.9 K/UL (ref 0.1–1.3)
MONOCYTES NFR BLD: 4 % (ref 4–12)
N MEN DNA BLD POS QL NAA+NON-PROBE: NOT DETECTED
NEUTS SEG # BLD: 18.6 K/UL (ref 1.7–8.2)
NEUTS SEG NFR BLD: 91 % (ref 43–78)
NRBC # BLD: 0 K/UL (ref 0–0.2)
P AERUGINOSA DNA BLD POS NAA+NON-PROBE: NOT DETECTED
PLATELET # BLD AUTO: 159 K/UL (ref 150–450)
PMV BLD AUTO: 10.6 FL (ref 9.4–12.3)
POTASSIUM SERPL-SCNC: 4.6 MMOL/L (ref 3.5–5.1)
PROT SERPL-MCNC: 6.3 G/DL (ref 6.3–8.2)
PROTEUS SP DNA BLD POS QL NAA+NON-PROBE: DETECTED
RBC # BLD AUTO: 3.69 M/UL (ref 4.23–5.6)
RESISTANT GENE NDM BY PCR: NOT DETECTED
RESISTANT GENE TARGETS: ABNORMAL
S AUREUS DNA BLD POS QL NAA+NON-PROBE: NOT DETECTED
S AUREUS+CONS DNA BLD POS NAA+NON-PROBE: NOT DETECTED
S EPIDERMIDIS DNA BLD POS QL NAA+NON-PRB: NOT DETECTED
S LUGDUNENSIS DNA BLD POS QL NAA+NON-PRB: NOT DETECTED
S MALTOPHILIA DNA BLD POS QL NAA+NON-PRB: NOT DETECTED
S MARCESCENS DNA BLD POS NAA+NON-PROBE: NOT DETECTED
S PNEUM DNA BLD POS QL NAA+NON-PROBE: NOT DETECTED
S PYO DNA BLD POS QL NAA+NON-PROBE: NOT DETECTED
SALMONELLA DNA BLD POS QL NAA+NON-PROBE: NOT DETECTED
SERVICE CMNT-IMP: ABNORMAL
SERVICE CMNT-IMP: NORMAL
SODIUM SERPL-SCNC: 133 MMOL/L (ref 136–145)
STREPTOCOCCUS DNA BLD POS NAA+NON-PROBE: NOT DETECTED
WBC # BLD AUTO: 20.5 K/UL (ref 4.3–11.1)

## 2024-08-25 PROCEDURE — 36415 COLL VENOUS BLD VENIPUNCTURE: CPT

## 2024-08-25 PROCEDURE — 6370000000 HC RX 637 (ALT 250 FOR IP): Performed by: STUDENT IN AN ORGANIZED HEALTH CARE EDUCATION/TRAINING PROGRAM

## 2024-08-25 PROCEDURE — 1100000003 HC PRIVATE W/ TELEMETRY

## 2024-08-25 PROCEDURE — 97165 OT EVAL LOW COMPLEX 30 MIN: CPT

## 2024-08-25 PROCEDURE — 87040 BLOOD CULTURE FOR BACTERIA: CPT

## 2024-08-25 PROCEDURE — 6360000002 HC RX W HCPCS: Performed by: HOSPITALIST

## 2024-08-25 PROCEDURE — C8929 TTE W OR WO FOL WCON,DOPPLER: HCPCS

## 2024-08-25 PROCEDURE — 80053 COMPREHEN METABOLIC PANEL: CPT

## 2024-08-25 PROCEDURE — 85025 COMPLETE CBC W/AUTO DIFF WBC: CPT

## 2024-08-25 PROCEDURE — 2580000003 HC RX 258: Performed by: STUDENT IN AN ORGANIZED HEALTH CARE EDUCATION/TRAINING PROGRAM

## 2024-08-25 PROCEDURE — 93010 ELECTROCARDIOGRAM REPORT: CPT | Performed by: INTERNAL MEDICINE

## 2024-08-25 PROCEDURE — 6360000002 HC RX W HCPCS: Performed by: STUDENT IN AN ORGANIZED HEALTH CARE EDUCATION/TRAINING PROGRAM

## 2024-08-25 PROCEDURE — 82962 GLUCOSE BLOOD TEST: CPT

## 2024-08-25 PROCEDURE — 87154 CUL TYP ID BLD PTHGN 6+ TRGT: CPT

## 2024-08-25 PROCEDURE — 97530 THERAPEUTIC ACTIVITIES: CPT

## 2024-08-25 PROCEDURE — 93306 TTE W/DOPPLER COMPLETE: CPT | Performed by: INTERNAL MEDICINE

## 2024-08-25 PROCEDURE — 97161 PT EVAL LOW COMPLEX 20 MIN: CPT

## 2024-08-25 PROCEDURE — 94760 N-INVAS EAR/PLS OXIMETRY 1: CPT

## 2024-08-25 PROCEDURE — 97112 NEUROMUSCULAR REEDUCATION: CPT

## 2024-08-25 PROCEDURE — 87205 SMEAR GRAM STAIN: CPT

## 2024-08-25 PROCEDURE — 6360000004 HC RX CONTRAST MEDICATION: Performed by: STUDENT IN AN ORGANIZED HEALTH CARE EDUCATION/TRAINING PROGRAM

## 2024-08-25 RX ORDER — FUROSEMIDE 10 MG/ML
40 INJECTION INTRAMUSCULAR; INTRAVENOUS ONCE
Status: COMPLETED | OUTPATIENT
Start: 2024-08-25 | End: 2024-08-25

## 2024-08-25 RX ADMIN — SODIUM CHLORIDE, PRESERVATIVE FREE 10 ML: 5 INJECTION INTRAVENOUS at 09:31

## 2024-08-25 RX ADMIN — PIPERACILLIN AND TAZOBACTAM 4500 MG: 4; .5 INJECTION, POWDER, LYOPHILIZED, FOR SOLUTION INTRAVENOUS at 09:27

## 2024-08-25 RX ADMIN — SODIUM CHLORIDE, PRESERVATIVE FREE 10 ML: 5 INJECTION INTRAVENOUS at 20:50

## 2024-08-25 RX ADMIN — SODIUM CHLORIDE, PRESERVATIVE FREE 0.15 ML: 5 INJECTION INTRAVENOUS at 11:27

## 2024-08-25 RX ADMIN — FUROSEMIDE 40 MG: 10 INJECTION, SOLUTION INTRAMUSCULAR; INTRAVENOUS at 21:15

## 2024-08-25 RX ADMIN — AMLODIPINE BESYLATE 10 MG: 10 TABLET ORAL at 09:28

## 2024-08-25 NOTE — SIGNIFICANT EVENT
RN notified that blood culture is growing gram negative rods, proteus species.  These findings have not yet been updated in EMR for review.  However based on admission for sepsis with UTI, proteus species is likely mirabilis.  As patient lives at a SNF, will start zosyn for empiric coverage.

## 2024-08-26 ENCOUNTER — APPOINTMENT (OUTPATIENT)
Dept: CT IMAGING | Age: 84
DRG: 871 | End: 2024-08-26
Payer: MEDICARE

## 2024-08-26 ENCOUNTER — APPOINTMENT (OUTPATIENT)
Dept: GENERAL RADIOLOGY | Age: 84
DRG: 871 | End: 2024-08-26
Payer: MEDICARE

## 2024-08-26 LAB
ACB COMPLEX DNA BLD POS QL NAA+NON-PROBE: NOT DETECTED
ACCESSION NUMBER, LLC1M: ABNORMAL
ANION GAP SERPL CALC-SCNC: 14 MMOL/L (ref 9–18)
B FRAGILIS DNA BLD POS QL NAA+NON-PROBE: NOT DETECTED
BASOPHILS # BLD: 0.1 K/UL (ref 0–0.2)
BASOPHILS NFR BLD: 0 % (ref 0–2)
BIOFIRE TEST COMMENT: ABNORMAL
BUN SERPL-MCNC: 93 MG/DL (ref 8–23)
C ALBICANS DNA BLD POS QL NAA+NON-PROBE: NOT DETECTED
C AURIS DNA BLD POS QL NAA+NON-PROBE: NOT DETECTED
C GATTII+NEOFOR DNA BLD POS QL NAA+N-PRB: NOT DETECTED
C GLABRATA DNA BLD POS QL NAA+NON-PROBE: NOT DETECTED
C KRUSEI DNA BLD POS QL NAA+NON-PROBE: NOT DETECTED
C PARAP DNA BLD POS QL NAA+NON-PROBE: NOT DETECTED
C TROPICLS DNA BLD POS QL NAA+NON-PROBE: NOT DETECTED
CALCIUM SERPL-MCNC: 8.5 MG/DL (ref 8.8–10.2)
CHLORIDE SERPL-SCNC: 100 MMOL/L (ref 98–107)
CO2 SERPL-SCNC: 20 MMOL/L (ref 20–28)
CREAT SERPL-MCNC: 2.99 MG/DL (ref 0.8–1.3)
DIFFERENTIAL METHOD BLD: ABNORMAL
E CLOAC COMP DNA BLD POS NAA+NON-PROBE: NOT DETECTED
E COLI DNA BLD POS QL NAA+NON-PROBE: NOT DETECTED
E FAECALIS DNA BLD POS QL NAA+NON-PROBE: NOT DETECTED
E FAECIUM DNA BLD POS QL NAA+NON-PROBE: NOT DETECTED
ENTEROBACTERALES DNA BLD POS NAA+N-PRB: NOT DETECTED
EOSINOPHIL # BLD: 0 K/UL (ref 0–0.8)
EOSINOPHIL NFR BLD: 0 % (ref 0.5–7.8)
ERYTHROCYTE [DISTWIDTH] IN BLOOD BY AUTOMATED COUNT: 14 % (ref 11.9–14.6)
GLUCOSE BLD STRIP.AUTO-MCNC: 120 MG/DL (ref 65–100)
GLUCOSE SERPL-MCNC: 103 MG/DL (ref 70–99)
GP B STREP DNA BLD POS QL NAA+NON-PROBE: NOT DETECTED
HAEM INFLU DNA BLD POS QL NAA+NON-PROBE: NOT DETECTED
HCT VFR BLD AUTO: 37 % (ref 41.1–50.3)
HGB BLD-MCNC: 12.6 G/DL (ref 13.6–17.2)
IMM GRANULOCYTES # BLD AUTO: 0.3 K/UL (ref 0–0.5)
IMM GRANULOCYTES NFR BLD AUTO: 2 % (ref 0–5)
K OXYTOCA DNA BLD POS QL NAA+NON-PROBE: NOT DETECTED
KLEBSIELLA SP DNA BLD POS QL NAA+NON-PRB: NOT DETECTED
KLEBSIELLA SP DNA BLD POS QL NAA+NON-PRB: NOT DETECTED
L MONOCYTOG DNA BLD POS QL NAA+NON-PROBE: NOT DETECTED
LYMPHOCYTES # BLD: 0.6 K/UL (ref 0.5–4.6)
LYMPHOCYTES NFR BLD: 3 % (ref 13–44)
MCH RBC QN AUTO: 31.7 PG (ref 26.1–32.9)
MCHC RBC AUTO-ENTMCNC: 34.1 G/DL (ref 31.4–35)
MCV RBC AUTO: 93 FL (ref 82–102)
MONOCYTES # BLD: 1.3 K/UL (ref 0.1–1.3)
MONOCYTES NFR BLD: 7 % (ref 4–12)
N MEN DNA BLD POS QL NAA+NON-PROBE: NOT DETECTED
NEUTS SEG # BLD: 16.3 K/UL (ref 1.7–8.2)
NEUTS SEG NFR BLD: 88 % (ref 43–78)
NRBC # BLD: 0 K/UL (ref 0–0.2)
P AERUGINOSA DNA BLD POS NAA+NON-PROBE: NOT DETECTED
PLATELET # BLD AUTO: 202 K/UL (ref 150–450)
PMV BLD AUTO: 12 FL (ref 9.4–12.3)
POTASSIUM SERPL-SCNC: 3.9 MMOL/L (ref 3.5–5.1)
PROCALCITONIN SERPL-MCNC: 44.8 NG/ML (ref 0–0.1)
PROTEUS SP DNA BLD POS QL NAA+NON-PROBE: NOT DETECTED
RBC # BLD AUTO: 3.98 M/UL (ref 4.23–5.6)
RESISTANT GENE TARGETS: ABNORMAL
S AUREUS DNA BLD POS QL NAA+NON-PROBE: NOT DETECTED
S AUREUS+CONS DNA BLD POS NAA+NON-PROBE: DETECTED
S EPIDERMIDIS DNA BLD POS QL NAA+NON-PRB: NOT DETECTED
S LUGDUNENSIS DNA BLD POS QL NAA+NON-PRB: NOT DETECTED
S MALTOPHILIA DNA BLD POS QL NAA+NON-PRB: NOT DETECTED
S MARCESCENS DNA BLD POS NAA+NON-PROBE: NOT DETECTED
S PNEUM DNA BLD POS QL NAA+NON-PROBE: NOT DETECTED
S PYO DNA BLD POS QL NAA+NON-PROBE: NOT DETECTED
SALMONELLA DNA BLD POS QL NAA+NON-PROBE: NOT DETECTED
SERVICE CMNT-IMP: ABNORMAL
SODIUM SERPL-SCNC: 134 MMOL/L (ref 136–145)
STREPTOCOCCUS DNA BLD POS NAA+NON-PROBE: NOT DETECTED
WBC # BLD AUTO: 18.7 K/UL (ref 4.3–11.1)

## 2024-08-26 PROCEDURE — 84145 PROCALCITONIN (PCT): CPT

## 2024-08-26 PROCEDURE — 71045 X-RAY EXAM CHEST 1 VIEW: CPT

## 2024-08-26 PROCEDURE — 2580000003 HC RX 258: Performed by: STUDENT IN AN ORGANIZED HEALTH CARE EDUCATION/TRAINING PROGRAM

## 2024-08-26 PROCEDURE — 85025 COMPLETE CBC W/AUTO DIFF WBC: CPT

## 2024-08-26 PROCEDURE — 6360000002 HC RX W HCPCS: Performed by: STUDENT IN AN ORGANIZED HEALTH CARE EDUCATION/TRAINING PROGRAM

## 2024-08-26 PROCEDURE — 6370000000 HC RX 637 (ALT 250 FOR IP): Performed by: STUDENT IN AN ORGANIZED HEALTH CARE EDUCATION/TRAINING PROGRAM

## 2024-08-26 PROCEDURE — 80048 BASIC METABOLIC PNL TOTAL CA: CPT

## 2024-08-26 PROCEDURE — 97530 THERAPEUTIC ACTIVITIES: CPT

## 2024-08-26 PROCEDURE — 82962 GLUCOSE BLOOD TEST: CPT

## 2024-08-26 PROCEDURE — 74176 CT ABD & PELVIS W/O CONTRAST: CPT

## 2024-08-26 PROCEDURE — 99497 ADVNCD CARE PLAN 30 MIN: CPT | Performed by: INTERNAL MEDICINE

## 2024-08-26 PROCEDURE — 36415 COLL VENOUS BLD VENIPUNCTURE: CPT

## 2024-08-26 PROCEDURE — 99222 1ST HOSP IP/OBS MODERATE 55: CPT | Performed by: INTERNAL MEDICINE

## 2024-08-26 PROCEDURE — 1100000003 HC PRIVATE W/ TELEMETRY

## 2024-08-26 RX ADMIN — AMLODIPINE BESYLATE 10 MG: 10 TABLET ORAL at 09:38

## 2024-08-26 RX ADMIN — PIPERACILLIN AND TAZOBACTAM 3375 MG: 3; .375 INJECTION, POWDER, LYOPHILIZED, FOR SOLUTION INTRAVENOUS at 14:52

## 2024-08-26 RX ADMIN — PIPERACILLIN AND TAZOBACTAM 3375 MG: 3; .375 INJECTION, POWDER, LYOPHILIZED, FOR SOLUTION INTRAVENOUS at 00:53

## 2024-08-26 RX ADMIN — SODIUM CHLORIDE, PRESERVATIVE FREE 10 ML: 5 INJECTION INTRAVENOUS at 09:42

## 2024-08-26 RX ADMIN — SODIUM CHLORIDE, PRESERVATIVE FREE 10 ML: 5 INJECTION INTRAVENOUS at 21:10

## 2024-08-26 ASSESSMENT — PAIN SCALES - GENERAL: PAINLEVEL_OUTOF10: 0

## 2024-08-26 NOTE — PLAN OF CARE
Problem: Safety - Adult  Goal: Free from fall injury  8/26/2024 1300 by Terri Ordonez RN  Outcome: Progressing  8/26/2024 1300 by Terri Ordonez RN  Outcome: Progressing     Problem: Discharge Planning  Goal: Discharge to home or other facility with appropriate resources  8/26/2024 1300 by Terri Ordonez RN  Outcome: Progressing  8/26/2024 1300 by Terri Ordonez RN  Outcome: Progressing     Problem: Pain  Goal: Verbalizes/displays adequate comfort level or baseline comfort level  8/26/2024 1300 by Terri Ordonez RN  Outcome: Progressing  8/26/2024 1300 by Terri Ordonez RN  Outcome: Progressing     Problem: Skin/Tissue Integrity  Goal: Absence of new skin breakdown  Description: 1.  Monitor for areas of redness and/or skin breakdown  2.  Assess vascular access sites hourly  3.  Every 4-6 hours minimum:  Change oxygen saturation probe site  4.  Every 4-6 hours:  If on nasal continuous positive airway pressure, respiratory therapy assess nares and determine need for appliance change or resting period.  8/26/2024 1300 by Terri Ordonez RN  Outcome: Progressing  8/26/2024 1300 by Terri Ordonez RN  Outcome: Progressing

## 2024-08-26 NOTE — CARE COORDINATION
08/26/24 0939   Service Assessment   Patient Orientation Alert and Oriented   Cognition Alert   History Provided By Patient   Primary Caregiver Self   Accompanied By/Relationship no one at bedside   Support Systems Children   Patient's Healthcare Decision Maker is: Named in Scanned ACP Document   PCP Verified by CM Yes   Last Visit to PCP Within last 3 months   Prior Functional Level Assistance with the following:;Mobility;Bathing;Cooking;Housework;Shopping   Current Functional Level Assistance with the following:;Mobility;Shopping;Housework;Cooking;Bathing   Can patient return to prior living arrangement Yes   Ability to make needs known: Good   Family able to assist with home care needs: No   Would you like for me to discuss the discharge plan with any other family members/significant others, and if so, who? Yes   Financial Resources Medicare   Community Resources None   Social/Functional History   Type of Home Assisted living   Home Equipment Walker - Rolling   Receives Help From Other (comment)  (staff)   ADL Assistance Needs assistance   Ambulation Assistance Needs assistance   Transfer Assistance Independent   Active  No   Discharge Planning   Type of Residence Assisted living   Living Arrangements Other (Comment)  (correction)   Current Services Prior To Admission Durable Medical Equipment   Current DME Prior to Arrival Walker   DME Ordered? No   Potential Assistance Purchasing Medications No   Type of Home Care Services None   Patient expects to be discharged to: Assisted living     Patient lives at [facility]. He uses a walker to ambulate. His daughter is his emergency contact. History of HH. No history of rehab. A nurse from [facility] will have to see the patient prior to patient returning.   CM following for discharge needs.    Principal Discharge DX:	Ankle edema, bilateral

## 2024-08-27 LAB
ANION GAP SERPL CALC-SCNC: 12 MMOL/L (ref 9–18)
BACTERIA SPEC CULT: ABNORMAL
BASOPHILS # BLD: 0.1 K/UL (ref 0–0.2)
BASOPHILS NFR BLD: 0 % (ref 0–2)
BUN SERPL-MCNC: 89 MG/DL (ref 8–23)
CALCIUM SERPL-MCNC: 9.5 MG/DL (ref 8.8–10.2)
CHLORIDE SERPL-SCNC: 100 MMOL/L (ref 98–107)
CO2 SERPL-SCNC: 23 MMOL/L (ref 20–28)
CREAT SERPL-MCNC: 3.13 MG/DL (ref 0.8–1.3)
CREAT UR-MCNC: 35 MG/DL (ref 39–259)
DIFFERENTIAL METHOD BLD: ABNORMAL
EOSINOPHIL # BLD: 0.1 K/UL (ref 0–0.8)
EOSINOPHIL NFR BLD: 1 % (ref 0.5–7.8)
ERYTHROCYTE [DISTWIDTH] IN BLOOD BY AUTOMATED COUNT: 13.8 % (ref 11.9–14.6)
GLUCOSE SERPL-MCNC: 100 MG/DL (ref 70–99)
GRAM STN SPEC: ABNORMAL
HCT VFR BLD AUTO: 38 % (ref 41.1–50.3)
HGB BLD-MCNC: 13.1 G/DL (ref 13.6–17.2)
IMM GRANULOCYTES # BLD AUTO: 0.6 K/UL (ref 0–0.5)
IMM GRANULOCYTES NFR BLD AUTO: 4 % (ref 0–5)
LYMPHOCYTES # BLD: 0.7 K/UL (ref 0.5–4.6)
LYMPHOCYTES NFR BLD: 5 % (ref 13–44)
MCH RBC QN AUTO: 31.9 PG (ref 26.1–32.9)
MCHC RBC AUTO-ENTMCNC: 34.5 G/DL (ref 31.4–35)
MCV RBC AUTO: 92.5 FL (ref 82–102)
MONOCYTES # BLD: 1.2 K/UL (ref 0.1–1.3)
MONOCYTES NFR BLD: 9 % (ref 4–12)
NEUTS SEG # BLD: 10.9 K/UL (ref 1.7–8.2)
NEUTS SEG NFR BLD: 81 % (ref 43–78)
NRBC # BLD: 0 K/UL (ref 0–0.2)
OSMOLALITY UR: 311 MOSM/KG H2O (ref 50–1400)
PLATELET # BLD AUTO: 155 K/UL (ref 150–450)
PMV BLD AUTO: 11.1 FL (ref 9.4–12.3)
POTASSIUM SERPL-SCNC: 3.6 MMOL/L (ref 3.5–5.1)
RBC # BLD AUTO: 4.11 M/UL (ref 4.23–5.6)
SERVICE CMNT-IMP: ABNORMAL
SODIUM SERPL-SCNC: 134 MMOL/L (ref 136–145)
SODIUM UR-SCNC: 50 MMOL/L
WBC # BLD AUTO: 13.6 K/UL (ref 4.3–11.1)

## 2024-08-27 PROCEDURE — 6370000000 HC RX 637 (ALT 250 FOR IP): Performed by: NURSE PRACTITIONER

## 2024-08-27 PROCEDURE — 36415 COLL VENOUS BLD VENIPUNCTURE: CPT

## 2024-08-27 PROCEDURE — 80048 BASIC METABOLIC PNL TOTAL CA: CPT

## 2024-08-27 PROCEDURE — 1100000003 HC PRIVATE W/ TELEMETRY

## 2024-08-27 PROCEDURE — 83935 ASSAY OF URINE OSMOLALITY: CPT

## 2024-08-27 PROCEDURE — 99223 1ST HOSP IP/OBS HIGH 75: CPT | Performed by: INTERNAL MEDICINE

## 2024-08-27 PROCEDURE — 2580000003 HC RX 258: Performed by: STUDENT IN AN ORGANIZED HEALTH CARE EDUCATION/TRAINING PROGRAM

## 2024-08-27 PROCEDURE — 6370000000 HC RX 637 (ALT 250 FOR IP): Performed by: HOSPITALIST

## 2024-08-27 PROCEDURE — 82570 ASSAY OF URINE CREATININE: CPT

## 2024-08-27 PROCEDURE — 6360000002 HC RX W HCPCS: Performed by: STUDENT IN AN ORGANIZED HEALTH CARE EDUCATION/TRAINING PROGRAM

## 2024-08-27 PROCEDURE — 99222 1ST HOSP IP/OBS MODERATE 55: CPT | Performed by: NURSE PRACTITIONER

## 2024-08-27 PROCEDURE — 84300 ASSAY OF URINE SODIUM: CPT

## 2024-08-27 PROCEDURE — 6370000000 HC RX 637 (ALT 250 FOR IP): Performed by: STUDENT IN AN ORGANIZED HEALTH CARE EDUCATION/TRAINING PROGRAM

## 2024-08-27 PROCEDURE — 85025 COMPLETE CBC W/AUTO DIFF WBC: CPT

## 2024-08-27 RX ORDER — TAMSULOSIN HYDROCHLORIDE 0.4 MG/1
0.4 CAPSULE ORAL DAILY
Status: DISCONTINUED | OUTPATIENT
Start: 2024-08-27 | End: 2024-09-05 | Stop reason: HOSPADM

## 2024-08-27 RX ORDER — LEVOFLOXACIN 500 MG/1
750 TABLET, FILM COATED ORAL ONCE
Status: COMPLETED | OUTPATIENT
Start: 2024-08-27 | End: 2024-08-27

## 2024-08-27 RX ORDER — LEVOFLOXACIN 500 MG/1
500 TABLET, FILM COATED ORAL EVERY OTHER DAY
Status: DISCONTINUED | OUTPATIENT
Start: 2024-08-29 | End: 2024-08-30

## 2024-08-27 RX ADMIN — TAMSULOSIN HYDROCHLORIDE 0.4 MG: 0.4 CAPSULE ORAL at 10:29

## 2024-08-27 RX ADMIN — LEVOFLOXACIN 750 MG: 500 TABLET, FILM COATED ORAL at 10:29

## 2024-08-27 RX ADMIN — PIPERACILLIN AND TAZOBACTAM 3375 MG: 3; .375 INJECTION, POWDER, LYOPHILIZED, FOR SOLUTION INTRAVENOUS at 01:29

## 2024-08-27 RX ADMIN — SODIUM CHLORIDE, PRESERVATIVE FREE 10 ML: 5 INJECTION INTRAVENOUS at 08:21

## 2024-08-27 RX ADMIN — AMLODIPINE BESYLATE 10 MG: 10 TABLET ORAL at 08:21

## 2024-08-27 RX ADMIN — SODIUM CHLORIDE, PRESERVATIVE FREE 10 ML: 5 INJECTION INTRAVENOUS at 19:51

## 2024-08-27 ASSESSMENT — PAIN SCALES - GENERAL
PAINLEVEL_OUTOF10: 0
PAINLEVEL_OUTOF10: 0

## 2024-08-27 NOTE — CARE COORDINATION
RAJIV received a call from RN at [facility]. She will be here tomorrow to evaluate the patient  and determine if he's able to return. She requested that she be here when therapy works with the patient. RAJIV explained that RAJIV was unable to arrange a specific time. RAJIV will share therapy notes with her. RAJIV following.

## 2024-08-28 LAB
ANION GAP SERPL CALC-SCNC: 12 MMOL/L (ref 9–18)
BACTERIA SPEC CULT: ABNORMAL
BUN SERPL-MCNC: 75 MG/DL (ref 8–23)
CALCIUM SERPL-MCNC: 8.3 MG/DL (ref 8.8–10.2)
CHLORIDE SERPL-SCNC: 100 MMOL/L (ref 98–107)
CO2 SERPL-SCNC: 23 MMOL/L (ref 20–28)
CREAT SERPL-MCNC: 2.76 MG/DL (ref 0.8–1.3)
ERYTHROCYTE [DISTWIDTH] IN BLOOD BY AUTOMATED COUNT: 13.7 % (ref 11.9–14.6)
GLUCOSE SERPL-MCNC: 105 MG/DL (ref 70–99)
GRAM STN SPEC: ABNORMAL
HCT VFR BLD AUTO: 36.4 % (ref 41.1–50.3)
HGB BLD-MCNC: 12.2 G/DL (ref 13.6–17.2)
MCH RBC QN AUTO: 31.2 PG (ref 26.1–32.9)
MCHC RBC AUTO-ENTMCNC: 33.5 G/DL (ref 31.4–35)
MCV RBC AUTO: 93.1 FL (ref 82–102)
NRBC # BLD: 0 K/UL (ref 0–0.2)
PLATELET # BLD AUTO: 201 K/UL (ref 150–450)
PMV BLD AUTO: 11.2 FL (ref 9.4–12.3)
POTASSIUM SERPL-SCNC: 3.7 MMOL/L (ref 3.5–5.1)
RBC # BLD AUTO: 3.91 M/UL (ref 4.23–5.6)
SERVICE CMNT-IMP: ABNORMAL
SODIUM SERPL-SCNC: 135 MMOL/L (ref 136–145)
WBC # BLD AUTO: 14 K/UL (ref 4.3–11.1)

## 2024-08-28 PROCEDURE — 36415 COLL VENOUS BLD VENIPUNCTURE: CPT

## 2024-08-28 PROCEDURE — 99231 SBSQ HOSP IP/OBS SF/LOW 25: CPT | Performed by: PHYSICIAN ASSISTANT

## 2024-08-28 PROCEDURE — 6370000000 HC RX 637 (ALT 250 FOR IP): Performed by: NURSE PRACTITIONER

## 2024-08-28 PROCEDURE — 97530 THERAPEUTIC ACTIVITIES: CPT

## 2024-08-28 PROCEDURE — 1100000003 HC PRIVATE W/ TELEMETRY

## 2024-08-28 PROCEDURE — 6370000000 HC RX 637 (ALT 250 FOR IP): Performed by: STUDENT IN AN ORGANIZED HEALTH CARE EDUCATION/TRAINING PROGRAM

## 2024-08-28 PROCEDURE — 80048 BASIC METABOLIC PNL TOTAL CA: CPT

## 2024-08-28 PROCEDURE — 6370000000 HC RX 637 (ALT 250 FOR IP): Performed by: PHYSICIAN ASSISTANT

## 2024-08-28 PROCEDURE — 85027 COMPLETE CBC AUTOMATED: CPT

## 2024-08-28 PROCEDURE — 2580000003 HC RX 258: Performed by: STUDENT IN AN ORGANIZED HEALTH CARE EDUCATION/TRAINING PROGRAM

## 2024-08-28 RX ORDER — BENZONATATE 100 MG/1
100 CAPSULE ORAL 3 TIMES DAILY PRN
Status: DISCONTINUED | OUTPATIENT
Start: 2024-08-28 | End: 2024-08-30

## 2024-08-28 RX ADMIN — AMLODIPINE BESYLATE 10 MG: 10 TABLET ORAL at 09:36

## 2024-08-28 RX ADMIN — TAMSULOSIN HYDROCHLORIDE 0.4 MG: 0.4 CAPSULE ORAL at 09:36

## 2024-08-28 RX ADMIN — SODIUM CHLORIDE, PRESERVATIVE FREE 10 ML: 5 INJECTION INTRAVENOUS at 20:43

## 2024-08-28 RX ADMIN — SODIUM CHLORIDE, PRESERVATIVE FREE 10 ML: 5 INJECTION INTRAVENOUS at 09:36

## 2024-08-28 RX ADMIN — BENZONATATE 100 MG: 100 CAPSULE ORAL at 20:41

## 2024-08-28 RX ADMIN — BENZONATATE 100 MG: 100 CAPSULE ORAL at 12:57

## 2024-08-29 ENCOUNTER — APPOINTMENT (OUTPATIENT)
Dept: ULTRASOUND IMAGING | Age: 84
DRG: 871 | End: 2024-08-29
Payer: MEDICARE

## 2024-08-29 LAB
ANION GAP SERPL CALC-SCNC: 11 MMOL/L (ref 9–18)
BASOPHILS # BLD: 0.1 K/UL (ref 0–0.2)
BASOPHILS NFR BLD: 1 % (ref 0–2)
BUN SERPL-MCNC: 66 MG/DL (ref 8–23)
CALCIUM SERPL-MCNC: 8.2 MG/DL (ref 8.8–10.2)
CHLORIDE SERPL-SCNC: 101 MMOL/L (ref 98–107)
CO2 SERPL-SCNC: 22 MMOL/L (ref 20–28)
CREAT SERPL-MCNC: 2.66 MG/DL (ref 0.8–1.3)
DIFFERENTIAL METHOD BLD: ABNORMAL
EOSINOPHIL # BLD: 0.3 K/UL (ref 0–0.8)
EOSINOPHIL NFR BLD: 2 % (ref 0.5–7.8)
ERYTHROCYTE [DISTWIDTH] IN BLOOD BY AUTOMATED COUNT: 13.9 % (ref 11.9–14.6)
GLUCOSE SERPL-MCNC: 105 MG/DL (ref 70–99)
HCT VFR BLD AUTO: 33.2 % (ref 41.1–50.3)
HGB BLD-MCNC: 11.6 G/DL (ref 13.6–17.2)
IMM GRANULOCYTES # BLD AUTO: 0.9 K/UL (ref 0–0.5)
IMM GRANULOCYTES NFR BLD AUTO: 6 % (ref 0–5)
LYMPHOCYTES # BLD: 1 K/UL (ref 0.5–4.6)
LYMPHOCYTES NFR BLD: 7 % (ref 13–44)
MCH RBC QN AUTO: 32.3 PG (ref 26.1–32.9)
MCHC RBC AUTO-ENTMCNC: 34.9 G/DL (ref 31.4–35)
MCV RBC AUTO: 92.5 FL (ref 82–102)
MONOCYTES # BLD: 1 K/UL (ref 0.1–1.3)
MONOCYTES NFR BLD: 7 % (ref 4–12)
NEUTS SEG # BLD: 11.2 K/UL (ref 1.7–8.2)
NEUTS SEG NFR BLD: 77 % (ref 43–78)
NRBC # BLD: 0 K/UL (ref 0–0.2)
PLATELET # BLD AUTO: 334 K/UL (ref 150–450)
PLATELET COMMENT: ADEQUATE
PMV BLD AUTO: 11.5 FL (ref 9.4–12.3)
POTASSIUM SERPL-SCNC: 3.6 MMOL/L (ref 3.5–5.1)
PROCALCITONIN SERPL-MCNC: 4.61 NG/ML (ref 0–0.1)
RBC # BLD AUTO: 3.59 M/UL (ref 4.23–5.6)
RBC MORPH BLD: ABNORMAL
SODIUM SERPL-SCNC: 134 MMOL/L (ref 136–145)
WBC # BLD AUTO: 14.5 K/UL (ref 4.3–11.1)
WBC MORPH BLD: ABNORMAL

## 2024-08-29 PROCEDURE — 6370000000 HC RX 637 (ALT 250 FOR IP): Performed by: NURSE PRACTITIONER

## 2024-08-29 PROCEDURE — 36415 COLL VENOUS BLD VENIPUNCTURE: CPT

## 2024-08-29 PROCEDURE — 6370000000 HC RX 637 (ALT 250 FOR IP): Performed by: STUDENT IN AN ORGANIZED HEALTH CARE EDUCATION/TRAINING PROGRAM

## 2024-08-29 PROCEDURE — 1100000003 HC PRIVATE W/ TELEMETRY

## 2024-08-29 PROCEDURE — 2580000003 HC RX 258: Performed by: STUDENT IN AN ORGANIZED HEALTH CARE EDUCATION/TRAINING PROGRAM

## 2024-08-29 PROCEDURE — 51798 US URINE CAPACITY MEASURE: CPT

## 2024-08-29 PROCEDURE — 76770 US EXAM ABDO BACK WALL COMP: CPT

## 2024-08-29 PROCEDURE — 85025 COMPLETE CBC W/AUTO DIFF WBC: CPT

## 2024-08-29 PROCEDURE — 6370000000 HC RX 637 (ALT 250 FOR IP): Performed by: PHYSICIAN ASSISTANT

## 2024-08-29 PROCEDURE — 6370000000 HC RX 637 (ALT 250 FOR IP): Performed by: HOSPITALIST

## 2024-08-29 PROCEDURE — 80048 BASIC METABOLIC PNL TOTAL CA: CPT

## 2024-08-29 PROCEDURE — 84145 PROCALCITONIN (PCT): CPT

## 2024-08-29 RX ORDER — HEPARIN SODIUM 5000 [USP'U]/ML
5000 INJECTION, SOLUTION INTRAVENOUS; SUBCUTANEOUS EVERY 8 HOURS SCHEDULED
Status: DISCONTINUED | OUTPATIENT
Start: 2024-08-29 | End: 2024-08-29

## 2024-08-29 RX ORDER — GUAIFENESIN/DEXTROMETHORPHAN 100-10MG/5
10 SYRUP ORAL EVERY 6 HOURS PRN
Status: DISCONTINUED | OUTPATIENT
Start: 2024-08-29 | End: 2024-08-31

## 2024-08-29 RX ORDER — GUAIFENESIN/DEXTROMETHORPHAN 100-10MG/5
10 SYRUP ORAL ONCE
Status: COMPLETED | OUTPATIENT
Start: 2024-08-29 | End: 2024-08-29

## 2024-08-29 RX ADMIN — TAMSULOSIN HYDROCHLORIDE 0.4 MG: 0.4 CAPSULE ORAL at 09:11

## 2024-08-29 RX ADMIN — LEVOFLOXACIN 500 MG: 500 TABLET, FILM COATED ORAL at 09:11

## 2024-08-29 RX ADMIN — GUAIFENESIN SYRUP AND DEXTROMETHORPHAN 10 ML: 100; 10 SYRUP ORAL at 11:46

## 2024-08-29 RX ADMIN — AMLODIPINE BESYLATE 10 MG: 10 TABLET ORAL at 09:11

## 2024-08-29 RX ADMIN — BENZONATATE 100 MG: 100 CAPSULE ORAL at 08:35

## 2024-08-29 RX ADMIN — GUAIFENESIN SYRUP AND DEXTROMETHORPHAN 10 ML: 100; 10 SYRUP ORAL at 18:04

## 2024-08-29 RX ADMIN — SODIUM CHLORIDE, PRESERVATIVE FREE 10 ML: 5 INJECTION INTRAVENOUS at 09:12

## 2024-08-29 RX ADMIN — SODIUM CHLORIDE, PRESERVATIVE FREE 10 ML: 5 INJECTION INTRAVENOUS at 21:07

## 2024-08-30 ENCOUNTER — APPOINTMENT (OUTPATIENT)
Dept: GENERAL RADIOLOGY | Age: 84
DRG: 871 | End: 2024-08-30
Payer: MEDICARE

## 2024-08-30 ENCOUNTER — APPOINTMENT (OUTPATIENT)
Dept: MRI IMAGING | Age: 84
DRG: 871 | End: 2024-08-30
Payer: MEDICARE

## 2024-08-30 LAB
AFP-TM SERPL-MCNC: <1.82 NG/ML (ref 0–8.3)
ALBUMIN SERPL-MCNC: 2 G/DL (ref 3.2–4.6)
ALBUMIN/GLOB SERPL: 0.4 (ref 1–1.9)
ALP SERPL-CCNC: 171 U/L (ref 40–129)
ALT SERPL-CCNC: 28 U/L (ref 12–65)
ANION GAP SERPL CALC-SCNC: 8 MMOL/L (ref 9–18)
APPEARANCE UR: CLEAR
AST SERPL-CCNC: 33 U/L (ref 15–37)
BACTERIA SPEC CULT: NORMAL
BACTERIA URNS QL MICRO: NEGATIVE /HPF
BASOPHILS # BLD: 0.2 K/UL (ref 0–0.2)
BASOPHILS NFR BLD: 1 % (ref 0–2)
BILIRUB SERPL-MCNC: 0.4 MG/DL (ref 0–1.2)
BILIRUB UR QL: NEGATIVE
BUN SERPL-MCNC: 62 MG/DL (ref 8–23)
CALCIUM SERPL-MCNC: 8 MG/DL (ref 8.8–10.2)
CANCER AG19-9 SERPL-ACNC: 54.7 U/ML (ref 0–35)
CEA SERPL-MCNC: 3.8 NG/ML (ref 0–3.8)
CHLORIDE SERPL-SCNC: 103 MMOL/L (ref 98–107)
CO2 SERPL-SCNC: 23 MMOL/L (ref 20–28)
COLOR UR: ABNORMAL
CREAT SERPL-MCNC: 2.34 MG/DL (ref 0.8–1.3)
DIFFERENTIAL METHOD BLD: ABNORMAL
EOSINOPHIL # BLD: 0.3 K/UL (ref 0–0.8)
EOSINOPHIL NFR BLD: 2 % (ref 0.5–7.8)
EPI CELLS #/AREA URNS HPF: ABNORMAL /HPF
ERYTHROCYTE [DISTWIDTH] IN BLOOD BY AUTOMATED COUNT: 13.7 % (ref 11.9–14.6)
GLOBULIN SER CALC-MCNC: 4.4 G/DL (ref 2.3–3.5)
GLUCOSE SERPL-MCNC: 114 MG/DL (ref 70–99)
GLUCOSE UR STRIP.AUTO-MCNC: NEGATIVE MG/DL
HCT VFR BLD AUTO: 32.6 % (ref 41.1–50.3)
HGB BLD-MCNC: 11.1 G/DL (ref 13.6–17.2)
HGB UR QL STRIP: ABNORMAL
HYALINE CASTS URNS QL MICRO: ABNORMAL /LPF
IMM GRANULOCYTES # BLD AUTO: 0.8 K/UL (ref 0–0.5)
IMM GRANULOCYTES NFR BLD AUTO: 5 % (ref 0–5)
KETONES UR QL STRIP.AUTO: NEGATIVE MG/DL
LEUKOCYTE ESTERASE UR QL STRIP.AUTO: ABNORMAL
LYMPHOCYTES # BLD: 1 K/UL (ref 0.5–4.6)
LYMPHOCYTES NFR BLD: 6 % (ref 13–44)
MCH RBC QN AUTO: 31.5 PG (ref 26.1–32.9)
MCHC RBC AUTO-ENTMCNC: 34 G/DL (ref 31.4–35)
MCV RBC AUTO: 92.6 FL (ref 82–102)
MONOCYTES # BLD: 1.1 K/UL (ref 0.1–1.3)
MONOCYTES NFR BLD: 7 % (ref 4–12)
NEUTS SEG # BLD: 12.7 K/UL (ref 1.7–8.2)
NEUTS SEG NFR BLD: 79 % (ref 43–78)
NITRITE UR QL STRIP.AUTO: NEGATIVE
NRBC # BLD: 0 K/UL (ref 0–0.2)
PH UR STRIP: 5.5 (ref 5–9)
PLATELET # BLD AUTO: 233 K/UL (ref 150–450)
PLATELET COMMENT: ADEQUATE
PMV BLD AUTO: 11 FL (ref 9.4–12.3)
POTASSIUM SERPL-SCNC: 3.9 MMOL/L (ref 3.5–5.1)
PROCALCITONIN SERPL-MCNC: 2.3 NG/ML (ref 0–0.1)
PROT SERPL-MCNC: 6.3 G/DL (ref 6.3–8.2)
PROT UR STRIP-MCNC: 30 MG/DL
PSA SERPL-MCNC: 1.1 NG/ML (ref 0–4)
RBC # BLD AUTO: 3.52 M/UL (ref 4.23–5.6)
RBC #/AREA URNS HPF: ABNORMAL /HPF
RBC MORPH BLD: ABNORMAL
SERVICE CMNT-IMP: NORMAL
SODIUM SERPL-SCNC: 133 MMOL/L (ref 136–145)
SP GR UR REFRACTOMETRY: 1.01 (ref 1–1.02)
UROBILINOGEN UR QL STRIP.AUTO: 0.2 EU/DL (ref 0.2–1)
WBC # BLD AUTO: 16.1 K/UL (ref 4.3–11.1)
WBC MORPH BLD: ABNORMAL
WBC URNS QL MICRO: ABNORMAL /HPF

## 2024-08-30 PROCEDURE — 80053 COMPREHEN METABOLIC PANEL: CPT

## 2024-08-30 PROCEDURE — 6370000000 HC RX 637 (ALT 250 FOR IP): Performed by: PHYSICIAN ASSISTANT

## 2024-08-30 PROCEDURE — 6370000000 HC RX 637 (ALT 250 FOR IP): Performed by: NURSE PRACTITIONER

## 2024-08-30 PROCEDURE — 82378 CARCINOEMBRYONIC ANTIGEN: CPT

## 2024-08-30 PROCEDURE — 86301 IMMUNOASSAY TUMOR CA 19-9: CPT

## 2024-08-30 PROCEDURE — 97530 THERAPEUTIC ACTIVITIES: CPT

## 2024-08-30 PROCEDURE — 85025 COMPLETE CBC W/AUTO DIFF WBC: CPT

## 2024-08-30 PROCEDURE — 1100000003 HC PRIVATE W/ TELEMETRY

## 2024-08-30 PROCEDURE — 71045 X-RAY EXAM CHEST 1 VIEW: CPT

## 2024-08-30 PROCEDURE — 84145 PROCALCITONIN (PCT): CPT

## 2024-08-30 PROCEDURE — 6370000000 HC RX 637 (ALT 250 FOR IP): Performed by: STUDENT IN AN ORGANIZED HEALTH CARE EDUCATION/TRAINING PROGRAM

## 2024-08-30 PROCEDURE — 36415 COLL VENOUS BLD VENIPUNCTURE: CPT

## 2024-08-30 PROCEDURE — 97535 SELF CARE MNGMENT TRAINING: CPT

## 2024-08-30 PROCEDURE — 84153 ASSAY OF PSA TOTAL: CPT

## 2024-08-30 PROCEDURE — 81001 URINALYSIS AUTO W/SCOPE: CPT

## 2024-08-30 PROCEDURE — 82105 ALPHA-FETOPROTEIN SERUM: CPT

## 2024-08-30 PROCEDURE — 2580000003 HC RX 258: Performed by: STUDENT IN AN ORGANIZED HEALTH CARE EDUCATION/TRAINING PROGRAM

## 2024-08-30 RX ORDER — CIPROFLOXACIN 500 MG/1
500 TABLET, FILM COATED ORAL
Status: DISCONTINUED | OUTPATIENT
Start: 2024-08-30 | End: 2024-08-31

## 2024-08-30 RX ORDER — BENZONATATE 100 MG/1
200 CAPSULE ORAL 3 TIMES DAILY PRN
Status: DISCONTINUED | OUTPATIENT
Start: 2024-08-30 | End: 2024-08-31

## 2024-08-30 RX ADMIN — AMLODIPINE BESYLATE 10 MG: 10 TABLET ORAL at 08:58

## 2024-08-30 RX ADMIN — SODIUM CHLORIDE, PRESERVATIVE FREE 10 ML: 5 INJECTION INTRAVENOUS at 10:15

## 2024-08-30 RX ADMIN — SODIUM CHLORIDE, PRESERVATIVE FREE 5 ML: 5 INJECTION INTRAVENOUS at 20:31

## 2024-08-30 RX ADMIN — TAMSULOSIN HYDROCHLORIDE 0.4 MG: 0.4 CAPSULE ORAL at 08:58

## 2024-08-30 RX ADMIN — GUAIFENESIN SYRUP AND DEXTROMETHORPHAN 10 ML: 100; 10 SYRUP ORAL at 10:27

## 2024-08-30 RX ADMIN — BENZONATATE 100 MG: 100 CAPSULE ORAL at 08:58

## 2024-08-30 RX ADMIN — BENZONATATE 200 MG: 100 CAPSULE ORAL at 17:45

## 2024-08-30 RX ADMIN — CIPROFLOXACIN 500 MG: 500 TABLET, FILM COATED ORAL at 11:33

## 2024-08-31 ENCOUNTER — APPOINTMENT (OUTPATIENT)
Dept: MRI IMAGING | Age: 84
DRG: 871 | End: 2024-08-31
Payer: MEDICARE

## 2024-08-31 ENCOUNTER — APPOINTMENT (OUTPATIENT)
Dept: ULTRASOUND IMAGING | Age: 84
DRG: 871 | End: 2024-08-31
Payer: MEDICARE

## 2024-08-31 LAB
ANION GAP SERPL CALC-SCNC: 7 MMOL/L (ref 9–18)
B PERT DNA SPEC QL NAA+PROBE: NOT DETECTED
BASOPHILS # BLD: 0.2 K/UL (ref 0–0.2)
BASOPHILS NFR BLD: 1 % (ref 0–2)
BORDETELLA PARAPERTUSSIS BY PCR: NOT DETECTED
BUN SERPL-MCNC: 56 MG/DL (ref 8–23)
C PNEUM DNA SPEC QL NAA+PROBE: NOT DETECTED
CALCIUM SERPL-MCNC: 8.1 MG/DL (ref 8.8–10.2)
CHLORIDE SERPL-SCNC: 101 MMOL/L (ref 98–107)
CO2 SERPL-SCNC: 23 MMOL/L (ref 20–28)
CREAT SERPL-MCNC: 2.17 MG/DL (ref 0.8–1.3)
DIFFERENTIAL METHOD BLD: ABNORMAL
EOSINOPHIL # BLD: 0.3 K/UL (ref 0–0.8)
EOSINOPHIL NFR BLD: 2 % (ref 0.5–7.8)
ERYTHROCYTE [DISTWIDTH] IN BLOOD BY AUTOMATED COUNT: 13.8 % (ref 11.9–14.6)
FLUAV SUBTYP SPEC NAA+PROBE: NOT DETECTED
FLUBV RNA SPEC QL NAA+PROBE: NOT DETECTED
GLUCOSE SERPL-MCNC: 107 MG/DL (ref 70–99)
HADV DNA SPEC QL NAA+PROBE: NOT DETECTED
HCOV 229E RNA SPEC QL NAA+PROBE: NOT DETECTED
HCOV HKU1 RNA SPEC QL NAA+PROBE: NOT DETECTED
HCOV NL63 RNA SPEC QL NAA+PROBE: NOT DETECTED
HCOV OC43 RNA SPEC QL NAA+PROBE: NOT DETECTED
HCT VFR BLD AUTO: 34.6 % (ref 41.1–50.3)
HGB BLD-MCNC: 12 G/DL (ref 13.6–17.2)
HMPV RNA SPEC QL NAA+PROBE: NOT DETECTED
HPIV1 RNA SPEC QL NAA+PROBE: NOT DETECTED
HPIV2 RNA SPEC QL NAA+PROBE: NOT DETECTED
HPIV3 RNA SPEC QL NAA+PROBE: NOT DETECTED
HPIV4 RNA SPEC QL NAA+PROBE: NOT DETECTED
IMM GRANULOCYTES # BLD AUTO: 1 K/UL (ref 0–0.5)
IMM GRANULOCYTES NFR BLD AUTO: 6 % (ref 0–5)
LYMPHOCYTES # BLD: 1 K/UL (ref 0.5–4.6)
LYMPHOCYTES NFR BLD: 6 % (ref 13–44)
M PNEUMO DNA SPEC QL NAA+PROBE: NOT DETECTED
MCH RBC QN AUTO: 31.9 PG (ref 26.1–32.9)
MCHC RBC AUTO-ENTMCNC: 34.7 G/DL (ref 31.4–35)
MCV RBC AUTO: 92 FL (ref 82–102)
MONOCYTES # BLD: 1 K/UL (ref 0.1–1.3)
MONOCYTES NFR BLD: 6 % (ref 4–12)
NEUTS SEG # BLD: 13.1 K/UL (ref 1.7–8.2)
NEUTS SEG NFR BLD: 79 % (ref 43–78)
NRBC # BLD: 0 K/UL (ref 0–0.2)
PLATELET # BLD AUTO: 263 K/UL (ref 150–450)
PLATELET COMMENT: ADEQUATE
PMV BLD AUTO: 10.2 FL (ref 9.4–12.3)
POTASSIUM SERPL-SCNC: 4.1 MMOL/L (ref 3.5–5.1)
RBC # BLD AUTO: 3.76 M/UL (ref 4.23–5.6)
RBC MORPH BLD: ABNORMAL
RSV RNA SPEC QL NAA+PROBE: NOT DETECTED
RV+EV RNA SPEC QL NAA+PROBE: NOT DETECTED
SARS-COV-2 RNA RESP QL NAA+PROBE: NOT DETECTED
SODIUM SERPL-SCNC: 132 MMOL/L (ref 136–145)
WBC # BLD AUTO: 16.6 K/UL (ref 4.3–11.1)
WBC MORPH BLD: ABNORMAL

## 2024-08-31 PROCEDURE — 6370000000 HC RX 637 (ALT 250 FOR IP): Performed by: NURSE PRACTITIONER

## 2024-08-31 PROCEDURE — 6370000000 HC RX 637 (ALT 250 FOR IP): Performed by: PHYSICIAN ASSISTANT

## 2024-08-31 PROCEDURE — 2580000003 HC RX 258: Performed by: PHYSICIAN ASSISTANT

## 2024-08-31 PROCEDURE — 0202U NFCT DS 22 TRGT SARS-COV-2: CPT

## 2024-08-31 PROCEDURE — 80048 BASIC METABOLIC PNL TOTAL CA: CPT

## 2024-08-31 PROCEDURE — 85025 COMPLETE CBC W/AUTO DIFF WBC: CPT

## 2024-08-31 PROCEDURE — 36415 COLL VENOUS BLD VENIPUNCTURE: CPT

## 2024-08-31 PROCEDURE — 6360000002 HC RX W HCPCS: Performed by: PHYSICIAN ASSISTANT

## 2024-08-31 PROCEDURE — 93925 LOWER EXTREMITY STUDY: CPT

## 2024-08-31 PROCEDURE — 2580000003 HC RX 258: Performed by: STUDENT IN AN ORGANIZED HEALTH CARE EDUCATION/TRAINING PROGRAM

## 2024-08-31 PROCEDURE — 6360000002 HC RX W HCPCS: Performed by: STUDENT IN AN ORGANIZED HEALTH CARE EDUCATION/TRAINING PROGRAM

## 2024-08-31 PROCEDURE — 93925 LOWER EXTREMITY STUDY: CPT | Performed by: RADIOLOGY

## 2024-08-31 PROCEDURE — 6370000000 HC RX 637 (ALT 250 FOR IP): Performed by: STUDENT IN AN ORGANIZED HEALTH CARE EDUCATION/TRAINING PROGRAM

## 2024-08-31 PROCEDURE — 1100000003 HC PRIVATE W/ TELEMETRY

## 2024-08-31 PROCEDURE — 74183 MRI ABD W/O CNTR FLWD CNTR: CPT

## 2024-08-31 RX ORDER — GUAIFENESIN/DEXTROMETHORPHAN 100-10MG/5
10 SYRUP ORAL EVERY 6 HOURS
Status: DISCONTINUED | OUTPATIENT
Start: 2024-08-31 | End: 2024-09-01

## 2024-08-31 RX ORDER — BENZONATATE 100 MG/1
200 CAPSULE ORAL 3 TIMES DAILY
Status: DISCONTINUED | OUTPATIENT
Start: 2024-08-31 | End: 2024-09-03

## 2024-08-31 RX ORDER — MORPHINE SULFATE 10 MG/5ML
2.5 SOLUTION ORAL EVERY 4 HOURS PRN
Status: DISCONTINUED | OUTPATIENT
Start: 2024-08-31 | End: 2024-09-05 | Stop reason: HOSPADM

## 2024-08-31 RX ORDER — GUAIFENESIN/DEXTROMETHORPHAN 100-10MG/5
10 SYRUP ORAL EVERY 6 HOURS
Status: DISCONTINUED | OUTPATIENT
Start: 2024-08-31 | End: 2024-08-31

## 2024-08-31 RX ADMIN — BENZONATATE 200 MG: 100 CAPSULE ORAL at 09:29

## 2024-08-31 RX ADMIN — GUAIFENESIN SYRUP AND DEXTROMETHORPHAN 10 ML: 100; 10 SYRUP ORAL at 19:51

## 2024-08-31 RX ADMIN — SODIUM CHLORIDE, PRESERVATIVE FREE 10 ML: 5 INJECTION INTRAVENOUS at 21:37

## 2024-08-31 RX ADMIN — BENZONATATE 200 MG: 100 CAPSULE ORAL at 21:36

## 2024-08-31 RX ADMIN — TAMSULOSIN HYDROCHLORIDE 0.4 MG: 0.4 CAPSULE ORAL at 09:28

## 2024-08-31 RX ADMIN — PIPERACILLIN AND TAZOBACTAM 4500 MG: 4; .5 INJECTION, POWDER, LYOPHILIZED, FOR SOLUTION INTRAVENOUS at 10:44

## 2024-08-31 RX ADMIN — PIPERACILLIN AND TAZOBACTAM 3375 MG: 3; .375 INJECTION, POWDER, LYOPHILIZED, FOR SOLUTION INTRAVENOUS at 17:33

## 2024-08-31 RX ADMIN — SODIUM CHLORIDE, PRESERVATIVE FREE 10 ML: 5 INJECTION INTRAVENOUS at 09:28

## 2024-08-31 RX ADMIN — CIPROFLOXACIN 500 MG: 500 TABLET, FILM COATED ORAL at 09:27

## 2024-08-31 RX ADMIN — GUAIFENESIN SYRUP AND DEXTROMETHORPHAN 10 ML: 100; 10 SYRUP ORAL at 13:21

## 2024-08-31 RX ADMIN — BENZONATATE 200 MG: 100 CAPSULE ORAL at 16:05

## 2024-08-31 RX ADMIN — AMLODIPINE BESYLATE 10 MG: 10 TABLET ORAL at 09:28

## 2024-09-01 PROBLEM — N32.0 BLADDER OUTLET OBSTRUCTION: Status: ACTIVE | Noted: 2024-09-01

## 2024-09-01 PROBLEM — J96.01 ACUTE HYPOXIC RESPIRATORY FAILURE (HCC): Status: RESOLVED | Noted: 2024-08-24 | Resolved: 2024-09-01

## 2024-09-01 PROBLEM — E44.0 MODERATE PROTEIN-CALORIE MALNUTRITION (HCC): Status: ACTIVE | Noted: 2024-09-01

## 2024-09-01 PROBLEM — N32.89 INCREASED TRABECULATION OF BLADDER: Status: ACTIVE | Noted: 2024-09-01

## 2024-09-01 PROBLEM — R63.4 UNINTENTIONAL WEIGHT LOSS: Status: ACTIVE | Noted: 2024-09-01

## 2024-09-01 PROBLEM — R16.0 LIVER MASS: Status: ACTIVE | Noted: 2024-09-01

## 2024-09-01 PROBLEM — R97.8 ELEVATED CA 19-9 LEVEL: Status: ACTIVE | Noted: 2024-09-01

## 2024-09-01 PROBLEM — N18.4 CKD (CHRONIC KIDNEY DISEASE), STAGE IV (HCC): Status: ACTIVE | Noted: 2019-12-09

## 2024-09-01 PROBLEM — R78.81 BACTEREMIA DUE TO PROTEUS SPECIES: Status: ACTIVE | Noted: 2024-09-01

## 2024-09-01 PROBLEM — R91.8 MASS OF UPPER LOBE OF LEFT LUNG: Status: ACTIVE | Noted: 2024-09-01

## 2024-09-01 PROBLEM — K40.90 RIGHT INGUINAL HERNIA: Status: ACTIVE | Noted: 2024-09-01

## 2024-09-01 PROBLEM — B96.4 URINARY TRACT INFECTION DUE TO PROTEUS: Status: ACTIVE | Noted: 2024-09-01

## 2024-09-01 PROBLEM — N39.0 URINARY TRACT INFECTION DUE TO PROTEUS: Status: ACTIVE | Noted: 2024-09-01

## 2024-09-01 PROBLEM — B96.4 BACTEREMIA DUE TO PROTEUS SPECIES: Status: ACTIVE | Noted: 2024-09-01

## 2024-09-01 LAB
ALBUMIN SERPL-MCNC: 1.9 G/DL (ref 3.2–4.6)
ALBUMIN/GLOB SERPL: 0.5 (ref 1–1.9)
ALP SERPL-CCNC: 159 U/L (ref 40–129)
ALT SERPL-CCNC: 27 U/L (ref 12–65)
ANION GAP SERPL CALC-SCNC: 9 MMOL/L (ref 9–18)
AST SERPL-CCNC: 30 U/L (ref 15–37)
BASOPHILS # BLD: 0.1 K/UL (ref 0–0.2)
BASOPHILS NFR BLD: 1 % (ref 0–2)
BILIRUB SERPL-MCNC: 0.6 MG/DL (ref 0–1.2)
BUN SERPL-MCNC: 52 MG/DL (ref 8–23)
CALCIUM SERPL-MCNC: 7.7 MG/DL (ref 8.8–10.2)
CHLORIDE SERPL-SCNC: 102 MMOL/L (ref 98–107)
CO2 SERPL-SCNC: 21 MMOL/L (ref 20–28)
CREAT SERPL-MCNC: 2.21 MG/DL (ref 0.8–1.3)
DIFFERENTIAL METHOD BLD: ABNORMAL
EOSINOPHIL # BLD: 0.3 K/UL (ref 0–0.8)
EOSINOPHIL NFR BLD: 2 % (ref 0.5–7.8)
ERYTHROCYTE [DISTWIDTH] IN BLOOD BY AUTOMATED COUNT: 13.9 % (ref 11.9–14.6)
GLOBULIN SER CALC-MCNC: 4 G/DL (ref 2.3–3.5)
GLUCOSE SERPL-MCNC: 102 MG/DL (ref 70–99)
HCT VFR BLD AUTO: 32.5 % (ref 41.1–50.3)
HGB BLD-MCNC: 10.9 G/DL (ref 13.6–17.2)
IMM GRANULOCYTES # BLD AUTO: 0.8 K/UL (ref 0–0.5)
IMM GRANULOCYTES NFR BLD AUTO: 5 % (ref 0–5)
LYMPHOCYTES # BLD: 0.9 K/UL (ref 0.5–4.6)
LYMPHOCYTES NFR BLD: 5 % (ref 13–44)
MCH RBC QN AUTO: 32 PG (ref 26.1–32.9)
MCHC RBC AUTO-ENTMCNC: 33.5 G/DL (ref 31.4–35)
MCV RBC AUTO: 95.3 FL (ref 82–102)
MONOCYTES # BLD: 1.4 K/UL (ref 0.1–1.3)
MONOCYTES NFR BLD: 8 % (ref 4–12)
NEUTS SEG # BLD: 13.8 K/UL (ref 1.7–8.2)
NEUTS SEG NFR BLD: 79 % (ref 43–78)
NRBC # BLD: 0 K/UL (ref 0–0.2)
PLATELET # BLD AUTO: 289 K/UL (ref 150–450)
PMV BLD AUTO: 10 FL (ref 9.4–12.3)
POTASSIUM SERPL-SCNC: 4.8 MMOL/L (ref 3.5–5.1)
PROCALCITONIN SERPL-MCNC: 0.72 NG/ML (ref 0–0.1)
PROT SERPL-MCNC: 5.9 G/DL (ref 6.3–8.2)
RBC # BLD AUTO: 3.41 M/UL (ref 4.23–5.6)
SODIUM SERPL-SCNC: 133 MMOL/L (ref 136–145)
WBC # BLD AUTO: 17.4 K/UL (ref 4.3–11.1)

## 2024-09-01 PROCEDURE — 36415 COLL VENOUS BLD VENIPUNCTURE: CPT

## 2024-09-01 PROCEDURE — 2580000003 HC RX 258: Performed by: STUDENT IN AN ORGANIZED HEALTH CARE EDUCATION/TRAINING PROGRAM

## 2024-09-01 PROCEDURE — 84145 PROCALCITONIN (PCT): CPT

## 2024-09-01 PROCEDURE — 1100000003 HC PRIVATE W/ TELEMETRY

## 2024-09-01 PROCEDURE — 92610 EVALUATE SWALLOWING FUNCTION: CPT

## 2024-09-01 PROCEDURE — 6360000002 HC RX W HCPCS: Performed by: STUDENT IN AN ORGANIZED HEALTH CARE EDUCATION/TRAINING PROGRAM

## 2024-09-01 PROCEDURE — 6370000000 HC RX 637 (ALT 250 FOR IP): Performed by: STUDENT IN AN ORGANIZED HEALTH CARE EDUCATION/TRAINING PROGRAM

## 2024-09-01 PROCEDURE — 87641 MR-STAPH DNA AMP PROBE: CPT

## 2024-09-01 PROCEDURE — 6370000000 HC RX 637 (ALT 250 FOR IP): Performed by: PHYSICIAN ASSISTANT

## 2024-09-01 PROCEDURE — 6370000000 HC RX 637 (ALT 250 FOR IP): Performed by: NURSE PRACTITIONER

## 2024-09-01 PROCEDURE — 80053 COMPREHEN METABOLIC PANEL: CPT

## 2024-09-01 PROCEDURE — 51798 US URINE CAPACITY MEASURE: CPT

## 2024-09-01 PROCEDURE — 85025 COMPLETE CBC W/AUTO DIFF WBC: CPT

## 2024-09-01 RX ORDER — GUAIFENESIN/DEXTROMETHORPHAN 100-10MG/5
10 SYRUP ORAL EVERY 6 HOURS PRN
Status: DISCONTINUED | OUTPATIENT
Start: 2024-09-01 | End: 2024-09-05 | Stop reason: HOSPADM

## 2024-09-01 RX ADMIN — BENZONATATE 200 MG: 100 CAPSULE ORAL at 14:04

## 2024-09-01 RX ADMIN — SODIUM CHLORIDE, PRESERVATIVE FREE 10 ML: 5 INJECTION INTRAVENOUS at 20:47

## 2024-09-01 RX ADMIN — GUAIFENESIN SYRUP AND DEXTROMETHORPHAN 10 ML: 100; 10 SYRUP ORAL at 00:47

## 2024-09-01 RX ADMIN — PIPERACILLIN AND TAZOBACTAM 3375 MG: 3; .375 INJECTION, POWDER, LYOPHILIZED, FOR SOLUTION INTRAVENOUS at 08:56

## 2024-09-01 RX ADMIN — TAMSULOSIN HYDROCHLORIDE 0.4 MG: 0.4 CAPSULE ORAL at 08:45

## 2024-09-01 RX ADMIN — AMLODIPINE BESYLATE 10 MG: 10 TABLET ORAL at 08:45

## 2024-09-01 RX ADMIN — SODIUM CHLORIDE: 9 INJECTION, SOLUTION INTRAVENOUS at 08:55

## 2024-09-01 RX ADMIN — PIPERACILLIN AND TAZOBACTAM 3375 MG: 3; .375 INJECTION, POWDER, LYOPHILIZED, FOR SOLUTION INTRAVENOUS at 00:49

## 2024-09-01 RX ADMIN — BENZONATATE 200 MG: 100 CAPSULE ORAL at 20:47

## 2024-09-01 RX ADMIN — SODIUM CHLORIDE, PRESERVATIVE FREE 5 ML: 5 INJECTION INTRAVENOUS at 08:46

## 2024-09-01 RX ADMIN — PIPERACILLIN AND TAZOBACTAM 3375 MG: 3; .375 INJECTION, POWDER, LYOPHILIZED, FOR SOLUTION INTRAVENOUS at 16:34

## 2024-09-01 RX ADMIN — BENZONATATE 200 MG: 100 CAPSULE ORAL at 08:45

## 2024-09-01 NOTE — CONSULTS
Patient: Parish Cohen MRN: [id number]  SSN: [id number]    YOB: 1940  Age: 84 y.o.  Sex: male       Date of Request: 8/26/2024  Date of Consult:  8/26/2024  Reason for Consult:  goals of care and category status  Requesting Physician: Dr. Varma     Assessment/Plan:     Principal Diagnosis:    Dyspnea  R06.00    Additional Diagnoses:   Debility, Unspecified  R53.81  Frailty  R54  Counseling, Encounter for Medical Advice  Z71.9  Encounter for Palliative Care  Z51.5    Palliative Performance Scale (PPS):       Medical Decision Making:   Reviewed and summarized labs and imaging from admission.  Pt alert and oriented.  Denies any pain, nausea.  Feels breathing has improved.  Reviewed ongoing care and discussed pt wishes.  He states he has one living daughter named Karen who would be decision maker for pt.  He wishes to remain a full code at this time.  Will follow loosely    I spent greater than 25 mins on advanced care planning.     Will discuss findings with members of the interdisciplinary team.      Thank you for this referral.         Subjective:     History obtained from:  Patient and Chart    Chief Complaint: fatigue  History of Present Illness:   84 y.o. male with medical history of HTN, atrial fibrillation on Eliquis, dementia, who presented with urinary incontinence.     The patient is a resident of nursing home [facility]'s care.  Staff members noted worsening of urinary incontinence with hematuria.  The patient is on Eliquis for A-fib.  Apparently, he also had unwitnessed fall, although the patient was not able to recall the event.  He has dementia, and his mentation seemed to be at baseline.  The patient was transferred to ER for further evaluation regarding urinary incontinence with hematuria.     In ER, vitals were significant with RR 32.  CBC showed WBC 17.7.  CMP showed creatinine 2.67 and BUN 76.  UA showed large blood, nitrite positive, large leukocyte esterase, and bacteria 4+.  
Nutrition: linking consult    JUANCARLOS CASTELLANOS, RD    
Urology Consult    Requesting MD:     Patient: Parish Cohen MRN: [id number]  SSN: [id number]    YOB: 1940  Age: 84 y.o.  Sex: male      Subjective:      Parish Cohen is a 84 y.o. male with medical history of HTN, atrial fibrillation on Eliquis, dementia, who presented with urinary incontinence.     The patient is a resident of nursing [facility].  Staff members noted worsening of urinary incontinence with hematuria.  The patient is on Eliquis for A-fib.  Apparently, he also had unwitnessed fall, although the patient was not able to recall the event.  He has dementia, and his mentation seemed to be at baseline.  The patient was transferred to ER for further evaluation regarding urinary incontinence with hematuria.     In ER, vitals were significant with RR 32.  CBC showed WBC 17.7.  CMP showed creatinine 2.67 and BUN 76.  UA showed large blood, nitrite positive, large leukocyte esterase, and bacteria 4+.  Lactic acid 2.1.  He was given ciprofloxacin in ER.  Hospitalist was consulted for admission regarding sepsis.    Urology consult for bilateral hydronephrosis.    Patient seen at bedside. He denies incomplete emptying or LUTS. However, chart review and notes from [facility] indicate worsening urinary incontinence. This could be overflow incontinence from urinary retention. Geronimo placed yesterday afternoon.    Past Medical History:   Diagnosis Date    Allergic rhinitis     Fractured pelvis (HCC) 2006    Fell off of roof.    History of chicken pox Childhood    Scarlet fever Childhood    Zenker's diverticulum     In the throat.     Past Surgical History:   Procedure Laterality Date    CATARACT REMOVAL      MALIGNANT SKIN LESION EXCISION      Multiple Basal Cells.    VASECTOMY        Family History   Problem Relation Age of Onset    Heart Disease Father     Cancer Neg Hx     Stroke Father     Diabetes Father     Heart Disease Mother     Hypertension Mother      Social History 
FIDEL LAURI RN @0625 ON 08.25.24 AC          Salmonella species by PCR NOT DETECTED        Serratia marcescens by PCR NOT DETECTED        Haemophilus Influenzae by PCR NOT DETECTED        Neisseria meningitidis by PCR NOT DETECTED        Pseudomonas aeruginosa NOT DETECTED        Stenotrophomonas maltophilia by PCR NOT DETECTED        Candida albicans by PCR NOT DETECTED        Candida auris by PCR NOT DETECTED        Candida glabrata NOT DETECTED        Candida krusei by PCR NOT DETECTED        Candida parapsilosis by PCR NOT DETECTED        Candida tropicalis by PCR NOT DETECTED        Cryptococcus neoformans/gattii by PCR NOT DETECTED        Resistant gene targets          Resistant gene ctx-m by PCR NOT DETECTED        Resistant gene imp by PCR NOT DETECTED        KPC (Carbapenem resistance gene) NOT DETECTED        Resistant gene ndm by PCR NOT DETECTED        Resistant gene oxa-48-like by pcr NOT DETECTED        Resistant gene vim by PCR NOT DETECTED        [employer] test comment       False positive results may rarely occur. Correlate with clinical,epidemiologic, and other laboratory findings           Comment: Please see BCID Interpretation Guide in EPIC Links       Culture, Urine [[id number]] Collected: 08/24/24 1325    Order Status: Completed Specimen: Urine, clean catch Updated: 08/25/24 0858     Special Requests NO SPECIAL REQUESTS        Culture       No growth after short period of incubation. Further results to follow after overnight incubation.                  Imaging:     I have personally reviewed imaging studies showing:  CT CHEST PULMONARY EMBOLISM W CONTRAST    Result Date: 8/24/2024  1. No evidence of pulmonary embolism. 2. Groundglass opacification throughout both lungs with interlobular septal thickening suggesting CHF/volume overload. Atypical infection with pneumonitis could also have such an appearance. 3. New bandlike area of consolidation in the left upper lobe extending to the pleural 
\"COVID19\" in the last 72 hours.  Assessment and Plan:  (Medical Decision Making)   Impression: 84 y.o male admitted with UTI and positive blood cultures with proteus- ID following and adjusting antibiotics. We were consulted for hypoxia and worsening infiltrates on imaging. He is on room air at time of consult with sat >95%. No cough, congestion, or shortness of breath.      Principal Problem:    Severe sepsis (HCC)    UTI (urinary tract infection)  Plan: blood cultures positive for proteus by PCR ; ID following for antibiotic recommendations- on levaquin now, zosyn stopped   Procal 44.8, LA normalized with fluids   WBC now trending downward   He has no cough and lung sounds fairly clear. Continue antibiotics per ID. No sputum available for cultures. If cough is witnessed despite patient's report due to confusion, can start mucinex and send sputum cultures if able.   New 14 mm consolidation seen in RADHA- could be atelectasis but would recommend repeat CT in 3 months.     Active Problems:    Paroxysmal atrial fibrillation (HCC)  Plan: was on eliquis as outpatient; is being held here     Stage 3a chronic kidney disease (HCC)  Plan: Cr worsening with UTI and post lasix.     Dementia (HCC)  Plan: baseline confusion    Acute hypoxic respiratory failure (HCC)  Plan: was requiring up to 7lpm to maintain sat >90%; now completely on room air and sat on bedside monitor is 96%; maybe oxygenation improved with the sporadic doses of diuretics       Full Code    Thank you very much for this referral.  We appreciate the opportunity to participate in this patient's care.  Will follow along with above stated plan.    In this split/shared evaluation I performed performed a medically appropriate history and exam, counseled and educated the patient and/or family member, documented information in EMR, and coordinated care. which accounted for 20 minutes clinical time.     REJI Prater - NP    In this split/shared evaluation I

## 2024-09-02 LAB
ANION GAP SERPL CALC-SCNC: 10 MMOL/L (ref 9–18)
BUN SERPL-MCNC: 51 MG/DL (ref 8–23)
CALCIUM SERPL-MCNC: 8.3 MG/DL (ref 8.8–10.2)
CHLORIDE SERPL-SCNC: 103 MMOL/L (ref 98–107)
CO2 SERPL-SCNC: 22 MMOL/L (ref 20–28)
CREAT SERPL-MCNC: 2.32 MG/DL (ref 0.8–1.3)
ERYTHROCYTE [DISTWIDTH] IN BLOOD BY AUTOMATED COUNT: 14.1 % (ref 11.9–14.6)
GLUCOSE SERPL-MCNC: 102 MG/DL (ref 70–99)
HCT VFR BLD AUTO: 32.3 % (ref 41.1–50.3)
HGB BLD-MCNC: 11.3 G/DL (ref 13.6–17.2)
MCH RBC QN AUTO: 32.5 PG (ref 26.1–32.9)
MCHC RBC AUTO-ENTMCNC: 35 G/DL (ref 31.4–35)
MCV RBC AUTO: 92.8 FL (ref 82–102)
MRSA DNA SPEC QL NAA+PROBE: NOT DETECTED
NRBC # BLD: 0 K/UL (ref 0–0.2)
PLATELET # BLD AUTO: 373 K/UL (ref 150–450)
PMV BLD AUTO: 10.5 FL (ref 9.4–12.3)
POTASSIUM SERPL-SCNC: 4.9 MMOL/L (ref 3.5–5.1)
RBC # BLD AUTO: 3.48 M/UL (ref 4.23–5.6)
S AUREUS CPE NOSE QL NAA+PROBE: NOT DETECTED
SODIUM SERPL-SCNC: 135 MMOL/L (ref 136–145)
WBC # BLD AUTO: 17.9 K/UL (ref 4.3–11.1)

## 2024-09-02 PROCEDURE — 36415 COLL VENOUS BLD VENIPUNCTURE: CPT

## 2024-09-02 PROCEDURE — 2580000003 HC RX 258: Performed by: STUDENT IN AN ORGANIZED HEALTH CARE EDUCATION/TRAINING PROGRAM

## 2024-09-02 PROCEDURE — 6360000002 HC RX W HCPCS: Performed by: STUDENT IN AN ORGANIZED HEALTH CARE EDUCATION/TRAINING PROGRAM

## 2024-09-02 PROCEDURE — 1100000003 HC PRIVATE W/ TELEMETRY

## 2024-09-02 PROCEDURE — 6370000000 HC RX 637 (ALT 250 FOR IP): Performed by: PHYSICIAN ASSISTANT

## 2024-09-02 PROCEDURE — 85027 COMPLETE CBC AUTOMATED: CPT

## 2024-09-02 PROCEDURE — 80048 BASIC METABOLIC PNL TOTAL CA: CPT

## 2024-09-02 PROCEDURE — 6370000000 HC RX 637 (ALT 250 FOR IP): Performed by: NURSE PRACTITIONER

## 2024-09-02 RX ADMIN — TAMSULOSIN HYDROCHLORIDE 0.4 MG: 0.4 CAPSULE ORAL at 09:19

## 2024-09-02 RX ADMIN — BENZONATATE 200 MG: 100 CAPSULE ORAL at 09:19

## 2024-09-02 RX ADMIN — PIPERACILLIN AND TAZOBACTAM 3375 MG: 3; .375 INJECTION, POWDER, LYOPHILIZED, FOR SOLUTION INTRAVENOUS at 00:57

## 2024-09-02 RX ADMIN — PIPERACILLIN AND TAZOBACTAM 3375 MG: 3; .375 INJECTION, POWDER, LYOPHILIZED, FOR SOLUTION INTRAVENOUS at 23:17

## 2024-09-02 RX ADMIN — SODIUM CHLORIDE, PRESERVATIVE FREE 10 ML: 5 INJECTION INTRAVENOUS at 09:31

## 2024-09-02 RX ADMIN — PIPERACILLIN AND TAZOBACTAM 3375 MG: 3; .375 INJECTION, POWDER, LYOPHILIZED, FOR SOLUTION INTRAVENOUS at 09:27

## 2024-09-02 RX ADMIN — SODIUM CHLORIDE, PRESERVATIVE FREE 10 ML: 5 INJECTION INTRAVENOUS at 22:39

## 2024-09-02 RX ADMIN — BENZONATATE 200 MG: 100 CAPSULE ORAL at 14:42

## 2024-09-02 RX ADMIN — BENZONATATE 200 MG: 100 CAPSULE ORAL at 22:39

## 2024-09-02 ASSESSMENT — PAIN SCALES - GENERAL: PAINLEVEL_OUTOF10: 0

## 2024-09-03 PROCEDURE — 2580000003 HC RX 258: Performed by: STUDENT IN AN ORGANIZED HEALTH CARE EDUCATION/TRAINING PROGRAM

## 2024-09-03 PROCEDURE — 6370000000 HC RX 637 (ALT 250 FOR IP): Performed by: NURSE PRACTITIONER

## 2024-09-03 PROCEDURE — 6370000000 HC RX 637 (ALT 250 FOR IP): Performed by: PHYSICIAN ASSISTANT

## 2024-09-03 PROCEDURE — 6360000002 HC RX W HCPCS: Performed by: STUDENT IN AN ORGANIZED HEALTH CARE EDUCATION/TRAINING PROGRAM

## 2024-09-03 PROCEDURE — 1100000003 HC PRIVATE W/ TELEMETRY

## 2024-09-03 RX ORDER — LEVOFLOXACIN 500 MG/1
500 TABLET, FILM COATED ORAL EVERY OTHER DAY
Status: DISCONTINUED | OUTPATIENT
Start: 2024-09-05 | End: 2024-09-05 | Stop reason: HOSPADM

## 2024-09-03 RX ORDER — LEVOFLOXACIN 500 MG/1
750 TABLET, FILM COATED ORAL ONCE
Status: COMPLETED | OUTPATIENT
Start: 2024-09-03 | End: 2024-09-03

## 2024-09-03 RX ORDER — BENZONATATE 100 MG/1
200 CAPSULE ORAL 3 TIMES DAILY PRN
Status: DISCONTINUED | OUTPATIENT
Start: 2024-09-03 | End: 2024-09-05 | Stop reason: HOSPADM

## 2024-09-03 RX ADMIN — LEVOFLOXACIN 750 MG: 500 TABLET, FILM COATED ORAL at 15:45

## 2024-09-03 RX ADMIN — SODIUM CHLORIDE, PRESERVATIVE FREE 10 ML: 5 INJECTION INTRAVENOUS at 20:17

## 2024-09-03 RX ADMIN — BENZONATATE 200 MG: 100 CAPSULE ORAL at 08:43

## 2024-09-03 RX ADMIN — TAMSULOSIN HYDROCHLORIDE 0.4 MG: 0.4 CAPSULE ORAL at 08:43

## 2024-09-03 RX ADMIN — PIPERACILLIN AND TAZOBACTAM 3375 MG: 3; .375 INJECTION, POWDER, LYOPHILIZED, FOR SOLUTION INTRAVENOUS at 08:47

## 2024-09-03 RX ADMIN — SODIUM CHLORIDE, PRESERVATIVE FREE 5 ML: 5 INJECTION INTRAVENOUS at 08:50

## 2024-09-03 ASSESSMENT — PAIN SCALES - GENERAL
PAINLEVEL_OUTOF10: 0
PAINLEVEL_OUTOF10: 1

## 2024-09-03 NOTE — CARE COORDINATION
Patient is agreeable to snf at discharge. CM spoke with daughter about snfs. She requested [facility] Darrel or [facility] Lis. CM sent referrals and awaiting response. Patient's insurance requires precert.

## 2024-09-03 NOTE — CARE COORDINATION
RAJIV met with the patient's daughter to discuss different snf options. She asked that CM send referrals to [facility], [facility] and [facility]. Awaiting response.   Patient needs to work with therapy for snf approval.

## 2024-09-04 PROCEDURE — 97535 SELF CARE MNGMENT TRAINING: CPT

## 2024-09-04 PROCEDURE — 2580000003 HC RX 258: Performed by: STUDENT IN AN ORGANIZED HEALTH CARE EDUCATION/TRAINING PROGRAM

## 2024-09-04 PROCEDURE — 6370000000 HC RX 637 (ALT 250 FOR IP): Performed by: NURSE PRACTITIONER

## 2024-09-04 PROCEDURE — 97530 THERAPEUTIC ACTIVITIES: CPT

## 2024-09-04 PROCEDURE — 1100000003 HC PRIVATE W/ TELEMETRY

## 2024-09-04 RX ADMIN — SODIUM CHLORIDE, PRESERVATIVE FREE 5 ML: 5 INJECTION INTRAVENOUS at 20:39

## 2024-09-04 RX ADMIN — SODIUM CHLORIDE, PRESERVATIVE FREE 10 ML: 5 INJECTION INTRAVENOUS at 08:25

## 2024-09-04 RX ADMIN — TAMSULOSIN HYDROCHLORIDE 0.4 MG: 0.4 CAPSULE ORAL at 08:25

## 2024-09-04 ASSESSMENT — PAIN SCALES - GENERAL
PAINLEVEL_OUTOF10: 0
PAINLEVEL_OUTOF10: 0

## 2024-09-04 NOTE — ACP (ADVANCE CARE PLANNING)
Advance Care Planning     Advance Care Planning Inpatient Note  Spiritual Care Department    Today's Date: 9/4/2024  Unit: [facility] MED SURG    Received request from HealthCare Provider.  Upon review of chart and communication with care team, patient's decision making abilities are not in question.. Patient and Child/Children was/were present in the room during visit.    Goals of ACP Conversation:  Discuss advance care planning documents    Health Care Decision Makers:       Primary Decision Maker: Karen Cohen - Child - [phone]  Summary:  Verified Documents    Advance Care Planning Documents (Patient Wishes):  Healthcare Power of /Advance Directive Appointment of Health Care Agent     Assessment:  Ch reviewed the pt's chart and consulted with the nurse about the pt ability to make decision. Upon arrival in the room, Ch introduced himself and provided education about HCPOA for the patient and his daughter. After a conversation, the patient and her daughter decided not to complete the HCPOA at this time. They said that they will move tomorrow to another facility therefore they will wait for now. Ch provided spiritual care and emotional support through pastoral presence, education, non-anxious presence, active listening, and meaningful conversation. CH is available as needed.      Interventions:  Provided education on documents for clarity and greater understanding  Discussed and provided education on state decision maker hierarchy  Encouraged ongoing ACP conversation with future decision makers and loved ones    Care Preferences Communicated:   No    Outcomes/Plan:  ACP Discussion: Refused    Electronically signed by Chaplain Agustin on 9/4/2024 at 4:59 PM

## 2024-09-04 NOTE — CARE COORDINATION
[facility] accepted patient for rehab. CM will wait for therapy notes and initiate precert. Daughter at bedside made aware.

## 2024-09-05 VITALS
DIASTOLIC BLOOD PRESSURE: 59 MMHG | TEMPERATURE: 98.8 F | OXYGEN SATURATION: 93 % | SYSTOLIC BLOOD PRESSURE: 108 MMHG | WEIGHT: 144 LBS | BODY MASS INDEX: 24.59 KG/M2 | HEIGHT: 64 IN | RESPIRATION RATE: 18 BRPM | HEART RATE: 74 BPM

## 2024-09-05 PROCEDURE — 6370000000 HC RX 637 (ALT 250 FOR IP): Performed by: NURSE PRACTITIONER

## 2024-09-05 PROCEDURE — 2580000003 HC RX 258: Performed by: STUDENT IN AN ORGANIZED HEALTH CARE EDUCATION/TRAINING PROGRAM

## 2024-09-05 RX ORDER — POLYETHYLENE GLYCOL 3350 17 G/17G
17 POWDER, FOR SOLUTION ORAL DAILY PRN
Qty: 7 PACKET | Refills: 0 | Status: SHIPPED | OUTPATIENT
Start: 2024-09-05 | End: 2024-09-12

## 2024-09-05 RX ORDER — TAMSULOSIN HYDROCHLORIDE 0.4 MG/1
0.4 CAPSULE ORAL DAILY
Qty: 30 CAPSULE | Refills: 3 | Status: SHIPPED | OUTPATIENT
Start: 2024-09-06

## 2024-09-05 RX ORDER — ONDANSETRON 4 MG/1
4 TABLET, ORALLY DISINTEGRATING ORAL EVERY 8 HOURS PRN
Qty: 14 TABLET | Refills: 0 | Status: SHIPPED | OUTPATIENT
Start: 2024-09-05 | End: 2024-09-12

## 2024-09-05 RX ORDER — LEVOFLOXACIN 500 MG/1
500 TABLET, FILM COATED ORAL EVERY OTHER DAY
Qty: 1 TABLET | Refills: 0 | Status: SHIPPED | OUTPATIENT
Start: 2024-09-07 | End: 2024-09-08

## 2024-09-05 RX ADMIN — TAMSULOSIN HYDROCHLORIDE 0.4 MG: 0.4 CAPSULE ORAL at 09:05

## 2024-09-05 RX ADMIN — SODIUM CHLORIDE, PRESERVATIVE FREE 10 ML: 5 INJECTION INTRAVENOUS at 09:05

## 2024-09-05 RX ADMIN — LEVOFLOXACIN 500 MG: 500 TABLET, FILM COATED ORAL at 09:05

## 2024-09-05 ASSESSMENT — PAIN SCALES - GENERAL: PAINLEVEL_OUTOF10: 0

## 2024-09-05 NOTE — PLAN OF CARE
Problem: Safety - Adult  Goal: Free from fall injury  9/5/2024 1022 by Lucia Ordonez RN  Outcome: Completed  9/5/2024 0355 by Anastasia De León RN  Outcome: Progressing     Problem: Discharge Planning  Goal: Discharge to home or other facility with appropriate resources  9/5/2024 1022 by Lucia Ordonez RN  Outcome: Completed  9/5/2024 0355 by Anastasia De León RN  Outcome: Progressing     Problem: Pain  Goal: Verbalizes/displays adequate comfort level or baseline comfort level  9/5/2024 1022 by Lucia Ordonez RN  Outcome: Completed  9/5/2024 0355 by Anastasia De León RN  Outcome: Progressing     Problem: Skin/Tissue Integrity  Goal: Absence of new skin breakdown  Description: 1.  Monitor for areas of redness and/or skin breakdown  2.  Assess vascular access sites hourly  3.  Every 4-6 hours minimum:  Change oxygen saturation probe site  4.  Every 4-6 hours:  If on nasal continuous positive airway pressure, respiratory therapy assess nares and determine need for appliance change or resting period.  9/5/2024 1022 by Lucia Ordonez RN  Outcome: Completed  9/5/2024 0355 by Anastasia De León RN  Outcome: Progressing     Problem: ABCDS Injury Assessment  Goal: Absence of physical injury  9/5/2024 1022 by Lucia Ordonez RN  Outcome: Completed  9/5/2024 0355 by Anastasia De León RN  Outcome: Progressing

## 2024-09-05 NOTE — CARE COORDINATION
09/05/24 1118   Services At/After Discharge   Transition of Care Consult (CM Consult) SNF   Partner SNF No   Reason Why Partner SNF Not Chosen Location   Services At/After Discharge Skilled Nursing Facility (SNF)    Resource Information Provided? No   Hospital Transport Time of Discharge 1400   Confirm Follow Up Transport Other (see comment)   Condition of Participation: Discharge Planning   The Plan for Transition of Care is related to the following treatment goals: patient is discharging to snf   The Patient and/or Patient Representative was provided with a Choice of Provider? Patient   The Patient and/Or Patient Representative agree with the Discharge Plan? Yes   Freedom of Choice list was provided with basic dialogue that supports the patient's individualized plan of care/goals, treatment preferences, and shares the quality data associated with the providers?  Yes     Patient is discharging to [facility] via [facility]. CM called patient's daughter to make her aware of transport time.

## 2024-09-05 NOTE — PROGRESS NOTES
Hospitalist Progress Note   Admit Date:  2024 12:54 PM   Name:  Parish Cohen   Age:  84 y.o.  Sex:  male  :  1940   MRN:  [id number]   Room:  828/    Presenting/Chief Complaint: Hematuria     Reason(s) for Admission: Septicemia (HCC) [A41.9]  Sepsis (HCC) [A41.9]  Urinary tract infection with hematuria, site unspecified [N39.0, R31.9]     Hospital Course:   Parish Cohen is an 84 y.o. CM w/ a PMH of HTN, atrial fibrillation (recently taken off Eliquis), dementia who is a resident of [facility] who presented with urinary incontinence w/ hematuria. He felt fine at that time, but they sent him to the ER due to blood in urine. Prior to that he was given 3 days of macrobid then seen by urology  and UA was reportedly clear. ABX were stopped. He went back to [facility] and he had two unwitnessed falls which he feels were mechanical by nature when he tripped over the threshold of the door. On  the patient was transferred to ER and admitted to the Hospitalist service for further evaluation and treatment.     In the ER, vitals were significant with RR 32.  CBC showed WBC 17.7.  CMP showed creatinine 2.67 and BUN 76.  UA showed large blood, large leukocyte esterase, and bacteria 4+.  Lactic acid 2.1. At that time, he felt fine but daughter noticed confusion.      He was admitted to the Hospitalist service.  Treated initially with rocephin, cipro and zosyn, Blood and urine cultures returned w/ Proteus mirabilis.  ID was consulted and placed final recommendations on Aug/27 for levaquin q48 hours based on renal function beginning 24 with EOT 24.  Urology consulted d/t CT finding of hydronephrosis, recommends continuation of Hess, Flomax,  Pulmonology consulted d/t hypoxia and worsened infiltrates. Was given a short course of lasix out of concern for hypervolemia and. Hypoxia resolved.  He will need repeat CT scan in 3 months to monitor area in RADHA.    I removed hess on 
       Hospitalist Progress Note   Admit Date:  2024 12:54 PM   Name:  Parish Cohen   Age:  84 y.o.  Sex:  male  :  1940   MRN:  [id number]   Room:  828/    Presenting/Chief Complaint: Hematuria     Reason(s) for Admission: Septicemia (HCC) [A41.9]  Sepsis (HCC) [A41.9]  Urinary tract infection with hematuria, site unspecified [N39.0, R31.9]     Hospital Course:   Parish Cohen is an 84 y.o. CM w/ a PMH of HTN, atrial fibrillation (reportedly on Eliquis), dementia who is a resident of [facility] who presented with urinary incontinence w/ hematuria     Apparently, he also had unwitnessed fall, although the patient was not able to recall the event.  The patient was transferred to ER and admitted to the Hospitalist service. or further evaluation and treatment.     In the ER, vitals were significant with RR 32.  CBC showed WBC 17.7.  CMP showed creatinine 2.67 and BUN 76.  UA showed large blood, large leukocyte esterase, and bacteria 4+.  Lactic acid 2.1.  He was given ciprofloxacin in ER.     He was admitted to the Hospitalist service.  Blood and urine cultures returned w/ Proteus mirabilis.  ID was consulted and placed final recommendations on Aug/27.  Urology consulted d/t CT finding of hydronephrosis, recommends continuation of Geronimo, Flomax,  Pulmonology consulted d/t hypoxia and worsened infiltrates. Hypoxia resolved.  He will need repeat CT scan in 3 months to monitor area in RADHA.    Subjective & 24hr Events:    - Pt sitting in bed, frustrated and trying to call his daughter.  Assisted pt in placing call.  Also spoke with daughter, Migdalia, during phone call.  Addressed her concerns as able.  During interview, PT/OT entered.   Pt agreeable to working with therapy.      Pt denies current chest pain, shortness of breath, n/v, abd pain.  Poor historian. Geronimo noted with pink-tinged output.     Assessment & Plan:     Severe sepsis  Proteus mirabilis bacteremia, source 
       Hospitalist Progress Note   Admit Date:  2024 12:54 PM   Name:  Parish Cohen   Age:  84 y.o.  Sex:  male  :  1940   MRN:  [id number]   Room:  8/    Presenting/Chief Complaint: Hematuria     Reason(s) for Admission: Septicemia (HCC) [A41.9]  Sepsis (HCC) [A41.9]  Urinary tract infection with hematuria, site unspecified [N39.0, R31.9]     Hospital Course:   Parish Cohen is an 84 y.o. CM w/ a PMH of HTN, atrial fibrillation (recently taken off Eliquis due to falls), dementia, dementia and inguinal hernia (R) who is a resident of [facility] who presented with urinary incontinence w/ hematuria. He felt fine at that time, but they sent him to the ER due to blood in urine. Prior to that he was given 3 days of macrobid then seen by urology  and UA was reportedly clear. ABX were stopped. He went back to [facility] and he had two unwitnessed falls which he feels were mechanical by nature when he tripped over the threshold of the door. On  the patient was transferred to ER due to worrisome hematuria and AMS and admitted to the Hospitalist service for further evaluation and treatment.     In the ER, vitals were significant with RR 32.  CBC showed WBC 17.7.  CMP showed creatinine 2.67 and BUN 76.  UA showed large blood, large leukocyte esterase, and bacteria 4+.  Lactic acid 2.1. At that time, he felt fine but daughter noticed confusion.      He was admitted to the Hospitalist service.  Treated initially with Rocephin, cipro, and Zosyn, eventually got Lasix.  Blood and urine cultures returned w/ Proteus mirabilis.  ID was consulted and placed final recommendations on Aug/27 for Levaquin q48 hours based on renal function beginning 24 with EOT 24.  Urology consulted d/t CT finding of hydronephrosis, recommends Flomax and monitoring PVR (did have Geronimo initially but this was removed).  Pulmonology consulted d/t hypoxia and worsened infiltrates.  Was given a short course 
       Hospitalist Progress Note   Admit Date:  2024 12:54 PM   Name:  Parish Cohen   Age:  84 y.o.  Sex:  male  :  1940   MRN:  [id number]   Room:  8/    Presenting/Chief Complaint: Hematuria     Reason(s) for Admission: Septicemia (HCC) [A41.9]  Sepsis (HCC) [A41.9]  Urinary tract infection with hematuria, site unspecified [N39.0, R31.9]     Hospital Course:   Parish Cohen is an 84 y.o. CM w/ a PMH of HTN, atrial fibrillation (recently taken off Eliquis due to falls), dementia, dementia and inguinal hernia (R) who is a resident of [facility] who presented with urinary incontinence w/ hematuria. He felt fine at that time, but they sent him to the ER due to blood in urine. Prior to that he was given 3 days of macrobid then seen by urology  and UA was reportedly clear. ABX were stopped. He went back to [facility] and he had two unwitnessed falls which he feels were mechanical by nature when he tripped over the threshold of the door. On  the patient was transferred to ER due to worrisome hematuria and AMS and admitted to the Hospitalist service for further evaluation and treatment.     In the ER, vitals were significant with RR 32.  CBC showed WBC 17.7.  CMP showed creatinine 2.67 and BUN 76.  UA showed large blood, large leukocyte esterase, and bacteria 4+.  Lactic acid 2.1. At that time, he felt fine but daughter noticed confusion.      He was admitted to the Hospitalist service.  Treated initially with rocephin, cipro and zosyn,eventually got lasix, Blood and urine cultures returned w/ Proteus mirabilis.  ID was consulted and placed final recommendations on Aug/27 for levaquin q48 hours based on renal function beginning 24 with EOT 24.  Urology consulted d/t CT finding of hydronephrosis, recommends Flomax and monitoring PVR (did have hess initially but this was removed) Pulmonology consulted d/t hypoxia and worsened infiltrates. Was given a short course of 
ACUTE OCCUPATIONAL THERAPY GOALS:   (Developed with and agreed upon by patient and/or caregiver.)  1. Patient will complete lower body bathing and dressing with MIN A and adaptive equipment as needed.   2. Patient will complete upper body bathing and dressing with SUPERVISION and adaptive equipment as needed.  3. Patient will complete toileting with MIN A.   4. Patient will tolerate 30 minutes of OT treatment with 1-2 rest breaks to increase activity tolerance for ADLs.   5. Patient will complete functional transfers with SUPERVISION and adaptive equipment as needed.   6. Patient will complete simple grooming task sitting unsupported with SUPERVISION.     Timeframe: 7 visits      OCCUPATIONAL THERAPY: Daily Note AM   OT Visit Days: 3   Time In/Out  OT Charge Capture  Rehab Caseload Tracker  OT Orders    Parihs Cohen is a 84 y.o. male   PRIMARY DIAGNOSIS: Septicemia (HCC)  Septicemia (HCC) [A41.9]  Sepsis (HCC) [A41.9]  Urinary tract infection with hematuria, site unspecified [N39.0, R31.9]       Inpatient: Payor: [facility] MEDICARE / Plan: formerly [facility] MEDICARE ADVANTAGE / Product Type: *No Product type* /     ASSESSMENT:     REHAB RECOMMENDATIONS:   Recommendation to date pending progress:  Setting:  Short-term Rehab    Equipment:    To Be Determined     ASSESSMENT:  Mr. Cohen continues to weak, still needing more help for all tasks. Daughter Migdalia at bedside. [facility] has denied taking pt back. Accepted at [facility]. Pt has h/o dementia. Encouragement needed for all tasks today and works better if the task is his idea, so OT did that and pt was able to help move himself up in bed for better positioning to eat. Pt able to eat several bites of chicken noodle soup with a spoon and then OT held the bowl and he drank the broth with a straw. Pt reaching for and drinking from a regular cup with straw using a single UE but noted to use both his L and his R on different occasions. Pt was then setup for rinsing his 
ACUTE PHYSICAL THERAPY GOALS:   (Developed with and agreed upon by patient and/or caregiver.)  (1.) Parish Cohen  will move from supine to sit and sit to supine , scoot up and down, and roll side to side with SUPERVISION within 7 treatment day(s).    (2.) Parish Cohen will transfer from bed to chair and chair to bed with SUPERVISION using the least restrictive device within 7 treatment day(s).    (3.) Parish Cohen will ambulate with SUPERVISION for 100 feet with the least restrictive device within 7 treatment day(s).   (4.) Parish Cohen will perform standing static and dynamic balance activities x 5 minutes with SUPERVISION to improve safety within 7 treatment day(s).  (5.) Parish Cohen will perform therapeutic exercises x 10 min for HEP with MINIMAL ASSIST to improve strength, endurance, and functional mobility within 7 treatment day(s).     PHYSICAL THERAPY: Daily Note AM   (Link to Caseload Tracking: PT Visit Days : 3  Time In/Out PT Charge Capture  Rehab Caseload Tracker  Orders    Parish Cohen is a 84 y.o. male   PRIMARY DIAGNOSIS: Septicemia (HCC)  Septicemia (HCC) [A41.9]  Sepsis (HCC) [A41.9]  Urinary tract infection with hematuria, site unspecified [N39.0, R31.9]       Inpatient: Payor: [facility] MEDICARE / Plan: [employer] MEDICARE ADVANTAGE / Product Type: *No Product type* /     ASSESSMENT:     REHAB RECOMMENDATIONS:   Recommendation to date pending progress:  Setting:  Back to villatoro's care with HHPT    Equipment:    To Be Determined     ASSESSMENT:  Mr. Cohen was supine upon contact and agreeable to PT. Patient performed supine to sit with SBA, additional time, and cues for technique. Patient then transferred to standing with CGA and cues for hand placement. Once standing patient was found to be soiled. Patient demonstrated fair static standing balance during prolonged standing ADL activity while getting cleaned up. Patient ambulated 4' to recliner chair with 
CH responded to a consult request for a HCPOA. Ch reviewed the pt's chart and consulted with the nurse about the pt ability to make decision. Upon arrival in the room, Ch introduced himself and provided education about HCPOA for the patient and his daughter. After a conversation, the patient and her daughter decided not to complete the HCPOA at this time. They said that they will move tomorrow to another facility therefore they will wait for now.  provided spiritual care and emotional support through pastoral presence, education, non-anxious presence, active listening, and meaningful conversation. CH is available as needed.   
Comprehensive Nutrition Assessment    Type and Reason for Visit: Initial, Consult  General Nutrition Management/other reason (Hospitalists)    Nutrition Recommendations/Plan:   Meals and Snacks:  Diet: Continue current order  Nutrition Supplement Therapy:  Medical food supplement therapy:  Initiate Ensure Enlive three times per day (this provides 350 kcal and 20 grams protein per bottle)     Malnutrition Assessment:  Malnutrition Status: Moderate malnutrition  Context: Chronic Illness  Findings of clinical characteristics of malnutrition:   Energy Intake:  Unable to assess  Weight Loss:  Mild weight loss (specify amount and time period) (5.9% over ~10 months)     Body Fat Loss:  Mild body fat loss Orbital, Triceps, Fat Overlying Ribs   Muscle Mass Loss:  Mild muscle mass loss Temples (temporalis), Clavicles (pectoralis & deltoids)  Fluid Accumulation:  No significant fluid accumulation     Strength:  Not Performed     Nutrition Assessment:  Nutrition History: Patient from a memory care home. He states he eats 3 meals per day. When asked if he eats well he states he weighed 155# when he moved in and now weighs in the 140s. Does not provide specifics on usual intake.     Do You Have Any Cultural, Amish, or Ethnic Food Preferences?: No   Weight History: 1/16/23 153#, 2/21/24 146#. This is a 5.9% weight loss in ~10 months  Nutrition Background:       PMH: HTN, Afib, dementia, inguinal hernia. Presented with urinary incontinence and hematuria. He was admitted for sepsis from UTI.    Nutrition Interval:  Patient sleeping but wakes easily. Answers \"sure\" when offered to set up lunch. Drank all of orange juice and partial Ensure supplied by RN. Did not attempt to eat and nodded back off before RD left. Discussed with RN, Danika. She states he drinks well but has little interest in food today.      Current Nutrition Therapies:  ADULT DIET; Regular  ADULT ORAL NUTRITION SUPPLEMENT; PM Snack; Standard High 
Comprehensive Nutrition Assessment    Type and Reason for Visit: Reassess  General Nutrition Management/other reason (Hospitalists)    Nutrition Recommendations/Plan:   Meals and Snacks:  Diet: Continue current order  Nutrition Supplement Therapy:  Medical food supplement therapy:  Advance Ensure Enlive four times per day (this provides 350 kcal and 20 grams protein per bottle)     Malnutrition Assessment:  Malnutrition Status: Moderate malnutrition  Context: Chronic Illness  Findings of clinical characteristics of malnutrition:   Energy Intake:  Unable to assess  Weight Loss:  Mild weight loss (specify amount and time period) (5.9% over ~10 months)     Body Fat Loss:  Mild body fat loss Orbital, Triceps, Fat Overlying Ribs   Muscle Mass Loss:  Mild muscle mass loss Temples (temporalis), Clavicles (pectoralis & deltoids)  Fluid Accumulation:  No significant fluid accumulation     Strength:  Not Performed     Nutrition Assessment:  Nutrition History: Patient from a memory care home. He states he eats 3 meals per day. When asked if he eats well he states he weighed 155# when he moved in and now weighs in the 140s. Does not provide specifics on usual intake.     Do You Have Any Cultural, Quaker, or Ethnic Food Preferences?: No   Weight History: 1/16/23 153#, 2/21/24 146#. This is a 5.9% weight loss in ~10 months  Nutrition Background:       PMH: HTN, Afib, dementia, inguinal hernia. Presented with urinary incontinence and hematuria. He was admitted for sepsis from UTI.    Nutrition Interval:  Pt in bed after occupational therapy visit. Pt reports he is drinking Ensure, but that he is not very interested in food. Requests to change to vanilla instead of chocolate ensure has he is getting tired of this flavor.     Spoke with pt daughter. Reports that pt is unable to feed himself, but is able to drink liquids on his own. Discussed potential for assisting patient at meal times with a 1:1 feed; pt daughter declines, 
Infectious Disease Progress Note    Today's Date: 2024   Admit Date: 2024  : 1940    Impression:   GNR bacteremia: Proteus by PCR, susceptibilities pending  Complicated pyelonephritis with hematuria, CT with moderate right hydro/mild left    Dementia  A-fib on Eliquis    Plan:   Engage Urology - given progressive renal failure, patient probably needs a hess catheter/cystoscopy - ? Stent placement  Continue pip/tazo pending MICs  ID will continue to follow    Anti-infectives:   CTX   Cipro -  Zosyn -  SUBJECTIVE  Patient sitting in chair. Gross hematuria noted in canister but not in bag used instead on condom cath   Denies abdominal pain   Objective:     Visit Vitals  /70   Pulse 90   Temp 97.9 °F (36.6 °C) (Oral)   Resp 18   Ht 1.626 m (5' 4\")   Wt 64.9 kg (143 lb)   SpO2 99%   BMI 24.55 kg/m²     Temp (24hrs), Av.3 °F (36.8 °C), Min:97.7 °F (36.5 °C), Max:98.8 °F (37.1 °C)   Patient is sat in chair, has normal respirations  Abdomen is soft non-tender, hernia noted   draining bag hooked up to tubing and cannister noted. Urine in tubing does not appear to have blood grossly but there is blood in cannister      Intake/Output Summary (Last 24 hours) at 2024 0934  Last data filed at 2024 0508  Gross per 24 hour   Intake 290 ml   Output 1600 ml   Net -1310 ml         Microbiology:     Signed By: Julio Cesar Fagan MD     2024      Part of this note may have been written by using a voice dictation software.  The note has been proof read but may still contain some grammatical/other typographical errors.  
Infectious Disease Progress Note    Today's Date: 2024   Admit Date: 2024  : 1940    Impression:   Proteus bacteremia - Urinary souce  Complicated pyelonephritis with hematuria, CT with moderate right hydro/mild left    Dementia  A-fib on Eliquis    Plan:   Appreciate Urology input. Hess now in place  Dc pip/tazo  Start levofloxacin 750mg q48hrs - adjusted to renal function   Complete 2 weeks on 24  ID will sign off. Thank you for this consult    Anti-infectives:   CTX   Cipro   Zosyn -  Levofloxacin  - 24  SUBJECTIVE  Resting comfortably. Now has hess   Objective:     Visit Vitals  /73   Pulse 88   Temp 98.1 °F (36.7 °C) (Oral)   Resp 16   Ht 1.626 m (5' 4\")   Wt 66 kg (145 lb 9.6 oz)   SpO2 100%   BMI 24.99 kg/m²     Temp (24hrs), Av.1 °F (36.7 °C), Min:97.9 °F (36.6 °C), Max:98.2 °F (36.8 °C)   Patient is in bed resting       Intake/Output Summary (Last 24 hours) at 2024 0927  Last data filed at 2024 0820  Gross per 24 hour   Intake 118 ml   Output 3475 ml   Net -3357 ml         Microbiology:     Signed By: Julio Cesar Fagan MD     2024      Part of this note may have been written by using a voice dictation software.  The note has been proof read but may still contain some grammatical/other typographical errors.  
OT Note:    OT attempted to see patient for therapy. Pt was asleep at this time. OT will re-attempt to see patient at a later date/time.    Thanks,  DRISS Bearden    
Occupational Therapy Note:    Attempted to see patient this AM for occupational therapy treatment  session this morning but patient adamantly refused to participate despite encouragement from therapist, WALLACE, RN, and his daughter on the phone. Will check back on patient at a later date/time if schedule permits.Will follow and re-attempt as schedule permits/patient available. Thank you,    Kiara Eisenberg, [facility]    Rehab Caseload Tracker       
PT Daily Note:  Attempted to see patient for physical therapy this morning but patient adamantly refused to participate despite encouragement from therapist, AMADOR, RN, and his daughter on the phone. Will check back on patient at a later date/time if schedule permits.  Thank you,  Aiyana Bender, PTA    
Patient awake, lying in bed. Respirations even and unlabored on RA. A&Ox3 (person, place, time). No signs of distress noted on exam. No needs expressed at this time. Patient denies any pain. Bed locked and in lowest position, call light within reach. Plan of care ongoing.   
Patient c/o being really thirsty and hungry and wanting to eat and drink. Daughter asked to reschedule MRI for in the morning so patient could eat and drink. Notified MRI and hospitalist of request.   
Patient discharged off the floor via transport stretcher with all belongings and is en route to facility with no complications.   
Patient had CT without contrast per stone protocol on 8/26/2024.  There are no intrarenal or ureteral stones present.  He has bilateral hydro likely associated with bladder outlet obstruction.  I just see bilateral hydronephrosis on his MRI.  If high PVRs replace Geronimo.  Creatinine stable at 2.2.  If voiding okay have him follow-up with urology as outpatient.  Please call with additional questions  
Patient lying in bed with eyes closed. No signs of distress noted. Wakes easily. Patient oriented x3 at this time. Patient on room air. Portable telemetry monitor present Purewick in place. Patient denies pain, nausea, or SOB at this time. Instructed patient to call for assistance OOB. Call light and tray table are within reach. Bed alarm is armed.   
Patient resting in bed with no complaints at this time.  Patient is alert and orientated with periods of confusion but no distress noted at this time..  IV intact and patent with no s/s of infection noted.  Respirations even and unlabored with heart rate regular.  Patient unable to ambulate independently without assistance; requires x1 with walker.  PT/OT consulted.  Bed in low locked position with call light within reach.  Patient instructed to call if assistance is needed.  Will continue to monitor.    
Patient sitting up in recliner watching TV.  Patient alert with some confusion.  Facility states patient is independent with ambulation but requires a walker.  Facility states he is pretty independent with all ADL's also.  Patient denies any pain and appears comfortable at this time.  Call light within reach and patient instructed to call if assistance is needed.  Will continue to follow.  
Patient stable and back in bed after being up in recliner most of the day.  Patient's facility called and stated patient is independent.  Patient has been a x1 assist with transfers.  PT/OT following and recommend HH at [facility].  Spoke with patient's daughter this afternoon and answered all questions per patient's permission.  Patient denies any pain and appears comfortable at this time.  Call light within reach and patient instructed to call if assistance is needed.  Report to be given to oncoming RN 7p-7a  
Please complete MRI screening form with signatures. EAL  
Pt arrived to floor via stretcher  RR 44 O2 79% on RA  /94 Temp 99.0 aux  Placed on 7L O2 90%   RT at bedside  MD notified   1732 orders received     
Pt awake in bed. A/ox4 with periods of confusion. Respirations even and unlabored on room air. Pt denies pain at this time, no distress noted. Pt instructed to use call light if assistance is needed with call light in reach. Plan of care ongoing.  
Pt being transported to MRI via stretcher.   
Pt is refusing to wear tele box and keep it on. Pt educated on importance of keeping it on. Pt states \"he wants to die\". MD notified. Plan of care ongoing.   
Pt refuses continuous pulse ox finger probe. Pt educated on importance of pulse ox, pt still denied. Plan of care ongoing.   
Pt resting in bed awake. Alert and oriented times 3 at this time. On RA. No s/sx of distress noted. No complaints of pain. Call light within reach. Bed alarm set   
Pt resting in bed. No signs of distress. Respirations are even and non-labored. Room air. Pt denies pain at this time. Pt instructed to use call light for assistance. Call light within reach. Bed in lowest position.   
Pt resting in bed. Respirations even and unlabored on RA. No s/s of distress. Call light within reach. Preparing to give report to oncoming RN.      No acute events overnight.   
Pt resting in bed. Room air. No signs of distress at this time. Respirations are even and non-labored. No needs or wants expressed at this time. Will report to oncoming shift.  Pt instructed to use call light for assistance. With call light within reach. Bed in lowest position.  
Pt returned to floor from MRI. No s/sx of distress noted. Daughter at bedside.   
Pt returned to floor from ultrasound with transport via stretcher.  
Pt with a PVO of 84 mL  
Pt's daughter, Karen, requesting for  to give her a phone call in the morning. Phone # is [phone].  
Pt's daughter, Karen, requesting to speak/with  in the morning.   
RN called at 5pm. Patient wanting to eat and drink. Family member requested to do scan at a later time. TAVON    
Shift report received from previous RN. Pt in bed. Respirations even and unlabored on 3L NC. No signs of distress, pt denies any pain. Pt instructed to utilize call light if any assistance was needed. Pt verbalized understanding. No further needs stated by pt.     
TRANSFER - IN REPORT:    Verbal report received from SINTIA Godfrey on [facility]  being received from ED for routine progression of patient care      Report consisted of patient's Situation, Background, Assessment and   Recommendations(SBAR).     Information from the following report(s) ED Encounter Summary and ED SBAR was reviewed with the receiving nurse.    Opportunity for questions and clarification was provided.      Assessment completed upon patient's arrival to unit and care assumed.       SBAR received and given to primary receiving RNZee.  
TRANSFER - IN REPORT:    Verbal report received from SINTIA Rodarte on [facility]  being received from [facility] for routine progression of patient care      Report consisted of patient's Situation, Background, Assessment and   Recommendations(SBAR).     Information from the following report(s) Nurse Handoff Report was reviewed with the receiving nurse.    Opportunity for questions and clarification was provided.      Assessment completed upon patient's arrival to unit and care assumed.    
TRANSFER - OUT REPORT:    Verbal report given to SINTIA Rodarte on [facility]  being transferred to [facility] for routine progression of patient care       Report consisted of patient's Situation, Background, Assessment and   Recommendations(SBAR).     Information from the following report(s) Nurse Handoff Report was reviewed with the receiving nurse.           Lines:   Peripheral IV 09/02/24 Left;Anterior Forearm (Active)   Site Assessment Clean, dry & intact 09/05/24 0740   Line Status Capped 09/05/24 0740   Line Care Connections checked and tightened 09/05/24 0740   Phlebitis Assessment No symptoms 09/05/24 0740   Infiltration Assessment 0 09/05/24 0740   Alcohol Cap Used Yes 09/05/24 0740   Dressing Status Clean, dry & intact 09/05/24 0740   Dressing Type Transparent 09/05/24 0740   Dressing Intervention New 09/02/24 6787        Opportunity for questions and clarification was provided.      Patient transported with:  Transport       
BLE's with cues for improved quality of exercise. Steady progress towards PT goals. Will continue PT efforts.     SUBJECTIVE:   Mr. Cohen states, \"Why do I have to get up\"     Social/Functional Type of Home: Assisted living  Home Equipment: Walker - Rolling  Receives Help From: Other (comment) (staff)  ADL Assistance: Needs assistance  Ambulation Assistance: Needs assistance  Transfer Assistance: Independent  Active : No  OBJECTIVE:     PAIN: VITALS / O2: PRECAUTION / LINES / DRAINS:   Pre Treatment:   Pain Assessment: None - Denies Pain      Post Treatment: 0 Vitals        Oxygen    Continuous Pulse Oximetry, External Catheter, and Telemetry     RESTRICTIONS/PRECAUTIONS:        MOBILITY: I Mod I S SBA CGA Min Mod Max Total  NT x2 Comments:   Bed Mobility    Rolling [] [] [] [] [] [] [] [] [] [] []    Supine to Sit [] [] [] [] [] [x] [] [] [] [] []    Scooting [] [] [] [] [] [] [] [] [] [] []    Sit to Supine [] [] [] [] [] [] [] [] [] [] []    Transfers    Sit to Stand [] [] [] [] [] [x] [] [] [] [] []    Bed to Chair [] [] [] [] [] [x] [] [] [] [] []    Stand to Sit [] [] [] [] [] [x] [] [] [] [] []     [] [] [] [] [] [] [] [] [] [] []    I=Independent, Mod I=Modified Independent, S=Supervision, SBA=Standby Assistance, CGA=Contact Guard Assistance,   Min=Minimal Assistance, Mod=Moderate Assistance, Max=Maximal Assistance, Total=Total Assistance, NT=Not Tested    BALANCE: Good Fair+ Fair Fair- Poor NT Comments   Sitting Static [] [x] [] [] [] []    Sitting Dynamic [] [] [x] [] [] []              Standing Static [] [] [x] [] [] []    Standing Dynamic [] [] [] [x] [] []      GAIT: I Mod I S SBA CGA Min Mod Max Total  NT x2 Comments:   Level of Assistance [] [] [] [] [] [x] [] [] [] [] []    Distance  80 feet    DME Rolling Walker    Gait Quality Decreased xiomy , Decreased step clearance, and Decreased step length    Weightbearing Status      Stairs      I=Independent, Mod I=Modified Independent, 
activities while therapist cleaned him up demonstrating fair static standing balance. Patient required a seated rest break in between standing bouts due to fatigue. Patient then ambulated 40' with rolling walker, min assist, chair follow for safety, and cues for sequencing with rolling walker. Patient took several short standing rest breaks during mobility. Slow progress towards PT goals. Will continue PT efforts.     SUBJECTIVE:   Mr. Cohen states, \"WHAT???\"     Social/Functional Type of Home: Assisted living  Home Equipment: Walker - Rolling  Receives Help From: Other (comment) (staff)  ADL Assistance: Needs assistance  Ambulation Assistance: Needs assistance  Transfer Assistance: Independent  Active : No  OBJECTIVE:     PAIN: VITALS / O2: PRECAUTION / LINES / DRAINS:   Pre Treatment:    0      Post Treatment: 0 Vitals        Oxygen    External Catheter    RESTRICTIONS/PRECAUTIONS:        MOBILITY: I Mod I S SBA CGA Min Mod Max Total  NT x2 Comments:   Bed Mobility    Rolling [] [] [] [] [] [] [] [] [] [] []    Supine to Sit [] [] [] [] [] [x] [] [] [] [] [x]    Scooting [] [] [] [] [] [] [] [] [] [] []    Sit to Supine [] [] [] [] [] [] [] [] [] [] []    Transfers    Sit to Stand [] [] [] [] [] [x] [] [] [] [] [x]    Bed to Chair [] [] [] [] [] [] [] [] [] [] []    Stand to Sit [] [] [] [] [] [x] [] [] [] [] [x]     [] [] [] [] [] [] [] [] [] [] []    I=Independent, Mod I=Modified Independent, S=Supervision, SBA=Standby Assistance, CGA=Contact Guard Assistance,   Min=Minimal Assistance, Mod=Moderate Assistance, Max=Maximal Assistance, Total=Total Assistance, NT=Not Tested    BALANCE: Good Fair+ Fair Fair- Poor NT Comments   Sitting Static [x] [] [] [] [] []    Sitting Dynamic [] [x] [] [] [] []              Standing Static [] [] [x] [] [] []    Standing Dynamic [] [] [] [x] [] []      GAIT: I Mod I S SBA CGA Min Mod Max Total  NT x2 Comments:   Level of Assistance [] [] [] [] [] [x] [] [] [] [] [] Chair 
assist for balance, chair follow for safety and cues for sequencing with rolling walker. Patient returned to recliner chair where he was repositioned for comfort. Slow progress towards PT goals. Will continue PT efforts.     SUBJECTIVE:   Mr. Cohen states, \"Oh that chair?\"     Social/Functional Type of Home: Assisted living  Home Equipment: Walker - Rolling  Receives Help From: Other (comment) (staff)  ADL Assistance: Needs assistance  Ambulation Assistance: Needs assistance  Transfer Assistance: Independent  Active : No  OBJECTIVE:     PAIN: VITALS / O2: PRECAUTION / LINES / DRAINS:   Pre Treatment:    0      Post Treatment: 0 Vitals        Oxygen    None    RESTRICTIONS/PRECAUTIONS:        MOBILITY: I Mod I S SBA CGA Min Mod Max Total  NT x2 Comments:   Bed Mobility    Rolling [] [] [] [x] [] [] [] [] [] [] []    Supine to Sit [] [] [] [] [] [x] [] [] [] [] []    Scooting [] [] [] [] [] [] [] [] [] [] []    Sit to Supine [] [] [] [] [] [] [] [] [] [] []    Transfers    Sit to Stand [] [] [] [] [] [x] [] [] [] [] []    Bed to Chair [] [] [] [] [] [] [] [] [] [] []    Stand to Sit [] [] [] [] [] [x] [] [] [] [] []     [] [] [] [] [] [] [] [] [] [] []    I=Independent, Mod I=Modified Independent, S=Supervision, SBA=Standby Assistance, CGA=Contact Guard Assistance,   Min=Minimal Assistance, Mod=Moderate Assistance, Max=Maximal Assistance, Total=Total Assistance, NT=Not Tested    BALANCE: Good Fair+ Fair Fair- Poor NT Comments   Sitting Static [x] [] [] [] [] []    Sitting Dynamic [] [x] [] [] [] []              Standing Static [] [] [x] [] [] []    Standing Dynamic [] [] [] [x] [] []      GAIT: I Mod I S SBA CGA Min Mod Max Total  NT x2 Comments:   Level of Assistance [] [] [] [] [] [x] [] [] [] [] [] Chair follow for safety   Distance  30 feet    DME Rolling Walker    Gait Quality Decreased xiomy , Decreased step clearance, and Decreased step length    Weightbearing Status      Stairs      I=Independent, 
evaluation, pt performed mobility including bed mobility with SBA, required mod A x 2 for STS transfers and ambulation bed to chair. Pt presents as functioning below his baseline, with deficits in mobility secondary to generalized weakness, impaired balance, and decreased safety awareness. Pt will benefit from skilled therapy services to address stated deficits to promote return to highest level of function, independence, and safety. Recommend discharge return to nursing facility with  PT services once medically ready for discharge. If facility is unable to provide care for pt at this increased level of assistance, will need rehab prior to returning to facility.      [facility] AM-PAC™ “6 Clicks” Basic Mobility Inpatient Short Form  AM-PAC Basic Mobility - Inpatient   How much help is needed turning from your back to your side while in a flat bed without using bedrails?: A Little  How much help is needed moving from lying on your back to sitting on the side of a flat bed without using bedrails?: A Little  How much help is needed moving to and from a bed to a chair?: A Lot  How much help is needed standing up from a chair using your arms?: A Lot  How much help is needed walking in hospital room?: A Lot  How much help is needed climbing 3-5 steps with a railing?: Total  AM-PAC Inpatient Mobility Raw Score : 13  AM-PAC Inpatient T-Scale Score : 36.74  Mobility Inpatient CMS 0-100% Score: 64.91  Mobility Inpatient CMS G-Code Modifier : CL    SUBJECTIVE:   Mr. Cohen states, \"Let me do it\"     Social/Functional Type of Home: Assisted living  Home Equipment: Walker - Rolling  Receives Help From:  (care staff)  Ambulation Assistance: Independent (with RW)  Transfer Assistance: Independent (with RW)    OBJECTIVE:     PAIN: VITALS / O2: PRECAUTION / LINES / DRAINS:   Pre Treatment:    No pain reported      Post Treatment: No pain reported Vitals        Oxygen   2L O2 HFNC   Continuous Pulse Oximetry and Telemetry 
infectious  Repeat cultures (1 bottle w/ likely contaminant; 3 bottles NGTD)  ID signed off on Aug/27  TTE makes no mention of vegetation  Zosyn --> levofloxacin w/ EOT Sep/07  D/c trospium  Urology consulted d/t CT finding of hydronephrosis.  Continue Geronimo and started tamsulosin, will follow.    Acute on chronic HFpEF  Hypoxia  -unclear etiology. EKG did not show any RVR. CT chest ruled out PE, but had an evidence of pulmonary volume overload.  TTE 55-60% LVEF  Given Lasix 20 mg on day of admission; given 40 mg overnight Aug/25  Pulmonology consulted on Aug/27; no intervention necessary but does recommend repeat CT chest in 3 months     JORDYN on CKD stage IIIa  Creatinine 2.67 on admission.  It was normal 2 years ago.  Components of intrinsic, pre-, and post-renal  I&O  Given Lasix 20 mg on day of admission; given 40 mg overnight Aug/25  Ur cr 35; Ur osm 311; Ur Na 50  Trend BMP on Aug/28     Paroxysmal atrial fibrillation  Rate controlled  He is not on any rhythm or rate control agents  ER reported he is on apixaban; this is not on his home med list?; patient says he doesn't take this but he is a poor historian     Essential hypertension  Continue amlodipine 10 mg daily  Will hold ARB d/t renal     Dementia  Currently at baseline  CT head non acute  Delirium precautions     History of TIA  Not on aspirin currently.  Likely due to increased bleeding risk in the setting of frequent falls.   Not on statin either, but he has an advanced age  Monitor clinically for any new neurologic deficits     Goals of care  Palliative medicine consulted by Admitting      Anticipated Discharge Arrangements:   Pending    PT/OT evals ordered?  Therapy evals ordered  Diet:  ADULT DIET; Regular; No Added Salt (3-4 gm); 1800 ml  VTE prophylaxis: SCD's   Code status: Full Code      Non-peripheral Lines and Tubes (if present):      Urinary Catheter 08/26/24 2 Way (Active)        Telemetry (if present):  Cardiac/Telemetry Monitor On: 
Maximum assistance or maximum use     of strategies with partial po intake   [] 1 Severe dysphagia- NPO. Unable to tolerate any po safely     PLAN    Duration/Frequency: No treatment indicated at this time    Rehabilitation Potential For Stated Goals: Good    Interdisciplinary Collaboration: MD/ PA/ NP     Medical Necessity    No additional speech therapy indicated at this time.     Education:   Education provided to: Patient and Family/Caregiver  Education topics: Results of evaluation, Role of speech therapy, and Diet recommendations   Education response: verbalized understanding and all questions were answered    Safety:   Patient left in bed.  Family/visitors at bedside.  Results and recommendations communicated to PA via PerfectServe and face to face    Therapy Time:  Time In: 1201  Time Out: 1222  Minutes: 21    PEDRO Yancey  9/1/2024 2:04 PM  
UTI (urinary tract infection)    History of TIA (transient ischemic attack)    Dementia (HCC)    Acute hypoxic respiratory failure (HCC)  Resolved Problems:    * No resolved hospital problems. *    Bilateral hydronephrosis. Hess placed yesterday. Cr improved from 3.13 to 2.76 today. Urine cx grew proteus.  Plan:   Abx per ID recs.  Cont flomax.  Cont to monitor creatinine.  Recommend continuing hess until creatinine normalizes.      LUIS Hall [facility]    Phone: [phone]              
[] [] [x]    Functional Mobility [] [] [] [] [] [] [x] [] [] [] X2--SPT   I=Independent, Mod I=Modified Independent, S=Supervision/Setup, SBA=Standby Assistance, CGA=Contact Guard Assistance, Min=Minimal Assistance, Mod=Moderate Assistance, Max=Maximal Assistance, Total=Total Assistance, NT=Not Tested    PLAN:   FREQUENCY/DURATION   OT Plan of Care: 3 times/week for duration of hospital stay or until stated goals are met, whichever comes first.    PROBLEM LIST:   (Skilled intervention is medically necessary to address:)  Decreased ADL/Functional Activities  Decreased Activity Tolerance  Decreased Balance  Decreased Cognition  Decreased Coordination  Decreased Safety Awareness  Decreased Strength  Decreased Transfer Abilities   INTERVENTIONS PLANNED:  (Benefits and precautions of occupational therapy have been discussed with the patient.)  Self Care Training  Therapeutic Activity  Therapeutic Exercise/HEP  Neuromuscular Re-education  Manual Therapy  Education         TREATMENT:     EVALUATION: LOW COMPLEXITY: (Untimed Charge)  The initial evaluation charge encompasses clinical chart review, objective assessment, interpretation of assessment, and skilled monitoring of the patient's response to treatment in order to develop a plan of care.     TREATMENT:   Co-Treatment PT/OT necessary due to patient's decreased overall endurance/tolerance levels, as well as need for high level skilled assistance to complete functional transfers/mobility and functional tasks  Neuromuscular Re-education (10 Minutes): Patient participated in neuromuscular re-education including functional reaching, weight shifting, postural training, midline training, standing tolerance activity , and sitting balance activity   with minimal verbal cues and tactile cues to improve sitting balance, standing balance, posture, coordination, static balance, and dynamic balance in order to prepare for functional task, prepare for seated ADLs, prepare for 
MS Eliana, OTR/L               
0.5 - 4.6 K/UL    Monocytes Absolute 1.0 0.1 - 1.3 K/UL    Eosinophils Absolute 0.3 0.0 - 0.8 K/UL    Basophils Absolute 0.1 0.0 - 0.2 K/UL    Immature Granulocytes Absolute 0.9 (H) 0.0 - 0.5 K/UL    RBC Comment OVALOCYTES      RBC Comment ANISOCYTOSIS + POIKILOCYTOSIS      RBC Comment MOI CELLS      WBC Comment Result Confirmed By Smear      Platelet Comment ADEQUATE      Differential Type AUTOMATED         No results for input(s): \"COVID19\" in the last 72 hours.    Current Meds:  Current Facility-Administered Medications   Medication Dose Route Frequency    guaiFENesin-dextromethorphan (ROBITUSSIN DM) 100-10 MG/5ML syrup 10 mL  10 mL Oral Q6H PRN    benzonatate (TESSALON) capsule 100 mg  100 mg Oral TID PRN    tamsulosin (FLOMAX) capsule 0.4 mg  0.4 mg Oral Daily    levoFLOXacin (LEVAQUIN) tablet 500 mg  500 mg Oral Every Other Day    sodium chloride flush 0.9 % injection 5-40 mL  5-40 mL IntraVENous 2 times per day    sodium chloride flush 0.9 % injection 5-40 mL  5-40 mL IntraVENous PRN    0.9 % sodium chloride infusion   IntraVENous PRN    ondansetron (ZOFRAN-ODT) disintegrating tablet 4 mg  4 mg Oral Q8H PRN    Or    ondansetron (ZOFRAN) injection 4 mg  4 mg IntraVENous Q6H PRN    polyethylene glycol (GLYCOLAX) packet 17 g  17 g Oral Daily PRN    acetaminophen (TYLENOL) tablet 650 mg  650 mg Oral Q6H PRN    Or    acetaminophen (TYLENOL) suppository 650 mg  650 mg Rectal Q6H PRN    amLODIPine (NORVASC) tablet 10 mg  10 mg Oral Daily    [Held by provider] valsartan (DIOVAN) tablet 80 mg  80 mg Oral Daily       Signed:  LUIS Hernandez    Part of this note may have been written by using a voice dictation software.  The note has been proof read but may still contain some grammatical/other typographical errors.    
Range    MRSA by PCR Not detected NOTD      SA by PCR Not detected NOTD     CBC    Collection Time: 09/02/24  3:28 AM   Result Value Ref Range    WBC 17.9 (H) 4.3 - 11.1 K/uL    RBC 3.48 (L) 4.23 - 5.6 M/uL    Hemoglobin 11.3 (L) 13.6 - 17.2 g/dL    Hematocrit 32.3 (L) 41.1 - 50.3 %    MCV 92.8 82 - 102 FL    MCH 32.5 26.1 - 32.9 PG    MCHC 35.0 31.4 - 35.0 g/dL    RDW 14.1 11.9 - 14.6 %    Platelets 373 150 - 450 K/uL    MPV 10.5 9.4 - 12.3 FL    nRBC 0.00 0.0 - 0.2 K/uL   Basic Metabolic Panel w/ Reflex to MG    Collection Time: 09/02/24  3:28 AM   Result Value Ref Range    Sodium 135 (L) 136 - 145 mmol/L    Potassium 4.9 3.5 - 5.1 mmol/L    Chloride 103 98 - 107 mmol/L    CO2 22 20 - 28 mmol/L    Anion Gap 10 9 - 18 mmol/L    Glucose 102 (H) 70 - 99 mg/dL    BUN 51 (H) 8 - 23 MG/DL    Creatinine 2.32 (H) 0.80 - 1.30 MG/DL    Est, Glom Filt Rate 27 (L) >60 ml/min/1.73m2    Calcium 8.3 (L) 8.8 - 10.2 MG/DL       No results for input(s): \"COVID19\" in the last 72 hours.      Current Meds:  Current Facility-Administered Medications   Medication Dose Route Frequency    piperacillin-tazobactam (ZOSYN) 3,375 mg in sodium chloride 0.9 % 50 mL IVPB (mini-bag)  3,375 mg IntraVENous Q12H    guaiFENesin-dextromethorphan (ROBITUSSIN DM) 100-10 MG/5ML syrup 10 mL  10 mL Oral Q6H PRN    Benzocaine-Menthol (CEPACOL SORE THROAT) 15-2.6 MG lozenge 1 lozenge  1 lozenge Oral Q2H PRN    gadoteridol (PROHANCE) injection 13 mL  13 mL IntraVENous ONCE PRN    morphine 10 MG/5ML solution 2.5 mg  2.5 mg Oral Q4H PRN    tamsulosin (FLOMAX) capsule 0.4 mg  0.4 mg Oral Daily    sodium chloride flush 0.9 % injection 5-40 mL  5-40 mL IntraVENous 2 times per day    sodium chloride flush 0.9 % injection 5-40 mL  5-40 mL IntraVENous PRN    0.9 % sodium chloride infusion   IntraVENous PRN    ondansetron (ZOFRAN-ODT) disintegrating tablet 4 mg  4 mg Oral Q8H PRN    Or    ondansetron (ZOFRAN) injection 4 mg  4 mg IntraVENous Q6H PRN    polyethylene 
   Resistant gene imp by PCR NOT DETECTED NOTDET      KPC (Carbapenem resistance gene) NOT DETECTED NOTDET      Resistant gene ndm by PCR NOT DETECTED NOTDET      Resistant gene oxa-48-like by pcr NOT DETECTED NOTDET      Resistant gene vim by PCR NOT DETECTED NOTDET      [employer] test comment        False positive results may rarely occur. Correlate with clinical,epidemiologic, and other laboratory findings   Lactic Acid    Collection Time: 08/24/24  3:07 PM   Result Value Ref Range    Lactic Acid 1.9 0.5 - 2.0 mmol/L   EKG 12 Lead    Collection Time: 08/24/24  5:32 PM   Result Value Ref Range    Ventricular Rate 109 BPM    QRS Duration 92 ms    Q-T Interval 312 ms    QTc Calculation (Bazett) 420 ms    R Axis -45 degrees    T Axis 91 degrees    Diagnosis       Atrial fibrillation with rapid ventricular response with premature   ventricular or aberrantly conducted complexes  Left anterior fascicular block  Nonspecific T wave abnormality  Abnormal ECG  When compared with ECG of 24-AUG-2024 13:01,  PREVIOUS ECG IS PRESENT  Confirmed by Alex Kitchen MD ([id number]) on 8/25/2024 7:15:13 AM     POCT Glucose    Collection Time: 08/24/24  8:21 PM   Result Value Ref Range    POC Glucose 95 65 - 100 mg/dL    Performed by: DIXONATIONNIAPATIENTCARETECHTRAINEE    Comprehensive Metabolic Panel w/ Reflex to MG    Collection Time: 08/25/24  5:25 AM   Result Value Ref Range    Sodium 133 (L) 136 - 145 mmol/L    Potassium 4.6 3.5 - 5.1 mmol/L    Chloride 102 98 - 107 mmol/L    CO2 19 (L) 20 - 28 mmol/L    Anion Gap 12 9 - 18 mmol/L    Glucose 103 (H) 70 - 99 mg/dL    BUN 81 (H) 8 - 23 MG/DL    Creatinine 2.69 (H) 0.80 - 1.30 MG/DL    Est, Glom Filt Rate 22 (L) >60 ml/min/1.73m2    Calcium 8.5 (L) 8.8 - 10.2 MG/DL    Total Bilirubin 0.7 0.0 - 1.2 MG/DL    ALT 14 12 - 65 U/L    AST 23 15 - 37 U/L    Alk Phosphatase 92 40 - 129 U/L    Total Protein 6.3 6.3 - 8.2 g/dL    Albumin 2.2 (L) 3.2 - 4.6 g/dL    Globulin 4.2 (H) 2.3 - 3.5 g/dL    
%    Eosinophils % 2 0.5 - 7.8 %    Basophils % 1 0.0 - 2.0 %    Immature Granulocytes % 5 0.0 - 5.0 %    Neutrophils Absolute 12.7 (H) 1.7 - 8.2 K/UL    Lymphocytes Absolute 1.0 0.5 - 4.6 K/UL    Monocytes Absolute 1.1 0.1 - 1.3 K/UL    Eosinophils Absolute 0.3 0.0 - 0.8 K/UL    Basophils Absolute 0.2 0.0 - 0.2 K/UL    Immature Granulocytes Absolute 0.8 (H) 0.0 - 0.5 K/UL    RBC Comment SLIGHT  ANISOCYTOSIS + POIKILOCYTOSIS        WBC Comment Result Confirmed By Smear      Platelet Comment ADEQUATE      Differential Type AUTOMATED     Comprehensive Metabolic Panel w/ Reflex to MG    Collection Time: 08/30/24  3:30 AM   Result Value Ref Range    Sodium 133 (L) 136 - 145 mmol/L    Potassium 3.9 3.5 - 5.1 mmol/L    Chloride 103 98 - 107 mmol/L    CO2 23 20 - 28 mmol/L    Anion Gap 8 (L) 9 - 18 mmol/L    Glucose 114 (H) 70 - 99 mg/dL    BUN 62 (H) 8 - 23 MG/DL    Creatinine 2.34 (H) 0.80 - 1.30 MG/DL    Est, Glom Filt Rate 26 (L) >60 ml/min/1.73m2    Calcium 8.0 (L) 8.8 - 10.2 MG/DL    Total Bilirubin 0.4 0.0 - 1.2 MG/DL    ALT 28 12 - 65 U/L    AST 33 15 - 37 U/L    Alk Phosphatase 171 (H) 40 - 129 U/L    Total Protein 6.3 6.3 - 8.2 g/dL    Albumin 2.0 (L) 3.2 - 4.6 g/dL    Globulin 4.4 (H) 2.3 - 3.5 g/dL    Albumin/Globulin Ratio 0.4 (L) 1.0 - 1.9     CEA    Collection Time: 08/30/24  3:30 AM   Result Value Ref Range    CEA 3.8 0.0 - 3.8 ng/mL   Cancer Antigen 19-9    Collection Time: 08/30/24  3:30 AM   Result Value Ref Range    CA 19-9 54.70 (H) 0.00 - 35.00 U/mL   AFP Tumor Marker    Collection Time: 08/30/24  3:30 AM   Result Value Ref Range    AFP-Tumor Marker <1.82 0.00 - 8.30 ng/mL   Procalcitonin    Collection Time: 08/30/24  3:30 AM   Result Value Ref Range    Procalcitonin 2.30 (H) 0.00 - 0.10 ng/mL   PSA, Diagnostic    Collection Time: 08/30/24  3:30 AM   Result Value Ref Range    PSA 1.1 0.0 - 4.0 ng/mL   Urinalysis w rflx microscopic    Collection Time: 08/30/24 12:55 PM   Result Value Ref Range    
oxa-48-like by pcr NOT DETECTED NOTDET      Resistant gene vim by PCR NOT DETECTED NOTDET      [employer]fire test comment        False positive results may rarely occur. Correlate with clinical,epidemiologic, and other laboratory findings   Lactic Acid    Collection Time: 08/24/24  3:07 PM   Result Value Ref Range    Lactic Acid 1.9 0.5 - 2.0 mmol/L   EKG 12 Lead    Collection Time: 08/24/24  5:32 PM   Result Value Ref Range    Ventricular Rate 109 BPM    QRS Duration 92 ms    Q-T Interval 312 ms    QTc Calculation (Bazett) 420 ms    R Axis -45 degrees    T Axis 91 degrees    Diagnosis       Atrial fibrillation with rapid ventricular response with premature   ventricular or aberrantly conducted complexes  Left anterior fascicular block  Nonspecific T wave abnormality  Abnormal ECG  When compared with ECG of 24-AUG-2024 13:01,  PREVIOUS ECG IS PRESENT  Confirmed by Alex Kitchen MD ([id number]) on 8/25/2024 7:15:13 AM     POCT Glucose    Collection Time: 08/24/24  8:21 PM   Result Value Ref Range    POC Glucose 95 65 - 100 mg/dL    Performed by: DIXONATIONNIAPATIENTCARETECHTRAINEE    Comprehensive Metabolic Panel w/ Reflex to MG    Collection Time: 08/25/24  5:25 AM   Result Value Ref Range    Sodium 133 (L) 136 - 145 mmol/L    Potassium 4.6 3.5 - 5.1 mmol/L    Chloride 102 98 - 107 mmol/L    CO2 19 (L) 20 - 28 mmol/L    Anion Gap 12 9 - 18 mmol/L    Glucose 103 (H) 70 - 99 mg/dL    BUN 81 (H) 8 - 23 MG/DL    Creatinine 2.69 (H) 0.80 - 1.30 MG/DL    Est, Glom Filt Rate 22 (L) >60 ml/min/1.73m2    Calcium 8.5 (L) 8.8 - 10.2 MG/DL    Total Bilirubin 0.7 0.0 - 1.2 MG/DL    ALT 14 12 - 65 U/L    AST 23 15 - 37 U/L    Alk Phosphatase 92 40 - 129 U/L    Total Protein 6.3 6.3 - 8.2 g/dL    Albumin 2.2 (L) 3.2 - 4.6 g/dL    Globulin 4.2 (H) 2.3 - 3.5 g/dL    Albumin/Globulin Ratio 0.5 (L) 1.0 - 1.9     CBC with Auto Differential    Collection Time: 08/25/24  5:25 AM   Result Value Ref Range    WBC 20.5 (H) 4.3 - 11.1 K/uL    RBC 
   Hemoglobin 10.9 (L) 13.6 - 17.2 g/dL    Hematocrit 32.5 (L) 41.1 - 50.3 %    MCV 95.3 82 - 102 FL    MCH 32.0 26.1 - 32.9 PG    MCHC 33.5 31.4 - 35.0 g/dL    RDW 13.9 11.9 - 14.6 %    Platelets 289 150 - 450 K/uL    MPV 10.0 9.4 - 12.3 FL    nRBC 0.00 0.0 - 0.2 K/uL    Differential Type AUTOMATED      Neutrophils % 79 (H) 43 - 78 %    Lymphocytes % 5 (L) 13 - 44 %    Monocytes % 8 4.0 - 12.0 %    Eosinophils % 2 0.5 - 7.8 %    Basophils % 1 0.0 - 2.0 %    Immature Granulocytes % 5 0.0 - 5.0 %    Neutrophils Absolute 13.8 (H) 1.7 - 8.2 K/UL    Lymphocytes Absolute 0.9 0.5 - 4.6 K/UL    Monocytes Absolute 1.4 (H) 0.1 - 1.3 K/UL    Eosinophils Absolute 0.3 0.0 - 0.8 K/UL    Basophils Absolute 0.1 0.0 - 0.2 K/UL    Immature Granulocytes Absolute 0.8 (H) 0.0 - 0.5 K/UL       Recent Labs     08/31/24  1333   COVID19 NOT DETECTED       Current Meds:  Current Facility-Administered Medications   Medication Dose Route Frequency    guaiFENesin-dextromethorphan (ROBITUSSIN DM) 100-10 MG/5ML syrup 10 mL  10 mL Oral Q6H PRN    Benzocaine-Menthol (CEPACOL SORE THROAT) 15-2.6 MG lozenge 1 lozenge  1 lozenge Oral Q2H PRN    gadoteridol (PROHANCE) injection 13 mL  13 mL IntraVENous ONCE PRN    piperacillin-tazobactam (ZOSYN) 3,375 mg in sodium chloride 0.9 % 50 mL IVPB (mini-bag)  3,375 mg IntraVENous Q8H    benzonatate (TESSALON) capsule 200 mg  200 mg Oral TID    morphine 10 MG/5ML solution 2.5 mg  2.5 mg Oral Q4H PRN    tamsulosin (FLOMAX) capsule 0.4 mg  0.4 mg Oral Daily    sodium chloride flush 0.9 % injection 5-40 mL  5-40 mL IntraVENous 2 times per day    sodium chloride flush 0.9 % injection 5-40 mL  5-40 mL IntraVENous PRN    0.9 % sodium chloride infusion   IntraVENous PRN    ondansetron (ZOFRAN-ODT) disintegrating tablet 4 mg  4 mg Oral Q8H PRN    Or    ondansetron (ZOFRAN) injection 4 mg  4 mg IntraVENous Q6H PRN    polyethylene glycol (GLYCOLAX) packet 17 g  17 g Oral Daily PRN    acetaminophen (TYLENOL) tablet 650

## 2024-09-05 NOTE — DISCHARGE SUMMARY
%  Pulse via Oximetry: 79 beats per minute  Pulse Oximeter Device Mode: Intermittent  O2 Device: None (Room air)  O2 Flow Rate (L/min): 3 L/min    Estimated body mass index is 24.72 kg/m² as calculated from the following:    Height as of this encounter: 1.626 m (5' 4\").    Weight as of this encounter: 65.3 kg (144 lb).    Intake/Output Summary (Last 24 hours) at 9/5/2024 0951  Last data filed at 9/4/2024 1915  Gross per 24 hour   Intake 240 ml   Output --   Net 240 ml         Physical Exam:  General:    Well nourished.  No overt distress this morning.  Head:  Normocephalic, atraumatic  Eyes:  Sclerae appear normal.  Pupils equally round.    HENT:  Nares appear normal, no drainage.  Moist mucous membranes  Neck:  No restricted ROM.  Trachea midline  CV:   RRR. Normal heart sounds.  No JVD  Lungs:   CTAB.  No wheezing, rhonchi, or rales.  Respirations even, unlabored  Abdomen:   Soft, nontender, nondistended.    Extremities: Warm and dry.   No edema.    Skin:     No rashes.  Normal coloration  Neuro:  CN II-XII grossly intact.  Psych:  Normal mood and affect.      Signed:  Jessie Bryson DO    Part of this note may have been written by using a voice dictation software.  The note has been proof read but may still contain some grammatical/other typographical errors.

## 2024-09-06 ENCOUNTER — CARE COORDINATION (OUTPATIENT)
Dept: CARE COORDINATION | Facility: CLINIC | Age: 84
End: 2024-09-06

## 2024-09-06 NOTE — CARE COORDINATION
Transition of care outreach postponed for 10 days due to patient's discharge to [facility] for short term rehab.

## 2024-09-17 ENCOUNTER — CARE COORDINATION (OUTPATIENT)
Dept: CARE COORDINATION | Facility: CLINIC | Age: 84
End: 2024-09-17

## 2024-09-23 PROBLEM — N39.0 UTI (URINARY TRACT INFECTION): Status: RESOLVED | Noted: 2024-08-24 | Resolved: 2024-09-23

## 2024-09-24 ENCOUNTER — CARE COORDINATION (OUTPATIENT)
Dept: CARE COORDINATION | Facility: CLINIC | Age: 84
End: 2024-09-24

## 2024-09-25 ENCOUNTER — OFFICE VISIT (OUTPATIENT)
Dept: UROLOGY | Age: 84
End: 2024-09-25
Payer: MEDICARE

## 2024-09-25 DIAGNOSIS — N40.1 BPH WITH OBSTRUCTION/LOWER URINARY TRACT SYMPTOMS: ICD-10-CM

## 2024-09-25 DIAGNOSIS — N13.30 BILATERAL HYDRONEPHROSIS: ICD-10-CM

## 2024-09-25 DIAGNOSIS — N20.0 RENAL STONE: ICD-10-CM

## 2024-09-25 DIAGNOSIS — N28.89 RIGHT RENAL MASS: Primary | ICD-10-CM

## 2024-09-25 DIAGNOSIS — N32.89 BLADDER MASS: ICD-10-CM

## 2024-09-25 DIAGNOSIS — N13.8 BPH WITH OBSTRUCTION/LOWER URINARY TRACT SYMPTOMS: ICD-10-CM

## 2024-09-25 PROCEDURE — 1123F ACP DISCUSS/DSCN MKR DOCD: CPT | Performed by: NURSE PRACTITIONER

## 2024-09-25 PROCEDURE — 99214 OFFICE O/P EST MOD 30 MIN: CPT | Performed by: NURSE PRACTITIONER

## 2024-10-01 ENCOUNTER — CARE COORDINATION (OUTPATIENT)
Dept: CARE COORDINATION | Facility: CLINIC | Age: 84
End: 2024-10-01

## 2024-10-01 NOTE — CARE COORDINATION
Care Transitions Post-Acute Facility Update Call    10/1/2024    Patient: Parish Cohen Patient : 1940   MRN: [id number]  Reason for Admission: Septicemia  Discharge Date: 24 RARS: Readmission Risk Score: 8.6  According to Epic notes, patient attended a Urology appointment on 2024 and  it reports patient is currently still @ [facility]. Phone lines are down @ [facility] garcia. Will continue to follow up for discharge.       Care Transitions Post Acute Facility Update    Care Transitions Interventions      Post Acute Facility Update         Nursing       Rehab/Functional       SW/Discharge Planning

## 2024-10-07 ENCOUNTER — CARE COORDINATION (OUTPATIENT)
Dept: CARE COORDINATION | Facility: CLINIC | Age: 84
End: 2024-10-07

## 2024-10-07 NOTE — CARE COORDINATION
Care Transitions Post-Acute Facility Update Call    10/7/2024    Patient: Parish Cohen Patient : 1940   MRN: [id number]  Reason for Admission: Septicema  Discharge Date: 24 RARS: Readmission Risk Score: 8.6  Spoke with staff @ [facility] states Todd patient is currently still @ the facility. Will continue to follow for tentative discharge.       Care Transitions Post Acute Facility Update    Care Transitions Interventions      Post Acute Facility Update         Nursing       Rehab/Functional       SW/Discharge Planning

## 2024-10-14 ENCOUNTER — CARE COORDINATION (OUTPATIENT)
Dept: CARE COORDINATION | Facility: CLINIC | Age: 84
End: 2024-10-14

## 2024-10-14 NOTE — CARE COORDINATION
Care Transitions Post-Acute Facility Update Call    10/14/2024    Patient: Parish Cohen Patient : 1940   MRN: [id number]  Reason for Admission: Septicemia  Discharge Date: 24 RARS: Readmission Risk Score: 8.6  [facility] Patient has been in the facility for 30 days. CTN notified.       Care Transitions Post Acute Facility Update    Care Transitions Interventions      Post Acute Facility Update         Nursing       Rehab/Functional       SW/Discharge Planning

## 2024-10-16 ENCOUNTER — CARE COORDINATION (OUTPATIENT)
Dept: CARE COORDINATION | Facility: CLINIC | Age: 84
End: 2024-10-16

## 2025-01-01 ENCOUNTER — APPOINTMENT (OUTPATIENT)
Dept: GENERAL RADIOLOGY | Age: 85
DRG: 951 | End: 2025-01-01
Payer: MEDICARE

## 2025-01-01 ENCOUNTER — HOSPITAL ENCOUNTER (INPATIENT)
Age: 85
LOS: 1 days | DRG: 951 | End: 2025-03-16
Admitting: FAMILY MEDICINE
Payer: MEDICARE

## 2025-01-01 VITALS
HEART RATE: 119 BPM | RESPIRATION RATE: 26 BRPM | TEMPERATURE: 100.2 F | OXYGEN SATURATION: 79 % | DIASTOLIC BLOOD PRESSURE: 43 MMHG | SYSTOLIC BLOOD PRESSURE: 56 MMHG

## 2025-01-01 DIAGNOSIS — I50.9 ACUTE ON CHRONIC HEART FAILURE, UNSPECIFIED HEART FAILURE TYPE (HCC): Primary | ICD-10-CM

## 2025-01-01 DIAGNOSIS — A41.9 SEPSIS WITH ACUTE RENAL FAILURE, DUE TO UNSPECIFIED ORGANISM, UNSPECIFIED ACUTE RENAL FAILURE TYPE, UNSPECIFIED WHETHER SEPTIC SHOCK PRESENT (HCC): ICD-10-CM

## 2025-01-01 DIAGNOSIS — R65.20 SEPSIS WITH ACUTE RENAL FAILURE, DUE TO UNSPECIFIED ORGANISM, UNSPECIFIED ACUTE RENAL FAILURE TYPE, UNSPECIFIED WHETHER SEPTIC SHOCK PRESENT (HCC): ICD-10-CM

## 2025-01-01 DIAGNOSIS — N17.9 SEPSIS WITH ACUTE RENAL FAILURE, DUE TO UNSPECIFIED ORGANISM, UNSPECIFIED ACUTE RENAL FAILURE TYPE, UNSPECIFIED WHETHER SEPTIC SHOCK PRESENT (HCC): ICD-10-CM

## 2025-01-01 LAB
ALBUMIN SERPL-MCNC: 1.9 G/DL (ref 3.2–4.6)
ALBUMIN/GLOB SERPL: 0.4 (ref 1–1.9)
ALP SERPL-CCNC: 99 U/L (ref 40–129)
ALT SERPL-CCNC: 16 U/L (ref 8–55)
ANION GAP SERPL CALC-SCNC: 22 MMOL/L (ref 7–16)
APPEARANCE UR: ABNORMAL
AST SERPL-CCNC: 47 U/L (ref 15–37)
BACTERIA URNS QL MICRO: ABNORMAL /HPF
BASOPHILS # BLD: 0.06 K/UL (ref 0–0.2)
BASOPHILS NFR BLD: 0.3 % (ref 0–2)
BILIRUB SERPL-MCNC: 1 MG/DL (ref 0–1.2)
BILIRUB UR QL: ABNORMAL
BUN SERPL-MCNC: 63 MG/DL (ref 8–23)
CALCIUM SERPL-MCNC: 8.8 MG/DL (ref 8.8–10.2)
CHLORIDE SERPL-SCNC: 105 MMOL/L (ref 98–107)
CO2 SERPL-SCNC: 15 MMOL/L (ref 20–29)
COLOR UR: ABNORMAL
CREAT SERPL-MCNC: 3.94 MG/DL (ref 0.8–1.3)
DIFFERENTIAL METHOD BLD: ABNORMAL
EOSINOPHIL # BLD: 0.21 K/UL (ref 0–0.8)
EOSINOPHIL NFR BLD: 1 % (ref 0.5–7.8)
EPI CELLS #/AREA URNS HPF: ABNORMAL /HPF
ERYTHROCYTE [DISTWIDTH] IN BLOOD BY AUTOMATED COUNT: 15.6 % (ref 11.9–14.6)
GLOBULIN SER CALC-MCNC: 5.2 G/DL (ref 2.3–3.5)
GLUCOSE SERPL-MCNC: 45 MG/DL (ref 70–99)
GLUCOSE UR STRIP.AUTO-MCNC: NEGATIVE MG/DL
HCT VFR BLD AUTO: 38.4 % (ref 41.1–50.3)
HGB BLD-MCNC: 12.8 G/DL (ref 13.6–17.2)
HGB UR QL STRIP: ABNORMAL
IMM GRANULOCYTES # BLD AUTO: 0.34 K/UL (ref 0–0.5)
IMM GRANULOCYTES NFR BLD AUTO: 1.6 % (ref 0–5)
KETONES UR QL STRIP.AUTO: ABNORMAL MG/DL
LACTATE SERPL-SCNC: 5.4 MMOL/L (ref 0.5–2)
LACTATE SERPL-SCNC: 7 MMOL/L (ref 0.5–2)
LEUKOCYTE ESTERASE UR QL STRIP.AUTO: ABNORMAL
LYMPHOCYTES # BLD: 0.25 K/UL (ref 0.5–4.6)
LYMPHOCYTES NFR BLD: 1.2 % (ref 13–44)
MCH RBC QN AUTO: 32.4 PG (ref 26.1–32.9)
MCHC RBC AUTO-ENTMCNC: 33.3 G/DL (ref 31.4–35)
MCV RBC AUTO: 97.2 FL (ref 82–102)
MONOCYTES # BLD: 0.76 K/UL (ref 0.1–1.3)
MONOCYTES NFR BLD: 3.6 % (ref 4–12)
NEUTS SEG # BLD: 19.38 K/UL (ref 1.7–8.2)
NEUTS SEG NFR BLD: 92.3 % (ref 43–78)
NITRITE UR QL STRIP.AUTO: NEGATIVE
NRBC # BLD: 0.05 K/UL (ref 0–0.2)
NT PRO BNP: ABNORMAL PG/ML (ref 0–450)
PH UR STRIP: 6.5 (ref 5–9)
PLATELET # BLD AUTO: 184 K/UL (ref 150–450)
PLATELET COMMENT: ADEQUATE
PMV BLD AUTO: 10.7 FL (ref 9.4–12.3)
POTASSIUM SERPL-SCNC: 4.6 MMOL/L (ref 3.5–5.1)
PROT SERPL-MCNC: 7.2 G/DL (ref 6.3–8.2)
PROT UR STRIP-MCNC: 100 MG/DL
RBC # BLD AUTO: 3.95 M/UL (ref 4.23–5.6)
RBC #/AREA URNS HPF: ABNORMAL /HPF
RBC MORPH BLD: ABNORMAL
SODIUM SERPL-SCNC: 142 MMOL/L (ref 136–145)
SP GR UR REFRACTOMETRY: 1.01 (ref 1–1.02)
UROBILINOGEN UR QL STRIP.AUTO: 1 EU/DL (ref 0.2–1)
WBC # BLD AUTO: 21 K/UL (ref 4.3–11.1)
WBC URNS QL MICRO: ABNORMAL /HPF

## 2025-01-01 PROCEDURE — 36600 WITHDRAWAL OF ARTERIAL BLOOD: CPT

## 2025-01-01 PROCEDURE — 96366 THER/PROPH/DIAG IV INF ADDON: CPT

## 2025-01-01 PROCEDURE — 96375 TX/PRO/DX INJ NEW DRUG ADDON: CPT

## 2025-01-01 PROCEDURE — 80053 COMPREHEN METABOLIC PANEL: CPT

## 2025-01-01 PROCEDURE — 71045 X-RAY EXAM CHEST 1 VIEW: CPT

## 2025-01-01 PROCEDURE — 81001 URINALYSIS AUTO W/SCOPE: CPT

## 2025-01-01 PROCEDURE — 99285 EMERGENCY DEPT VISIT HI MDM: CPT

## 2025-01-01 PROCEDURE — 84295 ASSAY OF SERUM SODIUM: CPT

## 2025-01-01 PROCEDURE — 96368 THER/DIAG CONCURRENT INF: CPT

## 2025-01-01 PROCEDURE — 82803 BLOOD GASES ANY COMBINATION: CPT

## 2025-01-01 PROCEDURE — 82330 ASSAY OF CALCIUM: CPT

## 2025-01-01 PROCEDURE — 1100000000 HC RM PRIVATE

## 2025-01-01 PROCEDURE — 85025 COMPLETE CBC W/AUTO DIFF WBC: CPT

## 2025-01-01 PROCEDURE — 83880 ASSAY OF NATRIURETIC PEPTIDE: CPT

## 2025-01-01 PROCEDURE — 6360000002 HC RX W HCPCS

## 2025-01-01 PROCEDURE — 83605 ASSAY OF LACTIC ACID: CPT

## 2025-01-01 PROCEDURE — 2580000003 HC RX 258

## 2025-01-01 PROCEDURE — 5A09357 ASSISTANCE WITH RESPIRATORY VENTILATION, LESS THAN 24 CONSECUTIVE HOURS, CONTINUOUS POSITIVE AIRWAY PRESSURE: ICD-10-PCS

## 2025-01-01 PROCEDURE — 94660 CPAP INITIATION&MGMT: CPT

## 2025-01-01 PROCEDURE — 2500000003 HC RX 250 WO HCPCS: Performed by: FAMILY MEDICINE

## 2025-01-01 PROCEDURE — 96365 THER/PROPH/DIAG IV INF INIT: CPT

## 2025-01-01 PROCEDURE — 84132 ASSAY OF SERUM POTASSIUM: CPT

## 2025-01-01 PROCEDURE — 36415 COLL VENOUS BLD VENIPUNCTURE: CPT

## 2025-01-01 PROCEDURE — 82947 ASSAY GLUCOSE BLOOD QUANT: CPT

## 2025-01-01 RX ORDER — LOPERAMIDE HYDROCHLORIDE 2 MG/1
2 CAPSULE ORAL PRN
Status: DISCONTINUED | OUTPATIENT
Start: 2025-01-01 | End: 2025-01-01 | Stop reason: HOSPADM

## 2025-01-01 RX ORDER — POLYETHYLENE GLYCOL 3350 17 G/17G
17 POWDER, FOR SOLUTION ORAL DAILY PRN
Status: DISCONTINUED | OUTPATIENT
Start: 2025-01-01 | End: 2025-01-01 | Stop reason: HOSPADM

## 2025-01-01 RX ORDER — ONDANSETRON 2 MG/ML
4 INJECTION INTRAMUSCULAR; INTRAVENOUS EVERY 6 HOURS PRN
Status: DISCONTINUED | OUTPATIENT
Start: 2025-01-01 | End: 2025-01-01 | Stop reason: HOSPADM

## 2025-01-01 RX ORDER — BISACODYL 10 MG
10 SUPPOSITORY, RECTAL RECTAL DAILY PRN
Status: DISCONTINUED | OUTPATIENT
Start: 2025-01-01 | End: 2025-01-01 | Stop reason: HOSPADM

## 2025-01-01 RX ORDER — ONDANSETRON 2 MG/ML
4 INJECTION INTRAMUSCULAR; INTRAVENOUS EVERY 6 HOURS PRN
Status: DISCONTINUED | OUTPATIENT
Start: 2025-01-01 | End: 2025-01-01 | Stop reason: SDUPTHER

## 2025-01-01 RX ORDER — LEVOFLOXACIN 5 MG/ML
750 INJECTION, SOLUTION INTRAVENOUS ONCE
Status: COMPLETED | OUTPATIENT
Start: 2025-01-01 | End: 2025-01-01

## 2025-01-01 RX ORDER — MORPHINE SULFATE 4 MG/ML
4 INJECTION, SOLUTION INTRAMUSCULAR; INTRAVENOUS
Refills: 0 | Status: DISCONTINUED | OUTPATIENT
Start: 2025-01-01 | End: 2025-01-01 | Stop reason: HOSPADM

## 2025-01-01 RX ORDER — MORPHINE SULFATE 4 MG/ML
4 INJECTION, SOLUTION INTRAMUSCULAR; INTRAVENOUS ONCE
Refills: 0 | Status: COMPLETED | OUTPATIENT
Start: 2025-01-01 | End: 2025-01-01

## 2025-01-01 RX ORDER — ACETAMINOPHEN 325 MG/1
650 TABLET ORAL EVERY 4 HOURS PRN
Status: DISCONTINUED | OUTPATIENT
Start: 2025-01-01 | End: 2025-01-01 | Stop reason: HOSPADM

## 2025-01-01 RX ORDER — GLYCOPYRROLATE 0.2 MG/ML
0.2 INJECTION INTRAMUSCULAR; INTRAVENOUS EVERY 4 HOURS PRN
Status: DISCONTINUED | OUTPATIENT
Start: 2025-01-01 | End: 2025-01-01 | Stop reason: HOSPADM

## 2025-01-01 RX ORDER — ACETAMINOPHEN 650 MG/1
650 SUPPOSITORY RECTAL EVERY 4 HOURS PRN
Status: DISCONTINUED | OUTPATIENT
Start: 2025-01-01 | End: 2025-01-01 | Stop reason: HOSPADM

## 2025-01-01 RX ORDER — GLYCOPYRROLATE 0.2 MG/ML
0.2 INJECTION INTRAMUSCULAR; INTRAVENOUS ONCE
Status: COMPLETED | OUTPATIENT
Start: 2025-01-01 | End: 2025-01-01

## 2025-01-01 RX ORDER — ONDANSETRON 4 MG/1
4 TABLET, ORALLY DISINTEGRATING ORAL EVERY 8 HOURS PRN
Status: DISCONTINUED | OUTPATIENT
Start: 2025-01-01 | End: 2025-01-01 | Stop reason: HOSPADM

## 2025-01-01 RX ORDER — MORPHINE SULFATE 2 MG/ML
2 INJECTION, SOLUTION INTRAMUSCULAR; INTRAVENOUS ONCE
Refills: 0 | Status: COMPLETED | OUTPATIENT
Start: 2025-01-01 | End: 2025-01-01

## 2025-01-01 RX ORDER — MORPHINE SULFATE 2 MG/ML
2 INJECTION, SOLUTION INTRAMUSCULAR; INTRAVENOUS
Refills: 0 | Status: DISCONTINUED | OUTPATIENT
Start: 2025-01-01 | End: 2025-01-01 | Stop reason: HOSPADM

## 2025-01-01 RX ORDER — MORPHINE SULFATE 4 MG/ML
INJECTION, SOLUTION INTRAMUSCULAR; INTRAVENOUS
Status: COMPLETED
Start: 2025-01-01 | End: 2025-01-01

## 2025-01-01 RX ORDER — MORPHINE SULFATE 20 MG/ML
5 SOLUTION ORAL
Refills: 0 | Status: DISCONTINUED | OUTPATIENT
Start: 2025-01-01 | End: 2025-01-01 | Stop reason: HOSPADM

## 2025-01-01 RX ADMIN — VANCOMYCIN HYDROCHLORIDE 1500 MG: 10 INJECTION, POWDER, LYOPHILIZED, FOR SOLUTION INTRAVENOUS at 11:15

## 2025-01-01 RX ADMIN — MORPHINE SULFATE 4 MG: 4 INJECTION INTRAVENOUS at 16:36

## 2025-01-01 RX ADMIN — GLYCOPYRROLATE 0.2 MG: 0.2 INJECTION INTRAMUSCULAR; INTRAVENOUS at 16:43

## 2025-01-01 RX ADMIN — LEVOFLOXACIN 750 MG: 5 INJECTION, SOLUTION INTRAVENOUS at 10:50

## 2025-01-01 RX ADMIN — MORPHINE SULFATE 4 MG: 4 INJECTION, SOLUTION INTRAMUSCULAR; INTRAVENOUS at 12:42

## 2025-01-01 RX ADMIN — MORPHINE SULFATE 2 MG: 2 INJECTION, SOLUTION INTRAMUSCULAR; INTRAVENOUS at 16:37

## 2025-01-01 RX ADMIN — MORPHINE SULFATE 4 MG: 4 INJECTION INTRAVENOUS at 12:42

## 2025-03-16 PROBLEM — Z51.5 COMFORT MEASURES ONLY STATUS: Status: ACTIVE | Noted: 2025-01-01

## 2025-03-16 PROBLEM — N17.9 AKI (ACUTE KIDNEY INJURY): Status: ACTIVE | Noted: 2025-01-01

## 2025-03-16 PROBLEM — R65.21 SEPTIC SHOCK (HCC): Status: ACTIVE | Noted: 2024-01-01

## 2025-03-16 NOTE — ACP (ADVANCE CARE PLANNING)
Advance Care Planning Note   Admit Date:  3/16/2025 10:07 AM   Name:  Parish Cohne   Age:  84 y.o.  Sex:  male  :  1940   MRN:  [id number]   Room:  [place] [address]    Parish Cohen has capacity to make his own decisions:   No    If pt unable to make decisions, POA/surrogate decision maker:  Daughter    Other people present:   Daughter    Patient / surrogate decision-maker directed code status:  DNR/DNI    Patient or surrogate consented to discussion of the current conditions, workup, management plans, prognosis, and the risk for further deterioration.  Time spent: 17 minutes in direct discussion.      Signed:  FEI CASTAÑEDA DO

## 2025-03-16 NOTE — DEATH NOTES
Death Pronouncement Note  Patient's Name: Parish Cohen   Patient's YOB: 1940  MRN Number: [id number]    Admitting Provider: Jesika Castañeda DO  Attending Provider: Jesika Castañeda DO    Patient was examined and the following were absent: Pulses, Blood Pressure, and Respiratory effort    I declared the patient dead on  at 17:34 on 3/16/25    Preliminary Cause of Death: Septic shock due to complicated UTI    Electronically signed by JESIKA CASTAÑEDA DO on 3/16/25 at 5:40 PM EDT

## 2025-03-16 NOTE — ED TRIAGE NOTES
Pt arrives via [facility] coming from [facility] after being called out for AMS/unresponsive patient. Pt w/normal baseline mentation last night. 80/40 w/ EMS. Pt being bagged at time of triage. BGL 78. MD speaking w/ family about code status at time of triage. No intubate, no CPR

## 2025-03-16 NOTE — ED NOTES
Report given to Janelle PATRICIO. Care transferred at this time.     William Leblanc, RN  03/16/25 9399

## 2025-03-16 NOTE — H&P
(PF) 0.9 % 10 mL injection    OR Linked Order Group     ondansetron (ZOFRAN-ODT) disintegrating tablet 4 mg     ondansetron (ZOFRAN) injection 4 mg       Prior to Admit Medications:  Current Outpatient Medications   Medication Instructions    aluminum & magnesium hydroxide-simethicone (MAALOX) 200-200-20 MG/5ML SUSP suspension 30 mLs, Oral, EVERY 6 HOURS PRN    aspirin 81 mg, Oral, DAILY    tamsulosin (FLOMAX) 0.4 mg, Oral, DAILY       I have personally reviewed labs and tests:  Recent Results (from the past 24 hours)   CBC with Auto Differential    Collection Time: 03/16/25 10:41 AM   Result Value Ref Range    WBC 21.0 (H) 4.3 - 11.1 K/uL    RBC 3.95 (L) 4.23 - 5.6 M/uL    Hemoglobin 12.8 (L) 13.6 - 17.2 g/dL    Hematocrit 38.4 (L) 41.1 - 50.3 %    MCV 97.2 82 - 102 FL    MCH 32.4 26.1 - 32.9 PG    MCHC 33.3 31.4 - 35.0 g/dL    RDW 15.6 (H) 11.9 - 14.6 %    Platelets 184 150 - 450 K/uL    MPV 10.7 9.4 - 12.3 FL    nRBC 0.05 0.0 - 0.2 K/uL    Neutrophils % 92.3 (H) 43.0 - 78.0 %    Lymphocytes % 1.2 (L) 13.0 - 44.0 %    Monocytes % 3.6 (L) 4.0 - 12.0 %    Eosinophils % 1.0 0.5 - 7.8 %    Basophils % 0.3 0.0 - 2.0 %    Immature Granulocytes % 1.6 0.0 - 5.0 %    Neutrophils Absolute 19.38 (H) 1.70 - 8.20 K/UL    Lymphocytes Absolute 0.25 (L) 0.50 - 4.60 K/UL    Monocytes Absolute 0.76 0.10 - 1.30 K/UL    Eosinophils Absolute 0.21 0.00 - 0.80 K/UL    Basophils Absolute 0.06 0.00 - 0.20 K/UL    Immature Granulocytes Absolute 0.34 0.0 - 0.5 K/UL    RBC Comment SLIGHT  ANISOCYTOSIS + POIKILOCYTOSIS        Platelet Comment ADEQUATE      Differential Type AUTOMATED     Comprehensive Metabolic Panel    Collection Time: 03/16/25 10:41 AM   Result Value Ref Range    Sodium 142 136 - 145 mmol/L    Potassium 4.6 3.5 - 5.1 mmol/L    Chloride 105 98 - 107 mmol/L    CO2 15 (L) 20 - 29 mmol/L    Anion Gap 22 (H) 7 - 16 mmol/L    Glucose 45 (L) 70 - 99 mg/dL    BUN 63 (H) 8 - 23 MG/DL    Creatinine 3.94 (H) 0.80 - 1.30 MG/DL    Est,

## 2025-03-16 NOTE — PROGRESS NOTES
Referral received from ER staff to support daughter as patient was brought in distress.  Talked at length with daughter as she was processing events.  Decision by daughter for patient to have hospice consult.  Daughter is next of kin.  Prayer and support given.  Chaplains remain available for support.    Ana Pickering M.Div, [facility] / / Bereavement Coordinator  Spiritual Care Department   c: [phone]/ [phone] / [email or web address]     [address] [address]  [email or web address]

## 2025-03-16 NOTE — ED PROVIDER NOTES
Emergency Department Provider Note       PCP: Ezequiel Camacho MD   Age: 84 y.o.   Sex: male     DISPOSITION  2025 08:08:04 PM    ICD-10-CM    1. Acute on chronic heart failure, unspecified heart failure type ([facility])  I50.9       2. Sepsis with acute renal failure, due to unspecified organism, unspecified acute renal failure type, unspecified whether septic shock present ([facility])  A41.9     R65.20     N17.9           Medical Decision Making     Differential diagnosis includes pneumonia, acute on chronic heart failure, fluid overload, atrial abnormality, partial list.    Patient brought in for being found unresponsive, also found to be in respiratory distress with agonal breathing.  Initially ventilation was assisted using BVM.    I did have a long discussion with daughter who is the primary medical decision maker as soon as patient had arrived.  Patient is a DNR.  States that patient would not want to be intubated and daughter states that he has rapidly declined over the past few months.  He no longer is able to do things that he used to a few months ago.  Is no longer answering the phone and intermittently eating.  Daughter is working towards hospice and comfort care as she believes that this would be in line with patient's wishes.  States that patient would not want to be on the BiPAP or any other uncomfortable or life-saving measures.    Was initially given a dose of broad-spectrum antibiotics as he did meet multiple sepsis criteria.    Lab work does show elevated BUN at 50,000, likely has acute on chronic heart failure contributing to the sepsis.  Source was presumed to be pneumonia based off chest x-ray however patient has had similar pleural thickening in the past, not sure whether this represents focal infection or edema.    During his stay, blood pressure continued to be soft, 70s over 40s.  Initially try to 500 cc fluid bolus however I do not think he would tolerate more fluid.  Unfortunately did

## 2025-03-16 NOTE — DISCHARGE SUMMARY
Hospitalist Discharge Summary   Admit Date:  3/16/2025 10:07 AM   DC Note date: 3/16/2025  Name:  Parish Cohen   Age:  84 y.o.  Sex:  male  :  1940   MRN:  [id number]   Room:  [place] [address]  PCP:  Ezequiel Camacho MD    Presenting Complaint: Respiratory Distress     Initial Admission Diagnosis: Comfort measures only status [Z51.5]     Problem List for this Hospitalization (present on admission):    Principal Problem:    Comfort measures only status  Active Problems:    Benign prostatic hyperplasia    Sinus bradycardia    Essential hypertension    Paroxysmal atrial fibrillation (HCC)    CKD (chronic kidney disease), stage IV (HCC)    Septic shock (HCC)    History of TIA (transient ischemic attack)    Dementia (HCC)    Complicated UTI (urinary tract infection)    Moderate protein-calorie malnutrition    JORDYN (acute kidney injury)  Resolved Problems:    * No resolved hospital problems. *      Hospital Course:  84 y.o. male who presented to the ED for cc unresponsiveness. Patient unable to give hx but daughter states she started to notice her father become altered since Wednesday. Patient was found in septic shock secondary to UTI and suspected pneumonia. After discussing with ER provider, comfort measures were decided.      Hx of p afib not on anticoagulation due to falls, BPH, bradycardia, HLD, CKD 4, malnutrition    Time of death : 17:34    Death due to cardiac arrest due to septic shock from complicated UTI. Also concern of pneumonia.     Disposition:   Diet: Diet NPO  Code Status: DNR    Follow Ups:  Follow-up Information    None             Pending Labs       Order Current Status    POCT Blood Gas & Electrolytes In process            Current Discharge Medication List        CONTINUE these medications which have NOT CHANGED    Details   tamsulosin (FLOMAX) 0.4 MG capsule Take 1 capsule by mouth daily  Qty: 30 capsule, Refills: 3      aspirin 81 MG EC tablet Take 1 tablet by mouth in the morning.  Qty:

## 2025-03-17 LAB
ANION GAP BLD CALC-SCNC: ABNORMAL MMOL/L
ARTERIAL PATENCY WRIST A: POSITIVE
BASE DEFICIT BLD-SCNC: 7.4 MMOL/L
BDY SITE: ABNORMAL
CA-I BLD-MCNC: 1.14 MMOL/L (ref 1.12–1.32)
CO2 BLD-SCNC: 17 MMOL/L (ref 13–23)
FIO2 ON VENT: 100 %
GAS FLOW.O2 O2 DELIVERY SYS: ABNORMAL
HCO3 BLD-SCNC: 17.2 MMOL/L (ref 21–28)
INSPIRATION.DURATION SETTING TIME VENT: 0.9 SEC
PCO2 BLD: 31.1 MMHG (ref 35–45)
PEEP RESPIRATORY: 8
PH BLD: 7.35 (ref 7.35–7.45)
PO2 BLD: 211 MMHG (ref 75–100)
POTASSIUM BLD-SCNC: 3.7 MMOL/L (ref 3.5–5.1)
PRESSURE SUPPORT SETTING VENT: 8
RESPIRATORY RATE, POC: 42 (ref 5–40)
RESPIRATORY RATE: 24
SAO2 % BLD: 100 %
SERVICE CMNT-IMP: ABNORMAL
SODIUM BLD-SCNC: 146 MMOL/L (ref 136–145)
SPECIMEN SITE: ABNORMAL
VENTILATION MODE VENT: ABNORMAL

## 2025-03-17 NOTE — ED NOTES
Report received from SINTIA Thomas. Patient is in comfort care at this time and has been taken off of supplemental oxygen.     Janelle Mcginnis RN  03/16/25 2006

## 2025-03-17 NOTE — ED NOTES
RN in room when patient passed. Time of Death was 1734. Death confirmed through auscultation of heart as well as monitor, and pulse checks.      Janelle Mcginnis, RN  03/16/25 2007

## (undated) DEVICE — SPONGE GZ W4XL4IN COT 12 PLY TYP VII WVN C FLD DSGN

## (undated) DEVICE — SUTURE MCRYL SZ 2-0 L27IN ABSRB UD SH L26MM TAPERPOINT NDL Y417H

## (undated) DEVICE — SHEET, DRAPE, SPLIT, STERILE: Brand: MEDLINE

## (undated) DEVICE — GUIDE WIRE, BALL-TIPPED, STERILE

## (undated) DEVICE — 3M™ IOBAN™ 2 ANTIMICROBIAL INCISE DRAPE 6650EZ: Brand: IOBAN™ 2

## (undated) DEVICE — DRAPE,U/SHT,SPLIT,FILM,60X84,STERILE: Brand: MEDLINE

## (undated) DEVICE — PREP SKN CHLRAPRP APL 26ML STR --

## (undated) DEVICE — SOLUTION IV 1000ML 0.9% SOD CHL

## (undated) DEVICE — TFN: Brand: MEDLINE INDUSTRIES, INC.

## (undated) DEVICE — DRAPE C-ARMOUR C-ARM KIT --

## (undated) DEVICE — 7 DAY SILVER-COATED ANTIMICROBIAL BARRIER DRESSING: Brand: ACTICOAT 7  4" X 5"

## (undated) DEVICE — DRILL, AO, STERILE

## (undated) DEVICE — INTENDED FOR TISSUE SEPARATION, AND OTHER PROCEDURES THAT REQUIRE A SHARP SURGICAL BLADE TO PUNCTURE OR CUT.: Brand: BARD-PARKER SAFETY BLADES SIZE 10, STERILE

## (undated) DEVICE — PAD,ABDOMINAL,5"X9",ST,LF,25/BX: Brand: MEDLINE INDUSTRIES, INC.

## (undated) DEVICE — REAMER SHAFT, MOD.TRINKLE: Brand: BIXCUT

## (undated) DEVICE — DRAPE SHT 3 QTR PROXIMA 53X77 --

## (undated) DEVICE — K-WIRE